# Patient Record
Sex: FEMALE | Race: WHITE | NOT HISPANIC OR LATINO | Employment: OTHER | URBAN - METROPOLITAN AREA
[De-identification: names, ages, dates, MRNs, and addresses within clinical notes are randomized per-mention and may not be internally consistent; named-entity substitution may affect disease eponyms.]

---

## 2017-05-04 LAB
CHOLEST SERPL-MCNC: 198 MG/DL (ref 0–199)
CREAT SERPL-MCNC: 1.09 MG/DL (ref 0.55–1.02)
GFR SERPL CREATININE-BSD FRML MDRD: 54 ML/MIN/1.73M2
GLUCOSE SERPL-MCNC: 93 MG/DL (ref 70–100)
HDLC SERPL-MCNC: 40 MG/DL
LDLC SERPL CALC-MCNC: 140 MG/DL (ref 0–129)
NONHDLC SERPL-MCNC: 158 MG/DL
POTASSIUM SERPL-SCNC: 4.3 MMOL/L (ref 3.5–5.1)
TRIGL SERPL-MCNC: 89 MG/DL (ref 0–149)
TSH SERPL-ACNC: 0.97 UIU/ML (ref 0.3–4.5)

## 2017-05-08 ENCOUNTER — TRANSFERRED RECORDS (OUTPATIENT)
Dept: HEALTH INFORMATION MANAGEMENT | Facility: CLINIC | Age: 62
End: 2017-05-08

## 2017-05-08 LAB
HPV ABSTRACT: NORMAL
PAP-ABSTRACT: NORMAL

## 2018-02-08 LAB
CREAT SERPL-MCNC: 1.06 MG/DL (ref 0.55–1.02)
GFR SERPL CREATININE-BSD FRML MDRD: 55 ML/MIN/1.73M2

## 2018-05-18 ENCOUNTER — TRANSFERRED RECORDS (OUTPATIENT)
Dept: HEALTH INFORMATION MANAGEMENT | Facility: CLINIC | Age: 63
End: 2018-05-18

## 2018-06-18 ENCOUNTER — TRANSFERRED RECORDS (OUTPATIENT)
Dept: MULTI SPECIALTY CLINIC | Facility: CLINIC | Age: 63
End: 2018-06-18

## 2019-07-19 ENCOUNTER — OFFICE VISIT (OUTPATIENT)
Dept: FAMILY MEDICINE | Facility: CLINIC | Age: 64
End: 2019-07-19
Payer: COMMERCIAL

## 2019-07-19 VITALS
DIASTOLIC BLOOD PRESSURE: 62 MMHG | HEART RATE: 64 BPM | WEIGHT: 257 LBS | SYSTOLIC BLOOD PRESSURE: 102 MMHG | TEMPERATURE: 97.9 F | BODY MASS INDEX: 38.95 KG/M2 | HEIGHT: 68 IN

## 2019-07-19 DIAGNOSIS — E03.4 HYPOTHYROIDISM DUE TO ACQUIRED ATROPHY OF THYROID: Primary | ICD-10-CM

## 2019-07-19 DIAGNOSIS — Z12.39 SCREENING FOR BREAST CANCER: ICD-10-CM

## 2019-07-19 DIAGNOSIS — R94.31 ABNORMAL ELECTROCARDIOGRAM: ICD-10-CM

## 2019-07-19 DIAGNOSIS — Z78.0 ASYMPTOMATIC POSTMENOPAUSAL STATUS: ICD-10-CM

## 2019-07-19 DIAGNOSIS — Z82.49 FAMILY HISTORY OF ISCHEMIC HEART DISEASE: ICD-10-CM

## 2019-07-19 DIAGNOSIS — F33.42 RECURRENT MAJOR DEPRESSIVE DISORDER, IN FULL REMISSION (H): ICD-10-CM

## 2019-07-19 PROCEDURE — 84443 ASSAY THYROID STIM HORMONE: CPT | Performed by: FAMILY MEDICINE

## 2019-07-19 PROCEDURE — 36415 COLL VENOUS BLD VENIPUNCTURE: CPT | Performed by: FAMILY MEDICINE

## 2019-07-19 PROCEDURE — 99203 OFFICE O/P NEW LOW 30 MIN: CPT | Performed by: FAMILY MEDICINE

## 2019-07-19 PROCEDURE — 93000 ELECTROCARDIOGRAM COMPLETE: CPT | Performed by: FAMILY MEDICINE

## 2019-07-19 RX ORDER — LEVOTHYROXINE SODIUM 100 UG/1
100 TABLET ORAL EVERY MORNING
COMMUNITY
End: 2019-07-19

## 2019-07-19 RX ORDER — LEVOTHYROXINE SODIUM 100 UG/1
100 TABLET ORAL EVERY MORNING
Qty: 90 TABLET | Refills: 3 | Status: SHIPPED | OUTPATIENT
Start: 2019-07-19 | End: 2020-07-23

## 2019-07-19 ASSESSMENT — MIFFLIN-ST. JEOR: SCORE: 1755.27

## 2019-07-19 NOTE — PROGRESS NOTES
"Subjective     Hanh Villalba is a 63 year old female who presents to clinic today for the following health issues:    Is a retired  ([employer] [place]).  Is partnered with her long term partner Abigail.  Enjoying care home.  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    HPI   New Patient/Transfer of Care  Has a history of hypothyroidism and depression.  Feels that her depression is completely resolved with Prozac and desires to continue on medication.    Mother had an MI at age 70 - and  of heart disease at age 80.   70 year old sister just had an MI 4 weeks ago - was asymptomatic prior.        Reviewed and updated as needed this visit by Provider  Allergies  Meds  Problems  Surg Hx         Review of Systems   ROS COMP: Constitutional, HEENT, cardiovascular, pulmonary, gi and gu systems are negative, except as otherwise noted.      Objective    /62 (Cuff Size: Adult Large)   Pulse 64   Temp 97.9  F (36.6  C) (Oral)   Ht 1.721 m (5' 7.75\")   Wt 116.6 kg (257 lb)   BMI 39.37 kg/m    Body mass index is 39.37 kg/m .  Physical Exam   GENERAL: healthy, alert and no distress  RESP: lungs clear to auscultation - no rales, rhonchi or wheezes  BREAST: normal without masses, tenderness or nipple discharge and no palpable axillary masses or adenopathy  CV: regular rate and rhythm, normal S1 S2, no S3 or S4, no murmur, click or rub, no peripheral edema and peripheral pulses strong  PSYCH: mentation appears normal, affect normal/bright    Diagnostic Test Results:  No results found for this or any previous visit (from the past 24 hour(s)).    EKG - normal, bradycardic.    Assessment & Plan     (E03.4) Hypothyroidism due to acquired atrophy of thyroid  (primary encounter diagnosis)  Comment: asymptomatic   Plan: levothyroxine (SYNTHROID/LEVOTHROID) 100 MCG         tablet, TSH with free T4 reflex        adjust therapy based on labs      (F33.42) Recurrent major depressive disorder, in full " "remission (H)  Comment: in remission  Plan: FLUoxetine (PROZAC) 20 MG capsule        Continue current medication      (Z78.0) Asymptomatic postmenopausal status  Comment: has never had DEXA  Plan: DX Hip/Pelvis/Spine        adjust therapy based on results    (Z12.31) Screening for breast cancer  Comment:   Plan: mammogram ordered    (Z82.49) Family history of ischemic heart disease  Comment:   Plan: EKG 12-lead complete w/read - Clinics,         Echocardiogram Exercise Stress        Advised stress test given family history         BMI:   Estimated body mass index is 39.37 kg/m  as calculated from the following:    Height as of this encounter: 1.721 m (5' 7.75\").    Weight as of this encounter: 116.6 kg (257 lb).   Weight management plan: Discussed healthy diet and exercise guidelines        Discussed that we completed many tasks for a preventive visit today.  Could consider returning this year to complete CPE or we could simply meet again in a year for Annual Wellness exam.      No follow-ups on file.    Angeli Messina MD  [facility]      "

## 2019-07-19 NOTE — PATIENT INSTRUCTIONS
You will be contacted to schedule your stress test.    My care team will contact you with lab results.

## 2019-07-19 NOTE — LETTER
[facility]  [address] [address]  [phone]                                                                                                July 22, 2019    Hanh Villalba  [address] [place] [address]        Dear Ms. Villalba,    Your recent lab results were within normal limits. A copy of those results are included with this letter.      Sincerely,      Mari Messina MD/    Results for orders placed or performed in visit on 07/19/19   TSH with free T4 reflex   Result Value Ref Range    TSH 1.24 0.40 - 4.00 mU/L

## 2019-07-20 LAB — TSH SERPL DL<=0.005 MIU/L-ACNC: 1.24 MU/L (ref 0.4–4)

## 2019-08-05 ENCOUNTER — ANCILLARY PROCEDURE (OUTPATIENT)
Dept: BONE DENSITY | Facility: CLINIC | Age: 64
End: 2019-08-05
Attending: FAMILY MEDICINE
Payer: COMMERCIAL

## 2019-08-05 ENCOUNTER — ANCILLARY PROCEDURE (OUTPATIENT)
Dept: MAMMOGRAPHY | Facility: CLINIC | Age: 64
End: 2019-08-05
Attending: FAMILY MEDICINE
Payer: COMMERCIAL

## 2019-08-05 DIAGNOSIS — Z12.39 SCREENING FOR BREAST CANCER: ICD-10-CM

## 2019-08-05 DIAGNOSIS — Z78.0 ASYMPTOMATIC POSTMENOPAUSAL STATUS: ICD-10-CM

## 2019-08-05 PROCEDURE — 77067 SCR MAMMO BI INCL CAD: CPT | Mod: TC

## 2019-08-05 PROCEDURE — 77080 DXA BONE DENSITY AXIAL: CPT | Performed by: INTERNAL MEDICINE

## 2019-08-06 ENCOUNTER — ANCILLARY PROCEDURE (OUTPATIENT)
Dept: CARDIOLOGY | Facility: CLINIC | Age: 64
End: 2019-08-06
Attending: FAMILY MEDICINE
Payer: COMMERCIAL

## 2019-08-06 DIAGNOSIS — R94.31 ABNORMAL ELECTROCARDIOGRAM: ICD-10-CM

## 2019-08-06 DIAGNOSIS — Z82.49 FAMILY HISTORY OF ISCHEMIC HEART DISEASE: ICD-10-CM

## 2019-08-06 PROCEDURE — 93321 DOPPLER ECHO F-UP/LMTD STD: CPT | Mod: 26 | Performed by: INTERNAL MEDICINE

## 2019-08-06 PROCEDURE — 93016 CV STRESS TEST SUPVJ ONLY: CPT | Performed by: INTERNAL MEDICINE

## 2019-08-06 PROCEDURE — 93350 STRESS TTE ONLY: CPT | Mod: TC | Performed by: INTERNAL MEDICINE

## 2019-08-06 PROCEDURE — 93321 DOPPLER ECHO F-UP/LMTD STD: CPT | Mod: TC | Performed by: INTERNAL MEDICINE

## 2019-08-06 PROCEDURE — 93350 STRESS TTE ONLY: CPT | Mod: 26 | Performed by: INTERNAL MEDICINE

## 2019-08-06 PROCEDURE — 93325 DOPPLER ECHO COLOR FLOW MAPG: CPT | Mod: 26 | Performed by: INTERNAL MEDICINE

## 2019-08-06 PROCEDURE — 93352 ADMIN ECG CONTRAST AGENT: CPT | Performed by: INTERNAL MEDICINE

## 2019-08-06 PROCEDURE — 93325 DOPPLER ECHO COLOR FLOW MAPG: CPT | Mod: TC | Performed by: INTERNAL MEDICINE

## 2019-08-06 PROCEDURE — 93018 CV STRESS TEST I&R ONLY: CPT | Performed by: INTERNAL MEDICINE

## 2019-08-06 PROCEDURE — 93017 CV STRESS TEST TRACING ONLY: CPT | Performed by: INTERNAL MEDICINE

## 2019-08-06 PROCEDURE — 40000264 ZZHC STATISTIC IV PUSH SINGLE INITIAL SUBSTANCE: Performed by: INTERNAL MEDICINE

## 2019-08-06 RX ADMIN — Medication 5 ML: at 09:15

## 2019-08-13 ENCOUNTER — MYC MEDICAL ADVICE (OUTPATIENT)
Dept: FAMILY MEDICINE | Facility: CLINIC | Age: 64
End: 2019-08-13

## 2019-08-14 ENCOUNTER — OFFICE VISIT (OUTPATIENT)
Dept: FAMILY MEDICINE | Facility: CLINIC | Age: 64
End: 2019-08-14
Payer: COMMERCIAL

## 2019-08-14 VITALS
SYSTOLIC BLOOD PRESSURE: 108 MMHG | WEIGHT: 255 LBS | BODY MASS INDEX: 38.65 KG/M2 | HEIGHT: 68 IN | HEART RATE: 68 BPM | TEMPERATURE: 98.5 F | DIASTOLIC BLOOD PRESSURE: 60 MMHG

## 2019-08-14 DIAGNOSIS — N95.0 POST-MENOPAUSAL BLEEDING: Primary | ICD-10-CM

## 2019-08-14 DIAGNOSIS — E03.4 HYPOTHYROIDISM DUE TO ACQUIRED ATROPHY OF THYROID: ICD-10-CM

## 2019-08-14 PROCEDURE — 99214 OFFICE O/P EST MOD 30 MIN: CPT | Performed by: FAMILY MEDICINE

## 2019-08-14 ASSESSMENT — MIFFLIN-ST. JEOR: SCORE: 1746.2

## 2019-08-14 NOTE — PROGRESS NOTES
"Subjective     Hanh Villalba is a 63 year old female who presents to clinic today for the following health issues:    HPI   Vaginal Bleeding (Dysmenorrhea)  Onset: august 13    Description:   Duration of bleeding episodes: started august 13th with spotting  Frequency between periods:  Post menopausal  Cramping: mild lower abdominal cramping    Accompanying Signs & Symptoms:  Weakness: no   Lightheadedness: no   Hot flashes: not for many years  Nosebleeds/Easy bruising: no   Vaginal Discharge: no   No history of abnormal discharge    Menarche-at age 11.   menopause around [age], since then no vaginal bleeding  No pregnancy, no sexual activity  No gyn issues, no prolonged   History of oophorectomy left side-history of cyst  7/19/19=tsh normal      Woke up in the morning with wiping noticed brown/red discharge, she was sure it was coming from vaginal area, from vaginal area, today stopped  No trauma or sexual activity  Mother with history of AML  gramdmother -uterine or cervical cancer    Pap done in 2017 was neg with neg hpv        Patient Active Problem List   Diagnosis     Hypothyroidism due to acquired atrophy of thyroid     Recurrent major depressive disorder, in full remission (H)     Past Surgical History:   Procedure Laterality Date     SALPINGO OOPHORECTOMY,R/L/SHANDA Left     Salpingo Oophorectomy,       Social History     Tobacco Use     Smoking status: Former Smoker     Smokeless tobacco: Never Used   Substance Use Topics     Alcohol use: Yes     No family history on file.        Reviewed and updated as needed this visit by Provider         Review of Systems   ROS COMP: Constitutional, HEENT, cardiovascular, pulmonary, GI, , musculoskeletal, neuro, skin, endocrine and psych systems are negative, except as otherwise noted.      Objective    /60   Pulse 68   Temp 98.5  F (36.9  C) (Oral)   Ht 1.721 m (5' 7.75\")   Wt 115.7 kg (255 lb)   BMI 39.06 kg/m    Body mass index is 39.06 kg/m .  Physical " "Exam   GENERAL: healthy, alert and no distress  EYES: Eyes grossly normal to inspection, PERRL and conjunctivae and sclerae normal  RESP: lungs clear to auscultation - no rales, rhonchi or wheezes  BREAST: normal without masses, tenderness or nipple discharge and no palpable axillary masses or adenopathy  CV: regular rate and rhythm, normal S1 S2, no S3 or S4, no murmur, click or rub, no peripheral edema and peripheral pulses strong  ABDOMEN: soft, nontender, no hepatosplenomegaly, no masses and bowel sounds normal  MS: no gross musculoskeletal defects noted, no edema  SKIN: no suspicious lesions or rashes  NEURO: Normal strength and tone, mentation intact and speech normal  PSYCH: mentation appears normal, affect normal/bright    Diagnostic Test Results:  Labs reviewed in Epic        Assessment & Plan       ICD-10-CM    1. Post-menopausal bleeding N95.0 US Pelvic Complete w Transvaginal   2. Hypothyroidism due to acquired atrophy of thyroid E03.4    new to this provider  Patient is  who went through menapause about 15-20 years ago with resolved postmenopausal bleeding.  History of hypothyroidism-taking meds, recent labs was normal.    Ultrasound as advised, reviewed differential diagnosis being cancer  Follow up with gyn for endometrial biopsy    BMI:   Estimated body mass index is 39.06 kg/m  as calculated from the following:    Height as of this encounter: 1.721 m (5' 7.75\").    Weight as of this encounter: 115.7 kg (255 lb).   Weight management plan: Discussed healthy diet and exercise guidelines        Work on weight loss  Regular exercise    No follow-ups on file.    Lamonte Castillo DO  [facility]    "

## 2019-08-14 NOTE — PATIENT INSTRUCTIONS
Ultrasound as advised  Follow up with gyn  Lamonte Castillo D.O.    Patient Education     [facility]   Discharged by : Sierra Proctor MA    Paper scripts provided to patient : no     If you have any questions regarding your visit please contact your care team:     Team Gold                Clinic Hours Telephone Number     Dr. Mari Brooksfer Femi, CNP 7am-7pm  Monday - Thursday   7am-5pm  Fridays  [phone]   (Appointment scheduling available 24/7)     RN Line  [phone] option 2     Urgent Care - [place] - 11am-9pm Monday-Friday Saturday-Sunday- 9am-5pm     [place] -   5pm-9pm Monday-Friday Saturday-Sunday- 9am-5pm    [phone] - [place]    [phone] - [place]     For a Price Quote for your services, please call our FileHold Document Management software Price Line at [phone].     What options do I have for visits at the clinic other than the traditional office visit?     To expand how we care for you, many of our providers are utilizing electronic visits (e-visits) and telephone visits, when medically appropriate, for interactions with their patients rather than a visit in the clinic. We also offer nurse visits for many medical concerns. Just like any other service, we will bill your insurance company for this type of visit based on time spent on the phone with your provider. Not all insurance companies cover these visits. Please check with your medical insurance if this type of visit is covered. You will be responsible for any charges that are not paid by your insurance.   E-visits via Webupo: generally incur a $45.00 fee.     Telephone visits:  Time spent on the phone: *charged based on time that is spent on the phone in increments of 10 minutes. Estimated cost:   5-10 mins $30.00   11-20 mins. $59.00   21-30 mins. $85.00     Use [facility]upo (secure email communication and access to your chart) to send your primary care provider a  message or make an appointment. Ask someone on your Team how to sign up for [facility].     As always, Thank you for trusting us with your health care needs!    [facility]:    Scheduling Appointments  [place]  Call: [phone]    Betty Zavala, [facility]  Call: [phone]    [facility]  Call: [phone]    For Gastroenterology referrals   [facility]   Clinics and Surgery [place], 4th Floor   [address] [place] [address]   Appointments: [phone]    WHERE TO GO FOR CARE?  Clinic    Make an appointment if you:       Are sick (cold, cough, flu, sore throat, earache or in pain).       Have a small injury (sprain, small cut, burn or broken bone).       Need a physical exam, Pap smear, vaccine or prescription refill.       Have questions about your health or medicines.    To reach us:      Call [phone] ([phone]). Open 24 hours every day. (For counseling services, call [phone].)    Log into [employer] at [email or web address]. (Visit [email or web address] to create an account.) Hospital emergency room    An emergency is a serious or life- threatening problem that must be treated right away.    Call 590 or get to the hospital if you have:      Very bad or sudden:            - Chest pain or pressure         - Bleeding         - Head or belly pain         - Dizziness or trouble seeing, walking or                          Speaking      Problems breathing      Blood in your vomit or you are coughing up blood      A major injury (knocked out, loss of a finger or limb, rape, broken bone protruding from skin)    A mental health crisis. (Or call the Mental Health Crisis line at 1-216.630.3772 or Suicide Prevention Hotline at 1-386.817.2242.)    Open 24 hours every day. You don't need an appointment.     Urgent care    Visit urgent care for sickness or small injuries when the clinic is closed. You  don't need an appointment. To check hours or find an urgent care near you, visit [email or web address]. Online care    Get online care from [facility] for more than 70 common problems, like colds, allergies and infections. Open 24 hours every day at:   [email or web address]   Need help deciding?    For advice about where to be seen, you may call your clinic and ask to speak with a nurse. We're here for you 24 hours every day.         If you are deaf or hard of hearing, please let us know. We provide many free services including sign language interpreters, oral interpreters, TTYs, telephone amplifiers, note takers and written materials.

## 2019-08-14 NOTE — TELEPHONE ENCOUNTER
See [employer] message reply to patient.  Will await response.    RN followed protocol found in Telephone Triage Protocols for Nurses (fifth edition) by Kailyn Coates.    Eddi Sharma RN

## 2019-08-14 NOTE — TELEPHONE ENCOUNTER
Patient states she will call the appointment line (see Mychart).    Will close encounter.    Chandler Zhou RN

## 2019-08-15 ENCOUNTER — ANCILLARY PROCEDURE (OUTPATIENT)
Dept: ULTRASOUND IMAGING | Facility: CLINIC | Age: 64
End: 2019-08-15
Attending: FAMILY MEDICINE
Payer: COMMERCIAL

## 2019-08-15 DIAGNOSIS — N95.0 POST-MENOPAUSAL BLEEDING: ICD-10-CM

## 2019-08-15 PROCEDURE — 76856 US EXAM PELVIC COMPLETE: CPT

## 2019-08-15 PROCEDURE — 76830 TRANSVAGINAL US NON-OB: CPT

## 2019-08-27 ENCOUNTER — OFFICE VISIT (OUTPATIENT)
Dept: OBGYN | Facility: CLINIC | Age: 64
End: 2019-08-27
Payer: COMMERCIAL

## 2019-08-27 VITALS
WEIGHT: 258.6 LBS | OXYGEN SATURATION: 98 % | DIASTOLIC BLOOD PRESSURE: 71 MMHG | HEART RATE: 55 BPM | BODY MASS INDEX: 39.61 KG/M2 | SYSTOLIC BLOOD PRESSURE: 115 MMHG

## 2019-08-27 DIAGNOSIS — N95.0 POSTMENOPAUSAL BLEEDING: Primary | ICD-10-CM

## 2019-08-27 PROCEDURE — 99203 OFFICE O/P NEW LOW 30 MIN: CPT | Performed by: OBSTETRICS & GYNECOLOGY

## 2019-08-27 RX ORDER — CLOBETASOL PROPIONATE 0.5 MG/G
OINTMENT TOPICAL
COMMUNITY
Start: 2018-06-18 | End: 2020-07-17

## 2019-08-27 NOTE — PROGRESS NOTES
Hanh is a 63 year old  referred here by Dr. Castillo with complaint of postmenopausal bleeding uterine bleeding.  She went through menopause around age 43.    She developed bleeding on .   She noted brown and red stained discharge and blood.  She states the bleeding isn't too heavy but she has intermittent bleeding every day.  She has used liners and tampons.   She has had associated cramping.    She has not had any dizziness, shortness of breath, fatigue, or chest pain.  She has had no prior episodes of bleeding.     Her grandmother had a uterine or cervical cancer.     Component      Latest Ref Rng & Units 2019   TSH      0.40 - 4.00 mU/L 1.24     She had the following ultrasound and we reviewed the report and the films.      ULTRASOUND PELVIC COMPLETE WITH TRANSVAGINAL IMAGING  8/15/2019 9:00 AM   HISTORY: Post-menopausal bleeding.  FINDINGS:  Transvaginal images were performed to better evaluate the patient's uterus, ovaries and endometrial stripe.  The uterus is elongated in appearance measuring 10.4 x 5.3 x 5.9 cm. No fibroids are evident. Endometrial stripe measures 34 mm and is abnormally thickened for patient's age. Echogenic focus in the region of the lower uterine segment could represent an endometrial polyp measuring 1 cm in diameter. The right ovary is normal measuring 1.4 x  1.3 x 2.4 cm. The left ovary is not visualized and likely obscured by bowel gas.  No adnexal masses are present. No free pelvic fluid is present.                                                   IMPRESSION: Marked thickening of the endometrial stripe with echogenic focus in the lower uterine segment of the endometrial cavity, likely a polyp. Endometrial hyperplasia or neoplasia remains in the differential.      Past Medical History:   Diagnosis Date     Overweight        Past Surgical History:   Procedure Laterality Date     SALPINGO OOPHORECTOMY,R/L/SHANDA Left 1980s    Salpingo Oophorectomy,       OB History     Para Term  AB Living   0 0 0 0 0 0   SAB TAB Ectopic Multiple Live Births   0 0 0 0 0       Gynecological History         No LMP recorded. Patient is postmenopausal.  Menopause age 43     NoSTD/no PID/no IUD      See above HPI         No Known Allergies      Current Outpatient Medications   Medication Sig Dispense Refill     Apple Cider Vinegar 600 MG CAPS        CALCIUM PO Take 20 mg by mouth       CHELATED MAGNESIUM PO Take 600 mg by mouth       cholecalciferol (VITAMIN D3) 5000 units TABS tablet Take by mouth daily       clobetasol (TEMOVATE) 0.05 % external ointment Apply sparingly then once or twice a week as needed       Cyanocobalamin 1500 MCG TBDP        FLUoxetine (PROZAC) 20 MG capsule Take 1 capsule (20 mg) by mouth daily 90 capsule 3     Glucosamine-Chondroitin (GLUCOSAMINE CHONDR COMPLEX PO) Take 750 mg by mouth       Lactobacillus (ACIDOPHILUS PO)        levothyroxine (SYNTHROID/LEVOTHROID) 100 MCG tablet Take 1 tablet (100 mcg) by mouth every morning 90 tablet 3     Omega 3-6-9 Fatty Acids (OMEGA-3-6-9 PO)        Turmeric 450 MG CAPS        Zinc Picolinate 25 MG TABS          Social History     Socioeconomic History     Marital status: Single     Spouse name: Not on file     Number of children: Not on file     Years of education: Not on file     Highest education level: Not on file   Occupational History     Occupation: retired     Comment:    Social Needs     Financial resource strain: Not on file     Food insecurity:     Worry: Not on file     Inability: Not on file     Transportation needs:     Medical: Not on file     Non-medical: Not on file   Tobacco Use     Smoking status: Former Smoker     Smokeless tobacco: Former User     Quit date: 1987   Substance and Sexual Activity     Alcohol use: Yes     Drug use: Never     Sexual activity: Not Currently   Lifestyle     Physical activity:     Days per week: Not on file     Minutes per session: Not on  file     Stress: Not on file   Relationships     Social connections:     Talks on phone: Not on file     Gets together: Not on file     Attends Christian service: Not on file     Active member of club or organization: Not on file     Attends meetings of clubs or organizations: Not on file     Relationship status: Not on file     Intimate partner violence:     Fear of current or ex partner: Not on file     Emotionally abused: Not on file     Physically abused: Not on file     Forced sexual activity: Not on file   Other Topics Concern     Not on file   Social History Narrative     Not on file       Family History   Problem Relation Age of Onset     Acute myelogenous leukemia Mother      Parkinsonism Father      Obesity Sister      Heart Disease Sister      Uterine Cancer Maternal Grandmother 40     No Known Problems Sister          Review of Systems:  10 point ROS of systems including Constitutional, Eyes, Respiratory, Cardiovascular, Gastroenterology, Genitourinary, Integumentary, Muscularskeletal, Psychiatric were all negative except for pertinent positives noted in my HPI and in the PMH.          EXAM:  /71 (BP Location: Right arm, Cuff Size: Adult Large)   Pulse 55   Wt 117.3 kg (258 lb 9.6 oz)   SpO2 98%   BMI 39.61 kg/m    Body mass index is 39.61 kg/m .  General Appearance:  healthy, alert, active, no distress  Skin:  Normal skin turgor  Neuro:  Alert, cranial nerves grossly intact  HEENT: NCAT  Neck:  No masses or lesions carotids are +2/4. No bruits heard  Lungs:  Good respiratory effort   Abdomen: Soft, nontender.  Normal bowel sounds.  No masses  Pelvic exam:  Not performed   Extremities:  No clubbing, cyanosis or edema.        ASSESSMENT:  Postmenopausal bleeding  Endometrial thickening on ultrasound       PLAN:  We discussed the bleeding profiles as people age, approach and are in menopause.  Even though menstrual changes and irregularities are common and expected, they may also indicate or  precipitate endometrial abnormalities.   The patient and I discussed the options for evaluation.  The EMB, SIS and the D&C were reviewed with her.  The EMB will not remove a polyp, but will give a tissue sample.  The SIS will further evaluate the lining, but no tissue sample, and should she have a suspected polyp, it would not be removed.  The D&C is more expensive but is currently the gold standard.  With the thickening of the endometrium so thick and also with the possibility of polyp, I suggest the D&C with hysteroscopy is the best choice of the options noted above.    The D&C and Hysteroscopy is discussed with her and the goals and limitations.  The MyoSure is used to help remove tissue under visual guidance.  The instruments are discussed.  We reviewed the associated risks and benefits and goals and limitations.  The risks include, but are not limited to, death, brain damage, paralysis of any or all limbs, loss of an organ, function of an organ, disfiguring scars, bleeding, infection, damage to the uterus, tubes, ovaries, bowel, bladder, cervix and vagina.  In addition, there is a possibility of other procedures that might be deemed necessary at the time of the surgery, depending upon findings and/or complications.  This may also include laparoscopy, laparotomy, and rarely colostomy (which often times can be reversible in the future).  Risks with blood include but are not limited to HIV/AIDS, hepatitis and transfusion reactions, with transfusion reactions being the greatest risk.  Questions seem to be answered.   TT 30 min  CT and review of records and scans, greater than 80% as noted above in the Plan.   Navdeep Barajas MD

## 2019-08-29 ENCOUNTER — TELEPHONE (OUTPATIENT)
Dept: OBGYN | Facility: CLINIC | Age: 64
End: 2019-08-29

## 2019-08-29 NOTE — TELEPHONE ENCOUNTER
Surgery Pre-Certification    Medical Record Number: [id number]  Hanh Villalba  YOB: 1955   Phone: [phone] (home)   Primary Provider: Angeli [place]/  /  Reason for Admit:  NICK    Surgeon: KANDY Barajas MD  Surgical Procedure: Myosure D&C  ICD-9 Coded: N95.0  Date of Surgery: 9/12/19 12:00  Consent signed? No    Date signed:   Hospital: [facility]  Outpatient    Requestor:  Marah Gregg CMA     Location:  [facility] [phone]    Sent to pre cert

## 2019-08-29 NOTE — TELEPHONE ENCOUNTER
Reason for call:  Schedule surgery  Patient called regarding (reason for call): appointment  Additional comments: Patient states she is waiting for Marah to call her back to schedule surgery. Please call.     Phone number to reach patient:  Home number on file [phone] (home)    Best Time:  any    Can we leave a detailed message on this number?  NO

## 2019-08-29 NOTE — TELEPHONE ENCOUNTER
I called patient and gave her surgery information and scheduled her for post op. Appt.  She will schedule pre op physical with family doctor.  JOVANA Almaguer 8/29/2019

## 2019-08-29 NOTE — TELEPHONE ENCOUNTER
Authorizing Provider Encounter Provider   Navdeep Barajas MD Bassett, Cecil Emerson, MD   Associated Diagnoses     Postmenopausal bleeding  - Primary       Order Questions     Question Answer Comment   Procedure name(s) - multi select hysteroscopy and dilation and curettage with Myosure    Is this a multi surgeon case? No    Laterality N/A    Explant? No    Reason for procedure postmenopausal bleeding, endometrial thickening on ultrasound    Urgency of Surgery Patient Choice/Next Available    Location of Case: [facility]    Surgeon Procedure Time (incision to closure) in minutes (per procedure as applicable) 30    Note:  Surgical Case Time Needed (in minutes)   Patient Class (for admit prior to surgery, specify number of days in comments): Same day (surgery center outpatient)    Anesthesia Local + MAC    H&P To Be Completed By: PCP     needed? No    Is a PAC Consult order needed for the case? No    Note:  Preoperative Assessment Center   Post-Op Appointment 2 weeks    Vendor Needed? Yes

## 2019-08-30 NOTE — TELEPHONE ENCOUNTER
Patient has [employer] Medicare insurance, when checking on [employer] it comes up with no auth or pre-d needed on this plan for cpt code: 48454, 19126

## 2019-09-11 ENCOUNTER — ANESTHESIA EVENT (OUTPATIENT)
Dept: SURGERY | Facility: AMBULATORY SURGERY CENTER | Age: 64
End: 2019-09-11

## 2019-09-11 NOTE — ANESTHESIA PREPROCEDURE EVALUATION
Anesthesia Pre-Procedure Evaluation    Patient: Hanh Villalba   MRN:     [id number] Gender:   female   Age:    63 year old :      1955        Preoperative Diagnosis: POST MENOPAUSAL BLEEDING, ENDOMETRIAL THICKENING ON ULTRASOUND   Procedure(s):  HYSTEROSCOPY WITH MORCELLATOR (MYOSURE)  DILATION AND CURETTAGE, UTERUS     Past Medical History:   Diagnosis Date     Overweight       Past Surgical History:   Procedure Laterality Date     SALPINGO OOPHORECTOMY,R/L/SHANDA Left 1980s    Salpingo Oophorectomy,          Anesthesia Evaluation     .             ROS/MED HX    ENT/Pulmonary:  - neg pulmonary ROS     Neurologic:  - neg neurologic ROS     Cardiovascular:  - neg cardiovascular ROS       METS/Exercise Tolerance:  >4 METS   Hematologic:  - neg hematologic  ROS       Musculoskeletal:  - neg musculoskeletal ROS       GI/Hepatic:  - neg GI/hepatic ROS       Renal/Genitourinary:  - ROS Renal section negative       Endo:     (+) thyroid problem hypothyroidism, .      Psychiatric:     (+) psychiatric history depression      Infectious Disease:         Malignancy:      - no malignancy   Other: Comment: Post-menopausal bleeding                        PHYSICAL EXAM:   Mental Status/Neuro: A/A/O   Airway: Facies: Feasible  Mallampati: I  Mouth/Opening: Full  TM distance: > 6 cm  Neck ROM: Full   Respiratory: Auscultation: CTAB     Resp. Rate: Normal     Resp. Effort: Normal      CV: Rhythm: Regular  Rate: Age appropriate  Heart: Normal Sounds  Edema: None   Comments:      Dental: Normal Dentition                LABS:  CBC: No results found for: WBC, HGB, HCT, PLT  BMP:   Lab Results   Component Value Date    CR 1.06 (H) 2018     COAGS: No results found for: PTT, INR, FIBR  POC: No results found for: BGM, HCG, HCGS  OTHER:   Lab Results   Component Value Date    TSH 1.24 2019        Preop Vitals    BP Readings from Last 3 Encounters:   19 115/71   19 108/60   19 102/62    Pulse Readings  "from Last 3 Encounters:   08/27/19 55   08/14/19 68   07/19/19 64      Resp Readings from Last 3 Encounters:   No data found for Resp    SpO2 Readings from Last 3 Encounters:   08/27/19 98%      Temp Readings from Last 1 Encounters:   08/14/19 36.9  C (98.5  F) (Oral)    Ht Readings from Last 1 Encounters:   08/14/19 1.721 m (5' 7.75\")      Wt Readings from Last 1 Encounters:   08/27/19 117.3 kg (258 lb 9.6 oz)    Estimated body mass index is 39.61 kg/m  as calculated from the following:    Height as of 8/14/19: 1.721 m (5' 7.75\").    Weight as of 8/27/19: 117.3 kg (258 lb 9.6 oz).     LDA:        Assessment:   ASA SCORE: 2    H&P: History and physical reviewed and following examination; no interval change.    NPO Status: NPO Appropriate     Plan:   Anes. Type:  MAC   Pre-Medication: None   Induction:  IV (Standard)   Airway: Native Airway   Access/Monitoring: PIV   Maintenance: Propofol Sedation     Postop Plan:   Postop Pain: None  Postop Sedation/Airway: Not planned     PONV Management: Adult Risk Factors: Female   Prevention: Ondansetron, Dexamethasone, Propofol     CONSENT: Direct conversation   Plan and risks discussed with: Patient   Blood Products: Consented (ALL Blood Products)                   Frederick Andrade MD  "

## 2019-09-12 ENCOUNTER — ANESTHESIA (OUTPATIENT)
Dept: SURGERY | Facility: AMBULATORY SURGERY CENTER | Age: 64
End: 2019-09-12
Payer: COMMERCIAL

## 2019-09-12 ENCOUNTER — HOSPITAL ENCOUNTER (OUTPATIENT)
Facility: AMBULATORY SURGERY CENTER | Age: 64
Discharge: HOME | End: 2019-09-12
Attending: OBSTETRICS & GYNECOLOGY | Admitting: OBSTETRICS & GYNECOLOGY
Payer: COMMERCIAL

## 2019-09-12 VITALS
OXYGEN SATURATION: 96 % | DIASTOLIC BLOOD PRESSURE: 48 MMHG | SYSTOLIC BLOOD PRESSURE: 127 MMHG | RESPIRATION RATE: 16 BRPM | TEMPERATURE: 96.2 F

## 2019-09-12 DIAGNOSIS — G89.18 POST-OP PAIN: Primary | ICD-10-CM

## 2019-09-12 DIAGNOSIS — C54.1 ENDOMETRIAL ADENOCARCINOMA (H): Primary | ICD-10-CM

## 2019-09-12 PROCEDURE — 88342 IMHCHEM/IMCYTCHM 1ST ANTB: CPT | Performed by: OBSTETRICS & GYNECOLOGY

## 2019-09-12 PROCEDURE — 58558 HYSTEROSCOPY BIOPSY: CPT | Performed by: OBSTETRICS & GYNECOLOGY

## 2019-09-12 PROCEDURE — 58558 HYSTEROSCOPY BIOPSY: CPT

## 2019-09-12 PROCEDURE — 00000159 ZZHCL STATISTIC H-SEND OUTS PREP: Performed by: OBSTETRICS & GYNECOLOGY

## 2019-09-12 PROCEDURE — 88305 TISSUE EXAM BY PATHOLOGIST: CPT | Performed by: OBSTETRICS & GYNECOLOGY

## 2019-09-12 PROCEDURE — 81288 MLH1 GENE: CPT | Performed by: OBSTETRICS & GYNECOLOGY

## 2019-09-12 PROCEDURE — 88341 IMHCHEM/IMCYTCHM EA ADD ANTB: CPT | Performed by: OBSTETRICS & GYNECOLOGY

## 2019-09-12 PROCEDURE — G8918 PT W/O PREOP ORDER IV AB PRO: HCPCS

## 2019-09-12 PROCEDURE — G8907 PT DOC NO EVENTS ON DISCHARG: HCPCS

## 2019-09-12 RX ORDER — ONDANSETRON 2 MG/ML
INJECTION INTRAMUSCULAR; INTRAVENOUS PRN
Status: DISCONTINUED | OUTPATIENT
Start: 2019-09-12 | End: 2019-09-12

## 2019-09-12 RX ORDER — FENTANYL CITRATE 50 UG/ML
INJECTION, SOLUTION INTRAMUSCULAR; INTRAVENOUS PRN
Status: DISCONTINUED | OUTPATIENT
Start: 2019-09-12 | End: 2019-09-12

## 2019-09-12 RX ORDER — PROPOFOL 10 MG/ML
INJECTION, EMULSION INTRAVENOUS CONTINUOUS PRN
Status: DISCONTINUED | OUTPATIENT
Start: 2019-09-12 | End: 2019-09-12

## 2019-09-12 RX ORDER — KETOROLAC TROMETHAMINE 30 MG/ML
15 INJECTION, SOLUTION INTRAMUSCULAR; INTRAVENOUS ONCE
Status: COMPLETED | OUTPATIENT
Start: 2019-09-12 | End: 2019-09-12

## 2019-09-12 RX ORDER — IBUPROFEN 600 MG/1
600 TABLET, FILM COATED ORAL
Status: CANCELLED | OUTPATIENT
Start: 2019-09-12

## 2019-09-12 RX ORDER — HYDRALAZINE HYDROCHLORIDE 20 MG/ML
2.5-5 INJECTION INTRAMUSCULAR; INTRAVENOUS EVERY 10 MIN PRN
Status: DISCONTINUED | OUTPATIENT
Start: 2019-09-12 | End: 2019-09-13 | Stop reason: HOSPADM

## 2019-09-12 RX ORDER — HYDROMORPHONE HYDROCHLORIDE 1 MG/ML
.3-.5 INJECTION, SOLUTION INTRAMUSCULAR; INTRAVENOUS; SUBCUTANEOUS EVERY 10 MIN PRN
Status: DISCONTINUED | OUTPATIENT
Start: 2019-09-12 | End: 2019-09-13 | Stop reason: HOSPADM

## 2019-09-12 RX ORDER — FENTANYL CITRATE 50 UG/ML
25-50 INJECTION, SOLUTION INTRAMUSCULAR; INTRAVENOUS
Status: DISCONTINUED | OUTPATIENT
Start: 2019-09-12 | End: 2019-09-13 | Stop reason: HOSPADM

## 2019-09-12 RX ORDER — GABAPENTIN 300 MG/1
300 CAPSULE ORAL ONCE
Status: COMPLETED | OUTPATIENT
Start: 2019-09-12 | End: 2019-09-12

## 2019-09-12 RX ORDER — ALBUTEROL SULFATE 0.83 MG/ML
2.5 SOLUTION RESPIRATORY (INHALATION) EVERY 4 HOURS PRN
Status: DISCONTINUED | OUTPATIENT
Start: 2019-09-12 | End: 2019-09-13 | Stop reason: HOSPADM

## 2019-09-12 RX ORDER — ONDANSETRON 2 MG/ML
4 INJECTION INTRAMUSCULAR; INTRAVENOUS EVERY 30 MIN PRN
Status: DISCONTINUED | OUTPATIENT
Start: 2019-09-12 | End: 2019-09-13 | Stop reason: HOSPADM

## 2019-09-12 RX ORDER — ACETAMINOPHEN 325 MG/1
650 TABLET ORAL EVERY 4 HOURS PRN
Qty: 50 TABLET | Refills: 0 | Status: SHIPPED | OUTPATIENT
Start: 2019-09-12 | End: 2019-10-22

## 2019-09-12 RX ORDER — LIDOCAINE 40 MG/G
CREAM TOPICAL
Status: DISCONTINUED | OUTPATIENT
Start: 2019-09-12 | End: 2019-09-13 | Stop reason: HOSPADM

## 2019-09-12 RX ORDER — SODIUM CHLORIDE, SODIUM LACTATE, POTASSIUM CHLORIDE, CALCIUM CHLORIDE 600; 310; 30; 20 MG/100ML; MG/100ML; MG/100ML; MG/100ML
INJECTION, SOLUTION INTRAVENOUS CONTINUOUS
Status: DISCONTINUED | OUTPATIENT
Start: 2019-09-12 | End: 2019-09-13 | Stop reason: HOSPADM

## 2019-09-12 RX ORDER — OXYCODONE AND ACETAMINOPHEN 5; 325 MG/1; MG/1
1 TABLET ORAL
Status: CANCELLED | OUTPATIENT
Start: 2019-09-12

## 2019-09-12 RX ORDER — DEXAMETHASONE SODIUM PHOSPHATE 4 MG/ML
4 INJECTION, SOLUTION INTRA-ARTICULAR; INTRALESIONAL; INTRAMUSCULAR; INTRAVENOUS; SOFT TISSUE EVERY 10 MIN PRN
Status: DISCONTINUED | OUTPATIENT
Start: 2019-09-12 | End: 2019-09-13 | Stop reason: HOSPADM

## 2019-09-12 RX ORDER — ACETAMINOPHEN 325 MG/1
975 TABLET ORAL ONCE
Status: COMPLETED | OUTPATIENT
Start: 2019-09-12 | End: 2019-09-12

## 2019-09-12 RX ORDER — ACETAMINOPHEN 325 MG/1
975 TABLET ORAL ONCE
Status: DISCONTINUED | OUTPATIENT
Start: 2019-09-12 | End: 2019-09-13 | Stop reason: HOSPADM

## 2019-09-12 RX ORDER — OXYCODONE HYDROCHLORIDE 5 MG/1
10 TABLET ORAL EVERY 4 HOURS PRN
Status: DISCONTINUED | OUTPATIENT
Start: 2019-09-12 | End: 2019-09-13 | Stop reason: HOSPADM

## 2019-09-12 RX ORDER — ONDANSETRON 4 MG/1
4 TABLET, ORALLY DISINTEGRATING ORAL EVERY 30 MIN PRN
Status: DISCONTINUED | OUTPATIENT
Start: 2019-09-12 | End: 2019-09-13 | Stop reason: HOSPADM

## 2019-09-12 RX ORDER — PHYSOSTIGMINE SALICYLATE 1 MG/ML
1.2 INJECTION INTRAVENOUS
Status: DISCONTINUED | OUTPATIENT
Start: 2019-09-12 | End: 2019-09-13 | Stop reason: HOSPADM

## 2019-09-12 RX ORDER — KETOROLAC TROMETHAMINE 30 MG/ML
INJECTION, SOLUTION INTRAMUSCULAR; INTRAVENOUS PRN
Status: DISCONTINUED | OUTPATIENT
Start: 2019-09-12 | End: 2019-09-12

## 2019-09-12 RX ORDER — NALOXONE HYDROCHLORIDE 0.4 MG/ML
.1-.4 INJECTION, SOLUTION INTRAMUSCULAR; INTRAVENOUS; SUBCUTANEOUS
Status: DISCONTINUED | OUTPATIENT
Start: 2019-09-12 | End: 2019-09-13 | Stop reason: HOSPADM

## 2019-09-12 RX ORDER — IBUPROFEN 600 MG/1
600 TABLET, FILM COATED ORAL EVERY 6 HOURS PRN
Qty: 30 TABLET | Refills: 0 | Status: SHIPPED | OUTPATIENT
Start: 2019-09-12 | End: 2019-10-22

## 2019-09-12 RX ORDER — METOPROLOL TARTRATE 1 MG/ML
1-2 INJECTION, SOLUTION INTRAVENOUS EVERY 5 MIN PRN
Status: DISCONTINUED | OUTPATIENT
Start: 2019-09-12 | End: 2019-09-13 | Stop reason: HOSPADM

## 2019-09-12 RX ORDER — PROPOFOL 10 MG/ML
INJECTION, EMULSION INTRAVENOUS PRN
Status: DISCONTINUED | OUTPATIENT
Start: 2019-09-12 | End: 2019-09-12

## 2019-09-12 RX ORDER — MEPERIDINE HYDROCHLORIDE 25 MG/ML
12.5 INJECTION INTRAMUSCULAR; INTRAVENOUS; SUBCUTANEOUS
Status: DISCONTINUED | OUTPATIENT
Start: 2019-09-12 | End: 2019-09-13 | Stop reason: HOSPADM

## 2019-09-12 RX ORDER — LIDOCAINE HYDROCHLORIDE 20 MG/ML
INJECTION, SOLUTION INFILTRATION; PERINEURAL PRN
Status: DISCONTINUED | OUTPATIENT
Start: 2019-09-12 | End: 2019-09-12

## 2019-09-12 RX ADMIN — SODIUM CHLORIDE, SODIUM LACTATE, POTASSIUM CHLORIDE, CALCIUM CHLORIDE: 600; 310; 30; 20 INJECTION, SOLUTION INTRAVENOUS at 12:29

## 2019-09-12 RX ADMIN — ACETAMINOPHEN 975 MG: 325 TABLET ORAL at 11:29

## 2019-09-12 RX ADMIN — KETOROLAC TROMETHAMINE 15 MG: 30 INJECTION, SOLUTION INTRAMUSCULAR; INTRAVENOUS at 11:38

## 2019-09-12 RX ADMIN — GABAPENTIN 300 MG: 300 CAPSULE ORAL at 11:30

## 2019-09-12 RX ADMIN — FENTANYL CITRATE 25 MCG: 50 INJECTION, SOLUTION INTRAMUSCULAR; INTRAVENOUS at 12:30

## 2019-09-12 RX ADMIN — LIDOCAINE HYDROCHLORIDE 60 MG: 20 INJECTION, SOLUTION INFILTRATION; PERINEURAL at 12:30

## 2019-09-12 RX ADMIN — PROPOFOL 50 MG: 10 INJECTION, EMULSION INTRAVENOUS at 12:30

## 2019-09-12 RX ADMIN — SODIUM CHLORIDE, SODIUM LACTATE, POTASSIUM CHLORIDE, CALCIUM CHLORIDE: 600; 310; 30; 20 INJECTION, SOLUTION INTRAVENOUS at 11:38

## 2019-09-12 RX ADMIN — FENTANYL CITRATE 25 MCG: 50 INJECTION, SOLUTION INTRAMUSCULAR; INTRAVENOUS at 12:46

## 2019-09-12 RX ADMIN — PROPOFOL 150 MCG/KG/MIN: 10 INJECTION, EMULSION INTRAVENOUS at 12:30

## 2019-09-12 RX ADMIN — ONDANSETRON 4 MG: 2 INJECTION INTRAMUSCULAR; INTRAVENOUS at 13:16

## 2019-09-12 RX ADMIN — KETOROLAC TROMETHAMINE 30 MG: 30 INJECTION, SOLUTION INTRAMUSCULAR; INTRAVENOUS at 13:16

## 2019-09-12 RX ADMIN — OXYCODONE HYDROCHLORIDE 5 MG: 5 TABLET ORAL at 13:33

## 2019-09-12 NOTE — ANESTHESIA CARE TRANSFER NOTE
Patient: Hanh Villalba    Procedure(s):  HYSTEROSCOPY WITH MORCELLATOR (MYOSURE)  DILATION AND CURETTAGE, UTERUS    Diagnosis: POST MENOPAUSAL BLEEDING, ENDOMETRIAL THICKENING ON ULTRASOUND  Diagnosis Additional Information: No value filed.    Anesthesia Type:   MAC     Note:  Airway :Nasal Cannula  Patient transferred to:Phase II  Comments: Awake, comfortable, sats 99%< Report to RN.Handoff Report: Identifed the Patient, Identified the Reponsible Provider, Reviewed the pertinent medical history, Discussed the surgical course, Reviewed Intra-OP anesthesia mangement and issues during anesthesia, Set expectations for post-procedure period and Allowed opportunity for questions and acknowledgement of understanding      Vitals: (Last set prior to Anesthesia Care Transfer)    CRNA VITALS  9/12/2019 1246 - 9/12/2019 1320      9/12/2019             Pulse:  51    SpO2:  98 %                Electronically Signed By: ROSEMARY Jernigan CRNA  September 12, 2019  1:20 PM

## 2019-09-12 NOTE — DISCHARGE INSTRUCTIONS
[facility]  Same-Day Surgery   Adult Discharge Orders & Instructions   For 24 hours after surgery  1. Get plenty of rest.  A responsible adult must stay with you for at least 24 hours after you leave the hospital.   2. Do not drive or use heavy equipment.  If you have weakness or tingling, don't drive or use heavy equipment until this feeling goes away.  3. Do not drink alcohol.  4. Avoid strenuous or risky activities.  Ask for help when climbing stairs.   5. You may feel lightheaded.  IF so, sit for a few minutes before standing.  Have someone help you get up.   6. If you have nausea (feel sick to your stomach): Drink only clear liquids such as apple juice, ginger ale, broth or 7-Up.  Rest may also help.  Be sure to drink enough fluids.  Move to a regular diet as you feel able.  7. You may have a slight fever. Call the doctor if your fever is over 100 F (37.7 C) (taken under the tongue) or lasts longer than 24 hours.  8. You may have a dry mouth, a sore throat, muscle aches or trouble sleeping.  These should go away after 24 hours.  9. Do not make important or legal decisions.   Call your doctor for any of the followin.  Signs of infection (fever, growing tenderness at the surgery site, a large amount of drainage or bleeding, severe pain, foul-smelling drainage, redness, swelling).    2. It has been over 8 to 10 hours since surgery and you are still not able to urinate (pass water).    3.  Headache for over 24 hours.      To contact a doctor, call _____[phone]____________________      Tylenol was given at 11:30 AM    No Ibuprofen before 5: 45PM

## 2019-09-12 NOTE — PROCEDURES
"OPERATIVE REPORT:    Date of Procedure:  9/12/2019    Preoperative Diagnosis:  Postmenopausal bleeding  Endometrial thickening on ultrasound     Postoperative Diagnosis:  Postmenopausal bleeding  Endometrial thickening on ultrasound     Procedures:  Hysteroscopy and dilation and curetttage with Myosure    Surgeon:  Navdeep Barajas MD    Circulator: Sushma Scott RN  Scrub Person: Inez Shaver RN    Anesthesia:  Combined MAC with Local    Specimens:    Endometrial sampling     Complications:   None apparent     Findings/Conclusions:   uterus sounded to 7 cm  Very irregular lining of the uterus     Estimated Blood Loss:  10 ml    Condition on discharge from OR:  Satisfactory      Procedure in Detail:  Proper consents were obtained.  The patient and I reviewed the risks, benefits, goals and limitations.  Her questions seemed to be answered.  After consenting to the procedure, the patient was taken to the OR where she was placed into the supine position.  She was then placed under general anesthesia after which she was placed into the dorsal lithotomy position.  She was then prepped and draped into the usual sterile fashion.  I then performed the exam under anesthesia.  The speculum was placed and the cervix was grasped with the allis tenaculum.  The cervix was then dilated to a 6 Dari.  The hysteroscope was inserted in through the cervix.  The findings showed very irregular lining with atypical vessels seen.  Clear pictures were difficult due to some of the blood in the uterus.  It appeared that she might have a smooth polyp like structure as well. The Myosure device then inserted and the tissue was removed. The uterus was deflated and re-inflated with the fluid and the Myosure was again used to removed additional tissue.  The Myosure was then used to \"brush\" over the endometrium for sampling.  After multiple passes the endometrial curettage was then sent to pathology.   The uterus was deflated and  " the vaginal instruments were removed and hemostasis was noted.  The patient was then taken out of the dorsal lithotomy position, awakened, and taken to the recovery room in stable condition.    No apparent complications.  Counts were correct.

## 2019-09-12 NOTE — ANESTHESIA POSTPROCEDURE EVALUATION
Anesthesia POST Procedure Evaluation    Patient: Hanh Villalba   MRN:     [id number] Gender:   female   Age:    63 year old :      1955        Preoperative Diagnosis: POST MENOPAUSAL BLEEDING, ENDOMETRIAL THICKENING ON ULTRASOUND   Procedure(s):  HYSTEROSCOPY WITH MORCELLATOR (MYOSURE)  DILATION AND CURETTAGE, UTERUS   Postop Comments: No value filed.       Anesthesia Type:  Not documented  MAC    Reportable Event: NO     PAIN: Uncomplicated   Sign Out status: Comfortable, Well controlled pain     PONV: No PONV   Sign Out status:  No Nausea or Vomiting     Neuro/Psych: Uneventful perioperative course   Sign Out Status: Preoperative baseline; Age appropriate mentation     Airway/Resp.: Uneventful perioperative course   Sign Out Status: Non labored breathing, age appropriate RR; Resp. Status within EXPECTED Parameters     CV: Uneventful perioperative course   Sign Out status: Appropriate BP and perfusion indices; Appropriate HR/Rhythm     Disposition:   Sign Out in:  PACU  Disposition:  Phase II; Home  Recovery Course: Uneventful  Follow-Up: Not required     Comments/Narrative:  Patient doing well post-operatively.  No significant issues.  Hemodynamically stable, pain well controlled, nausea well controlled.  Stable for discharge from the PACU             Last Anesthesia Record Vitals:  CRNA VITALS  2019 1246 - 2019 1346      2019             Pulse:  51    SpO2:  98 %          Last PACU Vitals:  Vitals Value Taken Time   BP     Temp     Pulse     Resp     SpO2     Temp src     NIBP 113/56 2019  1:13 PM   Pulse 51 2019  1:15 PM   SpO2 98 % 2019  1:15 PM   Resp     Temp     Ht Rate     Temp 2           Electronically Signed By: Frederick Andrade MD, 2019, 2:31 PM

## 2019-09-12 NOTE — H&P
I have seen the patient today, and I have reviewed the H&P on record.  At this time, there are no updates indicated.  She desires to continue with the planned procedure.  Navdeep Barajas MD

## 2019-09-17 LAB — COPATH REPORT: NORMAL

## 2019-09-18 DIAGNOSIS — C55 ENDOMETRIOID ADENOCARCINOMA OF UTERUS (H): Primary | ICD-10-CM

## 2019-09-19 NOTE — TELEPHONE ENCOUNTER
ONCOLOGY INTAKE: Records Information      APPT INFORMATION: [id number] Dr. Perez Reddy  Referring provider:  Dr. 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 Internal Referral    Has patient been seen for any external appt for this diagnosis? No    If yes, where? NA    Has patient had any imaging or procedures outside of [facility] for this condition? No      If Yes, where? NA    ADDITIONAL INFORMATION:  None

## 2019-09-20 ENCOUNTER — OFFICE VISIT (OUTPATIENT)
Dept: URGENT CARE | Facility: URGENT CARE | Age: 64
End: 2019-09-20
Payer: COMMERCIAL

## 2019-09-20 VITALS
WEIGHT: 260 LBS | TEMPERATURE: 98.5 F | SYSTOLIC BLOOD PRESSURE: 132 MMHG | HEIGHT: 69 IN | BODY MASS INDEX: 38.51 KG/M2 | DIASTOLIC BLOOD PRESSURE: 70 MMHG | RESPIRATION RATE: 12 BRPM | HEART RATE: 70 BPM

## 2019-09-20 DIAGNOSIS — R30.0 DYSURIA: Primary | ICD-10-CM

## 2019-09-20 LAB
ALBUMIN UR-MCNC: 100 MG/DL
APPEARANCE UR: ABNORMAL
BACTERIA #/AREA URNS HPF: ABNORMAL /HPF
BILIRUB UR QL STRIP: NEGATIVE
COLOR UR AUTO: YELLOW
GLUCOSE UR STRIP-MCNC: NEGATIVE MG/DL
HGB UR QL STRIP: ABNORMAL
KETONES UR STRIP-MCNC: NEGATIVE MG/DL
LEUKOCYTE ESTERASE UR QL STRIP: ABNORMAL
NITRATE UR QL: POSITIVE
PH UR STRIP: 6 PH (ref 5–7)
RBC #/AREA URNS AUTO: ABNORMAL /HPF
SOURCE: ABNORMAL
SP GR UR STRIP: 1.02 (ref 1–1.03)
SPECIMEN SOURCE: NORMAL
UROBILINOGEN UR STRIP-ACNC: 0.2 EU/DL (ref 0.2–1)
WBC #/AREA URNS AUTO: >100 /HPF
WBC CLUMPS #/AREA URNS HPF: PRESENT /HPF
WET PREP SPEC: NORMAL

## 2019-09-20 PROCEDURE — 87088 URINE BACTERIA CULTURE: CPT | Performed by: FAMILY MEDICINE

## 2019-09-20 PROCEDURE — 87186 SC STD MICRODIL/AGAR DIL: CPT | Performed by: FAMILY MEDICINE

## 2019-09-20 PROCEDURE — 87086 URINE CULTURE/COLONY COUNT: CPT | Performed by: FAMILY MEDICINE

## 2019-09-20 PROCEDURE — 99213 OFFICE O/P EST LOW 20 MIN: CPT | Performed by: FAMILY MEDICINE

## 2019-09-20 PROCEDURE — 81001 URINALYSIS AUTO W/SCOPE: CPT | Performed by: FAMILY MEDICINE

## 2019-09-20 PROCEDURE — 87210 SMEAR WET MOUNT SALINE/INK: CPT | Performed by: FAMILY MEDICINE

## 2019-09-20 RX ORDER — SULFAMETHOXAZOLE/TRIMETHOPRIM 800-160 MG
1 TABLET ORAL 2 TIMES DAILY
Qty: 10 TABLET | Refills: 0 | Status: SHIPPED | OUTPATIENT
Start: 2019-09-20 | End: 2019-09-20

## 2019-09-20 RX ORDER — CEPHALEXIN 500 MG/1
500 CAPSULE ORAL 2 TIMES DAILY
Qty: 14 CAPSULE | Refills: 0 | Status: SHIPPED | OUTPATIENT
Start: 2019-09-20 | End: 2020-02-25

## 2019-09-20 ASSESSMENT — ENCOUNTER SYMPTOMS
BACK PAIN: 1
JOINT SWELLING: 0
STIFFNESS: 1
DIFFICULTY URINATING: 0
MUSCLE CRAMPS: 0
ARTHRALGIAS: 0
FLANK PAIN: 0
DYSURIA: 1
NECK PAIN: 0
MUSCLE WEAKNESS: 0
HEMATURIA: 0
HOT FLASHES: 0
MYALGIAS: 1
DECREASED LIBIDO: 0

## 2019-09-20 ASSESSMENT — MIFFLIN-ST. JEOR: SCORE: 1780.79

## 2019-09-20 NOTE — TELEPHONE ENCOUNTER
RECORDS STATUS - ALL OTHER DIAGNOSIS      RECORDS RECEIVED FROM: Internal Referral   DATE RECEIVED: Complete   NOTES STATUS DETAILS   OFFICE NOTE from referring provider     OFFICE NOTE from medical oncologist     DISCHARGE SUMMARY from hospital     DISCHARGE REPORT from the ER     OPERATIVE REPORT     MEDICATION LIST     CLINICAL TRIAL TREATMENTS TO DATE     LABS     PATHOLOGY REPORTS     ANYTHING RELATED TO DIAGNOSIS     GENONOMIC TESTING     TYPE:     IMAGING (NEED IMAGES & REPORT)     CT SCANS     MRI     MAMMO     ULTRASOUND     PET

## 2019-09-21 NOTE — PROGRESS NOTES
SUBJECTIVE:   Hanh Villalba is a 63 year old female presenting with a chief complaint of   Chief Complaint   Patient presents with     Urgent Care     Vaginal Problem     Had d and c last Thursday, having some pinkish discharge since Tuesday. having painful urination and pain in vaginal area, some frequency with urination. unsure if catheterizied during procedure.        She is an established patient of [facility].    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  Patient is post D&C Hysteroscopy 9/12/2019.  Patient has an upcoming consult scheduled 9/26/2019, as noted in Epic.    UTI symptoms started 2 day(s) ago.   Symptoms include:  dysuria and urgency.     Symptoms are mild, noted to be worsening.  Fever, Chills, Nausea/Vomiting?:  No  Abdominal Pain?:  Yes, 2/10 suprapubic versus vaginal pain noted.  Pain worsens with attempted urination.  Flank Pain?:  No  Hematuria?:  No  Vaginal Discharge/Symptoms?:  Bleeding has almost stopped the past 2 days, post recent procedure 9/12/2019.  No LMP recorded. Patient is postmenopausal..  STD Concerns?:  No    History of Recurrent UTI's?:  No  History of Kidney Stones?:  No  On Blood Thinners (e.g. Coumadin)?:  No  Last Creatinine:    Recent Labs   Lab Test 02/08/18   CR 1.06*     Review of Systems  No chest pain or shortness of breath.  Patient states that she has good family support.    Patient Active Problem List   Diagnosis     Hypothyroidism due to acquired atrophy of thyroid     Recurrent major depressive disorder, in full remission (H)       Past Medical History:   Diagnosis Date     Overweight        No Known Allergies    Family History   Problem Relation Age of Onset     Acute myelogenous leukemia Mother      Parkinsonism Father      Obesity Sister      Heart Disease Sister      Uterine Cancer Maternal Grandmother 40     No Known Problems Sister        Current Outpatient Medications  "  Medication Sig Dispense Refill     Apple Cider Vinegar 600 MG CAPS        CALCIUM PO Take 20 mg by mouth       CHELATED MAGNESIUM PO Take 600 mg by mouth       cholecalciferol (VITAMIN D3) 5000 units TABS tablet Take by mouth daily       clobetasol (TEMOVATE) 0.05 % external ointment Apply sparingly then once or twice a week as needed       Cyanocobalamin 1500 MCG TBDP        FLUoxetine (PROZAC) 20 MG capsule Take 1 capsule (20 mg) by mouth daily 90 capsule 3     Lactobacillus (ACIDOPHILUS PO)        levothyroxine (SYNTHROID/LEVOTHROID) 100 MCG tablet Take 1 tablet (100 mcg) by mouth every morning 90 tablet 3     Omega 3-6-9 Fatty Acids (OMEGA-3-6-9 PO)        Turmeric 450 MG CAPS        Zinc Picolinate 25 MG TABS        acetaminophen (TYLENOL) 325 MG tablet Take 2 tablets (650 mg) by mouth every 4 hours as needed for mild pain (Patient not taking: Reported on 9/20/2019) 50 tablet 0     Glucosamine-Chondroitin (GLUCOSAMINE CHONDR COMPLEX PO) Take 750 mg by mouth       ibuprofen (ADVIL/MOTRIN) 600 MG tablet Take 1 tablet (600 mg) by mouth every 6 hours as needed for moderate pain (Patient not taking: Reported on 9/20/2019) 30 tablet 0       Social History     Tobacco Use     Smoking status: Former Smoker     Smokeless tobacco: Former User     Quit date: 11/11/1987   Substance Use Topics     Alcohol use: Yes       OBJECTIVE  /70   Pulse 70   Temp 98.5  F (36.9  C) (Oral)   Resp 12   Ht 1.74 m (5' 8.5\")   Wt 117.9 kg (260 lb)   BMI 38.96 kg/m      Physical Exam    GENERAL APPEARANCE:  Awake, alert, and in no acute distress.  PSYCHIATRIC:  Pleasant affect.  HEENT:  Sclera anicteric.  No conjunctivitis.  Mucous membranes moist.  Neck:  Spontaneous full range of motion.  HEART:  Regular rate and rhythm.  No murmurs, rubs, or gallops.  LUNGS:  No respiratory distress.  No wheezes, rales, or rhonchi   ABDOMEN:   Soft and not distended, without organomegaly.  No tenderness to palpation.  BACK: No CVA " tenderness.  :  Declined by patient.  SKIN:  No rash.    Labs:  Results for orders placed or performed in visit on 09/20/19 (from the past 24 hour(s))   Wet prep   Result Value Ref Range    Specimen Description Vagina     Wet Prep Moderate  WBC'S seen       Wet Prep No clue cells seen     Wet Prep No yeast seen     Wet Prep No Trichomonas seen    *UA reflex to Microscopic and Culture ([place] and [facility] (except [place] and [place])   Result Value Ref Range    Color Urine Yellow     Appearance Urine Cloudy     Glucose Urine Negative NEG^Negative mg/dL    Bilirubin Urine Negative NEG^Negative    Ketones Urine Negative NEG^Negative mg/dL    Specific Gravity Urine 1.025 1.003 - 1.035    Blood Urine Large (A) NEG^Negative    pH Urine 6.0 5.0 - 7.0 pH    Protein Albumin Urine 100 (A) NEG^Negative mg/dL    Urobilinogen Urine 0.2 0.2 - 1.0 EU/dL    Nitrite Urine Positive (A) NEG^Negative    Leukocyte Esterase Urine Large (A) NEG^Negative    Source Midstream Urine    Urine Microscopic   Result Value Ref Range    WBC Urine >100 (A) OTO5^0 - 5 /HPF    RBC Urine 25-50 (A) OTO2^O - 2 /HPF    WBC Clumps Present (A) NEG^Negative /HPF    Bacteria Urine Few (A) NEG^Negative /HPF       ASSESSMENT:    1. Dysuria, likely UTI post recent procedure.  Differential was considered.    - Wet prep  - *UA reflex to Microscopic and Culture ([place] and [facility] (except [place] and [place])  - Urine Microscopic  - Urine Culture Aerobic Bacterial  - cephALEXin (KEFLEX) 500 MG capsule; Take 1 capsule (500 mg) by mouth 2 times daily for 7 days  Dispense: 14 capsule; Refill: 0     PLAN:    Patient declines further exam, labs, or evaluation today.    Patient will follow up 9/26/2019, as previously scheduled.    Patient Instructions     Fluids, only as discussed.      Antibiotic treatment (Keflex), pending the results of the Urine Culture.    Follow up in 24 hours if not improving.      Follow up in the ER immediately if:   fever, severe/worsening abdominal pain, severe/worsening back pain, or other emergent symptoms, as discussed.      Note: Note the initially prescribed Bactrim DS was canceled, which patient was advised not to fill.  Provider and nursing staff were unable to speak directly with the patient's pharmacy, but nursing staff left a message with the patient's pharmacy regarding the canceled prescription.  Nursing staff verified that patient picked up the correct, Keflex medication this evening.    Discussed risks and benefits of treatment strategies, as noted in the Assessment and Plan sections.    The patient was discharged ambulatory and in stable condition post discussion of follow up.     The above dictation was composed using Dragon software.    Sierra Francisco MD

## 2019-09-21 NOTE — PATIENT INSTRUCTIONS
Fluids, only as discussed.      Antibiotic treatment (Keflex), pending the results of the Urine Culture.    Follow up in 24 hours if not improving.      Follow up in the ER immediately if:  fever, severe/worsening abdominal pain, severe/worsening back pain, or other emergent symptoms, as discussed.

## 2019-09-23 LAB
BACTERIA SPEC CULT: ABNORMAL
BACTERIA SPEC CULT: ABNORMAL
SPECIMEN SOURCE: ABNORMAL

## 2019-09-26 ENCOUNTER — ONCOLOGY VISIT (OUTPATIENT)
Dept: ONCOLOGY | Facility: CLINIC | Age: 64
End: 2019-09-26
Attending: OBSTETRICS & GYNECOLOGY
Payer: COMMERCIAL

## 2019-09-26 ENCOUNTER — DOCUMENTATION ONLY (OUTPATIENT)
Dept: ONCOLOGY | Facility: CLINIC | Age: 64
End: 2019-09-26

## 2019-09-26 ENCOUNTER — PRE VISIT (OUTPATIENT)
Dept: ONCOLOGY | Facility: CLINIC | Age: 64
End: 2019-09-26

## 2019-09-26 VITALS
TEMPERATURE: 98.6 F | HEIGHT: 68 IN | HEART RATE: 64 BPM | BODY MASS INDEX: 39.56 KG/M2 | RESPIRATION RATE: 18 BRPM | WEIGHT: 261 LBS | DIASTOLIC BLOOD PRESSURE: 66 MMHG | SYSTOLIC BLOOD PRESSURE: 124 MMHG | OXYGEN SATURATION: 97 %

## 2019-09-26 DIAGNOSIS — C55 ENDOMETRIOID ADENOCARCINOMA OF UTERUS (H): ICD-10-CM

## 2019-09-26 LAB
ALBUMIN SERPL-MCNC: 4 G/DL (ref 3.4–5)
ALP SERPL-CCNC: 102 U/L (ref 40–150)
ALT SERPL W P-5'-P-CCNC: 30 U/L (ref 0–50)
ANION GAP SERPL CALCULATED.3IONS-SCNC: 5 MMOL/L (ref 3–14)
AST SERPL W P-5'-P-CCNC: 15 U/L (ref 0–45)
BASOPHILS # BLD AUTO: 0 10E9/L (ref 0–0.2)
BASOPHILS NFR BLD AUTO: 0.5 %
BILIRUB SERPL-MCNC: 0.7 MG/DL (ref 0.2–1.3)
BUN SERPL-MCNC: 18 MG/DL (ref 7–30)
CALCIUM SERPL-MCNC: 10.4 MG/DL (ref 8.5–10.1)
CHLORIDE SERPL-SCNC: 106 MMOL/L (ref 94–109)
CO2 SERPL-SCNC: 28 MMOL/L (ref 20–32)
COPATH REPORT: NORMAL
CREAT SERPL-MCNC: 1.04 MG/DL (ref 0.52–1.04)
DIFFERENTIAL METHOD BLD: NORMAL
EOSINOPHIL # BLD AUTO: 0.3 10E9/L (ref 0–0.7)
EOSINOPHIL NFR BLD AUTO: 3.4 %
ERYTHROCYTE [DISTWIDTH] IN BLOOD BY AUTOMATED COUNT: 13.2 % (ref 10–15)
GFR SERPL CREATININE-BSD FRML MDRD: 56 ML/MIN/{1.73_M2}
GLUCOSE SERPL-MCNC: 90 MG/DL (ref 70–99)
HCT VFR BLD AUTO: 39 % (ref 35–47)
HGB BLD-MCNC: 12.7 G/DL (ref 11.7–15.7)
IMM GRANULOCYTES # BLD: 0 10E9/L (ref 0–0.4)
IMM GRANULOCYTES NFR BLD: 0.4 %
LYMPHOCYTES # BLD AUTO: 2.2 10E9/L (ref 0.8–5.3)
LYMPHOCYTES NFR BLD AUTO: 27.3 %
MCH RBC QN AUTO: 31.9 PG (ref 26.5–33)
MCHC RBC AUTO-ENTMCNC: 32.6 G/DL (ref 31.5–36.5)
MCV RBC AUTO: 98 FL (ref 78–100)
MONOCYTES # BLD AUTO: 0.6 10E9/L (ref 0–1.3)
MONOCYTES NFR BLD AUTO: 7 %
NEUTROPHILS # BLD AUTO: 4.9 10E9/L (ref 1.6–8.3)
NEUTROPHILS NFR BLD AUTO: 61.4 %
NRBC # BLD AUTO: 0 10*3/UL
NRBC BLD AUTO-RTO: 0 /100
PLATELET # BLD AUTO: 241 10E9/L (ref 150–450)
POTASSIUM SERPL-SCNC: 4.4 MMOL/L (ref 3.4–5.3)
PROT SERPL-MCNC: 7.1 G/DL (ref 6.8–8.8)
RBC # BLD AUTO: 3.98 10E12/L (ref 3.8–5.2)
SODIUM SERPL-SCNC: 139 MMOL/L (ref 133–144)
WBC # BLD AUTO: 8 10E9/L (ref 4–11)

## 2019-09-26 PROCEDURE — 85025 COMPLETE CBC W/AUTO DIFF WBC: CPT | Performed by: OBSTETRICS & GYNECOLOGY

## 2019-09-26 PROCEDURE — 80053 COMPREHEN METABOLIC PANEL: CPT | Performed by: OBSTETRICS & GYNECOLOGY

## 2019-09-26 PROCEDURE — 99205 OFFICE O/P NEW HI 60 MIN: CPT | Mod: ZP | Performed by: OBSTETRICS & GYNECOLOGY

## 2019-09-26 PROCEDURE — 36415 COLL VENOUS BLD VENIPUNCTURE: CPT | Performed by: OBSTETRICS & GYNECOLOGY

## 2019-09-26 RX ORDER — CEFAZOLIN SODIUM 1 G/50ML
1 INJECTION, SOLUTION INTRAVENOUS SEE ADMIN INSTRUCTIONS
Status: CANCELLED | OUTPATIENT
Start: 2019-09-26

## 2019-09-26 RX ORDER — HEPARIN SODIUM 5000 [USP'U]/.5ML
5000 INJECTION, SOLUTION INTRAVENOUS; SUBCUTANEOUS
Status: CANCELLED | OUTPATIENT
Start: 2019-09-26

## 2019-09-26 RX ORDER — CEFAZOLIN SODIUM 2 G/50ML
2 SOLUTION INTRAVENOUS
Status: CANCELLED | OUTPATIENT
Start: 2019-09-26

## 2019-09-26 SDOH — HEALTH STABILITY: MENTAL HEALTH: HOW OFTEN DO YOU HAVE A DRINK CONTAINING ALCOHOL?: 2-3 TIMES A WEEK

## 2019-09-26 ASSESSMENT — PAIN SCALES - GENERAL: PAINLEVEL: NO PAIN (0)

## 2019-09-26 ASSESSMENT — MIFFLIN-ST. JEOR: SCORE: 1772.9

## 2019-09-26 NOTE — PROGRESS NOTES
Gynecologic Oncology Clinic - New Patient    Referring provider:    Navdeep Barajas MD  [address] [place] [address]    Patient: Hanh Villalba  : 1955    Date of Visit: Sep 26, 2019     Chief Complaint: endometrial cancer    History of Present Illness:  Hanh Villalba is a 63 year old post-menopausal female who presents for recommendations regarding treatment/management of endometrial cancer. She presents with her partner, Abigail.     She reports an episode of post-menopausal bleeding in August, which led to an ultrasound which showed a thickened endometrium and echogenic lesion. She subsequently underwent a D&C with the pathology returning showing a Grade 1 endometrial cancer. She continues to have bleeding. She had consistent bleeding for about a week. Since that time, light discharge but no heavy bleeding. Occasional cramping/pain. One episode of diarrhea. She recently had a UTI for which she is on antibiotics. Symptoms have resolved. No other changes to bowel/bladder habits.     Review of Systems:  Answers for HPI/ROS submitted by the patient on 2019; reviewed at visit 19   General Symptoms: No  Skin Symptoms: No  HENT Symptoms: No  EYE SYMPTOMS: No  HEART SYMPTOMS: No  LUNG SYMPTOMS: No  INTESTINAL SYMPTOMS: No  URINARY SYMPTOMS: Yes - recent UTI  GYNECOLOGIC SYMPTOMS: Yes, see HPI  BREAST SYMPTOMS: No  SKELETAL SYMPTOMS: Yes-back pain, muscle aches, joint pain  BLOOD SYMPTOMS: No  NERVOUS SYSTEM SYMPTOMS: No  MENTAL HEALTH SYMPTOMS: No    Past Medical History  Past Medical History:   Diagnosis Date     Depression      Hypothyroidism      Overweight        Past Surgical History  Past Surgical History:   Procedure Laterality Date     DILATION AND CURETTAGE N/A 2019    Procedure: DILATION AND CURETTAGE, UTERUS;  Surgeon: Navdeep Barajas MD;  Location:  OR     DILATION AND CURETTAGE, HYSTEROSCOPY DIAGNOSTIC, COMBINED  2019    postmenopausal  bleeding, endometrial thickening      OPERATIVE HYSTEROSCOPY WITH MORCELLATOR N/A 2019    Procedure: HYSTEROSCOPY WITH MORCELLATOR (MYOSURE);  Surgeon: Navdeep Barajas MD;  Location: [facility] OR     SALPINGO OOPHORECTOMY,R/L/SHANDA Left 1980s    Left ovarian with endometriois     US BREAST BIOPSY LT Left         OB/Gynecologic History:    Last Pap Smear: 2017 negative/HPV negative. History of abnormal: No    Social History  Social History     Tobacco Use     Smoking status: Former Smoker     Packs/day: 0.00     Smokeless tobacco: Former User     Quit date: 1987   Substance Use Topics     Alcohol use: Yes     Frequency: 2-3 times a week     Drug use: Never   She presents with her partner, Abigail. She is an avid traveler. She was supposed to be traveling to [place], but unfortunately had to cancel that because of this diagnosis. She has a trip planned to [facility] in November which includes scuba diving.     Family History  Family History   Problem Relation Age of Onset     Acute myelogenous leukemia Mother      Parkinsonism Father      Obesity Sister      Heart Disease Sister      Uterine Cancer Maternal Grandmother 40        Uterine versus cervical     No Known Problems Sister      Leukemia Brother      Leukemia Nephew    No family history of colon cancer or breast cancer.    Current Outpatient Medications   Medication Sig Dispense Refill     acetaminophen (TYLENOL) 325 MG tablet Take 2 tablets (650 mg) by mouth every 4 hours as needed for mild pain 50 tablet 0     Apple Cider Vinegar 600 MG CAPS        CALCIUM PO Take 20 mg by mouth       cephALEXin (KEFLEX) 500 MG capsule Take 1 capsule (500 mg) by mouth 2 times daily for 7 days 14 capsule 0     CHELATED MAGNESIUM PO Take 600 mg by mouth       cholecalciferol (VITAMIN D3) 5000 units TABS tablet Take by mouth daily       clobetasol (TEMOVATE) 0.05 % external ointment Apply sparingly then once or twice a week as needed       Cyanocobalamin 1500 MCG TBDP  "       FLUoxetine (PROZAC) 20 MG capsule Take 1 capsule (20 mg) by mouth daily 90 capsule 3     ibuprofen (ADVIL/MOTRIN) 600 MG tablet Take 1 tablet (600 mg) by mouth every 6 hours as needed for moderate pain 30 tablet 0     Lactobacillus (ACIDOPHILUS PO)        levothyroxine (SYNTHROID/LEVOTHROID) 100 MCG tablet Take 1 tablet (100 mcg) by mouth every morning 90 tablet 3     Omega 3-6-9 Fatty Acids (OMEGA-3-6-9 PO)        Zinc Picolinate 25 MG TABS        Turmeric 450 MG CAPS           Allergies  No Known Allergies    Preventive Care:  Mammogram: within last year, normal  Colonoscopy: colonoscopy within last 3 years, colonic polyps     Physical Exam:   /66   Pulse 64   Temp 98.6  F (37  C) (Oral)   Resp 18   Ht 1.72 m (5' 7.72\")   Wt 118.4 kg (261 lb)   SpO2 97%   BMI 40.02 kg/m    General appearance: Alert and oriented, no acute distress, well groomed  ENT: no palpable neck masses or thyromegaly appreciated  CV: bradycardic rate, regular rhythm, no murmurs appreciated   Resp: Lungs clear to auscultation bilaterally. Normal respiratory effort.  GI: Abdomen soft, non-tender, non-distended. No palpable masses  Skin: no skin changes or lesions  Psych: alert and oriented, appropriate affect  Lymph: No palpable lymphadenopathy in the neck, supraclavicular region, groin  Genitourinary:  External: anatomically normal appearing labia majora, minora, and clitoral bowen. No lesions noted  Vagina: pink mucosa without lesions  Cervix: normal in appearance without lesions  Bimanual exam: somewhat limited by patient discomfort. Feels slightly enlarged, mobile.    Labs:   Results for ALBERTO MONTALVO (MRN [id number]) as of 9/26/2019 17:17   Ref. Range 9/26/2019 12:21   Sodium Latest Ref Range: 133 - 144 mmol/L 139   Potassium Latest Ref Range: 3.4 - 5.3 mmol/L 4.4   Chloride Latest Ref Range: 94 - 109 mmol/L 106   Carbon Dioxide Latest Ref Range: 20 - 32 mmol/L 28   Urea Nitrogen Latest Ref Range: 7 - 30 mg/dL 18 "   Creatinine Latest Ref Range: 0.52 - 1.04 mg/dL 1.04   GFR Estimate Latest Ref Range: >60 mL/min/1.73_m2 56 (L)   GFR Estimate If Black Latest Ref Range: >60 mL/min/1.73_m2 65   Calcium Latest Ref Range: 8.5 - 10.1 mg/dL 10.4 (H)   Anion Gap Latest Ref Range: 3 - 14 mmol/L 5   Albumin Latest Ref Range: 3.4 - 5.0 g/dL 4.0   Protein Total Latest Ref Range: 6.8 - 8.8 g/dL 7.1   Bilirubin Total Latest Ref Range: 0.2 - 1.3 mg/dL 0.7   Alkaline Phosphatase Latest Ref Range: 40 - 150 U/L 102   ALT Latest Ref Range: 0 - 50 U/L 30   AST Latest Ref Range: 0 - 45 U/L 15   Glucose Latest Ref Range: 70 - 99 mg/dL 90   WBC Latest Ref Range: 4.0 - 11.0 10e9/L 8.0   Hemoglobin Latest Ref Range: 11.7 - 15.7 g/dL 12.7   Hematocrit Latest Ref Range: 35.0 - 47.0 % 39.0   Platelet Count Latest Ref Range: 150 - 450 10e9/L 241   RBC Count Latest Ref Range: 3.8 - 5.2 10e12/L 3.98   MCV Latest Ref Range: 78 - 100 fl 98   MCH Latest Ref Range: 26.5 - 33.0 pg 31.9   MCHC Latest Ref Range: 31.5 - 36.5 g/dL 32.6   RDW Latest Ref Range: 10.0 - 15.0 % 13.2   Diff Method Unknown Automated Method   % Neutrophils Latest Units: % 61.4   % Lymphocytes Latest Units: % 27.3   % Monocytes Latest Units: % 7.0   % Eosinophils Latest Units: % 3.4   % Basophils Latest Units: % 0.5   % Immature Granulocytes Latest Units: % 0.4       Imaging:   ULTRASOUND PELVIC COMPLETE WITH TRANSVAGINAL IMAGING  8/15/2019 9:00  AM      HISTORY: Post-menopausal bleeding.     FINDINGS:  Transvaginal images were performed to better evaluate the  patient's uterus, ovaries and endometrial stripe.     The uterus is elongated in appearance measuring 10.4 x 5.3 x 5.9 cm.  No fibroids are evident. Endometrial stripe measures 34 mm and is  abnormally thickened for patient's age. Echogenic focus in the region  of the lower uterine segment could represent an endometrial polyp  measuring 1 cm in diameter. The right ovary is normal measuring 1.4 x  1.3 x 2.4 cm. The left ovary is not  visualized and likely obscured by  bowel gas.  No adnexal masses are present. No free pelvic fluid is  present.                                                                      IMPRESSION: Marked thickening of the endometrial stripe with echogenic  focus in the lower uterine segment of the endometrial cavity, likely a  polyp. Endometrial hyperplasia or neoplasia remains in the  differential.     MICKIE GAMBOA MD    Assessment:  Hanh Villalba is a 63 year old here as a new patient for diagnosis of Grade 1 endometrial cancer.     Plan:   1. Grade 1 endometrioid adenocarcinoma: We reviewed the diagnosis of uterine cancer. Reviewed grade and that hers appears to be low grade, in general these tend to have a good prognosis; however, they can spread outside of the uterus. We discussed stage and that this is determined surgically. We discussed options for treatment to include surgery or radiation therapy. Hormones are an option but are not standard of care. We did discuss the option of using hormonal therapy to allow her to postpone surgery until after her trip to [facility]. However, she would prefer to proceed with surgery after a thorough discussion. We discussed risks/benefits of each alternative. However, based on history and exam, I feel she is an appropriate surgical risk. We discussed hysterectomy, BSO, sentinel lymph node removal. Her pathology shows loss of MSH1 and PMS 2 with hypermethylation of MLH1 promotor as the likely cause.     We reviewed sentinel lymph node procedure and ~15% risk of needing full lymph node dissection. I reviewed surgery including the use of vaginal manipulator and vaginal specimen removal. I discussed the risk of vaginal cuff dehiscence (separation) or wound dehiscence with need for further surgery. Risks including infection, bleeding, damage to surrounding structures or organs including bladder, ureters, bowel, blood vessels, or nerves. Risk of need for additional procedures or  blood transfusion if complications. Blood transfusions carry additional risks of transfusion reactions, damaged to lung/kidneys, fevers, death. Risk of blood clot/pulmonary embolism, which can be fatal. Lymph node resection brings risks of numbness of mons, upper thigh, vulva and lymphedema, which we think are reduced with sentinel lymph node dissection.    She would like to proceed with surgery. Consent was signed.  2. Pre-op work-up: Had recent EKG which showed sinus bradycardia. No medical issues that increase cardiopulmonary risks aside from her BMI 40. We will obtain CBC, BMP. She will return for surgery    Thank you for this consult and for including us in the care of your patient.     Perez Reddy MD    Gynecologic Oncology

## 2019-09-26 NOTE — LETTER
2019       RE: Hanh Villalba   [address]  [place] [address]     Dear Colleague,    Thank you for referring your patient, Hanh Villalba, to the [facility]. Please see a copy of my visit note below.    Gynecologic Oncology Clinic - New Patient    Referring provider:    Navdeep Barajas MD  [address] [place] [address]    Patient: Hanh Villalba  : 1955    Date of Visit: Sep 26, 2019     Chief Complaint: endometrial cancer    History of Present Illness:  Hanh Villalba is a 63 year old post-menopausal female who presents for recommendations regarding treatment/management of endometrial cancer. She presents with her partner, Abigail.     She reports an episode of post-menopausal bleeding in August, which led to an ultrasound which showed a thickened endometrium and echogenic lesion. She subsequently underwent a D&C with the pathology returning showing a Grade 1 endometrial cancer. She continues to have bleeding. She had consistent bleeding for about a week. Since that time, light discharge but no heavy bleeding. Occasional cramping/pain. One episode of diarrhea. She recently had a UTI for which she is on antibiotics. Symptoms have resolved. No other changes to bowel/bladder habits.     Review of Systems:  Answers for HPI/ROS submitted by the patient on 2019; reviewed at visit 19   General Symptoms: No  Skin Symptoms: No  HENT Symptoms: No  EYE SYMPTOMS: No  HEART SYMPTOMS: No  LUNG SYMPTOMS: No  INTESTINAL SYMPTOMS: No  URINARY SYMPTOMS: Yes - recent UTI  GYNECOLOGIC SYMPTOMS: Yes, see HPI  BREAST SYMPTOMS: No  SKELETAL SYMPTOMS: Yes-back pain, muscle aches, joint pain  BLOOD SYMPTOMS: No  NERVOUS SYSTEM SYMPTOMS: No  MENTAL HEALTH SYMPTOMS: No    Past Medical History  Past Medical History:   Diagnosis Date     Depression      Hypothyroidism      Overweight        Past Surgical History  Past Surgical History:   Procedure Laterality  Date     DILATION AND CURETTAGE N/A 2019    Procedure: DILATION AND CURETTAGE, UTERUS;  Surgeon: Navdeep Barajas MD;  Location: [facility] OR     DILATION AND CURETTAGE, HYSTEROSCOPY DIAGNOSTIC, COMBINED  2019    postmenopausal bleeding, endometrial thickening      OPERATIVE HYSTEROSCOPY WITH MORCELLATOR N/A 2019    Procedure: HYSTEROSCOPY WITH MORCELLATOR (MYOSURE);  Surgeon: Navdeep Barajas MD;  Location: [facility] OR     SALPINGO OOPHORECTOMY,R/L/SHANDA Left 1980s    Left ovarian with endometriois     US BREAST BIOPSY LT Left         OB/Gynecologic History:    Last Pap Smear: 2017 negative/HPV negative. History of abnormal: No    Social History  Social History     Tobacco Use     Smoking status: Former Smoker     Packs/day: 0.00     Smokeless tobacco: Former User     Quit date: 1987   Substance Use Topics     Alcohol use: Yes     Frequency: 2-3 times a week     Drug use: Never   She presents with her partner, Abigail. She is an avid traveler. She was supposed to be traveling to [place], but unfortunately had to cancel that because of this diagnosis. She has a trip planned to [facility] in November which includes scuba diving.     Family History  Family History   Problem Relation Age of Onset     Acute myelogenous leukemia Mother      Parkinsonism Father      Obesity Sister      Heart Disease Sister      Uterine Cancer Maternal Grandmother 40        Uterine versus cervical     No Known Problems Sister      Leukemia Brother      Leukemia Nephew    No family history of colon cancer or breast cancer.    Current Outpatient Medications   Medication Sig Dispense Refill     acetaminophen (TYLENOL) 325 MG tablet Take 2 tablets (650 mg) by mouth every 4 hours as needed for mild pain 50 tablet 0     Apple Cider Vinegar 600 MG CAPS        CALCIUM PO Take 20 mg by mouth       cephALEXin (KEFLEX) 500 MG capsule Take 1 capsule (500 mg) by mouth 2 times daily for 7 days 14 capsule 0     CHELATED MAGNESIUM PO  "Take 600 mg by mouth       cholecalciferol (VITAMIN D3) 5000 units TABS tablet Take by mouth daily       clobetasol (TEMOVATE) 0.05 % external ointment Apply sparingly then once or twice a week as needed       Cyanocobalamin 1500 MCG TBDP        FLUoxetine (PROZAC) 20 MG capsule Take 1 capsule (20 mg) by mouth daily 90 capsule 3     ibuprofen (ADVIL/MOTRIN) 600 MG tablet Take 1 tablet (600 mg) by mouth every 6 hours as needed for moderate pain 30 tablet 0     Lactobacillus (ACIDOPHILUS PO)        levothyroxine (SYNTHROID/LEVOTHROID) 100 MCG tablet Take 1 tablet (100 mcg) by mouth every morning 90 tablet 3     Omega 3-6-9 Fatty Acids (OMEGA-3-6-9 PO)        Zinc Picolinate 25 MG TABS        Turmeric 450 MG CAPS           Allergies  No Known Allergies    Preventive Care:  Mammogram: within last year, normal  Colonoscopy: colonoscopy within last 3 years, colonic polyps     Physical Exam:   /66   Pulse 64   Temp 98.6  F (37  C) (Oral)   Resp 18   Ht 1.72 m (5' 7.72\")   Wt 118.4 kg (261 lb)   SpO2 97%   BMI 40.02 kg/m     General appearance: Alert and oriented, no acute distress, well groomed  ENT: no palpable neck masses or thyromegaly appreciated  CV: bradycardic rate, regular rhythm, no murmurs appreciated   Resp: Lungs clear to auscultation bilaterally. Normal respiratory effort.  GI: Abdomen soft, non-tender, non-distended. No palpable masses  Skin: no skin changes or lesions  Psych: alert and oriented, appropriate affect  Lymph: No palpable lymphadenopathy in the neck, supraclavicular region, groin  Genitourinary:  External: anatomically normal appearing labia majora, minora, and clitoral bowen. No lesions noted  Vagina: pink mucosa without lesions  Cervix: normal in appearance without lesions  Bimanual exam: somewhat limited by patient discomfort. Feels slightly enlarged, mobile.    Labs:   Results for SELVIN ALBERTO LYDIA (MRN [id number]) as of 9/26/2019 17:17   Ref. Range 9/26/2019 12:21   Sodium Latest " Ref Range: 133 - 144 mmol/L 139   Potassium Latest Ref Range: 3.4 - 5.3 mmol/L 4.4   Chloride Latest Ref Range: 94 - 109 mmol/L 106   Carbon Dioxide Latest Ref Range: 20 - 32 mmol/L 28   Urea Nitrogen Latest Ref Range: 7 - 30 mg/dL 18   Creatinine Latest Ref Range: 0.52 - 1.04 mg/dL 1.04   GFR Estimate Latest Ref Range: >60 mL/min/1.73_m2 56 (L)   GFR Estimate If Black Latest Ref Range: >60 mL/min/1.73_m2 65   Calcium Latest Ref Range: 8.5 - 10.1 mg/dL 10.4 (H)   Anion Gap Latest Ref Range: 3 - 14 mmol/L 5   Albumin Latest Ref Range: 3.4 - 5.0 g/dL 4.0   Protein Total Latest Ref Range: 6.8 - 8.8 g/dL 7.1   Bilirubin Total Latest Ref Range: 0.2 - 1.3 mg/dL 0.7   Alkaline Phosphatase Latest Ref Range: 40 - 150 U/L 102   ALT Latest Ref Range: 0 - 50 U/L 30   AST Latest Ref Range: 0 - 45 U/L 15   Glucose Latest Ref Range: 70 - 99 mg/dL 90   WBC Latest Ref Range: 4.0 - 11.0 10e9/L 8.0   Hemoglobin Latest Ref Range: 11.7 - 15.7 g/dL 12.7   Hematocrit Latest Ref Range: 35.0 - 47.0 % 39.0   Platelet Count Latest Ref Range: 150 - 450 10e9/L 241   RBC Count Latest Ref Range: 3.8 - 5.2 10e12/L 3.98   MCV Latest Ref Range: 78 - 100 fl 98   MCH Latest Ref Range: 26.5 - 33.0 pg 31.9   MCHC Latest Ref Range: 31.5 - 36.5 g/dL 32.6   RDW Latest Ref Range: 10.0 - 15.0 % 13.2   Diff Method Unknown Automated Method   % Neutrophils Latest Units: % 61.4   % Lymphocytes Latest Units: % 27.3   % Monocytes Latest Units: % 7.0   % Eosinophils Latest Units: % 3.4   % Basophils Latest Units: % 0.5   % Immature Granulocytes Latest Units: % 0.4       Imaging:   ULTRASOUND PELVIC COMPLETE WITH TRANSVAGINAL IMAGING  8/15/2019 9:00  AM      HISTORY: Post-menopausal bleeding.     FINDINGS:  Transvaginal images were performed to better evaluate the  patient's uterus, ovaries and endometrial stripe.     The uterus is elongated in appearance measuring 10.4 x 5.3 x 5.9 cm.  No fibroids are evident. Endometrial stripe measures 34 mm and is  abnormally  thickened for patient's age. Echogenic focus in the region  of the lower uterine segment could represent an endometrial polyp  measuring 1 cm in diameter. The right ovary is normal measuring 1.4 x  1.3 x 2.4 cm. The left ovary is not visualized and likely obscured by  bowel gas.  No adnexal masses are present. No free pelvic fluid is  present.                                                                      IMPRESSION: Marked thickening of the endometrial stripe with echogenic  focus in the lower uterine segment of the endometrial cavity, likely a  polyp. Endometrial hyperplasia or neoplasia remains in the  differential.     MICKIE GAMBOA MD    Assessment:  Hanh Villalba is a 63 year old here as a new patient for diagnosis of Grade 1 endometrial cancer.     Plan:   1. Grade 1 endometrioid adenocarcinoma: We reviewed the diagnosis of uterine cancer. Reviewed grade and that hers appears to be low grade, in general these tend to have a good prognosis; however, they can spread outside of the uterus. We discussed stage and that this is determined surgically. We discussed options for treatment to include surgery or radiation therapy. Hormones are an option but are not standard of care. We did discuss the option of using hormonal therapy to allow her to postpone surgery until after her trip to [facility]. However, she would prefer to proceed with surgery after a thorough discussion. We discussed risks/benefits of each alternative. However, based on history and exam, I feel she is an appropriate surgical risk. We discussed hysterectomy, BSO, sentinel lymph node removal. Her pathology shows loss of MSH1 and PMS 2 with hypermethylation of MLH1 promotor as the likely cause.     We reviewed sentinel lymph node procedure and ~15% risk of needing full lymph node dissection. I reviewed surgery including the use of vaginal manipulator and vaginal specimen removal. I discussed the risk of vaginal cuff dehiscence (separation) or  wound dehiscence with need for further surgery. Risks including infection, bleeding, damage to surrounding structures or organs including bladder, ureters, bowel, blood vessels, or nerves. Risk of need for additional procedures or blood transfusion if complications. Blood transfusions carry additional risks of transfusion reactions, damaged to lung/kidneys, fevers, death. Risk of blood clot/pulmonary embolism, which can be fatal. Lymph node resection brings risks of numbness of mons, upper thigh, vulva and lymphedema, which we think are reduced with sentinel lymph node dissection.    She would like to proceed with surgery. Consent was signed.  2. Pre-op work-up: Had recent EKG which showed sinus bradycardia. No medical issues that increase cardiopulmonary risks aside from her BMI 40. We will obtain CBC, BMP. She will return for surgery    Thank you for this consult and for including us in the care of your patient.     Perez Reddy MD    Gynecologic Oncology     Again, thank you for allowing me to participate in the care of your patient.      Sincerely,    Perez Reddy MD

## 2019-09-26 NOTE — PROGRESS NOTES
I scheduled surgery for this patient with Dr. KINGSLEY Harper on 10/2 at [place]. Scheduled per availability. On wait list for 10/4.    The RN has provided them with education and a packet regarding their procedure.     They are aware that they will receive a call  ~2 days prior to the scheduled procedure and will be given an exact arrival/start time.

## 2019-09-27 ENCOUNTER — PREP FOR PROCEDURE (OUTPATIENT)
Dept: ONCOLOGY | Facility: CLINIC | Age: 64
End: 2019-09-27

## 2019-09-27 NOTE — NURSING NOTE
Pre Op Nurse Teaching Template    Relevant Diagnosis: endometrial cancer    Teaching Topic:  davinci assisted removal of uterus bilateral fallopian tubes and ovaries sentinel lymph node dissection    Person(s) involved in teaching :  Patient  & spouse  Motivation Level:  Asks Questions:    Yes      Eager to Learn:     Yes     Cooperative:          Yes    Receptive (willing. Able to accept information):    Yes      Patient and those who are listed above demonstrates understanding of the following:   Reason for the appointment, diagnosis and treatment plan:   Yes   Knowledge of proper use of medications and conditions for which they are ordered (with special attention to potential side effects or drug interactions): Yes   Which situations necessitate calling provider and whom to contact: Yes         Nutritional needs and diet plan:  Yes      Pain management techniques:     Yes, Pain Scale   Diet:   Yes, [facility] Diet Instructions    Teaching Concerns addressed: Yes    Infection Prevention:  Patient and those who are listed above demonstrate understanding of the following:  Pre-Op CHG Bathing Instructions: Yes  Surgical procedure site care taught:   Yes   Signs and symptoms of infection taught: Yes       Instructional Materials Used/Given:  The [facility] Before You Surgery Booklet  Showering or Bathing before Surgery Instructions & CHG Product  Hysterectomy Guidelines  Pain Assessment Tool   Home Care after Major Abdominal or Vaginal Surgery  Map  Accommodations Brochure  Phone numbers for [facility] and [facility]  Copy of Surgical Consent    Done Today:  Lab work,   Preop Visit not needed   Tests Ordered:  Post Op Visit Scheduled:10/22  Comments:    Surgery date/time:10/4    Consent to file in medical records  .

## 2019-09-29 ENCOUNTER — HEALTH MAINTENANCE LETTER (OUTPATIENT)
Age: 64
End: 2019-09-29

## 2019-10-03 ENCOUNTER — ANESTHESIA EVENT (OUTPATIENT)
Dept: SURGERY | Facility: CLINIC | Age: 64
End: 2019-10-03
Payer: COMMERCIAL

## 2019-10-03 NOTE — ANESTHESIA PREPROCEDURE EVALUATION
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]   Procedure(s):  Robot-assisted total laparoscopic hysterectomy, removal of tubes and ovaries, sentinel lymph node removal     Past Medical History:   Diagnosis Date     Depression      Hypothyroidism      Overweight       Past Surgical History:   Procedure Laterality Date     DILATION AND CURETTAGE N/A 2019    Procedure: DILATION AND CURETTAGE, UTERUS;  Surgeon: Navdeep Barajas MD;  Location: [facility] OR     DILATION AND CURETTAGE, HYSTEROSCOPY DIAGNOSTIC, COMBINED  2019    postmenopausal bleeding, endometrial thickening      OPERATIVE HYSTEROSCOPY WITH MORCELLATOR N/A 2019    Procedure: HYSTEROSCOPY WITH MORCELLATOR (MYOSURE);  Surgeon: Navdeep Barajas MD;  Location: [facility] OR     SALPINGO OOPHORECTOMY,R/L/SHANDA Left 1980s    Left ovarian with endometriois     US BREAST BIOPSY LT Left           Anesthesia Evaluation     .             ROS/MED HX    ENT/Pulmonary:  - neg pulmonary ROS     Neurologic:  - neg neurologic ROS     Cardiovascular:  - neg cardiovascular ROS   (+) ----. : . . . :. . Previous cardiac testing Echodate:2019results:Non-diagnostic stress echocardiogram. Unable to reach target heart rate.  No regional wall motion abnormalities at rest and peak exercise.  Normal LV size and function at rest. The LVEF is 55-60% and LVEF does not increase with exercise.  Normal blood pressure response to exercise.  No ECG evidence of ischemia at rest and peak exercise. Test terminated for leg pain.  No subjective signs of ischemia at rest and peak exercise. No reported chest discomfort.  Poor functional capacity.  No significant valvular disease noted on routine screening color flow Doppler and pulsed Doppler examination. The ascending aortic size appears normal.date:  "results:ECG reviewed date:7/19/2019 results:Last EKG Sinus Bradycardia, rate 51. date: results:          METS/Exercise Tolerance:  >4 METS   Hematologic:  - neg hematologic  ROS       Musculoskeletal:  - neg musculoskeletal ROS       GI/Hepatic:  - neg GI/hepatic ROS       Renal/Genitourinary:  - ROS Renal section negative       Endo:     (+) thyroid problem hypothyroidism, Obesity, .      Psychiatric:     (+) psychiatric history depression      Infectious Disease:         Malignancy:   (+) Malignancy History of Other  Other CA Endometrial Cancer Grade 1 status post         Other: Comment: Post-menopausal bleeding                    JZG FV AN PHYSICAL EXAM    LABS:  CBC:   Lab Results   Component Value Date    WBC 8.0 09/26/2019    HGB 12.7 09/26/2019    HCT 39.0 09/26/2019     09/26/2019     BMP:   Lab Results   Component Value Date     09/26/2019    POTASSIUM 4.4 09/26/2019    POTASSIUM 4.3 05/04/2017    CHLORIDE 106 09/26/2019    CO2 28 09/26/2019    BUN 18 09/26/2019    CR 1.04 09/26/2019    CR 1.06 (H) 02/08/2018    GLC 90 09/26/2019    GLC 93 05/04/2017     COAGS: No results found for: PTT, INR, FIBR  POC: No results found for: BGM, HCG, HCGS  OTHER:   Lab Results   Component Value Date    SHANDRA 10.4 (H) 09/26/2019    ALBUMIN 4.0 09/26/2019    PROTTOTAL 7.1 09/26/2019    ALT 30 09/26/2019    AST 15 09/26/2019    ALKPHOS 102 09/26/2019    BILITOTAL 0.7 09/26/2019    TSH 1.24 07/19/2019        Preop Vitals    BP Readings from Last 3 Encounters:   09/26/19 124/66   09/20/19 132/70   09/12/19 127/48    Pulse Readings from Last 3 Encounters:   09/26/19 64   09/20/19 70   08/27/19 55      Resp Readings from Last 3 Encounters:   09/26/19 18   09/20/19 12   09/12/19 16    SpO2 Readings from Last 3 Encounters:   09/26/19 97%   09/12/19 96%   08/27/19 98%      Temp Readings from Last 1 Encounters:   09/26/19 37  C (98.6  F) (Oral)    Ht Readings from Last 1 Encounters:   09/26/19 1.72 m (5' 7.72\")      Wt " "Readings from Last 1 Encounters:   09/26/19 118.4 kg (261 lb)    Estimated body mass index is 40.02 kg/m  as calculated from the following:    Height as of 9/26/19: 1.72 m (5' 7.72\").    Weight as of 9/26/19: 118.4 kg (261 lb).     LDA:        Assessment:   ASA SCORE: 2    H&P: History and physical reviewed and following examination; no interval change.         Plan:   Anes. Type:  General   Pre-Medication: Acetaminophen; Gabapentin   Induction:  IV (Standard)   Airway: ETT; Oral   Access/Monitoring: PIV; 2nd PIV   Maintenance: Balanced     Postop Plan:   Postop Pain: Opioids  Postop Sedation/Airway: Not planned     PONV Management:   Adult Risk Factors: Female, Postop Opioids   Prevention: Ondansetron, Dexamethasone                   Davin Mendoza MD  "

## 2019-10-04 ENCOUNTER — SURGERY (OUTPATIENT)
Age: 64
End: 2019-10-04
Payer: COMMERCIAL

## 2019-10-04 ENCOUNTER — ANESTHESIA (OUTPATIENT)
Dept: SURGERY | Facility: CLINIC | Age: 64
End: 2019-10-04
Payer: COMMERCIAL

## 2019-10-04 ENCOUNTER — HOSPITAL ENCOUNTER (OUTPATIENT)
Facility: CLINIC | Age: 64
Discharge: HOME | End: 2019-10-04
Attending: OBSTETRICS & GYNECOLOGY | Admitting: OBSTETRICS & GYNECOLOGY
Payer: COMMERCIAL

## 2019-10-04 VITALS
DIASTOLIC BLOOD PRESSURE: 71 MMHG | RESPIRATION RATE: 16 BRPM | HEART RATE: 59 BPM | OXYGEN SATURATION: 98 % | SYSTOLIC BLOOD PRESSURE: 124 MMHG | TEMPERATURE: 97.8 F

## 2019-10-04 DIAGNOSIS — C55 ENDOMETRIOID ADENOCARCINOMA OF UTERUS (H): ICD-10-CM

## 2019-10-04 DIAGNOSIS — Z90.710 S/P LAPAROSCOPIC HYSTERECTOMY: Primary | ICD-10-CM

## 2019-10-04 LAB
ABO + RH BLD: NORMAL
ABO + RH BLD: NORMAL
BLD GP AB SCN SERPL QL: NORMAL
BLOOD BANK CMNT PATIENT-IMP: NORMAL
GLUCOSE BLDC GLUCOMTR-MCNC: 97 MG/DL (ref 70–99)
SPECIMEN EXP DATE BLD: NORMAL

## 2019-10-04 PROCEDURE — 37000008 ZZH ANESTHESIA TECHNICAL FEE, 1ST 30 MIN: Performed by: OBSTETRICS & GYNECOLOGY

## 2019-10-04 PROCEDURE — 25000566 ZZH SEVOFLURANE, EA 15 MIN: Performed by: OBSTETRICS & GYNECOLOGY

## 2019-10-04 PROCEDURE — 40000170 ZZH STATISTIC PRE-PROCEDURE ASSESSMENT II: Performed by: OBSTETRICS & GYNECOLOGY

## 2019-10-04 PROCEDURE — 88305 TISSUE EXAM BY PATHOLOGIST: CPT | Performed by: OBSTETRICS & GYNECOLOGY

## 2019-10-04 PROCEDURE — 88112 CYTOPATH CELL ENHANCE TECH: CPT | Performed by: OBSTETRICS & GYNECOLOGY

## 2019-10-04 PROCEDURE — 25000125 ZZHC RX 250: Performed by: OBSTETRICS & GYNECOLOGY

## 2019-10-04 PROCEDURE — 27210794 ZZH OR GENERAL SUPPLY STERILE: Performed by: OBSTETRICS & GYNECOLOGY

## 2019-10-04 PROCEDURE — 36000088 ZZH SURGERY LEVEL 8 EA 15 ADDTL MIN - UMMC: Performed by: OBSTETRICS & GYNECOLOGY

## 2019-10-04 PROCEDURE — 58571 TLH W/T/O 250 G OR LESS: CPT | Mod: GC | Performed by: OBSTETRICS & GYNECOLOGY

## 2019-10-04 PROCEDURE — 25000132 ZZH RX MED GY IP 250 OP 250 PS 637: Performed by: STUDENT IN AN ORGANIZED HEALTH CARE EDUCATION/TRAINING PROGRAM

## 2019-10-04 PROCEDURE — 88309 TISSUE EXAM BY PATHOLOGIST: CPT | Performed by: OBSTETRICS & GYNECOLOGY

## 2019-10-04 PROCEDURE — 38571 LAPAROSCOPY LYMPHADENECTOMY: CPT | Mod: GC | Performed by: OBSTETRICS & GYNECOLOGY

## 2019-10-04 PROCEDURE — 86901 BLOOD TYPING SEROLOGIC RH(D): CPT | Performed by: OBSTETRICS & GYNECOLOGY

## 2019-10-04 PROCEDURE — 25800030 ZZH RX IP 258 OP 636: Performed by: STUDENT IN AN ORGANIZED HEALTH CARE EDUCATION/TRAINING PROGRAM

## 2019-10-04 PROCEDURE — 82962 GLUCOSE BLOOD TEST: CPT

## 2019-10-04 PROCEDURE — 36415 COLL VENOUS BLD VENIPUNCTURE: CPT | Performed by: OBSTETRICS & GYNECOLOGY

## 2019-10-04 PROCEDURE — 25800030 ZZH RX IP 258 OP 636: Performed by: ANESTHESIOLOGY

## 2019-10-04 PROCEDURE — 25000128 H RX IP 250 OP 636: Performed by: STUDENT IN AN ORGANIZED HEALTH CARE EDUCATION/TRAINING PROGRAM

## 2019-10-04 PROCEDURE — 71000027 ZZH RECOVERY PHASE 2 EACH 15 MINS: Performed by: OBSTETRICS & GYNECOLOGY

## 2019-10-04 PROCEDURE — 88331 PATH CONSLTJ SURG 1 BLK 1SPC: CPT | Performed by: OBSTETRICS & GYNECOLOGY

## 2019-10-04 PROCEDURE — 71000015 ZZH RECOVERY PHASE 1 LEVEL 2 EA ADDTL HR: Performed by: OBSTETRICS & GYNECOLOGY

## 2019-10-04 PROCEDURE — 25000131 ZZH RX MED GY IP 250 OP 636 PS 637: Performed by: STUDENT IN AN ORGANIZED HEALTH CARE EDUCATION/TRAINING PROGRAM

## 2019-10-04 PROCEDURE — 00000155 ZZHCL STATISTIC H-CELL BLOCK W/STAIN: Performed by: OBSTETRICS & GYNECOLOGY

## 2019-10-04 PROCEDURE — 25000125 ZZHC RX 250: Performed by: ANESTHESIOLOGY

## 2019-10-04 PROCEDURE — 25000125 ZZHC RX 250: Performed by: STUDENT IN AN ORGANIZED HEALTH CARE EDUCATION/TRAINING PROGRAM

## 2019-10-04 PROCEDURE — 25000128 H RX IP 250 OP 636: Performed by: ANESTHESIOLOGY

## 2019-10-04 PROCEDURE — 37000009 ZZH ANESTHESIA TECHNICAL FEE, EACH ADDTL 15 MIN: Performed by: OBSTETRICS & GYNECOLOGY

## 2019-10-04 PROCEDURE — 25000128 H RX IP 250 OP 636: Performed by: OBSTETRICS & GYNECOLOGY

## 2019-10-04 PROCEDURE — 71000014 ZZH RECOVERY PHASE 1 LEVEL 2 FIRST HR: Performed by: OBSTETRICS & GYNECOLOGY

## 2019-10-04 PROCEDURE — 88307 TISSUE EXAM BY PATHOLOGIST: CPT | Performed by: OBSTETRICS & GYNECOLOGY

## 2019-10-04 PROCEDURE — 36000086 ZZH SURGERY LEVEL 8 1ST 30 MIN UMMC: Performed by: OBSTETRICS & GYNECOLOGY

## 2019-10-04 PROCEDURE — 86900 BLOOD TYPING SEROLOGIC ABO: CPT | Performed by: OBSTETRICS & GYNECOLOGY

## 2019-10-04 PROCEDURE — 86850 RBC ANTIBODY SCREEN: CPT | Performed by: OBSTETRICS & GYNECOLOGY

## 2019-10-04 RX ORDER — ACETAMINOPHEN 325 MG/1
650 TABLET ORAL EVERY 6 HOURS PRN
Qty: 24 TABLET | Refills: 0 | Status: SHIPPED | OUTPATIENT
Start: 2019-10-04 | End: 2019-10-22

## 2019-10-04 RX ORDER — SODIUM CHLORIDE, SODIUM LACTATE, POTASSIUM CHLORIDE, CALCIUM CHLORIDE 600; 310; 30; 20 MG/100ML; MG/100ML; MG/100ML; MG/100ML
INJECTION, SOLUTION INTRAVENOUS CONTINUOUS PRN
Status: DISCONTINUED | OUTPATIENT
Start: 2019-10-04 | End: 2019-10-04

## 2019-10-04 RX ORDER — FLUMAZENIL 0.1 MG/ML
0.2 INJECTION, SOLUTION INTRAVENOUS
Status: DISCONTINUED | OUTPATIENT
Start: 2019-10-04 | End: 2019-10-04 | Stop reason: HOSPADM

## 2019-10-04 RX ORDER — ONDANSETRON 2 MG/ML
INJECTION INTRAMUSCULAR; INTRAVENOUS PRN
Status: DISCONTINUED | OUTPATIENT
Start: 2019-10-04 | End: 2019-10-04

## 2019-10-04 RX ORDER — LIDOCAINE HYDROCHLORIDE 20 MG/ML
INJECTION, SOLUTION INFILTRATION; PERINEURAL PRN
Status: DISCONTINUED | OUTPATIENT
Start: 2019-10-04 | End: 2019-10-04

## 2019-10-04 RX ORDER — HYDROMORPHONE HYDROCHLORIDE 1 MG/ML
.3-.5 INJECTION, SOLUTION INTRAMUSCULAR; INTRAVENOUS; SUBCUTANEOUS
Status: DISCONTINUED | OUTPATIENT
Start: 2019-10-04 | End: 2019-10-04 | Stop reason: HOSPADM

## 2019-10-04 RX ORDER — LIDOCAINE 40 MG/G
CREAM TOPICAL
Status: DISCONTINUED | OUTPATIENT
Start: 2019-10-04 | End: 2019-10-04 | Stop reason: HOSPADM

## 2019-10-04 RX ORDER — FENTANYL CITRATE 50 UG/ML
25-50 INJECTION, SOLUTION INTRAMUSCULAR; INTRAVENOUS
Status: DISCONTINUED | OUTPATIENT
Start: 2019-10-04 | End: 2019-10-04 | Stop reason: HOSPADM

## 2019-10-04 RX ORDER — ONDANSETRON 4 MG/1
4 TABLET, ORALLY DISINTEGRATING ORAL EVERY 30 MIN PRN
Status: DISCONTINUED | OUTPATIENT
Start: 2019-10-04 | End: 2019-10-09 | Stop reason: HOSPADM

## 2019-10-04 RX ORDER — SODIUM CHLORIDE, SODIUM LACTATE, POTASSIUM CHLORIDE, CALCIUM CHLORIDE 600; 310; 30; 20 MG/100ML; MG/100ML; MG/100ML; MG/100ML
INJECTION, SOLUTION INTRAVENOUS CONTINUOUS
Status: DISCONTINUED | OUTPATIENT
Start: 2019-10-04 | End: 2019-10-09 | Stop reason: HOSPADM

## 2019-10-04 RX ORDER — GABAPENTIN 300 MG/1
300 CAPSULE ORAL ONCE
Status: COMPLETED | OUTPATIENT
Start: 2019-10-04 | End: 2019-10-04

## 2019-10-04 RX ORDER — HEPARIN SODIUM 5000 [USP'U]/.5ML
5000 INJECTION, SOLUTION INTRAVENOUS; SUBCUTANEOUS
Status: COMPLETED | OUTPATIENT
Start: 2019-10-04 | End: 2019-10-04

## 2019-10-04 RX ORDER — ONDANSETRON 4 MG/1
4 TABLET, ORALLY DISINTEGRATING ORAL
Status: COMPLETED | OUTPATIENT
Start: 2019-10-04 | End: 2019-10-04

## 2019-10-04 RX ORDER — AMOXICILLIN 250 MG
1-2 CAPSULE ORAL 2 TIMES DAILY
Qty: 30 TABLET | Refills: 0 | Status: SHIPPED | OUTPATIENT
Start: 2019-10-04 | End: 2019-10-22

## 2019-10-04 RX ORDER — PROPOFOL 10 MG/ML
INJECTION, EMULSION INTRAVENOUS PRN
Status: DISCONTINUED | OUTPATIENT
Start: 2019-10-04 | End: 2019-10-04

## 2019-10-04 RX ORDER — ACETAMINOPHEN 325 MG/1
975 TABLET ORAL ONCE
Status: COMPLETED | OUTPATIENT
Start: 2019-10-04 | End: 2019-10-04

## 2019-10-04 RX ORDER — ONDANSETRON 2 MG/ML
4 INJECTION INTRAMUSCULAR; INTRAVENOUS EVERY 30 MIN PRN
Status: DISCONTINUED | OUTPATIENT
Start: 2019-10-04 | End: 2019-10-09 | Stop reason: HOSPADM

## 2019-10-04 RX ORDER — FENTANYL CITRATE 50 UG/ML
INJECTION, SOLUTION INTRAMUSCULAR; INTRAVENOUS PRN
Status: DISCONTINUED | OUTPATIENT
Start: 2019-10-04 | End: 2019-10-04

## 2019-10-04 RX ORDER — NALOXONE HYDROCHLORIDE 0.4 MG/ML
.1-.4 INJECTION, SOLUTION INTRAMUSCULAR; INTRAVENOUS; SUBCUTANEOUS
Status: DISCONTINUED | OUTPATIENT
Start: 2019-10-04 | End: 2019-10-05 | Stop reason: HOSPADM

## 2019-10-04 RX ORDER — EPHEDRINE SULFATE 50 MG/ML
INJECTION, SOLUTION INTRAMUSCULAR; INTRAVENOUS; SUBCUTANEOUS PRN
Status: DISCONTINUED | OUTPATIENT
Start: 2019-10-04 | End: 2019-10-04

## 2019-10-04 RX ORDER — OXYCODONE HYDROCHLORIDE 5 MG/1
5 TABLET ORAL EVERY 4 HOURS PRN
Qty: 6 TABLET | Refills: 0 | Status: SHIPPED | OUTPATIENT
Start: 2019-10-04 | End: 2019-10-22

## 2019-10-04 RX ORDER — MEPERIDINE HYDROCHLORIDE 25 MG/ML
12.5 INJECTION INTRAMUSCULAR; INTRAVENOUS; SUBCUTANEOUS
Status: DISCONTINUED | OUTPATIENT
Start: 2019-10-04 | End: 2019-10-09 | Stop reason: HOSPADM

## 2019-10-04 RX ORDER — CEFAZOLIN SODIUM 1 G/3ML
1 INJECTION, POWDER, FOR SOLUTION INTRAMUSCULAR; INTRAVENOUS SEE ADMIN INSTRUCTIONS
Status: DISCONTINUED | OUTPATIENT
Start: 2019-10-04 | End: 2019-10-04 | Stop reason: HOSPADM

## 2019-10-04 RX ORDER — CEFAZOLIN SODIUM 2 G/100ML
2 INJECTION, SOLUTION INTRAVENOUS
Status: COMPLETED | OUTPATIENT
Start: 2019-10-04 | End: 2019-10-04

## 2019-10-04 RX ORDER — ACETAMINOPHEN 325 MG/1
650 TABLET ORAL
Status: DISCONTINUED | OUTPATIENT
Start: 2019-10-04 | End: 2019-10-09 | Stop reason: HOSPADM

## 2019-10-04 RX ORDER — DEXAMETHASONE SODIUM PHOSPHATE 4 MG/ML
INJECTION, SOLUTION INTRA-ARTICULAR; INTRALESIONAL; INTRAMUSCULAR; INTRAVENOUS; SOFT TISSUE PRN
Status: DISCONTINUED | OUTPATIENT
Start: 2019-10-04 | End: 2019-10-04

## 2019-10-04 RX ORDER — BUPIVACAINE HYDROCHLORIDE 2.5 MG/ML
INJECTION, SOLUTION EPIDURAL; INFILTRATION; INTRACAUDAL PRN
Status: DISCONTINUED | OUTPATIENT
Start: 2019-10-04 | End: 2019-10-04

## 2019-10-04 RX ORDER — OXYCODONE HYDROCHLORIDE 10 MG/1
10 TABLET ORAL
Status: COMPLETED | OUTPATIENT
Start: 2019-10-04 | End: 2019-10-04

## 2019-10-04 RX ORDER — NALOXONE HYDROCHLORIDE 0.4 MG/ML
.1-.4 INJECTION, SOLUTION INTRAMUSCULAR; INTRAVENOUS; SUBCUTANEOUS
Status: DISCONTINUED | OUTPATIENT
Start: 2019-10-04 | End: 2019-10-04 | Stop reason: HOSPADM

## 2019-10-04 RX ORDER — INDOCYANINE GREEN AND WATER 25 MG
KIT INJECTION PRN
Status: DISCONTINUED | OUTPATIENT
Start: 2019-10-04 | End: 2019-10-04 | Stop reason: HOSPADM

## 2019-10-04 RX ORDER — GLYCOPYRROLATE 0.2 MG/ML
INJECTION, SOLUTION INTRAMUSCULAR; INTRAVENOUS PRN
Status: DISCONTINUED | OUTPATIENT
Start: 2019-10-04 | End: 2019-10-04

## 2019-10-04 RX ADMIN — MIDAZOLAM 2 MG: 1 INJECTION INTRAMUSCULAR; INTRAVENOUS at 07:40

## 2019-10-04 RX ADMIN — GLYCOPYRROLATE 0.4 MG: 0.2 INJECTION, SOLUTION INTRAMUSCULAR; INTRAVENOUS at 08:22

## 2019-10-04 RX ADMIN — DEXAMETHASONE SODIUM PHOSPHATE 2 MG: 4 INJECTION, SOLUTION INTRA-ARTICULAR; INTRALESIONAL; INTRAMUSCULAR; INTRAVENOUS; SOFT TISSUE at 07:58

## 2019-10-04 RX ADMIN — FENTANYL CITRATE 100 MCG: 50 INJECTION, SOLUTION INTRAMUSCULAR; INTRAVENOUS at 07:40

## 2019-10-04 RX ADMIN — DEXAMETHASONE SODIUM PHOSPHATE 4 MG: 4 INJECTION, SOLUTION INTRA-ARTICULAR; INTRALESIONAL; INTRAMUSCULAR; INTRAVENOUS; SOFT TISSUE at 08:42

## 2019-10-04 RX ADMIN — GABAPENTIN 300 MG: 300 CAPSULE ORAL at 06:37

## 2019-10-04 RX ADMIN — ROCURONIUM BROMIDE 20 MG: 10 INJECTION INTRAVENOUS at 09:46

## 2019-10-04 RX ADMIN — ROCURONIUM BROMIDE 30 MG: 10 INJECTION INTRAVENOUS at 08:36

## 2019-10-04 RX ADMIN — FENTANYL CITRATE 25 MCG: 50 INJECTION INTRAMUSCULAR; INTRAVENOUS at 12:00

## 2019-10-04 RX ADMIN — ACETAMINOPHEN 975 MG: 325 TABLET, FILM COATED ORAL at 06:37

## 2019-10-04 RX ADMIN — OXYCODONE HYDROCHLORIDE 10 MG: 5 TABLET ORAL at 13:57

## 2019-10-04 RX ADMIN — HYDROMORPHONE HYDROCHLORIDE 0.3 MG: 1 INJECTION, SOLUTION INTRAMUSCULAR; INTRAVENOUS; SUBCUTANEOUS at 12:49

## 2019-10-04 RX ADMIN — FENTANYL CITRATE 50 MCG: 50 INJECTION, SOLUTION INTRAMUSCULAR; INTRAVENOUS at 08:35

## 2019-10-04 RX ADMIN — ROCURONIUM BROMIDE 20 MG: 10 INJECTION INTRAVENOUS at 10:32

## 2019-10-04 RX ADMIN — DEXMEDETOMIDINE HYDROCHLORIDE 40 MCG: 100 INJECTION, SOLUTION INTRAVENOUS at 07:58

## 2019-10-04 RX ADMIN — CEFAZOLIN SODIUM 2 G: 2 INJECTION, SOLUTION INTRAVENOUS at 07:58

## 2019-10-04 RX ADMIN — ROCURONIUM BROMIDE 60 MG: 10 INJECTION INTRAVENOUS at 07:40

## 2019-10-04 RX ADMIN — HEPARIN SODIUM 5000 UNITS: 5000 INJECTION, SOLUTION INTRAVENOUS; SUBCUTANEOUS at 07:05

## 2019-10-04 RX ADMIN — CEFAZOLIN 1 G: 1 INJECTION, POWDER, FOR SOLUTION INTRAMUSCULAR; INTRAVENOUS at 09:59

## 2019-10-04 RX ADMIN — ONDANSETRON 4 MG: 4 TABLET, ORALLY DISINTEGRATING ORAL at 14:28

## 2019-10-04 RX ADMIN — Medication 5 MG: at 08:17

## 2019-10-04 RX ADMIN — INDOCYANINE GREEN 2.5 MG: KIT INTRAVENOUS at 10:53

## 2019-10-04 RX ADMIN — BUPIVACAINE HYDROCHLORIDE 60 ML: 2.5 INJECTION, SOLUTION EPIDURAL; INFILTRATION; INTRACAUDAL; PERINEURAL at 07:58

## 2019-10-04 RX ADMIN — ACETAMINOPHEN 650 MG: 325 TABLET, FILM COATED ORAL at 13:56

## 2019-10-04 RX ADMIN — SODIUM CHLORIDE, POTASSIUM CHLORIDE, SODIUM LACTATE AND CALCIUM CHLORIDE: 600; 310; 30; 20 INJECTION, SOLUTION INTRAVENOUS at 08:05

## 2019-10-04 RX ADMIN — ONDANSETRON 4 MG: 2 INJECTION INTRAMUSCULAR; INTRAVENOUS at 11:21

## 2019-10-04 RX ADMIN — ROCURONIUM BROMIDE 10 MG: 10 INJECTION INTRAVENOUS at 10:55

## 2019-10-04 RX ADMIN — SUGAMMADEX 200 MG: 100 INJECTION, SOLUTION INTRAVENOUS at 11:15

## 2019-10-04 RX ADMIN — ROCURONIUM BROMIDE 10 MG: 10 INJECTION INTRAVENOUS at 09:28

## 2019-10-04 RX ADMIN — LIDOCAINE HYDROCHLORIDE 100 MG: 20 INJECTION, SOLUTION INFILTRATION; PERINEURAL at 07:40

## 2019-10-04 RX ADMIN — SODIUM CHLORIDE, POTASSIUM CHLORIDE, SODIUM LACTATE AND CALCIUM CHLORIDE: 600; 310; 30; 20 INJECTION, SOLUTION INTRAVENOUS at 07:33

## 2019-10-04 RX ADMIN — PROPOFOL 200 MG: 10 INJECTION, EMULSION INTRAVENOUS at 07:40

## 2019-10-04 RX ADMIN — HYDROMORPHONE HYDROCHLORIDE 0.5 MG: 1 INJECTION, SOLUTION INTRAMUSCULAR; INTRAVENOUS; SUBCUTANEOUS at 08:41

## 2019-10-04 NOTE — ANESTHESIA PROCEDURE NOTES
Arterial Line Procedure Note  Staff:     Anesthesiologist:  Anu Minor MD    Resident/CRNA:  Lydia Everett MD    Arterial line performed by resident/CRNA in presence of a teaching physician    Location: In OR After Induction  Procedure Start/Stop Times:     patient identified, IV checked, site marked, risks and benefits discussed, informed consent, monitors and equipment checked, pre-op evaluation and at physician/surgeon's request      Correct Patient: Yes      Correct Position: Yes      Correct Site: Yes      Correct Procedure: Yes      Correct Laterality:  Yes    Site Marked:  Yes  Line Placement:     Procedure:  Arterial Line    Insertion Site:  Radial    Insertion laterality:  Left    Skin Prep: Chloraprep      Patient Prep: patient draped, mask, sterile gloves, hat and hand hygiene      Local skin infiltration:  None    Ultrasound Guided?: Yes      Artery evaluated via ultrasound confirming patency.   Using realtime imaging, the artery was punctured and the needle was observed entering the artery.      A permanent image is entered into patient's chart.      Catheter size:  20 gauge, Quick cath    Cath secured with: other (comment)      Dressing:  Tegaderm    Arterial waveform: Yes      IBP within 10% of NIBP: Yes

## 2019-10-04 NOTE — BRIEF OP NOTE
[facility], [place]    Brief Operative Note    Pre-operative diagnosis: Grade 1 endometrial cancer  Post-operative diagnosis Grade 1 endometrial cancer  Procedure: Procedure(s):  Robot-assisted total laparoscopic hysterectomy, Bilateral salpingectomy and Right Oophorectomy, Lysis of adhesion, Cervical Indocyanine Green injection, Bilateral sentinel lymph node dissection, Repair of vaginal laceration.  Surgeon: Surgeon(s) and Role:     * Perez Reddy MD - Primary     * Ekaterina Marks MD - Fellow - Assisting      * Lei Branham MD, PGY-4 - Assisting  Anesthesia: Combined General with TAP block   Estimated blood loss: 50 mL  Drains:  None  Specimens:   ID Type Source Tests Collected by Time Destination   1 : pelvic washings Washings Pelvis CYTOLOGY NON GYN Perez Reddy MD 10/4/2019  9:13 AM    A : small bowel mesentery nodule Tissue Other SURGICAL PATHOLOGY EXAM Perez Reddy MD 10/4/2019  8:50 AM    B : right obturator sentinel lymph node Tissue Lymph Node, [place] SURGICAL PATHOLOGY EXAM Perez Reddy MD 10/4/2019  9:23 AM    C : left internal iliac sentinel lymph nodes Tissue Lymph Node, [place] SURGICAL PATHOLOGY EXAM Perez Reddy MD 10/4/2019 10:02 AM    D : uterus, cervix, bilateral fallopian tubes, right ovary Tissue Uterus, Cervix and Bilateral Fallopian Tubes SURGICAL PATHOLOGY EXAM Perez Reddy MD 10/4/2019 10:41 AM      Findings: On EUA, narrow introitus with intact hymen. Small nulliparous cervix, mobile and without lesions or masses. On laparoscopic entry, no injury noted. Normal appearing liver, diaphragm, greater curvature of stomach. Diffuse 2-5 mm areas of dark brown, mucinous deposits on serosa of bowel and posterior cul de sac peritoneum consistent with endometriosis. Frozen sent of these lesions which returned as benign. Omental adhesions to upper anterior wall near liver, bowel adhesions to right pelvic wall, sigmoid adhesions to left pelvic  wall, left uterine adnexa, and uterine fundus. Small bowel adhesions along left pelvic side wall and posterior cul de sac. Surgically absent left ovary. Right and left pelvic sentinel lymph nodes identified and sent to pathology. Surgical pedicles, pelvic lymph node dissection beds and vaginal cuff hemostatic at end of case. Small vaginal lacerations at perineum and 4 o'clock of vagina repaired with 2-0 Vicryl with good hemostasis.  Complications: None.    Lei Branham MD  OB/GYN Resident, PGY-4  10/4/2019

## 2019-10-04 NOTE — ANESTHESIA PROCEDURE NOTES
Peripheral Nerve Block Procedure Note    Staff:     Anesthesiologist:  Chan Parada MD    Resident/CRNA:  Jim Bhagat MD    Block performed by resident/CRNA in the presence of a teaching physician    Location: OR AFTER induction  Procedure Start/Stop TImes:     patient identified, IV checked, site marked, risks and benefits discussed, informed consent, monitors and equipment checked, pre-op evaluation, at physician/surgeon's request and post-op pain management      Correct Patient: Yes      Correct Position: Yes      Correct Site: Yes      Correct Procedure: Yes      Correct Laterality:  Yes    Site Marked:  Yes  Procedure details:     Procedure:  TAP    Laterality:  Bilateral    Position:  Supine    Sterile Prep: chloraprep, mask and sterile gloves      Needle gauge:  21    Needle length (mm):  110    Ultrasound: Yes      Ultrasound used to identify targeted nerve, plexus, or vascular structure and placed a needle adjacent to it      Permanent Image entered into patiient's record      Abnormal pain on injection: No      Blood Aspirated: No      Paresthesias:  No    Bleeding at site: No      Bolus via:  Needle    Infusion Method:  Single Shot    Blood aspirated via catheter: No      Complications:  None  Assessment/Narrative:     Injection made incrementally with aspirations every (mL):  5

## 2019-10-04 NOTE — OP NOTE
[facility]  Full Operative Note    Date of Service:  10/4/2019    Surgeon: Perez Reddy MD  Assistants:   Ekaterina Marks, Gyn/Onc Fellow  Lei Branham MD, PGY-4      Preop Dx: FIGO Grade 1 endometrioid adenocarcinoma of the uterus  Postop Dx: Same, endometriosis  Procedure:   Robot-assisted total laparoscopic hysterectomy, Bilateral salpingectomy and Right Oophorectomy, Lysis of adhesion, Cervical Indocyanine Green injection, Bilateral sentinel lymph node dissection, Repair of vaginal laceration.    Anesthesia: General anesthesia  IVF: 1300 mL crystalloid  EBL: 50 mL  UOP: 400 mL   Drains: Malloy during case  Specimens:   ID Type Source Tests Collected by Time Destination   1 : pelvic washings Washings Pelvis CYTOLOGY NON GYN Perez Reddy MD 10/4/2019  9:13 AM    A : small bowel mesentery nodule Tissue Other SURGICAL PATHOLOGY EXAM Perez Reddy MD 10/4/2019  8:50 AM    B : right obturator sentinel lymph node Tissue Lymph Node, [place] SURGICAL PATHOLOGY EXAM Perez Reddy MD 10/4/2019  9:23 AM    C : left internal iliac sentinel lymph nodes Tissue Lymph Node, [place] SURGICAL PATHOLOGY EXAM Perez Reddy MD 10/4/2019 10:02 AM    D : uterus, cervix, bilateral fallopian tubes, right ovary Tissue Uterus, Cervix and Bilateral Fallopian Tubes SURGICAL PATHOLOGY EXAM Perez Reddy MD 10/4/2019 10:41 AM       Complications: None apparent    Indications: Ms. Hanh Villalba is a 63 year old who had post-menopausal bleeding with ultrasound showing thickened endometrium. She underwent a D&C showing Grade 1 endometrial cancer. The risks, benefits, and alternatives to the procedure were discussed and she agreed to proceed.    Findings:   On EUA, narrow introitus with intact hymen. Small nulliparous cervix, mobile and without lesions or masses. On laparoscopic entry, no injury noted. Normal appearing liver, diaphragm, greater curvature of stomach. Diffuse 2-5 mm areas of dark brown  deposits on serosa and mesentery of the large bowel and a small bowel loop that was adherent within the pelvis consistent with endometriosis. Frozen sent of these lesions which returned as benign. Omental adhesions to upper anterior wall near liver, bowel adhesions to right pelvic wall, sigmoid adhesions to left pelvic wall, left uterine adnexa, and uterine fundus. Small bowel adhesions along left pelvic side wall and posterior cul de sac. Surgically absent left ovary. Right and left pelvic sentinel lymph nodes identified and sent to pathology. Right in the obturator space and left along the internal iliac. Surgical pedicles, pelvic lymph node dissection beds and vaginal cuff hemostatic at end of case. Small vaginal lacerations at perineum and 4 o'clock of vagina repaired with 2-0 Vicryl with good hemostasis.    Complications:   None.    Description of Procedure:  Appropriate consent had been previously obtained. The patient was brought to the operating room where anesthesia as above was induced. Surgical time out was completed to confirm patient and procedure.    The patient was placed in dorsal lithotomy position in the Brennan stirru with her arms tucked at the sides. All bony prominences were padded.  She was prepped and draped in usual sterile fashion. A allen catheter was placed in the bladder. The cervix was visualized and 1mL of 1.25mg/mL was injected into 3 and 9 o'clock on the cervix both deep and superficial. A Ancestry uterine manipulator was placed in the uterus with a cervical cup fitted to the cervix. Gloves were changed.     Attention was turned to the abdomen where and a supraumbilial skin incision was made with a scalpel. A Veress needle was inserted into the abdomen and intra-abdominal placement confirmed by saline drop test and initial pressure of 3mmHg. The abdomen was insufflated to an operating pressure of 15mmHg with carbon dioxide. The Veress needle was removed and an 8mm blunt robotic trocar  was placed. The camera was inserted through the umbilical port and a complete survey of the abdomen was completed with findings as noted above. Under direct visualization, four additional 8-mm ports were inserted, two in each lower quadrant with each port placed approximately 9cm lateral to the prior port. The far right port was an 8mm AirSeal port. A survey was as above. Pelvic washings were collected. The patient was placed into Trendelenburg and the robot was then docked about difficulty and instruments placed.     The adhesions between the bowel and the pelvic sidewalls were taken down sharply using the robot including the adhesions of the small bowel loop which was freed from the pelvis. The retroperitoneum was entered parallel to the gonadal vessels. The ureter was identified. The para-vesical space was opened. Using FireFly the sentinel lymph node was identified on the right. It was removed with cautery. The additional adhesions on the left were taken down, adhesiolysis took nearly an hour. The same procedure was then carried out on the left to identify the ureter and then the sentinel lymph node, which was removed using cautery. Both were sent to pathology separately.  The ureters were again identified and the gonadal vessels were skeletonized, cauterized and transected followed by division of the broad ligament bilaterally. The round ligaments were then cauterized and divided, followed by division of the vesicouterine peritoneum, allowing the bladder to be reflected over the lower uterine segment, cervix and proximal vagina. The uterine arteries were skeletonized, cauterized and transected, followed by cautery and transection of the cardinal and uterosacral ligaments. Once below the level of the cervix, an anterior colpotomy was created and carried circumferentially around the base of the cervix using cautery. The specimen was retrieved through the vaginal opening and sent to pathology.     The vaginal cuff  was then closed in a running fashion using 0 V-lock. The abdomen and pelvis were irrigated and hemostasis confirmed. All instruments were removed and the robot was undocked. The pneumoperitoneum was allowed to escape, and the instruments were removed.    The skin of all port sites was closed using subcuticular 4-0 Monocryl followed by the application of skin glue. All counts were correct x 2 prior to closure. The vagina was inspected and the above lacerations were closed with 2-0 Vicryl to aid in hemostasis. The allen was removed. The patient was awakened and taken to recovery in stable condition.     Perez Reddy MD    Gynecologic Oncology

## 2019-10-04 NOTE — OR NURSING
Pt arrived to PACU.  PACU Rn assumed care of pt @ 1140.  Pt arrived able to state name and deny pain or nausea at present time.  Lungs coarse bi lat; tolerating face mask.  5 lap sites - approximated and intact - ji.  --  Additional assessment as per flowsheet.

## 2019-10-04 NOTE — ANESTHESIA POSTPROCEDURE EVALUATION
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   Postop Comments: No value filed.       Anesthesia Type:  Not documented  General    Reportable Event: NO     PAIN: Uncomplicated   Sign Out status: Comfortable, Well controlled pain     PONV: No PONV   Sign Out status:  No Nausea or Vomiting     Neuro/Psych: Uneventful perioperative course   Sign Out Status: Preoperative baseline; Age appropriate mentation     Airway/Resp.: Uneventful perioperative course   Sign Out Status: Non labored breathing, age appropriate RR; Resp. Status within EXPECTED Parameters     CV: Uneventful perioperative course   Sign Out status: Appropriate BP and perfusion indices; Appropriate HR/Rhythm     Disposition:   Sign Out in:  PACU  Disposition:  Phase II; Home  Recovery Course: Uneventful  Follow-Up: Not required           Last Anesthesia Record Vitals:  CRNA VITALS  10/4/2019 1105 - 10/4/2019 1205      10/4/2019             Resp Rate (observed):  (!) 5          Last PACU Vitals:  Vitals Value Taken Time   /62 10/4/2019 12:25 PM   Temp 36.4  C (97.5  F) 10/4/2019 12:10 PM   Pulse 55 10/4/2019 12:20 PM   Resp 12 10/4/2019 12:25 PM   SpO2 100 % 10/4/2019 12:31 PM   Temp src     NIBP     Pulse     SpO2     Resp     Temp     Ht Rate     Temp 2     Vitals shown include unvalidated device data.      Electronically Signed By: Anu Minor MD, 2019, 12:32 PM

## 2019-10-04 NOTE — DISCHARGE INSTRUCTIONS
[facility], [place]    Take it easy when you get home.  Remember, same day surgery DOES NOT MEAN SAME DAY RECOVERY! Healing is a gradual process.   You will need some time to recover- you may be more tired than you realize at first.  Rest and relax for at least the first 24 hours at home.  You'll feel better and heal faster if you take good care of yourself.   Same-Day Surgery   Adult Discharge Orders & Instructions     For 24 hours after surgery    1. Get plenty of rest.  A responsible adult must stay with you for at least 24 hours after you leave the hospital.   2. Do not drive or use heavy equipment.  If you have weakness or tingling, don't drive or use heavy equipment until this feeling goes away.  3. Do not drink alcohol.  4. Avoid strenuous or risky activities.  Ask for help when climbing stairs.   5. You may feel lightheaded.  IF so, sit for a few minutes before standing.  Have someone help you get up.   6. If you have nausea (feel sick to your stomach): Drink only clear liquids such as apple juice, ginger ale, broth or 7-Up.  Rest may also help.  Be sure to drink enough fluids.  Move to a regular diet as you feel able.  7. You may have a slight fever. Call the doctor if your fever is over 100 F (37.7 C) (taken under the tongue) or lasts longer than 24 hours.  8. You may have a dry mouth, a sore throat, muscle aches or trouble sleeping.  These should go away after 24 hours.  9. Do not make important or legal decisions.   Call your doctor for any of the followin.  Signs of infection (fever, growing tenderness at the surgery site, a large amount of drainage or bleeding, severe pain, foul-smelling drainage, redness, swelling).    2. It has been over 8 to 10 hours since surgery and you are still not able to urinate (pass water).    3.  Headache for over 24 hours.    4.  Numbness, tingling or weakness the day after surgery (if you had spinal anesthesia).  Use this number to  contact the clinic during regular business hours only please.  To contact a doctor, call _____[phone]  _____ or:       Use this number if the clinic is closed; this is a hospital answering service [phone] and ask for the resident on call for   ____GYN resident on call _____ (answered 24 hours a day)      Emergency Department:    [facility]: [phone]       (TTY for hearing impaired: [phone])           Tips for taking pain medications  To get the best pain relief possible , remember these points:      Take pain medications as directed, before pain becomes severe      Pain medication can upset your stomach: taking it with food may help      Constipation is a common side effect of pain medication. Drink plenty of  Fluids      Eat foods high in fiber. Take a stool softener  if recommended by your doctor or  Pharmacist.        Do not drink alcohol, drive or operate machinery while taking pain medications.      Ask about other ways to control pain, such as with heat, ice or relaxation.

## 2019-10-04 NOTE — PROGRESS NOTES
Post-Op Check      Hanh Villalba is a 63 year old POD#0 s/p RA-TLH, BSO, SLND    S: Pt doing well with pain well controlled with oral medications. Denies any nausea, shortness of breath and chest pain. Ambulating without dizziness. Eating crackers and drinking water and soda without issue. Voiding without issues    O:    /71   Pulse 59   Temp 97.8  F (36.6  C) (Oral)   Resp 16   SpO2 98%     I/O last 3 completed shifts:  In: 1400 [P.O.:100; I.V.:1300]  Out: 450 [Urine:400; Blood:50]    General:  A&Ox3, NAD  CV:  RRR, no m/r/g  Pulm:  CTAB, good inspiratory effort  Abd: soft, appropriately tender to palpation, nondistended.  No rebound or guarding  Incision: c/d/i    A/P:  63 year old POD#0 s/p RA-TLH, BSO, SLND for grade 1 endometrial cancer. Doing well in immediate postoperative period. Meeting goals for discharge.  - Reviewed intraoperative findings  - Discuss return precautions  - Will follow-up with Dr. Reddy on 10/22 for postoperative visit    Toyin Amin MD  OBGYN Resident, PGY1  Gyn Onc Pager: [phone]

## 2019-10-04 NOTE — ANESTHESIA CARE TRANSFER NOTE
Patient: Hanh Villalba    Procedure(s):  Robot-assisted total laparoscopic hysterectomy, Bilateral salpingectomy and Right Oophorectomy, Lysis of adhesion, Cervical Indocyanine Green injection, Bilateral sentinel lymph node dissection, Repair of vaginal laceration.    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    Anesthesia Type:   General     Note:  Airway :Face Mask  Patient transferred to:PACU  Comments: VSS. Breathing spontaneously at a regular rate with adequate tidal volumes and maintaining O2 sats on 6L facemask. Denies nausea or pain. No apparent complications from anesthesia.     Davin Mendoza MD  CA-1      Handoff Report: Identifed the Patient, Identified the Reponsible Provider, Reviewed the pertinent medical history, Discussed the surgical course, Reviewed Intra-OP anesthesia mangement and issues during anesthesia, Set expectations for post-procedure period and Allowed opportunity for questions and acknowledgement of understanding      Vitals: (Last set prior to Anesthesia Care Transfer)    CRNA VITALS  10/4/2019 1105 - 10/4/2019 1148      10/4/2019             Resp Rate (observed):  (!) 5                Electronically Signed By: Davin Mendoza MD  October 4, 2019  11:48 AM

## 2019-10-04 NOTE — OR NURSING
"Pt meeting phase one completion criteria @1255.   Alert and oriented times 4.  Rating pain in abdomen as \"dull\".   Lungs cont equal and clear bi lat.  Tolerating wean to room air.  Lap sites to abdomen remain approximated and intact.  Abdomen soft to palpation.  Clean claudia pad in place.             SBAR Hand off report to January Schmid Rn  .           "

## 2019-10-04 NOTE — ANESTHESIA PROCEDURE NOTES
Central Line Procedure Note  Staff:     Anesthesiologist:  Anu Minor MD    Resident/CRNA:  Lydia Everett MD    Central line placed by Resident/CRNA in the presence of a teaching physician    Location: In OR after induction  Procedure Start/Stop Times:     patient identified, IV checked, site marked, risks and benefits discussed, informed consent, monitors and equipment checked, pre-op evaluation and at physician/surgeon's request      Correct Patient: Yes      Correct Position: Yes      Correct Site: Yes      Correct Procedure: Yes      Correct Laterality:  Yes    Site Marked:  Yes  Line Placement:     Procedure:  Central Line    Insertion laterality:  Right    Insertion site:  Internal Jugular    Position:  Trendelenburg      Maximal Sterile Barriers: All elements of maximal sterile barrier technique followed      (Maximal sterile barriers include:   Sterile gown, Sterile Gloves, Mask, Cap, Whole body draped, hand hygiene and acceptable skin prep).Skin Prep: Chloraprep         Injection Technique:  Ultrasound guided    Sterile Ultrasound Technique:  Sterile probe cover and Sterile gel    Vein evaluated via U/S for patency/adequacy of catheter insertion and is adequate.  Using realtime U/S imaging the vein was punctured, and needle was observed entering vein on U/S      Permanent Image entered into patient's record      Local skin infiltration:  None    Catheter size:  7 Fr, 3 lumen, 20 cm    Cath secured with: suture      Dressing:  Tegaderm and Biopatch    Complications:  None obvious    Blood aspirated all lumens: Yes      All Lumens Flushed: Yes

## 2019-10-07 LAB — COPATH REPORT: NORMAL

## 2019-10-09 LAB — COPATH REPORT: NORMAL

## 2019-10-17 ENCOUNTER — MYC MEDICAL ADVICE (OUTPATIENT)
Dept: FAMILY MEDICINE | Facility: CLINIC | Age: 64
End: 2019-10-17

## 2019-10-17 NOTE — TELEPHONE ENCOUNTER
See [employer] message reply to patient.  Will await response. Okay to close if read with no reply.    Eddi Sharma RN

## 2019-10-18 ASSESSMENT — ENCOUNTER SYMPTOMS: DYSURIA: 1

## 2019-10-22 ENCOUNTER — OFFICE VISIT (OUTPATIENT)
Dept: ONCOLOGY | Facility: CLINIC | Age: 64
End: 2019-10-22
Attending: OBSTETRICS & GYNECOLOGY
Payer: COMMERCIAL

## 2019-10-22 VITALS
TEMPERATURE: 98.5 F | WEIGHT: 261.9 LBS | OXYGEN SATURATION: 99 % | HEART RATE: 70 BPM | RESPIRATION RATE: 18 BRPM | BODY MASS INDEX: 40.16 KG/M2 | SYSTOLIC BLOOD PRESSURE: 109 MMHG | DIASTOLIC BLOOD PRESSURE: 72 MMHG

## 2019-10-22 DIAGNOSIS — C55 ENDOMETRIOID ADENOCARCINOMA OF UTERUS (H): Primary | ICD-10-CM

## 2019-10-22 PROCEDURE — G0463 HOSPITAL OUTPT CLINIC VISIT: HCPCS | Mod: ZF

## 2019-10-22 PROCEDURE — 99213 OFFICE O/P EST LOW 20 MIN: CPT | Mod: 24 | Performed by: OBSTETRICS & GYNECOLOGY

## 2019-10-22 ASSESSMENT — PAIN SCALES - GENERAL: PAINLEVEL: NO PAIN (0)

## 2019-10-22 NOTE — PATIENT INSTRUCTIONS
Referral to radiation oncology  Follow up appointment 3 months after radiation is complete   PT, RN, DR MOORE

## 2019-10-22 NOTE — PROGRESS NOTES
Gynecologic Oncology Clinic - Established Patient Visit    Visit date: Oct 22, 2019     CC: treatment recommendations    Interval history: Hanh Villalba is a 63 year old who underwent a RATLH-BSO, sLND on 10/4/19 for endometrial cancer.     She is currently feeling very well. She had a couple of episode of vaginal spotting immediately following surgery, nothing recently. She is otherwise feeling well. She has no incisional concerns. She has a [place] to [place] planned for early November.         Relevant history:  9/12/2019 D&C with pathology showing Grade 1 endometrial cancer, loss of MLH1 and PMS2, hypermethylation of MLH1 promotor positive  10/4/2019: Robotic-assisted Total laparoscopic hysterectomy, bilateral salpingo-oophorectomy, sentinel lymph node dissection, vaginal laceration repair: Stage 1B FIGO Grade 2, DOI 17/21mm (81%), LVSI+, cytology negative, tumor 3.4cm; MSI-H (MLH1 promoter melthylation)    Review of Systems:  Constitutional: no fevers, chills  Cv: no chest pain, palpitations,   Resp: no cough, shortness of breath  GI: no constipation, diarrhea, abdominal pain  : no vaginal bleeding or discharge; pain     Past Medical History:  Past Medical History:   Diagnosis Date     Depression      Hypothyroidism      Overweight        Past Surgical History:  Past Surgical History:   Procedure Laterality Date     DAVINCI HYSTERECTOMY TOTAL, BILATERAL SALPINGO-OOPHORECTOMY, NODE DISSECTION, COMBINED N/A 10/4/2019    Procedure: Robot-assisted total laparoscopic hysterectomy, Bilateral salpingectomy and Right Oophorectomy, Lysis of adhesion, Cervical Indocyanine Green injection, Bilateral sentinel lymph node dissection, Repair of vaginal laceration.;  Surgeon: Perez Reddy MD;  Location: [facility] OR     DILATION AND CURETTAGE N/A 9/12/2019    Procedure: DILATION AND CURETTAGE, UTERUS;  Surgeon: Navdeep Barajas MD;  Location:  OR     OPERATIVE HYSTEROSCOPY WITH MORCELLATOR N/A 9/12/2019     Procedure: HYSTEROSCOPY WITH MORCELLATOR (MYOSURE);  Surgeon: Navdeep Barajas MD;  Location: [facility] OR     SALPINGO OOPHORECTOMY,R/L/SHANDA Left 1980s    Left ovarian with endometriois     US BREAST BIOPSY LT Left         Physical Exam:  /72   Pulse 70   Temp 98.5  F (36.9  C) (Oral)   Resp 18   Wt 118.8 kg (261 lb 14.4 oz)   SpO2 99%   BMI 40.16 kg/m     General appearance: no acute distress, well groomed, sitting comfortably   GI: abdomen soft, non-tender, minimal distension in the lower abdomen. Incisions well healed  :  External: vulva/labia normal in appearance without lesions  Bimanual exam: cuff is palpably intact with sutures in place.    Labs/Pathology:  FINAL DIAGNOSIS:   A. NODULE, SMALL BOWEL MESENTERIC, BIOPSY:   - Fibrous adhesions with inflammation and hemorrhage   - Negative for malignancy     B. SENTINEL LYMPH NODE, RIGHT OBTURATOR, EXCISION:   - One reactive lymph node, negative for malignancy (0/1)     C. SENTINEL LYMPH NODE, LEFT EXTERNAL ILIAC, EXCISION:   - One reactive lymph node, negative for malignancy (0/1)     D. UTERUS, CERVIX, BILATERAL FALLOPIAN TUBES AND RIGHT OVARY, HYSTERECTOMY    WITH BILATERAL SALPINGECTOMY AND   RIGHT OOPHORECTOMY:   - Endometrial endometrioid adenocarcinoma (FIGO grade 2)   - Chronic cervicitis   - Right ovary with surface fibrous adhesions and old hemorrhage   - Fallopian tubes with no significant histologic abnormality     Assessment:  Hanh is a 63 year old s/p surgery with a final diagnosis of Stage 1B FIGO Grade 2 endometrioid adenocarcinoma, DOI 17/21mm (81%), LVSI+, cytology negative, tumor 3.4cm.     Plan:  - Although her cancer is an early stage, she unfortunately does have numerous risk factors for recurrence both locoregional and distant given the grade 2 lesion, deep invasion, and positive LVSI. Given her high intermediate risk tumor, I would recommend she meet with Radiation Oncology to discuss vaginal brachytherapy. I have placed  the referral. We can work to schedule her prior to her trip in November; however, any therapy could wait until after she returns from [place] as this will give her cuff time to heal and hopefully would still remain within the usual window for therapy.  - Based upon her stage, I do not think any systemic therapy is warranted. We will plan to enter surveillance.   -For intermediate risk endometrial cancer recommendations are for every 3 month follow-up for the first year, then every 6 month follow-up until 5 years after diagnosis with imaging completed if any concern for recurrence.    Perez Reddy MD     Gynecologic Oncology

## 2019-10-22 NOTE — LETTER
10/22/2019       RE: Hanh Villalba  [address]  [facility] [address]     Dear Colleague,    Thank you for referring your patient, Hanh Villalba, to the [facility]. Please see a copy of my visit note below.    Gynecologic Oncology Clinic - Established Patient Visit    Visit date: Oct 22, 2019     CC: treatment recommendations    Interval history: Hanh Villalba is a 63 year old who underwent a RATLH-BSO, sLND on 10/4/19 for endometrial cancer.     She is currently feeling very well. She had a couple of episode of vaginal spotting immediately following surgery, nothing recently. She is otherwise feeling well. She has no incisional concerns. She has a trip to [place] planned for early November.         Relevant history:  9/12/2019 D&C with pathology showing Grade 1 endometrial cancer, loss of MLH1 and PMS2, hypermethylation of MLH1 promotor positive  10/4/2019: Robotic-assisted Total laparoscopic hysterectomy, bilateral salpingo-oophorectomy, sentinel lymph node dissection, vaginal laceration repair: Stage 1B FIGO Grade 2, DOI 17/21mm (81%), LVSI+, cytology negative, tumor 3.4cm; MSI-H (MLH1 promoter melthylation)    Review of Systems:  Constitutional: no fevers, chills  Cv: no chest pain, palpitations,   Resp: no cough, shortness of breath  GI: no constipation, diarrhea, abdominal pain  : no vaginal bleeding or discharge; pain     Past Medical History:  Past Medical History:   Diagnosis Date     Depression      Hypothyroidism      Overweight        Past Surgical History:  Past Surgical History:   Procedure Laterality Date     DAVINCI HYSTERECTOMY TOTAL, BILATERAL SALPINGO-OOPHORECTOMY, NODE DISSECTION, COMBINED N/A 10/4/2019    Procedure: Robot-assisted total laparoscopic hysterectomy, Bilateral salpingectomy and Right Oophorectomy, Lysis of adhesion, Cervical Indocyanine Green injection, Bilateral sentinel lymph node dissection, Repair of vaginal laceration.;  Surgeon:  Perez Reddy MD;  Location: [facility] OR     DILATION AND CURETTAGE N/A 9/12/2019    Procedure: DILATION AND CURETTAGE, UTERUS;  Surgeon: Navdeep Barajas MD;  Location: [facility] OR     OPERATIVE HYSTEROSCOPY WITH MORCELLATOR N/A 9/12/2019    Procedure: HYSTEROSCOPY WITH MORCELLATOR (MYOSURE);  Surgeon: Navdeep Barajas MD;  Location: [facility] OR     SALPINGO OOPHORECTOMY,R/L/SHANDA Left 1980s    Left ovarian with endometriois     US BREAST BIOPSY LT Left         Physical Exam:  /72   Pulse 70   Temp 98.5  F (36.9  C) (Oral)   Resp 18   Wt 118.8 kg (261 lb 14.4 oz)   SpO2 99%   BMI 40.16 kg/m      General appearance: no acute distress, well groomed, sitting comfortably   GI: abdomen soft, non-tender, minimal distension in the lower abdomen. Incisions well healed  :  External: vulva/labia normal in appearance without lesions  Bimanual exam: cuff is palpably intact with sutures in place.    Labs/Pathology:  FINAL DIAGNOSIS:   A. NODULE, SMALL BOWEL MESENTERIC, BIOPSY:   - Fibrous adhesions with inflammation and hemorrhage   - Negative for malignancy     B. SENTINEL LYMPH NODE, RIGHT OBTURATOR, EXCISION:   - One reactive lymph node, negative for malignancy (0/1)     C. SENTINEL LYMPH NODE, LEFT EXTERNAL ILIAC, EXCISION:   - One reactive lymph node, negative for malignancy (0/1)     D. UTERUS, CERVIX, BILATERAL FALLOPIAN TUBES AND RIGHT OVARY, HYSTERECTOMY    WITH BILATERAL SALPINGECTOMY AND   RIGHT OOPHORECTOMY:   - Endometrial endometrioid adenocarcinoma (FIGO grade 2)   - Chronic cervicitis   - Right ovary with surface fibrous adhesions and old hemorrhage   - Fallopian tubes with no significant histologic abnormality     Assessment:  Hanh is a 63 year old s/p surgery with a final diagnosis of Stage 1B FIGO Grade 2 endometrioid adenocarcinoma, DOI 17/21mm (81%), LVSI+, cytology negative, tumor 3.4cm.     Plan:  - Although her cancer is an early stage, she unfortunately does have numerous risk factors for  recurrence both locoregional and distant given the grade 2 lesion, deep invasion, and positive LVSI. Given her high intermediate risk tumor, I would recommend she meet with Radiation Oncology to discuss vaginal brachytherapy. I have placed the referral. We can work to schedule her prior to her trip in November; however, any therapy could wait until after she returns from [place] as this will give her cuff time to heal and hopefully would still remain within the usual window for therapy.  - Based upon her stage, I do not think any systemic therapy is warranted. We will plan to enter surveillance.   -For intermediate risk endometrial cancer recommendations are for every 3 month follow-up for the first year, then every 6 month follow-up until 5 years after diagnosis with imaging completed if any concern for recurrence.    Perez Reddy MD     Gynecologic Oncology

## 2019-10-22 NOTE — NURSING NOTE
"Oncology Rooming Note    October 22, 2019 9:41 AM   Hanh Villalba is a 63 year old female who presents for:    Chief Complaint   Patient presents with     Oncology Clinic Visit     Post op; hysterectomy     Initial Vitals: /72   Pulse 70   Temp 98.5  F (36.9  C) (Oral)   Resp 18   Wt 118.8 kg (261 lb 14.4 oz)   SpO2 99%   BMI 40.16 kg/m   Estimated body mass index is 40.16 kg/m  as calculated from the following:    Height as of 9/26/19: 1.72 m (5' 7.72\").    Weight as of this encounter: 118.8 kg (261 lb 14.4 oz). Body surface area is 2.38 meters squared.  No Pain (0) Comment: Data Unavailable   No LMP recorded. Patient is postmenopausal.  Allergies reviewed: Yes  Medications reviewed: Yes    Medications: Medication refills not needed today.  Pharmacy name entered into [employer]:    [id number] - [place], [place] - [address] [facility] - [place], [place] - [address]    Clinical concerns: Patient states there are no new concerns to discuss with provider.  Dr Reddy was not notified.         Rupali Bernal CMA              "

## 2019-10-24 NOTE — PROGRESS NOTES
Department of Radiation Oncology     [place] [address]  [address] [place]  [address]  Office:  [phone]  Fax:  [phone]   Radiation Oncology Clinic  [address] [place] [address]  Phone:  [phone]  Fax:  [phone]     RE: Hanh Villalba : 1955   MRN: [id number] RUPERT: Oct 28, 2019       RADIATION ONCOLOGY CONSULTATION       PROBLEM: high intermediate risk endometrial cancer     HISTORY OF PRESENT ILLNESS: Ms. Villalba is a 64-year-old woman with FIGO stage IB, [id number], grade 2 endometrial adenocarcinoma status post TLH-BSO with sentinel lymph node dissection who presents for consideration of adjuvant radiotherapy.    Regarding her oncologic history, she presented to her primary care provider on 2019 with 1 day of vaginal spotting.  Transvaginal ultrasound on 8/15/2019 showed a 10.4 x 5.3 x 5.9 cm uterus with a 34 mm endometrial stripe with an echogenic focus in the lower uterine segment consistent with a possible endometrial polyp. Endometrial biopsy on 19 ([id number], [facility]) was consistent with endometrioid adenocarcinoma of the endometrium, FIGO grade 1 with loss of MLH1 and PMS2 expression.     She was referred to Dr. Reddy in GYN oncology on 2019 with normal external genitalia, vagina, and cervix with a slightly enlarged uterus on bimanual exam.  Dr. Reddy recommended surgical resection.  On 10/4/2019 she underwent TLH-BSO with bilateral lymph node sampling.  The left ovary was noted to be surgically absent.  Small vaginal lacerations were repaired prior to closure and the postoperative course was uncomplicated.  Pathology ([facility], [id number]) showed grade 2 endometrial adenocarcinoma with 17/21 mm (81%) myometrial invasion with LVSI.  There was no lower uterine segment involvement or cervical stromal involvement and 0/1 sentinel lymph nodes on the right and 0/1 on the left were positive for carcinoma.  In follow-up with Dr. Reddy, she was  recommended for discussion of adjuvant radiotherapy due to her multiple risk factors for recurrence.     Currently, she reports that she is doing very well.  She has some increased urinary frequency and slight pain with bowel movements since her surgery, but this has been improving.  Her energy is mostly back to normal.  She denies unusual vaginal discharge or other pelvic pain.    PMH:   Past Medical History:   Diagnosis Date     Depression      Hypothyroidism      Overweight        PSH:  Past Surgical History:   Procedure Laterality Date     DAVINCI HYSTERECTOMY TOTAL, BILATERAL SALPINGO-OOPHORECTOMY, NODE DISSECTION, COMBINED N/A 10/4/2019    Procedure: Robot-assisted total laparoscopic hysterectomy, Bilateral salpingectomy and Right Oophorectomy, Lysis of adhesion, Cervical Indocyanine Green injection, Bilateral sentinel lymph node dissection, Repair of vaginal laceration.;  Surgeon: Perez Reddy MD;  Location: [facility] OR     DILATION AND CURETTAGE N/A 9/12/2019    Procedure: DILATION AND CURETTAGE, UTERUS;  Surgeon: Navdeep Barajas MD;  Location: [facility] OR     OPERATIVE HYSTEROSCOPY WITH MORCELLATOR N/A 9/12/2019    Procedure: HYSTEROSCOPY WITH MORCELLATOR (MYOSURE);  Surgeon: Navdeep Barajas MD;  Location: [facility] OR     SALPINGO OOPHORECTOMY,R/L/SHANDA Left 1980s    Left ovarian with endometriois     US BREAST BIOPSY LT Left        MEDICATIONS:  Current Outpatient Medications   Medication Sig Dispense Refill     Apple Cider Vinegar 600 MG CAPS Take 1 capsule by mouth daily        CALCIUM PO Take 20 mg by mouth daily        CHELATED MAGNESIUM PO Take 600 mg by mouth daily        cholecalciferol (VITAMIN D3) 5000 units TABS tablet Take by mouth daily       clobetasol (TEMOVATE) 0.05 % external ointment Apply sparingly then once or twice a week as needed       Cyanocobalamin 1500 MCG TBDP Take 1 tablet by mouth daily        FLUoxetine (PROZAC) 20 MG capsule Take 1 capsule (20 mg) by mouth daily 90 capsule 3      Lactobacillus (ACIDOPHILUS PO) Take 1 tablet by mouth daily        levothyroxine (SYNTHROID/LEVOTHROID) 100 MCG tablet Take 1 tablet (100 mcg) by mouth every morning 90 tablet 3     Omega 3-6-9 Fatty Acids (OMEGA-3-6-9 PO) Take 1 capsule by mouth daily        Zinc Picolinate 25 MG TABS Take 1 tablet by mouth daily          ALLERGY:  No Known Allergies    FAMILY HISTORY:  Family History   Problem Relation Age of Onset     Acute myelogenous leukemia Mother      Parkinsonism Father      Obesity Sister      Heart Disease Sister      Uterine Cancer Maternal Grandmother 40        Uterine versus cervical     No Known Problems Sister      Leukemia Brother      Leukemia Nephew        SOCIAL HISTORY  Social History     Tobacco Use     Smoking status: Former Smoker     Packs/day: 0.00     Last attempt to quit: 1987     Years since quittin.9     Smokeless tobacco: Former User     Quit date: 1987   Substance Use Topics     Alcohol use: Yes     Frequency: 2-3 times a week     ECOG PERFORMANCE STATUS: 0    HISTORY OF RADIATION: None    CHEMOTHERAPY HISTORY:  None    IMPLANTED CARDIAC DEVICE: None    PREGNANCY RISK: Post hysterectomy    REVIEW OF SYMPTOMS:  A full 14-point review of systems was performed as per nursing note.  Pertinent negatives and positives reviewed.    PHYSICAL EXAMINATION:    /75   Wt 118.4 kg (261 lb)   BMI 40.02 kg/m  , Pain 0/10  Gen: Alert, in NAD  Eyes: EOMI, sclera anicteric  HENT      Head: NC/AT     Ears: No external auricular lesions     Nose/sinus: No rhinorrhea or epistaxis     Oral Cavity/Oropharynx: MMM, no thrush noted  Pulm: Breathing comfortably on room air, no audible wheezes or ronchi  CV: Well-perfused, no cyanosis  Abdominal: Soft, nondistended  Skin: Normal color and turgor  Neurologic/MSK: motor grossly intact, normal gait  Psych: Appropriate mood and affect    ASSESSMENT AND PLAN: Bry Villalba is a 64-year-old woman with FIGO stage IB, [id number], grade 2  endometrial adenocarcinoma status post TLH-BSO with sentinel lymph node dissection with 81% myometrial invasion, positive LVSI, negative sentinel nodes, and no cervical stromal or lower uterine segment invasion who presents for consideration of adjuvant radiotherapy.    We had a long discussion with the patient regarding her pathology and the treatment paradigm for endometrial cancer.  We explained the indications for adjuvant radiotherapy, particularly in her case given her age greater than 50 years with grade 2 disease, outer third myometrial invasion and lymphovascular space invasion.  We discussed both external beam radiotherapy to the pelvis and vaginal brachytherapy with recommendation for vaginal brachytherapy due to decreased toxicities, increased convenience, and no effect on local recurrence or overall survival.  Her questions were answered to her stated satisfaction, and she consented for treatment.    The patient is planning a scuba diving trip to the [facility] in November and wants to start radiotherapy in the first week of December when she returns.  This is reasonable, so we will schedule her accordingly.    Patient to be treated with 5 fractions of adjuvant brachytherapy using a vaginal cylinder, planning treatment beginning around 12/2/2019    Thank you for allowing us to participate in this patient's care.  Please feel free to call with any questions or concerns.       The patient was seen and examined with Dr. Cesar, who agrees with the above assessment and plan.    Idris Castañeda MD PGY-3  Radiation Oncology, [facility], [place]  [phone] clinic  Pager [phone]    Ms. Villalba was seen and examined by me. Note above by Dr. Castañeda was reviewed and edited by me and reflects our mutual findings and plan of care.    Janny Cesar MD  Department of Radiation Oncology  [facility]

## 2019-10-28 ENCOUNTER — OFFICE VISIT (OUTPATIENT)
Dept: RADIATION ONCOLOGY | Facility: CLINIC | Age: 64
End: 2019-10-28
Attending: OBSTETRICS & GYNECOLOGY
Payer: COMMERCIAL

## 2019-10-28 VITALS — BODY MASS INDEX: 40.02 KG/M2 | SYSTOLIC BLOOD PRESSURE: 128 MMHG | DIASTOLIC BLOOD PRESSURE: 75 MMHG | WEIGHT: 261 LBS

## 2019-10-28 DIAGNOSIS — C54.1 MALIGNANT NEOPLASM OF ENDOMETRIUM (H): Primary | ICD-10-CM

## 2019-10-28 PROCEDURE — G0463 HOSPITAL OUTPT CLINIC VISIT: HCPCS | Performed by: RADIOLOGY

## 2019-10-28 ASSESSMENT — ENCOUNTER SYMPTOMS
FALLS: 0
DIARRHEA: 0
DYSURIA: 0
SENSORY CHANGE: 0
TINGLING: 0
HEMATURIA: 0
SPEECH CHANGE: 0
DEPRESSION: 0
EYE DISCHARGE: 0
VOMITING: 0
SEIZURES: 0
SPUTUM PRODUCTION: 0
ORTHOPNEA: 0
BACK PAIN: 0
FREQUENCY: 0
PALPITATIONS: 0
HEARTBURN: 0
CHILLS: 0
ABDOMINAL PAIN: 0
EYE PAIN: 0
CLAUDICATION: 0
POLYDIPSIA: 0
TREMORS: 0
DOUBLE VISION: 0
SINUS PAIN: 0
NECK PAIN: 0
EYE REDNESS: 0
BLOOD IN STOOL: 0
SHORTNESS OF BREATH: 0
PHOTOPHOBIA: 0
BLURRED VISION: 0
STRIDOR: 0
FOCAL WEAKNESS: 0
HEADACHES: 0
WHEEZING: 0
SORE THROAT: 0
MYALGIAS: 0
FLANK PAIN: 0
HEMOPTYSIS: 0
WEIGHT LOSS: 0
NERVOUS/ANXIOUS: 0
PND: 0
DIZZINESS: 0
INSOMNIA: 0
FEVER: 0
NAUSEA: 0
CONSTIPATION: 0
BRUISES/BLEEDS EASILY: 0
MEMORY LOSS: 0
HALLUCINATIONS: 0
DIAPHORESIS: 0
WEAKNESS: 0
LOSS OF CONSCIOUSNESS: 0
COUGH: 0

## 2019-10-28 ASSESSMENT — LIFESTYLE VARIABLES: SUBSTANCE_ABUSE: 0

## 2019-10-28 NOTE — PROGRESS NOTES
HPI  INITIAL PATIENT ASSESSMENT    Diagnosis: Cancer    Prior radiation therapy: None    Prior chemotherapy: None    Prior hormonal therapy:No    Pain Eval:  Denies    Psychosocial  Living arrangements: Lives with partner  Fall Risk: independent   referral needs: Not needed    Advanced Directive: No  Implantable Cardiac Device? No        Nurse face-to-face time: Level 5:  over 15 min face to face time    Review of Systems   Constitutional: Negative for chills, diaphoresis, fever, malaise/fatigue and weight loss.   HENT: Negative for congestion, ear discharge, ear pain, hearing loss, nosebleeds, sinus pain, sore throat and tinnitus.    Eyes: Negative for blurred vision, double vision, photophobia, pain, discharge and redness.   Respiratory: Negative for cough, hemoptysis, sputum production, shortness of breath, wheezing and stridor.    Cardiovascular: Negative for chest pain, palpitations, orthopnea, claudication, leg swelling and PND.   Gastrointestinal: Negative for abdominal pain, blood in stool, constipation, diarrhea, heartburn, melena, nausea and vomiting.   Genitourinary: Negative for dysuria, flank pain, frequency, hematuria and urgency.   Musculoskeletal: Negative for back pain, falls, joint pain, myalgias and neck pain.   Skin: Negative for itching and rash.   Neurological: Negative for dizziness, tingling, tremors, sensory change, speech change, focal weakness, seizures, loss of consciousness, weakness and headaches.   Endo/Heme/Allergies: Negative for environmental allergies and polydipsia. Does not bruise/bleed easily.   Psychiatric/Behavioral: Negative for depression, hallucinations, memory loss, substance abuse and suicidal ideas. The patient is not nervous/anxious and does not have insomnia.

## 2019-10-28 NOTE — LETTER
10/28/2019       RE: Hanh Villalba   [place] [facility] [id number]     Dear Colleague,    Thank you for referring your patient, Hanh Villalba, to the RADIATION ONCOLOGY CLINIC. Please see a copy of my visit note below.    Department of Radiation Oncology     [place] [address]  [address] [place]  [address]  Office:  [phone]  Fax:  [phone]   Radiation Oncology Clinic  [address] [place] [address]  Phone:  [phone]  Fax:  [phone]     RE: Hanh Villalba : 1955   MRN: [id number] RUPERT: Oct 28, 2019       RADIATION ONCOLOGY CONSULTATION       PROBLEM: high intermediate risk endometrial cancer     HISTORY OF PRESENT ILLNESS: Ms. Villalba is a 64-year-old woman with FIGO stage IB, [id number], grade 2 endometrial adenocarcinoma status post TLH-BSO with sentinel lymph node dissection who presents for consideration of adjuvant radiotherapy.    Regarding her oncologic history, she presented to her primary care provider on 2019 with 1 day of vaginal spotting.  Transvaginal ultrasound on 8/15/2019 showed a 10.4 x 5.3 x 5.9 cm uterus with a 34 mm endometrial stripe with an echogenic focus in the lower uterine segment consistent with a possible endometrial polyp. Endometrial biopsy on 19 ([id number], [facility]) was consistent with endometrioid adenocarcinoma of the endometrium, FIGO grade 1 with loss of MLH1 and PMS2 expression.     She was referred to Dr. Reddy in GYN oncology on 2019 with normal external genitalia, vagina, and cervix with a slightly enlarged uterus on bimanual exam.  Dr. Reddy recommended surgical resection.  On 10/4/2019 she underwent TLH-BSO with bilateral lymph node sampling.  The left ovary was noted to be surgically absent.  Small vaginal lacerations were repaired prior to closure and the postoperative course was uncomplicated.  Pathology ([facility], [id number]) showed grade 2 endometrial adenocarcinoma with 17/21 mm (81%)  myometrial invasion with LVSI.  There was no lower uterine segment involvement or cervical stromal involvement and 0/1 sentinel lymph nodes on the right and 0/1 on the left were positive for carcinoma.  In follow-up with Dr. Reddy, she was recommended for discussion of adjuvant radiotherapy due to her multiple risk factors for recurrence.     Currently, she reports that she is doing very well.  She has some increased urinary frequency and slight pain with bowel movements since her surgery, but this has been improving.  Her energy is mostly back to normal.  She denies unusual vaginal discharge or other pelvic pain.    PMH:   Past Medical History:   Diagnosis Date     Depression      Hypothyroidism      Overweight        PSH:  Past Surgical History:   Procedure Laterality Date     DAVINCI HYSTERECTOMY TOTAL, BILATERAL SALPINGO-OOPHORECTOMY, NODE DISSECTION, COMBINED N/A 10/4/2019    Procedure: Robot-assisted total laparoscopic hysterectomy, Bilateral salpingectomy and Right Oophorectomy, Lysis of adhesion, Cervical Indocyanine Green injection, Bilateral sentinel lymph node dissection, Repair of vaginal laceration.;  Surgeon: Perez Reddy MD;  Location: [facility] OR     DILATION AND CURETTAGE N/A 9/12/2019    Procedure: DILATION AND CURETTAGE, UTERUS;  Surgeon: Navdeep Barajas MD;  Location: [facility] OR     OPERATIVE HYSTEROSCOPY WITH MORCELLATOR N/A 9/12/2019    Procedure: HYSTEROSCOPY WITH MORCELLATOR (MYOSURE);  Surgeon: Navdeep Barajas MD;  Location: [facility] OR     SALPINGO OOPHORECTOMY,R/L/SHANDA Left 1980s    Left ovarian with endometriois     US BREAST BIOPSY LT Left        MEDICATIONS:  Current Outpatient Medications   Medication Sig Dispense Refill     Apple Cider Vinegar 600 MG CAPS Take 1 capsule by mouth daily        CALCIUM PO Take 20 mg by mouth daily        CHELATED MAGNESIUM PO Take 600 mg by mouth daily        cholecalciferol (VITAMIN D3) 5000 units TABS tablet Take by mouth daily       clobetasol  (TEMOVATE) 0.05 % external ointment Apply sparingly then once or twice a week as needed       Cyanocobalamin 1500 MCG TBDP Take 1 tablet by mouth daily        FLUoxetine (PROZAC) 20 MG capsule Take 1 capsule (20 mg) by mouth daily 90 capsule 3     Lactobacillus (ACIDOPHILUS PO) Take 1 tablet by mouth daily        levothyroxine (SYNTHROID/LEVOTHROID) 100 MCG tablet Take 1 tablet (100 mcg) by mouth every morning 90 tablet 3     Omega 3-6-9 Fatty Acids (OMEGA-3-6-9 PO) Take 1 capsule by mouth daily        Zinc Picolinate 25 MG TABS Take 1 tablet by mouth daily          ALLERGY:  No Known Allergies    FAMILY HISTORY:  Family History   Problem Relation Age of Onset     Acute myelogenous leukemia Mother      Parkinsonism Father      Obesity Sister      Heart Disease Sister      Uterine Cancer Maternal Grandmother 40        Uterine versus cervical     No Known Problems Sister      Leukemia Brother      Leukemia Nephew        SOCIAL HISTORY  Social History     Tobacco Use     Smoking status: Former Smoker     Packs/day: 0.00     Last attempt to quit: 1987     Years since quittin.9     Smokeless tobacco: Former User     Quit date: 1987   Substance Use Topics     Alcohol use: Yes     Frequency: 2-3 times a week     ECOG PERFORMANCE STATUS: 0    HISTORY OF RADIATION: None    CHEMOTHERAPY HISTORY:  None    IMPLANTED CARDIAC DEVICE: None    PREGNANCY RISK: Post hysterectomy    REVIEW OF SYMPTOMS:  A full 14-point review of systems was performed as per nursing note.  Pertinent negatives and positives reviewed.    PHYSICAL EXAMINATION:    /75   Wt 118.4 kg (261 lb)   BMI 40.02 kg/m   , Pain 0/10  Gen: Alert, in NAD  Eyes: EOMI, sclera anicteric  HENT      Head: NC/AT     Ears: No external auricular lesions     Nose/sinus: No rhinorrhea or epistaxis     Oral Cavity/Oropharynx: MMM, no thrush noted  Pulm: Breathing comfortably on room air, no audible wheezes or ronchi  CV: Well-perfused, no  cyanosis  Abdominal: Soft, nondistended  Skin: Normal color and turgor  Neurologic/MSK: motor grossly intact, normal gait  Psych: Appropriate mood and affect    ASSESSMENT AND PLAN: Bry Villalba is a 64-year-old woman with FIGO stage IB, [id number], grade 2 endometrial adenocarcinoma status post TLH-BSO with sentinel lymph node dissection with 81% myometrial invasion, positive LVSI, negative sentinel nodes, and no cervical stromal or lower uterine segment invasion who presents for consideration of adjuvant radiotherapy.    We had a long discussion with the patient regarding her pathology and the treatment paradigm for endometrial cancer.  We explained the indications for adjuvant radiotherapy, particularly in her case given her age greater than 50 years with grade 2 disease, outer third myometrial invasion and lymphovascular space invasion.  We discussed both external beam radiotherapy to the pelvis and vaginal brachytherapy with recommendation for vaginal brachytherapy due to decreased toxicities, increased convenience, and no effect on local recurrence or overall survival.  Her questions were answered to her stated satisfaction, and she consented for treatment.    The patient is planning a scuba diving trip to the [facility] in November and wants to start radiotherapy in the first week of December when she returns.  This is reasonable, so we will schedule her accordingly.    Patient to be treated with 5 fractions of adjuvant brachytherapy using a vaginal cylinder, planning treatment beginning around 12/2/2019    Thank you for allowing us to participate in this patient's care.  Please feel free to call with any questions or concerns.       The patient was seen and examined with Dr. Cesar, who agrees with the above assessment and plan.    Idris Castañeda MD PGY-3  Radiation Oncology, [facility]  [phone] clinic  Pager [phone]    Ms. Villalba was seen and examined by me. Note above by Dr. Castañeda  was reviewed and edited by me and reflects our mutual findings and plan of care.    Janny Cesar MD  Department of Radiation Oncology  [facility]                     HPI  INITIAL PATIENT ASSESSMENT    Diagnosis: Cancer    Prior radiation therapy: None    Prior chemotherapy: None    Prior hormonal therapy:No    Pain Eval:  Denies    Psychosocial  Living arrangements: Lives with partner  Fall Risk: independent   referral needs: Not needed    Advanced Directive: No  Implantable Cardiac Device? No        Nurse face-to-face time: Level 5:  over 15 min face to face time    Review of Systems   Constitutional: Negative for chills, diaphoresis, fever, malaise/fatigue and weight loss.   HENT: Negative for congestion, ear discharge, ear pain, hearing loss, nosebleeds, sinus pain, sore throat and tinnitus.    Eyes: Negative for blurred vision, double vision, photophobia, pain, discharge and redness.   Respiratory: Negative for cough, hemoptysis, sputum production, shortness of breath, wheezing and stridor.    Cardiovascular: Negative for chest pain, palpitations, orthopnea, claudication, leg swelling and PND.   Gastrointestinal: Negative for abdominal pain, blood in stool, constipation, diarrhea, heartburn, melena, nausea and vomiting.   Genitourinary: Negative for dysuria, flank pain, frequency, hematuria and urgency.   Musculoskeletal: Negative for back pain, falls, joint pain, myalgias and neck pain.   Skin: Negative for itching and rash.   Neurological: Negative for dizziness, tingling, tremors, sensory change, speech change, focal weakness, seizures, loss of consciousness, weakness and headaches.   Endo/Heme/Allergies: Negative for environmental allergies and polydipsia. Does not bruise/bleed easily.   Psychiatric/Behavioral: Negative for depression, hallucinations, memory loss, substance abuse and suicidal ideas. The patient is not nervous/anxious and does not have  insomnia.          Again, thank you for allowing me to participate in the care of your patient.      Sincerely,    Janny Cesar MD

## 2019-10-31 ENCOUNTER — OFFICE VISIT (OUTPATIENT)
Dept: FAMILY MEDICINE | Facility: CLINIC | Age: 64
End: 2019-10-31
Payer: COMMERCIAL

## 2019-10-31 VITALS — HEART RATE: 64 BPM | SYSTOLIC BLOOD PRESSURE: 129 MMHG | TEMPERATURE: 98.3 F | DIASTOLIC BLOOD PRESSURE: 71 MMHG

## 2019-10-31 DIAGNOSIS — Z71.84 TRAVEL ADVICE ENCOUNTER: Primary | ICD-10-CM

## 2019-10-31 PROCEDURE — 90662 IIV NO PRSV INCREASED AG IM: CPT | Performed by: NURSE PRACTITIONER

## 2019-10-31 PROCEDURE — 99402 PREV MED CNSL INDIV APPRX 30: CPT | Mod: 25 | Performed by: NURSE PRACTITIONER

## 2019-10-31 PROCEDURE — 90715 TDAP VACCINE 7 YRS/> IM: CPT | Performed by: NURSE PRACTITIONER

## 2019-10-31 PROCEDURE — 90471 IMMUNIZATION ADMIN: CPT | Performed by: NURSE PRACTITIONER

## 2019-10-31 PROCEDURE — G0008 ADMIN INFLUENZA VIRUS VAC: HCPCS | Mod: 59 | Performed by: NURSE PRACTITIONER

## 2019-10-31 RX ORDER — AZITHROMYCIN 500 MG/1
500 TABLET, FILM COATED ORAL DAILY
Qty: 3 TABLET | Refills: 0 | Status: SHIPPED | OUTPATIENT
Start: 2019-10-31 | End: 2020-02-25

## 2019-10-31 NOTE — PATIENT INSTRUCTIONS
Today October 31, 2019 you received the    Flu Vaccine    Tetanus (Tdap) Vaccine  .    These appointments can be made as a NURSE ONLY visit.    **It is very important for the vaccinations to be given on the scheduled day(s), this helps ensure you receive the full effectiveness of the vaccine.**    Please call [facility] with any questions [phone]    Thank you for visiting [facility]

## 2019-10-31 NOTE — PROGRESS NOTES
Nurse Note      Itinerary:  [place]       Departure Date: 11/08/2019      Return Date: 11/23/2019      Length of Trip 2 weeks      Reason for Travel: Tourism           Urban or rural: both      Accommodations: Hotel        IMMUNIZATION HISTORY  Have you received any immunizations within the past 4 weeks?  No  Have you ever fainted from having your blood drawn or from an injection?  No  Have you ever had a fever reaction to vaccination?  No  Have you ever had any bad reaction or side effect from any vaccination?  No  Have you ever had hepatitis A or B vaccine?  Yes  Do you live (or work closely) with anyone who has AIDS, an AIDS-like condition, any other immune disorder or who is on chemotherapy for cancer?  No  Do you have a family history of immunodeficiency?  No  Have you received any injection of immune globulin or any blood products during the past 12 months?  No    Patient roomed by All.ELIJAH Benitezole FREEMAN Deejay is a 64 year old female seen today with partner for counsultation for international travel to the stated countries.   Patient will be departing in  9 day(s) and  traveling with partner.      Patient itinerary :  will be in the [facility] ( 3 days ) [facility] and [facility] ( 2 days )  > [place]  > [facility] region of [facility] which presents risk for Dengue Fever, Chikungungya and food borne illnesses. exposure.      Patient's activities will include sightseeing, beach activities (salt water) and scuba diving.    Patient's country [place] birth is [place]    Special medical concerns: Anxiety/depression history  Pre-travel questionnaire was completed by patient and reviewed by provider.     Vitals: /71 (BP Location: Right arm, Patient Position: Sitting, Cuff Size: Adult Large)   Pulse 64   Temp 98.3  F (36.8  C) (Oral)   LMP  (Exact Date)   Breastfeeding? No   BMI= There is no height or weight on file to calculate BMI.    EXAM:  General:  Well-nourished,  well-developed in no acute distress.  Appears to be stated age, interacts appropriately and expresses understanding of information given to patient.    Current Outpatient Medications   Medication Sig Dispense Refill     azithromycin (ZITHROMAX) 500 MG tablet Take 1 tablet (500 mg) by mouth daily for 3 doses Take 1 tablet a day for up to 3 days for severe diarrhea 3 tablet 0     CALCIUM PO Take 20 mg by mouth daily        FLUoxetine (PROZAC) 20 MG capsule Take 1 capsule (20 mg) by mouth daily 90 capsule 3     levothyroxine (SYNTHROID/LEVOTHROID) 100 MCG tablet Take 1 tablet (100 mcg) by mouth every morning 90 tablet 3     Apple Cider Vinegar 600 MG CAPS Take 1 capsule by mouth daily        CHELATED MAGNESIUM PO Take 600 mg by mouth daily        cholecalciferol (VITAMIN D3) 5000 units TABS tablet Take by mouth daily       clobetasol (TEMOVATE) 0.05 % external ointment Apply sparingly then once or twice a week as needed       Cyanocobalamin 1500 MCG TBDP Take 1 tablet by mouth daily        Lactobacillus (ACIDOPHILUS PO) Take 1 tablet by mouth daily        Omega 3-6-9 Fatty Acids (OMEGA-3-6-9 PO) Take 1 capsule by mouth daily        Zinc Picolinate 25 MG TABS Take 1 tablet by mouth daily        Patient Active Problem List   Diagnosis     Hypothyroidism due to acquired atrophy of thyroid     Recurrent major depressive disorder, in full remission (H)     Endometrioid adenocarcinoma of uterus (H)     Not on File      Immunizations discussed include:   Hepatitis A:  Up to date  Hepatitis B: Up to date  Influenza: Ordered/given today, risks, benefits and side effects reviewed  Typhoid: Up to date  Rabies: Declined  reviewed managment of a animal bite or scratch (washing wound, seek medical care within 24 hours for post exposure prophylaxis )  Yellow Fever: Not indicated  Japanese Encephalitis: Not indicated - risk of disease is minimal urban and off season  Meningococcus: Not indicated  Tetanus/Diphtheria: Ordered/given  today, risks, benefits and side effects reviewed  Measles/Mumps/Rubella: Immune by disease history per patient report  Cholera: Not needed  Polio: Up to date  Pneumococcal: Under age of 65  Varicella: Immune by disease history per patient report  Zostavax:  Up to date  Shingrix: deferred  HPV:  Not indicated  TB:  Low risk     Altitude Exposure on this trip: no  Past tolerance to Altitude: na    ASSESSMENT/PLAN:    ICD-10-CM    1. Travel advice encounter Z71.84 azithromycin (ZITHROMAX) 500 MG tablet     I have reviewed general recommendations for safe travel   including: food/water precautions, insect precautions,roadway safety. Educational materials and [employer] report provided.    Malaraia prophylaxis recommended: none  Symptomatic treatment for traveler's diarrhea: azithromycin  Altitude illness prevention and treatment: na      Evacuation insurance advised and resources were provided to patient.    Total visit time 30 minutes  with over 50% of time spent counseling patient as detailed above.    Nadya Casillas CNP

## 2019-11-03 ENCOUNTER — MYC MEDICAL ADVICE (OUTPATIENT)
Dept: FAMILY MEDICINE | Facility: CLINIC | Age: 64
End: 2019-11-03

## 2019-11-04 NOTE — TELEPHONE ENCOUNTER
MyChart sent and will leave encounter open until read in case of response, then may close.    Erin Patel RN

## 2019-11-04 NOTE — TELEPHONE ENCOUNTER
[employer] has been read without response, so will close encounter.    Erin Patel RN

## 2019-11-05 ENCOUNTER — OFFICE VISIT (OUTPATIENT)
Dept: URGENT CARE | Facility: URGENT CARE | Age: 64
End: 2019-11-05
Payer: COMMERCIAL

## 2019-11-05 VITALS
BODY MASS INDEX: 40.02 KG/M2 | HEART RATE: 69 BPM | OXYGEN SATURATION: 99 % | TEMPERATURE: 98 F | WEIGHT: 261 LBS | DIASTOLIC BLOOD PRESSURE: 70 MMHG | SYSTOLIC BLOOD PRESSURE: 129 MMHG

## 2019-11-05 DIAGNOSIS — E66.01 MORBID OBESITY (H): ICD-10-CM

## 2019-11-05 DIAGNOSIS — R30.0 DYSURIA: Primary | ICD-10-CM

## 2019-11-05 DIAGNOSIS — N39.0 URINARY TRACT INFECTION WITHOUT HEMATURIA, SITE UNSPECIFIED: ICD-10-CM

## 2019-11-05 LAB
ALBUMIN UR-MCNC: NEGATIVE MG/DL
APPEARANCE UR: CLEAR
BILIRUB UR QL STRIP: NEGATIVE
COLOR UR AUTO: YELLOW
GLUCOSE UR STRIP-MCNC: NEGATIVE MG/DL
HGB UR QL STRIP: ABNORMAL
KETONES UR STRIP-MCNC: NEGATIVE MG/DL
LEUKOCYTE ESTERASE UR QL STRIP: ABNORMAL
NITRATE UR QL: NEGATIVE
PH UR STRIP: 5.5 PH (ref 5–7)
RBC #/AREA URNS AUTO: ABNORMAL /HPF
SOURCE: ABNORMAL
SP GR UR STRIP: >1.03 (ref 1–1.03)
UROBILINOGEN UR STRIP-ACNC: 0.2 EU/DL (ref 0.2–1)
WBC #/AREA URNS AUTO: ABNORMAL /HPF

## 2019-11-05 PROCEDURE — 81001 URINALYSIS AUTO W/SCOPE: CPT | Performed by: PHYSICIAN ASSISTANT

## 2019-11-05 PROCEDURE — 99213 OFFICE O/P EST LOW 20 MIN: CPT | Performed by: PREVENTIVE MEDICINE

## 2019-11-05 RX ORDER — CEPHALEXIN 500 MG/1
500 CAPSULE ORAL 2 TIMES DAILY
Qty: 14 CAPSULE | Refills: 0 | Status: SHIPPED | OUTPATIENT
Start: 2019-11-05 | End: 2020-02-25

## 2019-11-06 NOTE — PROGRESS NOTES
SUBJECTIVE:   Hanh Villalba is a 64 year old female who  presents today for a possible UTI. Symptoms of dysuria, urgency, frequency and burning have been going on for 6day(s).  Hematuria no.  gradual onsetand moderate.  There is no history of fever, chills, nausea or vomiting.  No history of vaginal or penile discharge. This patient does have a history of urinary tract infections. Patient denies long duration, rigors, flank pain, temperature > 101 degrees F., Vomiting, significant nausea or diarrhea, taking Coumadin and GFR less than 30 within the last year or vaginal discharge, vaginal odor and vaginal itching     Past Medical History:   Diagnosis Date     Depression      Hypothyroidism      Overweight      Current Outpatient Medications   Medication Sig Dispense Refill     Apple Cider Vinegar 600 MG CAPS Take 1 capsule by mouth daily        CALCIUM PO Take 20 mg by mouth daily        CHELATED MAGNESIUM PO Take 600 mg by mouth daily        cholecalciferol (VITAMIN D3) 5000 units TABS tablet Take by mouth daily       clobetasol (TEMOVATE) 0.05 % external ointment Apply sparingly then once or twice a week as needed       Cyanocobalamin 1500 MCG TBDP Take 1 tablet by mouth daily        FLUoxetine (PROZAC) 20 MG capsule Take 1 capsule (20 mg) by mouth daily 90 capsule 3     Lactobacillus (ACIDOPHILUS PO) Take 1 tablet by mouth daily        levothyroxine (SYNTHROID/LEVOTHROID) 100 MCG tablet Take 1 tablet (100 mcg) by mouth every morning 90 tablet 3     Omega 3-6-9 Fatty Acids (OMEGA-3-6-9 PO) Take 1 capsule by mouth daily        Zinc Picolinate 25 MG TABS Take 1 tablet by mouth daily        Social History     Tobacco Use     Smoking status: Former Smoker     Packs/day: 0.00     Last attempt to quit: 1987     Years since quittin.0     Smokeless tobacco: Never Used   Substance Use Topics     Alcohol use: Yes     Frequency: 2-3 times a week     36 hours of keflex on  and 11/3 - improved  symptoms    ROS:   CONSTITUTIONAL:NEGATIVE for fever, chills, change in weight  INTEGUMENTARY/SKIN: NEGATIVE for worrisome rashes, moles or lesions  EYES: NEGATIVE for vision changes or irritation  ENT/MOUTH: NEGATIVE for ear, mouth and throat problems  RESP:NEGATIVE for significant cough or SOB  CV: NEGATIVE for chest pain, palpitations or peripheral edema  GI: NEGATIVE for nausea, abdominal pain, heartburn, or change in bowel habits  MUSCULOSKELETAL: NEGATIVE for significant arthralgias or myalgia  NEURO: NEGATIVE for weakness, dizziness or paresthesias  ENDOCRINE: NEGATIVE for temperature intolerance, skin/hair changes  HEME/ALLERGY/IMMUNE: NEGATIVE for bleeding problems  PSYCHIATRIC: NEGATIVE for changes in mood or affect    OBJECTIVE:  /70   Pulse 69   Temp 98  F (36.7  C) (Oral)   Wt 118.4 kg (261 lb)   SpO2 99%   BMI 40.02 kg/m    GENERAL APPEARANCE: healthy, alert and no distress  RESP: lungs clear to auscultation - no rales, rhonchi or wheezes  CV: regular rates and rhythm, normal S1 S2, no murmur noted  ABDOMEN:  soft, nontender, no HSM or masses and bowel sounds normal  BACK: No CVA tenderness  SKIN: no suspicious lesions or rashes    UA 5-10 wbc    ASSESSMENT:   Lower, uncomplicated urinary tract infection.  Keflex 500 mg two times per day for 7 days  U cx pending      PLAN:  As per ordered above  Drink plenty of fluids.  Prevention and treatment of UTI's discussed.Signs and symptoms of pyelonephritis mentioned.  Follow up with primary care physician if not improving

## 2019-11-06 NOTE — PATIENT INSTRUCTIONS
Lower, uncomplicated urinary tract infection.  Keflex 500 mg two times per day for 7 days  U cx pending      PLAN:  As per ordered above  Drink plenty of fluids.  Prevention and treatment of UTI's discussed.Signs and symptoms of pyelonephritis mentioned.  Follow up with primary care physician if not improving

## 2019-12-02 ENCOUNTER — ALLIED HEALTH/NURSE VISIT (OUTPATIENT)
Dept: RADIATION ONCOLOGY | Facility: CLINIC | Age: 64
End: 2019-12-02
Attending: RADIOLOGY
Payer: COMMERCIAL

## 2019-12-02 VITALS — SYSTOLIC BLOOD PRESSURE: 128 MMHG | DIASTOLIC BLOOD PRESSURE: 68 MMHG | BODY MASS INDEX: 39.71 KG/M2 | WEIGHT: 259 LBS

## 2019-12-02 DIAGNOSIS — C54.1 MALIGNANT NEOPLASM OF ENDOMETRIUM (H): Primary | ICD-10-CM

## 2019-12-02 PROCEDURE — 27110014 ZZH IMPLANT RADIATION BRIEF: Performed by: RADIOLOGY

## 2019-12-02 PROCEDURE — 77295 3-D RADIOTHERAPY PLAN: CPT | Performed by: RADIOLOGY

## 2019-12-02 PROCEDURE — 77770 HDR RDNCL NTRSTL/ICAV BRCHTX: CPT | Performed by: RADIOLOGY

## 2019-12-02 PROCEDURE — C1717 BRACHYTX, NON-STR,HDR IR-192: HCPCS | Performed by: RADIOLOGY

## 2019-12-02 PROCEDURE — G0463 HOSPITAL OUTPT CLINIC VISIT: HCPCS | Mod: 25 | Performed by: RADIOLOGY

## 2019-12-02 PROCEDURE — 77470 SPECIAL RADIATION TREATMENT: CPT | Performed by: RADIOLOGY

## 2019-12-02 PROCEDURE — 77332 RADIATION TREATMENT AID(S): CPT | Performed by: RADIOLOGY

## 2019-12-02 PROCEDURE — 57156 INS VAG BRACHYTX DEVICE: CPT | Performed by: RADIOLOGY

## 2019-12-02 NOTE — PROGRESS NOTES
Hanh Villalba is here for scheduled HDR brachytherapy    HDR Single Channel Cylinder  1    Pre-procedure-  Time out done by Cynthia Goode RN and Dr Cesar.   Patient identified, arm band applied, signed consent available   Medications taken by patient prior to procedure None  Pain assessment: 0/140    Post-procedure-  Pain assessment: 0/10   Device removed without difficulty, Education for possible side effects and management, Discharge teaching reviewed, questions answered, Follow-up scheduled and Discharged to home

## 2019-12-02 NOTE — PROGRESS NOTES
A radiation therapy treatment planning simulation was performed.  HDR brachytherapy vaginal cylinder 1 of 5.  Please see the [facility] record for documentation.    Janny Cesar MD  Radiation Oncology

## 2019-12-02 NOTE — PROGRESS NOTES
RADIATION ONCOLOGY FOLLOW-UP VISIT    Ms. Villalba returns for initiation of vaginal cuff high-dose-rate brachytherapy.  Since her initial visit on October 28, 2019, she has done well.  She denies any pelvic pain, vaginal discharge, vaginal bleeding.  She has returned from her scuba diving trip to the [facility].    PHYSICAL EXAM  Vitals: /68, weight 117.48 kg, pain 0/10  General: In no acute distress, alert and oriented x3  Abdomen: Soft, nondistended, nontender, port sites well-healed  Genitourinary: Normal external genitalia, vaginal cuff well-healed without granulation tissue, bimanual exam confirms  Extremities: No edema     ASSESSMENT:  Ms. Villalba is a 64-year-old woman with FIGO stage IB, [id number], grade 2 endometrial adenocarcinoma status post TLH-BSO with sentinel lymph node dissection who presents for adjuvant radiotherapy to the vaginal cuff utilizing high-dose-rate brachytherapy.    PLAN:  1.  First traction of vaginal brachytherapy today.    Janny Cesar MD  Department of Radiation Oncology  [facility]

## 2019-12-04 ENCOUNTER — ALLIED HEALTH/NURSE VISIT (OUTPATIENT)
Dept: RADIATION ONCOLOGY | Facility: CLINIC | Age: 64
End: 2019-12-04
Attending: RADIOLOGY
Payer: COMMERCIAL

## 2019-12-04 VITALS — SYSTOLIC BLOOD PRESSURE: 151 MMHG | OXYGEN SATURATION: 93 % | DIASTOLIC BLOOD PRESSURE: 69 MMHG | HEART RATE: 73 BPM

## 2019-12-04 DIAGNOSIS — C54.1 MALIGNANT NEOPLASM OF ENDOMETRIUM (H): Primary | ICD-10-CM

## 2019-12-04 PROCEDURE — 77332 RADIATION TREATMENT AID(S): CPT | Performed by: RADIOLOGY

## 2019-12-04 PROCEDURE — 27110014 ZZH IMPLANT RADIATION BRIEF: Performed by: RADIOLOGY

## 2019-12-04 PROCEDURE — 77770 HDR RDNCL NTRSTL/ICAV BRCHTX: CPT | Performed by: RADIOLOGY

## 2019-12-04 PROCEDURE — C1717 BRACHYTX, NON-STR,HDR IR-192: HCPCS | Performed by: RADIOLOGY

## 2019-12-04 PROCEDURE — 77280 THER RAD SIMULAJ FIELD SMPL: CPT | Performed by: RADIOLOGY

## 2019-12-04 PROCEDURE — 57156 INS VAG BRACHYTX DEVICE: CPT | Performed by: RADIOLOGY

## 2019-12-04 NOTE — PROGRESS NOTES
A radiation therapy treatment planning simulation was performed.  HDR brachytherapy vaginal cylinder 2 of 5.  Please see the [facility] record for documentation.    Janny Cesar MD  Radiation Oncology

## 2019-12-04 NOTE — NURSING NOTE
Hanh Villalba is here for scheduled HDR brachytherapy    HDR Single Channel Cylinder  2    Pre-procedure-  Patient identified, arm band applied, signed consent available   Medications taken by patient prior to procedure, none  Pain assessment: 0/10  BP (!) 151/69   Pulse 73   SpO2 93%     Post-procedure-  Pain assessment: 0/10   Device removed without difficulty, Education for possible side effects and management, Discharge teaching reviewed, questions answered and Discharged to home.    Falls Risk Screening Questions - HDR    1. Have you fallen in the past one week?  No.  2. Have you felt unsteady when walking or standing in the past one week?  No.

## 2019-12-06 ENCOUNTER — ALLIED HEALTH/NURSE VISIT (OUTPATIENT)
Dept: RADIATION ONCOLOGY | Facility: CLINIC | Age: 64
End: 2019-12-06
Attending: RADIOLOGY
Payer: COMMERCIAL

## 2019-12-06 VITALS — DIASTOLIC BLOOD PRESSURE: 64 MMHG | WEIGHT: 258 LBS | BODY MASS INDEX: 39.56 KG/M2 | SYSTOLIC BLOOD PRESSURE: 148 MMHG

## 2019-12-06 DIAGNOSIS — C54.1 MALIGNANT NEOPLASM OF ENDOMETRIUM (H): Primary | ICD-10-CM

## 2019-12-06 PROCEDURE — 77280 THER RAD SIMULAJ FIELD SMPL: CPT | Performed by: RADIOLOGY

## 2019-12-06 PROCEDURE — C1717 BRACHYTX, NON-STR,HDR IR-192: HCPCS | Performed by: RADIOLOGY

## 2019-12-06 PROCEDURE — 77770 HDR RDNCL NTRSTL/ICAV BRCHTX: CPT | Performed by: RADIOLOGY

## 2019-12-06 PROCEDURE — 57156 INS VAG BRACHYTX DEVICE: CPT | Performed by: RADIOLOGY

## 2019-12-06 PROCEDURE — 27110014 ZZH IMPLANT RADIATION BRIEF: Performed by: RADIOLOGY

## 2019-12-06 PROCEDURE — 77332 RADIATION TREATMENT AID(S): CPT | Performed by: RADIOLOGY

## 2019-12-06 NOTE — PROGRESS NOTES
Hanh FREEMAN Martymo is here for scheduled HDR brachytherapy    HDR Single Channel Cylinder  3    Pre-procedure-  Time out done by Cynthia Goode RN and Dr Cesar.   Patient identified, arm band applied, signed consent available   Medications taken by patient prior to procedure None  Pain assessment: 0/10  BP (!) 148/64   Wt 117 kg (258 lb)   BMI 39.56 kg/m      Post-procedure-  Pain assessment: 0/10   Device removed without difficulty, Education for possible side effects and management, Discharge teaching reviewed, questions answered and Discharged to home

## 2019-12-09 ENCOUNTER — ALLIED HEALTH/NURSE VISIT (OUTPATIENT)
Dept: RADIATION ONCOLOGY | Facility: CLINIC | Age: 64
End: 2019-12-09
Attending: RADIOLOGY
Payer: COMMERCIAL

## 2019-12-09 VITALS — SYSTOLIC BLOOD PRESSURE: 134 MMHG | HEART RATE: 67 BPM | OXYGEN SATURATION: 94 % | DIASTOLIC BLOOD PRESSURE: 70 MMHG

## 2019-12-09 DIAGNOSIS — C54.1 MALIGNANT NEOPLASM OF ENDOMETRIUM (H): Primary | ICD-10-CM

## 2019-12-09 PROCEDURE — 77280 THER RAD SIMULAJ FIELD SMPL: CPT | Performed by: RADIOLOGY

## 2019-12-09 PROCEDURE — 77770 HDR RDNCL NTRSTL/ICAV BRCHTX: CPT | Performed by: RADIOLOGY

## 2019-12-09 PROCEDURE — 57156 INS VAG BRACHYTX DEVICE: CPT | Performed by: RADIOLOGY

## 2019-12-09 PROCEDURE — 77332 RADIATION TREATMENT AID(S): CPT | Performed by: RADIOLOGY

## 2019-12-09 PROCEDURE — 27110014 ZZH IMPLANT RADIATION BRIEF: Performed by: RADIOLOGY

## 2019-12-09 PROCEDURE — C1717 BRACHYTX, NON-STR,HDR IR-192: HCPCS | Performed by: RADIOLOGY

## 2019-12-09 NOTE — PROGRESS NOTES
A radiation therapy treatment planning simulation was performed.  HDR brachytherapy vaginal cylinder 4 of 5.  Please see the [facility] record for documentation.    Janny Cesar MD  Radiation Oncology

## 2019-12-09 NOTE — NURSING NOTE
Falls Risk Screening Questions - Weekly OTV    1. Have you fallen in the past one week?  No.  2. Have you felt unsteady when walking or standing in the past one week?  No.

## 2019-12-09 NOTE — PROGRESS NOTES
A radiation therapy treatment planning simulation was performed.  HDR brachytherapy vaginal cylinder 3 of 5.  Please see the [facility] record for documentation.    Janny Cesar MD  Radiation Oncology

## 2019-12-13 ENCOUNTER — ALLIED HEALTH/NURSE VISIT (OUTPATIENT)
Dept: RADIATION ONCOLOGY | Facility: CLINIC | Age: 64
End: 2019-12-13
Attending: RADIOLOGY
Payer: COMMERCIAL

## 2019-12-13 VITALS — HEART RATE: 80 BPM | SYSTOLIC BLOOD PRESSURE: 126 MMHG | DIASTOLIC BLOOD PRESSURE: 75 MMHG | OXYGEN SATURATION: 97 %

## 2019-12-13 DIAGNOSIS — C54.1 MALIGNANT NEOPLASM OF ENDOMETRIUM (H): Primary | ICD-10-CM

## 2019-12-13 PROCEDURE — 57156 INS VAG BRACHYTX DEVICE: CPT | Performed by: RADIOLOGY

## 2019-12-13 PROCEDURE — 77336 RADIATION PHYSICS CONSULT: CPT | Mod: XU | Performed by: RADIOLOGY

## 2019-12-13 PROCEDURE — 77770 HDR RDNCL NTRSTL/ICAV BRCHTX: CPT | Performed by: RADIOLOGY

## 2019-12-13 PROCEDURE — 77332 RADIATION TREATMENT AID(S): CPT | Performed by: RADIOLOGY

## 2019-12-13 PROCEDURE — C1717 BRACHYTX, NON-STR,HDR IR-192: HCPCS | Performed by: RADIOLOGY

## 2019-12-13 PROCEDURE — 27110014 ZZH IMPLANT RADIATION BRIEF: Performed by: RADIOLOGY

## 2019-12-13 PROCEDURE — 77280 THER RAD SIMULAJ FIELD SMPL: CPT | Performed by: RADIOLOGY

## 2019-12-13 NOTE — NURSING NOTE
Hanh Villalba is here for scheduled HDR brachytherapy    HDR Tandem and Ring   5    Pre-procedure-  Patient identified, arm band applied, signed consent available   Medications taken by patient prior to procedure, none  Pain assessment: 0/10  /75   Pulse 80   SpO2 97%     Post-procedure-  Pain assessment: 0/10   Device removed without difficulty, Education for possible side effects and management, Discharge teaching reviewed, questions answered, Vaginal dilator use reviewed, Follow-up scheduled and Discharged to home    Falls Risk Screening Questions - Weekly OTV    1. Have you fallen in the past one week?  No.  2. Have you felt unsteady when walking or standing in the past one week?  No.

## 2019-12-17 NOTE — PROGRESS NOTES
A radiation therapy treatment planning simulation was performed.  HDR brachytherapy vaginal cylinder 5 of 5.  Please see the [facility] record for documentation.    Janny Cesar MD  Radiation Oncology

## 2019-12-26 ENCOUNTER — ONCOLOGY VISIT (OUTPATIENT)
Dept: RADIATION ONCOLOGY | Facility: CLINIC | Age: 64
End: 2019-12-26

## 2019-12-27 NOTE — PROCEDURES
RADIOTHERAPY TREATMENT SUMMARY  DATE OF REPORT: 2019    PATIENT: DARIO VILLALBA  MEDICAL RECORD NO: [id number]  : 1955    DIAGNOSIS: Endometrial Adenocarcinoma, grade 2, FIGO stage IB, ([id number]) C54.1 Malignant neoplasm of endometrium  INTENT OF RADIOTHERAPY: Cure  CONCURRENT SYSTEMIC THERAPY: None                  TREATMENT SUMMARY:  Details of the treatments summarized below are found in records kept in the Department of Radiation Oncology at [facility].  Radiation Oncology - Course: 1:   Treatment Site Dose Modality From To Days Fx.  Vaginal cuff  3,000 cGy  2019  11  5    Dose per Fraction: 600 cGy       Total Dose: 3,000 cGy         COMMENTS:                      Dario Villalba is a 64-year-old post-menopausal woman who presented with vaginal spotting and was found to have a 34 mm endometrial stripe on transvaginal ultrasound. Endometrial biopsy was consistent with endometrioid adenocarcinoma, FIGO grade 1, with loss of MLH1 and PMS2 expression. She underwent total laparoscopic hysterectomy and bilateral salpingo oophorectomy with sentinel lymph node dissection with Dr. KINGSLEY Harper of Gynecology Oncology on 19. Pathology showed grade 2 endometrial adenocarcinoma  with 17/21 mm (81%) myometrial invasion, positive LVSI, and no cervical stromal or lower uterine segment invasion. Pearblossom nodes (0/2, 1L, 1R) were negative. Her disease was staged as FIGO stage IB, [id number]. Given her age, deep myometrial invasion and positive LVSI, she was recommended for adjuvant radiotherapy with intention of improved local disease control, and proceeded to undergo intracavitary brachytherapy using Ir-192 delivered via vaginal cylinder. A total dose of 3,000 cGy was delivered in 5 once-every-other-day fractions to the vaginal cuff. Ms. Villalba overall tolerated treatment well without acute side effects from radiotherapy.    ED visits/hospitalizations: None  Missed  treatments: None  Acute Toxicity Profile by CTC v5.0: None    PAIN MANAGEMENT: Patient did not experience pain during her course of treatment.                           FOLLOW UP PLAN:                         1. Follow up in 2 months in clinic with Dr. Cesar  2. Follow up with Dr. KINGSLEY Harper as scheduled.    Resident Physician: Juan Miguel Branham M.D.   Staff Physician: Janny Cesar M.D.  Physicist: Nicolás Christopher, PhD    CC:   Perez Harper MD

## 2020-01-31 ENCOUNTER — TELEPHONE (OUTPATIENT)
Dept: FAMILY MEDICINE | Facility: CLINIC | Age: 65
End: 2020-01-31

## 2020-01-31 NOTE — TELEPHONE ENCOUNTER
Panel Management Review      Patient has the following on her problem list: None      Composite cancer screening  Chart review shows that this patient is due/due soon for the following None  Summary:    Patient is due/failing the following:   PHYSICAL    Action needed:   Patient needs office visit for Physical.    Type of outreach:    Sent [employer] message.    Questions for provider review:    None                                                                                                                                    Dale Silver CMA on 1/31/2020 at 3:01 PM       .

## 2020-02-25 ENCOUNTER — ONCOLOGY VISIT (OUTPATIENT)
Dept: ONCOLOGY | Facility: CLINIC | Age: 65
End: 2020-02-25
Attending: OBSTETRICS & GYNECOLOGY
Payer: COMMERCIAL

## 2020-02-25 VITALS
TEMPERATURE: 98.1 F | SYSTOLIC BLOOD PRESSURE: 123 MMHG | OXYGEN SATURATION: 96 % | WEIGHT: 270 LBS | BODY MASS INDEX: 41.4 KG/M2 | HEART RATE: 72 BPM | RESPIRATION RATE: 16 BRPM | DIASTOLIC BLOOD PRESSURE: 71 MMHG

## 2020-02-25 DIAGNOSIS — C55 ENDOMETRIOID ADENOCARCINOMA OF UTERUS (H): Primary | ICD-10-CM

## 2020-02-25 PROCEDURE — 99213 OFFICE O/P EST LOW 20 MIN: CPT | Mod: ZP | Performed by: OBSTETRICS & GYNECOLOGY

## 2020-02-25 PROCEDURE — G0463 HOSPITAL OUTPT CLINIC VISIT: HCPCS | Mod: ZF

## 2020-02-25 ASSESSMENT — PAIN SCALES - GENERAL: PAINLEVEL: NO PAIN (0)

## 2020-02-25 NOTE — LETTER
2/25/2020       RE: Hanh Villalba  [address]  [place] [address]     Dear Colleague,    Thank you for referring your patient, Hanh Villalba, to the [facility]. Please see a copy of my visit note below.    Gynecologic Oncology Clinic - Established Patient Visit    Visit date: Feb 25, 2020     CC: endometrial cancer surveillance    Interval history: Hanh Villalba is a 64 year old with history of intermediate risk endometrial cancer here for follow-up. She is doing very well since her last visit. She had a 2 week trip to [facility] where she completed 19 dives. This went well. She is tolerating general diet. No change in bladder or bowels although she does note some urinary symptoms since brachytherapy, which continue to improve. No vaginal discharge or bleeding.       Relevant history:  9/12/2019 D&C with pathology showing Grade 1 endometrial cancer, loss of MLH1 and PMS2, hypermethylation of MLH1 promotor positive  10/4/2019: Robotic-assisted Total laparoscopic hysterectomy, bilateral salpingo-oophorectomy, sentinel lymph node dissection, vaginal laceration repair: Stage 1B FIGO Grade 2, DOI 17/21mm (81%), LVSI+, cytology negative, tumor 3.4cm; MSI-H (MLH1 promoter melthylation)  12/2019: Vaginal brachytherapy; 30Gy in 5 fx     Review of Systems:  Constitutional: no fevers, chills  Cv: no chest pain, palpitations,   Resp: +URI with cough, no shortness of breath  GI: no constipation, diarrhea, abdominal pain  : no vaginal bleeding or discharge    Past Surgical History:  Past Surgical History:   Procedure Laterality Date     DAVINCI HYSTERECTOMY TOTAL, BILATERAL SALPINGO-OOPHORECTOMY, NODE DISSECTION, COMBINED N/A 10/4/2019    Procedure: Robot-assisted total laparoscopic hysterectomy, Bilateral salpingectomy and Right Oophorectomy, Lysis of adhesion, Cervical Indocyanine Green injection, Bilateral sentinel lymph node dissection, Repair of vaginal laceration.;  Surgeon:  Perez Reddy MD;  Location: [facility] OR     DILATION AND CURETTAGE N/A 9/12/2019    Procedure: DILATION AND CURETTAGE, UTERUS;  Surgeon: Navdeep Barajas MD;  Location: [facility] OR     OPERATIVE HYSTEROSCOPY WITH MORCELLATOR N/A 9/12/2019    Procedure: HYSTEROSCOPY WITH MORCELLATOR (MYOSURE);  Surgeon: Navdeep Barajas MD;  Location: [facility] OR     SALPINGO OOPHORECTOMY,R/L/SHANDA Left 1980s    Left ovarian with endometriois     US BREAST BIOPSY LT Left         Physical Exam:  /71   Pulse 72   Temp 98.1  F (36.7  C) (Oral)   Resp 16   Wt 122.5 kg (270 lb)   LMP  (LMP Unknown)   SpO2 96%   Breastfeeding No   BMI 41.40 kg/m      General appearance: no acute distress, well groomed, sitting comfortably   CV: regular rate,  rhythm, no murmurs appreciated  Resp: clear to auscultation bilaterally, normal respiratory effort  GI: abdomen soft, non-tender, non-distended, no appreciable masses  Skin: incisions well healed  :  External: vulva/labia normal in appearance without lesions  Vagina: mucosa is pink, no lesions appreciated, vaginal cuff intact  Cervix: surgically absent  Uterus/adnexa: surgically absent uterus, no pelvic masses appreciated    Labs/Pathology:  n/a    Imaging review:  n/a    Assessment:  Hanh is a 64 year old with history of intermediate risk endometrial cancer here for 3 month surveillance visit after completion of vaginal brachytherapy as adjuvant therapy. No evidence of recurrence.    Plan:  - Return to clinic in 3 months for follow-up, sooner as needed.    Perez Reddy MD     Gynecologic Oncology

## 2020-02-25 NOTE — PROGRESS NOTES
Gynecologic Oncology Clinic - Established Patient Visit    Visit date: Feb 25, 2020     CC: ***    Interval history: Hanh Villalba is a 64 year old ***      Relevant history:  ***    Review of Systems:  Constitutional: ***no fevers, chills  Cv: ***no chest pain, palpitations,   Resp: ***no cough, shortness of breath  GI: ***no constipation, diarrhea, abdominal pain  : ***no vaginal bleeding or discharge    Past Medical History:  Past Medical History:   Diagnosis Date     Depression      Hypothyroidism      Overweight        Past Surgical History:  Past Surgical History:   Procedure Laterality Date     DAVINCI HYSTERECTOMY TOTAL, BILATERAL SALPINGO-OOPHORECTOMY, NODE DISSECTION, COMBINED N/A 10/4/2019    Procedure: Robot-assisted total laparoscopic hysterectomy, Bilateral salpingectomy and Right Oophorectomy, Lysis of adhesion, Cervical Indocyanine Green injection, Bilateral sentinel lymph node dissection, Repair of vaginal laceration.;  Surgeon: Perez Reddy MD;  Location: [facility]     DILATION AND CURETTAGE N/A 9/12/2019    Procedure: DILATION AND CURETTAGE, UTERUS;  Surgeon: Navdeep Barajas MD;  Location: [facility]     OPERATIVE HYSTEROSCOPY WITH MORCELLATOR N/A 9/12/2019    Procedure: HYSTEROSCOPY WITH MORCELLATOR (MYOSURE);  Surgeon: Navdeep Barajas MD;  Location: [facility]     SALPINGO OOPHORECTOMY,R/L/SHANDA Left 1980s    Left ovarian with endometriois     US BREAST BIOPSY LT Left         Physical Exam:  /71   Pulse 72   Temp 98.1  F (36.7  C) (Oral)   Resp 16   Wt 122.5 kg (270 lb)   LMP  (LMP Unknown)   SpO2 96%   Breastfeeding No   BMI 41.40 kg/m     General appearance: ***no acute distress, well groomed, sitting comfortably   CV: *** rate, *** rhythm, no murmurs appreciated  Resp: ***clear to auscultation bilaterally, normal respiratory effort  GI: ***abdomen soft, non-tender, non-distended, no appreciable masses  :  External: vulva/labia normal in appearance without  lesions  Vagina: mucosa is pink, no lesions appreciated, ***vaginal cuff intact  Cervix: ***surgically absent  Uterus/adnexa: ***surgically absent uterus, no pelvic masses appreciated    Labs/Pathology:  ***    Imaging review:  ***    Assessment:  Hanh is a 64 year old ***    Plan:  ***    Perez Reddy MD     Gynecologic Oncology   Answers for HPI/ROS submitted by the patient on 2/24/2020   General Symptoms: No  Skin Symptoms: No  HENT Symptoms: No  EYE SYMPTOMS: No  HEART SYMPTOMS: No  LUNG SYMPTOMS: No  INTESTINAL SYMPTOMS: No  URINARY SYMPTOMS: No  GYNECOLOGIC SYMPTOMS: No  BREAST SYMPTOMS: No  SKELETAL SYMPTOMS: No  BLOOD SYMPTOMS: No  NERVOUS SYSTEM SYMPTOMS: No  MENTAL HEALTH SYMPTOMS: No

## 2020-02-25 NOTE — NURSING NOTE
"Oncology Rooming Note    February 25, 2020 7:24 AM   Hanh Villalba is a 64 year old female who presents for:    Chief Complaint   Patient presents with     Oncology Clinic Visit     Return Endometrial Ca     Initial Vitals: /71   Pulse 72   Temp 98.1  F (36.7  C) (Oral)   Resp 16   Wt 122.5 kg (270 lb)   LMP  (LMP Unknown)   SpO2 96%   Breastfeeding No   BMI 41.40 kg/m   Estimated body mass index is 41.4 kg/m  as calculated from the following:    Height as of 9/26/19: 1.72 m (5' 7.72\").    Weight as of this encounter: 122.5 kg (270 lb). Body surface area is 2.42 meters squared.  No Pain (0) Comment: Data Unavailable   No LMP recorded (lmp unknown). Patient has had a hysterectomy.  Allergies reviewed: Yes  Medications reviewed: Yes    Medications: Medication refills not needed today.  Pharmacy name entered into [employer]: [id number] [address]    Clinical concerns: No new concerns.         Chelo Shields CMA              "

## 2020-02-26 NOTE — PROGRESS NOTES
Gynecologic Oncology Clinic - Established Patient Visit    Visit date: Feb 25, 2020     CC: endometrial cancer surveillance    Interval history: Hanh Villalba is a 64 year old with history of intermediate risk endometrial cancer here for follow-up. She is doing very well since her last visit. She had a 2 week trip to [facility] where she completed 19 dives. This went well. She is tolerating general diet. No change in bladder or bowels although she does note some urinary symptoms since brachytherapy, which continue to improve. No vaginal discharge or bleeding.       Relevant history:  9/12/2019 D&C with pathology showing Grade 1 endometrial cancer, loss of MLH1 and PMS2, hypermethylation of MLH1 promotor positive  10/4/2019: Robotic-assisted Total laparoscopic hysterectomy, bilateral salpingo-oophorectomy, sentinel lymph node dissection, vaginal laceration repair: Stage 1B FIGO Grade 2, DOI 17/21mm (81%), LVSI+, cytology negative, tumor 3.4cm; MSI-H (MLH1 promoter melthylation)  12/2019: Vaginal brachytherapy; 30Gy in 5 fx     Review of Systems:  Constitutional: no fevers, chills  Cv: no chest pain, palpitations,   Resp: +URI with cough, no shortness of breath  GI: no constipation, diarrhea, abdominal pain  : no vaginal bleeding or discharge    Past Surgical History:  Past Surgical History:   Procedure Laterality Date     DAVINCI HYSTERECTOMY TOTAL, BILATERAL SALPINGO-OOPHORECTOMY, NODE DISSECTION, COMBINED N/A 10/4/2019    Procedure: Robot-assisted total laparoscopic hysterectomy, Bilateral salpingectomy and Right Oophorectomy, Lysis of adhesion, Cervical Indocyanine Green injection, Bilateral sentinel lymph node dissection, Repair of vaginal laceration.;  Surgeon: Perez Reddy MD;  Location: [facility] OR     DILATION AND CURETTAGE N/A 9/12/2019    Procedure: DILATION AND CURETTAGE, UTERUS;  Surgeon: Navdeep Barajas MD;  Location:  OR     OPERATIVE HYSTEROSCOPY WITH MORCELLATOR N/A 9/12/2019    Procedure:  HYSTEROSCOPY WITH MORCELLATOR (MYOSURE);  Surgeon: Navdeep Barajas MD;  Location: [facility] OR     SALPINGO OOPHORECTOMY,R/L/SHANDA Left 1980s    Left ovarian with endometriois     US BREAST BIOPSY LT Left         Physical Exam:  /71   Pulse 72   Temp 98.1  F (36.7  C) (Oral)   Resp 16   Wt 122.5 kg (270 lb)   LMP  (LMP Unknown)   SpO2 96%   Breastfeeding No   BMI 41.40 kg/m     General appearance: no acute distress, well groomed, sitting comfortably   CV: regular rate,  rhythm, no murmurs appreciated  Resp: clear to auscultation bilaterally, normal respiratory effort  GI: abdomen soft, non-tender, non-distended, no appreciable masses  Skin: incisions well healed  :  External: vulva/labia normal in appearance without lesions  Vagina: mucosa is pink, no lesions appreciated, vaginal cuff intact  Cervix: surgically absent  Uterus/adnexa: surgically absent uterus, no pelvic masses appreciated    Labs/Pathology:  n/a    Imaging review:  n/a    Assessment:  Hanh is a 64 year old with history of intermediate risk endometrial cancer here for 3 month surveillance visit after completion of vaginal brachytherapy as adjuvant therapy. No evidence of recurrence.    Plan:  - Return to clinic in 3 months for follow-up, sooner as needed.    Perez Reddy MD     Gynecologic Oncology

## 2020-03-02 ENCOUNTER — OFFICE VISIT (OUTPATIENT)
Dept: RADIATION ONCOLOGY | Facility: CLINIC | Age: 65
End: 2020-03-02
Attending: RADIOLOGY
Payer: COMMERCIAL

## 2020-03-02 VITALS — WEIGHT: 272 LBS | BODY MASS INDEX: 41.7 KG/M2 | DIASTOLIC BLOOD PRESSURE: 73 MMHG | SYSTOLIC BLOOD PRESSURE: 145 MMHG

## 2020-03-02 DIAGNOSIS — C54.1 MALIGNANT NEOPLASM OF ENDOMETRIUM (H): Primary | ICD-10-CM

## 2020-03-02 PROCEDURE — G0463 HOSPITAL OUTPT CLINIC VISIT: HCPCS | Performed by: RADIOLOGY

## 2020-03-02 NOTE — LETTER
3/2/2020       RE: Hanh Villalba   [address]  [place] [address]     Dear Colleague,    Thank you for referring your patient, Hanh Villalba, to the RADIATION ONCOLOGY CLINIC. Please see a copy of my visit note below.        RADIATION ONCOLOGY FOLLOW-UP VISIT  DATE OF VISIT: 2020    Hanh Villalba  MRN: [id number]   : 1955     DISEASE TREATED:  Endometrial Adenocarcinoma, grade 2, FIGO stage IB, ([id number])     RADIATION THERAPY DELIVERED: Vaginal HDR Brachytherapy, 3,000 cGy completed 19    SYSTEMIC THERAPY: None    INTERVAL SINCE COMPLETION OF RADIATION THERAPY: Approximately 9 weeks    SUBJECTIVE:   Hanh Villalba is a 64 year old woman who presented to her primary care provider on 2019 with 1 day of vaginal spotting.  Transvaginal ultrasound on 8/15/2019 showed a 10.4 x 5.3 x 5.9 cm uterus with a 34 mm endometrial stripe with an echogenic focus in the lower uterine segment consistent with a possible endometrial polyp. Endometrial biopsy on 19 ([id number], [facility]) was consistent with endometrioid adenocarcinoma of the endometrium, FIGO grade 1 with loss of MLH1 and PMS2 expression. On 10/4/2019 she underwent TLH-BSO with bilateral lymph node sampling.  The left ovary was noted to be surgically absent.  Small vaginal lacerations were repaired prior to closure and the postoperative course was uncomplicated.  Pathology ([facility], [id number]) showed grade 2 endometrial adenocarcinoma with 17/21 mm (81%) myometrial invasion with LVSI.  There was no lower uterine segment involvement or cervical stromal involvement and 0/1 sentinel lymph nodes on the right and 0/1 on the left were positive for carcinoma.   Her disease was staged as FIGO stage IB, [id number]. Given her age, deep myometrial invasion and positive LVSI, she was recommended for adjuvant radiotherapy with intention of improved local disease control, and proceeded to undergo intracavitary brachytherapy  using Ir-192 delivered via vaginal cylinder. Ms. Villalba overall tolerated treatment well without acute side effects from radiotherapy. She recently saw Dr. Putnam on 20 and pelvic examination showed no mucosal abnormalities at the vaginal cuff and no masses palpated on bimanual examination.  Overall, she feels well.  She is tolerating a normal diet.  She denies any diarrhea or vaginal discharge.  She does note some increased urinary frequency and slight pain with bowel movements since her surgery and brachytherapy, but this has been improving.  Her energy is mostly back to normal.  She is using her vaginal dilator as instructed. She describes stress urinary incontinence which kicked up after brachytherapy but is gradually improving with Kegel exercises.  She recently returned from a scuba diving trip and is planning a trip to [facility] later this spring.    PHYSICAL EXAM:  General: In no acute distress, alert, oriented  Vitals: /73, weight: 123.38 kg, pain 0/10   The remainder of physical exam was not performed today as she recently had pelvic exam by Dr. Reddy    LABS AND IMAGING: No new studies    IMPRESSION:   Ms. Villalba is a 64 year old female with intermediate risk endometrial carcinoma who is status post vaginal cuff high-dose-rate brachytherapy completed approximately 9 weeks ago.  Overall, she tolerated radiation well.  She has recovered from the acute effects of treatment aside from some residual urinary frequency which is gradually improving.    PLAN:   1.  Follow-up with radiation oncology as needed  2.  Continue use of vaginal dilator for at least 9 to 12 months as instructed  3.  Continue follow-up with Gynecologic Oncology as instructed  4.  She was instructed to call with any questions.    Janny Cesar MD  Department of Radiation Oncology  [facility]                  FOLLOW-UP VISIT    Patient Name: Hanh Villalba      : 1955     Age: 64 year old         ______________________________________________________________________________     Chief Complaint   Patient presents with     Cancer     Pt is here for a follow up:Endometrial Cancer: Brachytherapy 3000 cGy completed 19     BP (!) 145/73   Wt 123.4 kg (272 lb)   LMP  (LMP Unknown)   BMI 41.70 kg/m          Pain  Denies    Labs  Other Labs: No    Imaging  None        Diarrhea: 0- None    Constipation: 0- None    Nausea: 0- None    Vomitin- No vomiting    Genitourinary system: 0- Normal    Dysuria: 0- None    Vaginal dilator use: Yes 3 times a week    Other Appointments:     MD Name:  Appointment Date:    MD Name: Appointment Date:   MD Name: Appointment Date:   Other Appointment Notes:     Residual Radiation side effect: No concerns     Additional Instructions:     Nurse face-to-face time: Level 3:  10 min face to face time          Again, thank you for allowing me to participate in the care of your patient.      Sincerely,    Janny Cesar MD

## 2020-03-02 NOTE — PROGRESS NOTES
FOLLOW-UP VISIT    Patient Name: Hanh Villalba      : 1955     Age: 64 year old        ______________________________________________________________________________     Chief Complaint   Patient presents with     Cancer     Pt is here for a follow up:Endometrial Cancer: Brachytherapy 3000 cGy completed 19     BP (!) 145/73   Wt 123.4 kg (272 lb)   LMP  (LMP Unknown)   BMI 41.70 kg/m         Pain  Denies    Labs  Other Labs: No    Imaging  None        Diarrhea: 0- None    Constipation: 0- None    Nausea: 0- None    Vomitin- No vomiting    Genitourinary system: 0- Normal    Dysuria: 0- None    Vaginal dilator use: Yes 3 times a week    Other Appointments:     MD Name:  Appointment Date:    MD Name: Appointment Date:   MD Name: Appointment Date:   Other Appointment Notes:     Residual Radiation side effect: No concerns     Additional Instructions:     Nurse face-to-face time: Level 3:  10 min face to face time

## 2020-03-15 ENCOUNTER — HEALTH MAINTENANCE LETTER (OUTPATIENT)
Age: 65
End: 2020-03-15

## 2020-07-14 ASSESSMENT — ENCOUNTER SYMPTOMS
DIARRHEA: 0
CONSTIPATION: 0
DYSURIA: 0
FEVER: 0
PARESTHESIAS: 0
HEMATURIA: 0
BREAST MASS: 0
EYE PAIN: 0
NERVOUS/ANXIOUS: 0
WEAKNESS: 0
FREQUENCY: 1
HEADACHES: 0
JOINT SWELLING: 1
ARTHRALGIAS: 1
DIZZINESS: 0
PALPITATIONS: 0
SHORTNESS OF BREATH: 0
NAUSEA: 0
MYALGIAS: 1
HEMATOCHEZIA: 0
HEARTBURN: 0
COUGH: 1
SORE THROAT: 0
CHILLS: 1
ABDOMINAL PAIN: 0

## 2020-07-14 ASSESSMENT — ACTIVITIES OF DAILY LIVING (ADL): CURRENT_FUNCTION: NO ASSISTANCE NEEDED

## 2020-07-17 ENCOUNTER — OFFICE VISIT (OUTPATIENT)
Dept: FAMILY MEDICINE | Facility: CLINIC | Age: 65
End: 2020-07-17
Payer: COMMERCIAL

## 2020-07-17 VITALS
WEIGHT: 264 LBS | HEIGHT: 68 IN | BODY MASS INDEX: 40.01 KG/M2 | TEMPERATURE: 98.3 F | HEART RATE: 63 BPM | SYSTOLIC BLOOD PRESSURE: 111 MMHG | OXYGEN SATURATION: 99 % | DIASTOLIC BLOOD PRESSURE: 72 MMHG

## 2020-07-17 DIAGNOSIS — E66.01 MORBID OBESITY (H): ICD-10-CM

## 2020-07-17 DIAGNOSIS — N39.46 MIXED STRESS AND URGE URINARY INCONTINENCE: ICD-10-CM

## 2020-07-17 DIAGNOSIS — Z11.59 NEED FOR HEPATITIS C SCREENING TEST: ICD-10-CM

## 2020-07-17 DIAGNOSIS — N18.30 CKD (CHRONIC KIDNEY DISEASE) STAGE 3, GFR 30-59 ML/MIN (H): ICD-10-CM

## 2020-07-17 DIAGNOSIS — Z00.00 ENCOUNTER FOR MEDICARE ANNUAL WELLNESS EXAM: ICD-10-CM

## 2020-07-17 DIAGNOSIS — F33.42 RECURRENT MAJOR DEPRESSIVE DISORDER, IN FULL REMISSION (H): ICD-10-CM

## 2020-07-17 DIAGNOSIS — C55 ENDOMETRIOID ADENOCARCINOMA OF UTERUS (H): ICD-10-CM

## 2020-07-17 DIAGNOSIS — Z00.00 ROUTINE GENERAL MEDICAL EXAMINATION AT A HEALTH CARE FACILITY: Primary | ICD-10-CM

## 2020-07-17 DIAGNOSIS — Z13.1 SCREENING FOR DIABETES MELLITUS: ICD-10-CM

## 2020-07-17 DIAGNOSIS — Z13.6 CARDIOVASCULAR SCREENING; LDL GOAL LESS THAN 130: ICD-10-CM

## 2020-07-17 DIAGNOSIS — E03.4 HYPOTHYROIDISM DUE TO ACQUIRED ATROPHY OF THYROID: ICD-10-CM

## 2020-07-17 DIAGNOSIS — Z23 NEED FOR PNEUMOCOCCAL VACCINE: ICD-10-CM

## 2020-07-17 DIAGNOSIS — L90.0 LICHEN SCLEROSUS: ICD-10-CM

## 2020-07-17 LAB
ANION GAP SERPL CALCULATED.3IONS-SCNC: 4 MMOL/L (ref 3–14)
BUN SERPL-MCNC: 22 MG/DL (ref 7–30)
CALCIUM SERPL-MCNC: 10.1 MG/DL (ref 8.5–10.1)
CHLORIDE SERPL-SCNC: 111 MMOL/L (ref 94–109)
CHOLEST SERPL-MCNC: 223 MG/DL
CO2 SERPL-SCNC: 28 MMOL/L (ref 20–32)
CREAT SERPL-MCNC: 1.1 MG/DL (ref 0.52–1.04)
GFR SERPL CREATININE-BSD FRML MDRD: 52 ML/MIN/{1.73_M2}
GLUCOSE SERPL-MCNC: 93 MG/DL (ref 70–99)
HDLC SERPL-MCNC: 43 MG/DL
LDLC SERPL CALC-MCNC: 161 MG/DL
NONHDLC SERPL-MCNC: 180 MG/DL
POTASSIUM SERPL-SCNC: 4.3 MMOL/L (ref 3.4–5.3)
SODIUM SERPL-SCNC: 143 MMOL/L (ref 133–144)
TRIGL SERPL-MCNC: 93 MG/DL
TSH SERPL DL<=0.005 MIU/L-ACNC: 0.84 MU/L (ref 0.4–4)

## 2020-07-17 PROCEDURE — 80048 BASIC METABOLIC PNL TOTAL CA: CPT | Performed by: FAMILY MEDICINE

## 2020-07-17 PROCEDURE — 99213 OFFICE O/P EST LOW 20 MIN: CPT | Mod: 25 | Performed by: FAMILY MEDICINE

## 2020-07-17 PROCEDURE — 36415 COLL VENOUS BLD VENIPUNCTURE: CPT | Performed by: FAMILY MEDICINE

## 2020-07-17 PROCEDURE — G0438 PPPS, INITIAL VISIT: HCPCS | Performed by: FAMILY MEDICINE

## 2020-07-17 PROCEDURE — 86803 HEPATITIS C AB TEST: CPT | Performed by: FAMILY MEDICINE

## 2020-07-17 PROCEDURE — 84443 ASSAY THYROID STIM HORMONE: CPT | Performed by: FAMILY MEDICINE

## 2020-07-17 PROCEDURE — 90670 PCV13 VACCINE IM: CPT | Performed by: FAMILY MEDICINE

## 2020-07-17 PROCEDURE — G0009 ADMIN PNEUMOCOCCAL VACCINE: HCPCS | Performed by: FAMILY MEDICINE

## 2020-07-17 PROCEDURE — 80061 LIPID PANEL: CPT | Performed by: FAMILY MEDICINE

## 2020-07-17 RX ORDER — CLOBETASOL PROPIONATE 0.5 MG/G
OINTMENT TOPICAL
Qty: 45 G | Refills: 0 | Status: SHIPPED | OUTPATIENT
Start: 2020-07-17 | End: 2023-09-26

## 2020-07-17 ASSESSMENT — PATIENT HEALTH QUESTIONNAIRE - PHQ9: SUM OF ALL RESPONSES TO PHQ QUESTIONS 1-9: 0

## 2020-07-17 ASSESSMENT — ENCOUNTER SYMPTOMS
CONSTIPATION: 0
PALPITATIONS: 0
DIARRHEA: 0
NERVOUS/ANXIOUS: 0
EYE PAIN: 0
DIZZINESS: 0
SHORTNESS OF BREATH: 0
CHILLS: 1
SORE THROAT: 0
HEMATOCHEZIA: 0
MYALGIAS: 1
HEARTBURN: 0
DYSURIA: 0
BREAST MASS: 0
PARESTHESIAS: 0
HEMATURIA: 0
FREQUENCY: 1
ABDOMINAL PAIN: 0
ARTHRALGIAS: 1
NAUSEA: 0
HEADACHES: 0
FEVER: 0
WEAKNESS: 0

## 2020-07-17 ASSESSMENT — ACTIVITIES OF DAILY LIVING (ADL): CURRENT_FUNCTION: NO ASSISTANCE NEEDED

## 2020-07-17 ASSESSMENT — MIFFLIN-ST. JEOR: SCORE: 1778.38

## 2020-07-17 NOTE — PROGRESS NOTES
"SUBJECTIVE:   Hanh Villalba is a 64 year old female who presents for Preventive Visit.  Are you in the first 12 months of your Medicare coverage?  No    Healthy Habits:     In general, how would you rate your overall health?  Excellent    Frequency of exercise:  2-3 days/week    Duration of exercise:  15-30 minutes    Do you usually eat at least 4 servings of fruit and vegetables a day, include whole grains    & fiber and avoid regularly eating high fat or \"junk\" foods?  Yes    Taking medications regularly:  Yes    Barriers to taking medications:  None    Medication side effects:  Not applicable    Ability to successfully perform activities of daily living:  No assistance needed    Home Safety:  No safety concerns identified    Hearing Impairment:  No hearing concerns    In the past 6 months, have you been bothered by leaking of urine? Yes    In general, how would you rate your overall mental or emotional health?  Excellent      PHQ-2 Total Score: 0    Health care directive - will sign today    clobetazol - using for lichen sclerosis, diagnosed by her previous MD in  in mid-2010's.  Well controlled. Uses sparingly.    Using pelvic dilators  Has been advised to use for at least a year.  Pelvic area is feeling normal.    Thyroid- feels much colder in the winter.  And her partner can be very warm in the same room.  Dry skin is more frequent in the winter.  Despite normal thyroid levels.    No joint concerns    Do you feel safe in your environment? Yes    Have you ever done Advance Care Planning? (For example, a Health Directive, POLST, or a discussion with a medical provider or your loved ones about your wishes): No, but pt has it with her to complete today      Fall risk  Fallen 2 or more times in the past year?: No  Any fall with injury in the past year?: No    Cognitive Screening   1) Repeat 3 items (Leader, Season, Table)    2) Clock draw: NORMAL  3) 3 item recall: Recalls 3 objects  Results: 3 items " recalled: COGNITIVE IMPAIRMENT LESS LIKELY    Mini-CogTM Copyright SHELLEY [employer]. Licensed by the author for use in [facility]; reprinted with permission (sara@[facility]). All rights reserved.      Do you have sleep apnea, excessive snoring or daytime drowsiness?: no    Reviewed and updated as needed this visit by clinical staff  Tobacco  Allergies  Meds  Problems  Med Hx  Surg Hx  Fam Hx  Soc Hx          Reviewed and updated as needed this visit by Provider  Allergies  Meds  Problems        Social History     Tobacco Use     Smoking status: Former Smoker     Packs/day: 1.00     Years: 15.00     Pack years: 15.00     Types: Cigarettes     Start date: 1972     Last attempt to quit: 1987     Years since quittin.7     Smokeless tobacco: Never Used   Substance Use Topics     Alcohol use: Yes     Frequency: 2-3 times a week     Comment: a beer here and there - never more than 2 -  2 times/week         Alcohol Use 2020   Prescreen: >3 drinks/day or >7 drinks/week? No         Current providers sharing in care for this patient include:   Patient Care Team:  Angeli Nolan MD as PCP - General (Family Practice)  Angeli Nolan MD as Assigned PCP    The following health maintenance items are reviewed in Epic and correct as of today:  Health Maintenance   Topic Date Due     HEPATITIS C SCREENING  1955     ADVANCE CARE PLANNING  1955     DEPRESSION ACTION PLAN  1955     PHQ-9  1955     ZOSTER IMMUNIZATION (2 of 3) 10/13/2014     LIPID  2018     PNEUMOCOCCAL IMMUNIZATION 65+ LOW/MEDIUM RISK (1 of 2 - PCV13) 10/23/2018     TSH W/FREE T4 REFLEX  2020     INFLUENZA VACCINE (1) 2020     FALL RISK ASSESSMENT  2021     COLORECTAL CANCER SCREENING  2021     MEDICARE ANNUAL WELLNESS VISIT  2021     MAMMO SCREENING  2021     DTAP/TDAP/TD IMMUNIZATION (3 - Td) 10/31/2029     DEXA  Completed     IPV IMMUNIZATION   "Aged Out     MENINGITIS IMMUNIZATION  Aged Out     HEPATITIS B IMMUNIZATION  Aged Out     Lab work is in process      Review of Systems   Constitutional: Positive for chills. Negative for fever.   HENT: Negative for congestion, ear pain, hearing loss and sore throat.    Eyes: Negative for pain and visual disturbance.   Respiratory: Negative for shortness of breath.    Cardiovascular: Negative for chest pain, palpitations and peripheral edema.   Gastrointestinal: Negative for abdominal pain, constipation, diarrhea, heartburn, hematochezia and nausea.   Breasts:  Negative for tenderness, breast mass and discharge.   Genitourinary: Positive for frequency and urgency. Negative for dysuria, genital sores, hematuria, pelvic pain, vaginal bleeding and vaginal discharge.   Musculoskeletal: Positive for arthralgias and myalgias.   Skin: Negative for rash.   Neurological: Negative for dizziness, weakness, headaches and paresthesias.   Psychiatric/Behavioral: Negative for mood changes. The patient is not nervous/anxious.        OBJECTIVE:   /72 (BP Location: Right arm, Patient Position: Chair, Cuff Size: Adult Large)   Pulse 63   Temp 98.3  F (36.8  C) (Oral)   Ht 1.715 m (5' 7.52\")   Wt 119.7 kg (264 lb)   LMP  (LMP Unknown)   SpO2 99%   BMI 40.71 kg/m   Estimated body mass index is 40.71 kg/m  as calculated from the following:    Height as of this encounter: 1.715 m (5' 7.52\").    Weight as of this encounter: 119.7 kg (264 lb).  Physical Exam  GENERAL APPEARANCE: healthy, alert and no distress  EYES: Eyes grossly normal to inspection, PERRL and conjunctivae and sclerae normal  HENT: ear canals and TM's normal, nose and mouth without ulcers or lesions, oropharynx clear and oral mucous membranes moist  NECK: no adenopathy, no asymmetry, masses, or scars and thyroid normal to palpation  RESP: lungs clear to auscultation - no rales, rhonchi or wheezes  BREAST: normal without masses, tenderness or nipple discharge " and no palpable axillary masses or adenopathy  CV: regular rate and rhythm, normal S1 S2, no S3 or S4, no murmur, click or rub, no peripheral edema and peripheral pulses strong  ABDOMEN: soft, nontender, no hepatosplenomegaly, no masses and bowel sounds normal   (female): normal female external genitalia, normal urethral meatus and Normal postsurgical bimanual exam without masses or tenderness  MS: no musculoskeletal defects are noted and gait is age appropriate without ataxia  SKIN: no suspicious lesions or rashes  NEURO: Normal strength and tone, sensory exam grossly normal, mentation intact and speech normal  PSYCH: mentation appears normal and affect normal/bright    Diagnostic Test Results:  No results found for this or any previous visit (from the past 24 hour(s)).    ASSESSMENT / PLAN:   (Z00.00) Routine general medical examination at a health care facility  (primary encounter diagnosis)  Comment: stable health  Plan:  Other health care maintenance up to date per chart or patient report.  Discussed zoster vaccine    (N18.3) CKD (chronic kidney disease) stage 3, GFR 30-59 ml/min (H)  Comment:   Plan: adjust therapy based on labs      (F33.42) Recurrent major depressive disorder, in full remission (H)  Comment: stable  Plan: FLUoxetine (PROZAC) 20 MG capsule        Continue current medication      (E66.01) morbid obesity (H)  Comment:   Plan: discussed diet and exercise    (E03.4) Hypothyroidism due to acquired atrophy of thyroid  Comment: some cold intolerance and dry skin, although this has been despite normal TSH levels  Plan: adjust therapy based on labs. And discussed that these could also be independent symptoms.    (C55) Endometrioid adenocarcinoma of uterus (H)  Comment: followed by Gyn Onc  Plan: normal pelvic exam completed today    (Z00.00) Encounter for Medicare annual wellness exam  Comment:   Plan: as above    (Z13.1) Screening for diabetes mellitus  Comment:   Plan: adjust therapy based on  "labs      (Z13.6) CARDIOVASCULAR SCREENING; LDL GOAL LESS THAN 130  Comment:   Plan: adjust therapy based on labs      (Z23) Need for pneumococcal vaccine  Comment:   Plan: PNEUMOCOCCAL CONJ VACCINE 13 VALENT IM            (Z11.59) Need for hepatitis C screening test  Comment:   Plan: Hepatitis C Screen Reflex to HCV RNA Quant and         Genotype        Patient agrees to testing.    (N39.46) Mixed stress and urge urinary incontinence  Comment: worse after surery  Plan: SRAVAN PT, HAND, AND CHIROPRACTIC REFERRAL            (L90.0) Lichen sclerosus  Comment: previously diagnosed  Plan: clobetasol (TEMOVATE) 0.05 % external ointment        Normal exam today.   Refill provided for flares and ongoing sparing use.      COUNSELING:  Reviewed preventive health counseling, as reflected in patient instructions  Special attention given to:       Regular exercise       Healthy diet/nutrition       Bladder control    Estimated body mass index is 40.71 kg/m  as calculated from the following:    Height as of this encounter: 1.715 m (5' 7.52\").    Weight as of this encounter: 119.7 kg (264 lb).    Weight management plan: Discussed healthy diet and exercise guidelines     reports that she quit smoking about 32 years ago. Her smoking use included cigarettes. She started smoking about 48 years ago. She has a 15.00 pack-year smoking history. She has never used smokeless tobacco.      Appropriate preventive services were discussed with this patient, including applicable screening as appropriate for cardiovascular disease, diabetes, osteopenia/osteoporosis, and glaucoma.  As appropriate for age/gender, discussed screening for colorectal cancer, prostate cancer, breast cancer, and cervical cancer. Checklist reviewing preventive services available has been given to the patient.    Reviewed patients plan of care and provided an AVS. The Intermediate Care Plan ( asthma action plan, low back pain action plan, and migraine action plan) for " Hanh meets the Care Plan requirement. This Care Plan has been established and reviewed with the Patient.    Counseling Resources:  ATP IV Guidelines  Pooled Cohorts Equation Calculator  Breast Cancer Risk Calculator  FRAX Risk Assessment  ICSI Preventive Guidelines  Dietary Guidelines for Americans, 2010  USDA's MyPlate  ASA Prophylaxis  Lung CA Screening    Angeli Messina MD  [facility]    Identified Health Risks:    Information on urinary incontinence and treatment options given to patient.

## 2020-07-17 NOTE — LETTER
My Depression Action Plan  Name: Hanh Villalba   Date of Birth 1955  Date: 7/17/2020    My doctor: Angeli Nolan   My clinic: [address] [id number] [phone]          GREEN    ZONE   Good Control    What it looks like:     Things are going generally well. You have normal ups and downs. You may even feel depressed from time to time, but bad moods usually last less than a day.   What you need to do:  1. Continue to care for yourself (see self care plan)  2. Check your depression survival kit and update it as needed  3. Follow your physician s recommendations including any medication.  4. Do not stop taking medication unless you consult with your physician first.           YELLOW         ZONE Getting Worse    What it looks like:     Depression is starting to interfere with your life.     It may be hard to get out of bed; you may be starting to isolate yourself from others.    Symptoms of depression are starting to last most all day and this has happened for several days.     You may have suicidal thoughts but they are not constant.   What you need to do:     1. Call your care team. Your response to treatment will improve if you keep your care team informed of your progress. Yellow periods are signs an adjustment may need to be made.     2. Continue your self-care.  Just get dressed and ready for the day.  Don't give yourself time to talk yourself out of it.    3. Talk to someone in your support network.    4. Open up your Depression Self-Care Plan/Wellness Kit.           RED    ZONE Medical Alert - Get Help    What it looks like:     Depression is seriously interfering with your life.     You may experience these or other symptoms: You can t get out of bed most days, can t work or engage in other necessary activities, you have trouble taking care of basic hygiene, or basic responsibilities, thoughts of  suicide or death that will not go away, self-injurious behavior.     What you need to do:  1. Call your care team and request a same-day appointment. If they are not available (weekends or after hours) call your local crisis line, emergency room or 911.            Depression Self-Care Plan / Wellness Kit    Self-Care for Depression  Here s the deal. Your body and mind are really not as separate as most people think.  What you do and think affects how you feel and how you feel influences what you do and think. This means if you do things that people who feel good do, it will help you feel better.  Sometimes this is all it takes.  There is also a place for medication and therapy depending on how severe your depression is, so be sure to consult with your medical provider and/ or Behavioral Health Consultant if your symptoms are worsening or not improving.     In order to better manage my stress, I will:    Exercise  Get some form of exercise, every day. This will help reduce pain and release endorphins, the  feel good  chemicals in your brain. This is almost as good as taking antidepressants!  This is not the same as joining a gym and then never going! (they count on that by the way ) It can be as simple as just going for a walk or doing some gardening, anything that will get you moving.      Hygiene   Maintain good hygiene (get out of bed in the morning, make your bed, brush your teeth, take a shower, and get dressed like you were going to work, even if you are unemployed).  If your clothes don't fit try to get ones that do.    Diet  Strive to eat foods that are good for me, drink plenty of water, and avoid excessive sugar, caffeine, alcohol, and other mood-altering substances.  Some foods that are helpful in depression are: complex carbohydrates, B vitamins, flaxseed, fish or fish oil, fresh fruits and vegetables.    Psychotherapy  Agree to participate in Individual Therapy (if recommended).    Medication  If  prescribed medications, I agree to take them.  Missing doses can result in serious side effects.  I understand that drinking alcohol, or other illicit drug use, may cause potential side effects.  I will not stop my medication abruptly without first discussing it with my provider.    Staying Connected With Others  Stay in touch with my friends, family members, and my primary care provider/team.    Use your imagination  Be creative.  We all have a creative side; it doesn t matter if it s oil painting, sand castles, or mud pies! This will also kick up the endorphins.    Witness Beauty  (AKA stop and smell the roses) Take a look outside, even in mid-winter. Notice colors, textures. Watch the squirrels and birds.     Service to others  Be of service to others.  There is always someone else in need.  By helping others we can  get out of ourselves  and remember the really important things.  This also provides opportunities for practicing all the other parts of the program.    Humor  Laugh and be silly!  Adjust your TV habits for less news and crime-drama and more comedy.    Control your stress  Try breathing deep, massage therapy, biofeedback, and meditation. Find time to relax each day.     Crisis Text Line  http://www.crisistextline.org    The Crisis Text Line serves anyone, in any type of crisis, providing access to free, 24/7 support and information via the medium people already use and trust:    Here's how it works:  1.  Text 648-350 from anywhere in the [place], anytime, about any type of crisis.  2.  A live, trained Crisis Counselor receives the text and responds quickly.  3.  The volunteer Crisis Counselor will help you move from a 'hot moment to a cool moment'.    My support system    Clinic Contact:  Phone number:    Contact 1:  Phone number:    Contact 2:  Phone number:    Anabaptist/:  Phone number:    Therapist:  Phone number:    Local crisis center:    Phone number:    Other community support:   Phone number:

## 2020-07-17 NOTE — NURSING NOTE
Prior to immunization administration, verified patients identity using patient s name and date of birth. Please see Immunization Activity for additional information.     Screening Questionnaire for Adult Immunization    Are you sick today?   No   Do you have allergies to medications, food, a vaccine component or latex?   No   Have you ever had a serious reaction after receiving a vaccination?   No   Do you have a long-term health problem with heart, lung, kidney, or metabolic disease (e.g., diabetes), asthma, a blood disorder, no spleen, complement component deficiency, a cochlear implant, or a spinal fluid leak?  Are you on long-term aspirin therapy?   No   Do you have cancer, leukemia, HIV/AIDS, or any other immune system problem?   No   Do you have a parent, brother, or sister with an immune system problem?   No   In the past 3 months, have you taken medications that affect  your immune system, such as prednisone, other steroids, or anticancer drugs; drugs for the treatment of rheumatoid arthritis, Crohn s disease, or psoriasis; or have you had radiation treatments?   No   Have you had a seizure, or a brain or other nervous system problem?   No   During the past year, have you received a transfusion of blood or blood    products, or been given immune (gamma) globulin or antiviral drug?   No   For women: Are you pregnant or is there a chance you could become       pregnant during the next month?   No   Have you received any vaccinations in the past 4 weeks?   No     Immunization questionnaire answers were all negative.        Per orders of Dr. Nolan, injection of prevnar 13 given by Tierra Gaines MA. Patient instructed to remain in clinic for 15 minutes afterwards, and to report any adverse reaction to me immediately.  Vaccine information supplied for pcv 13.  Verified patient's name and date of birth prior to injection.       Screening performed by Tierra Gaines MA on 7/17/2020 at 8:54 AM.

## 2020-07-17 NOTE — PATIENT INSTRUCTIONS
Patient Education   Personalized Prevention Plan  You are due for the preventive services outlined below.  Your care team is available to assist you in scheduling these services.  If you have already completed any of these items, please share that information with your care team to update in your medical record.  Health Maintenance Due   Topic Date Due                         Zoster (Shingles) Vaccine (2 of 3) 10/13/2014                         Urinary Incontinence, Female (Adult)  Urinary incontinence means loss of control of the bladder. This problem affects many women, especially as they get older. If you have incontinence, you may be embarrassed to ask for help. But know that this problem can be treated.  Types of Incontinence  There are different types of incontinence. Two of the main types are described here. You can have more than one type.    Stress incontinence. With this type, urine leaks when pressure (stress) is put on the bladder. This may happen when you cough, sneeze, or laugh. Stress incontinence most often occurs because the pelvic floor muscles that support the bladder and urethra are weak. This can happen after pregnancy and vaginal childbirth or a hysterectomy. It can also be due to excess body weight or hormone changes.    Urge incontinence (also called overactive bladder). With this type, a sudden urge to urinate is felt often. This may happen even though there may not be much urine in the bladder. The need to urinate often during the night is common. Urge incontinence most often occurs because of bladder spasms. This may be due to bladder irritation or infection. Damage to bladder nerves or pelvic muscles, constipation, and certain medicines can also lead to urge incontinence.  Treatment of urinary incontinence depends on the cause. Further evaluation is needed to find the type you have. This will likely include an exam and certain tests. Based on the results, you and your healthcare  provider can then plan treatment. Until a diagnosis is made, the home care tips below can help relieve symptoms.  Home care    Do pelvic floor muscle exercises, if they are prescribed. The pelvic floor muscles help support the bladder and urethra. Many women find that their symptoms improve when doing special exercises that strengthen these muscles. To do the exercises contract the muscles you would use to stop your stream of urine, but do this when you re not urinating. Hold for 10 seconds, then relax. Repeat 10 to 20 times in a row, at least 3 times a day. Your provider may give you other instructions for how to do the exercises and how often.    Keep a bladder diary. This helps track how often and how much you urinate over a set period of time. Bring this diary with you to your next visit with the provider. The information can help your provider learn more about your bladder problem.    Lose weight, if advised to by your provider. Excess weight puts pressure on the bladder. Your provider can help you create a weight-loss plan that s right for you. This may include exercising more and making certain diet changes.    Don't consume foods and drinks that may irritate the bladder. These can include alcohol and caffeinated drinks.    Quit smoking. Smoking and other tobacco use can lead to chronic cough that strains the pelvic floor muscles. Smoking may also damage the bladder and urethra. Talk with your provider about treatments or methods you can use to quit smoking.    If drinking large amounts of fluid causes you to have symptoms, you may be advised to limit your fluid intake. You may also be advised to drink most of your fluids during the day and to limit fluids at night.    If you re worried about urine leakage or accidents, you may wear absorbent pads to catch urine. Change the pads often. This helps reduce discomfort. It may also reduce the risk of skin or bladder infections.  Follow-up care  Follow up with your  healthcare provider, or as directed. It may take some to find the right treatment for your problem. Your treatment plan may include special therapies or medicines. Certain procedures or surgery may also be options. Be sure to discuss any questions you have with your provider.  When to seek medical advice  Call the healthcare provider right away if any of these occur:    Fever of 100.4 F (38 C) or higher, or as directed by your provider    Bladder pain or fullness    Abdominal swelling    Nausea or vomiting    Back pain    Weakness, dizziness or fainting  Date Last Reviewed: 10/1/2017    6304-0100 [employer]. [address] [place], [place] [address]. All rights reserved. This information is not intended as a substitute for professional medical care. Always follow your healthcare professional's instructions.            Consider  the new shingles vaccine, Shingrix.   Shingrix is given in a 2 shot series, between 2 and 6 months apart. It is recommended for adults age 50 and older. Initial studies have indicated that this new vaccine is 85-90% effective at preventing shingles, and is preferred over the old Zostavax vaccine.   It may be best to get the vaccine/shot at a pharmacy because many insurances cover this particular vaccine/shot through the pharmacy benefits (not the clinic benefits).      [facility] will call you to schedule physical therapy evaluation.  If you do not hear from them within 2-3 days you can reach them at [phone].  Your records from physical therapy visits will be available to me electronically.  Please let me know if your symptoms do not improve as expected with physical therapy.

## 2020-07-20 LAB — HCV AB SERPL QL IA: NONREACTIVE

## 2020-07-23 ENCOUNTER — DOCUMENTATION ONLY (OUTPATIENT)
Dept: OTHER | Facility: CLINIC | Age: 65
End: 2020-07-23

## 2020-08-20 ENCOUNTER — THERAPY VISIT (OUTPATIENT)
Dept: PHYSICAL THERAPY | Facility: CLINIC | Age: 65
End: 2020-08-20
Attending: FAMILY MEDICINE
Payer: COMMERCIAL

## 2020-08-20 DIAGNOSIS — M99.05 SOMATIC DYSFUNCTION OF PELVIS REGION: ICD-10-CM

## 2020-08-20 DIAGNOSIS — N39.46 MIXED STRESS AND URGE URINARY INCONTINENCE: ICD-10-CM

## 2020-08-20 DIAGNOSIS — N39.46 MIXED INCONTINENCE URGE AND STRESS (MALE)(FEMALE): Primary | ICD-10-CM

## 2020-08-20 PROCEDURE — 97112 NEUROMUSCULAR REEDUCATION: CPT | Mod: GP | Performed by: PHYSICAL THERAPIST

## 2020-08-20 PROCEDURE — 97161 PT EVAL LOW COMPLEX 20 MIN: CPT | Mod: GP | Performed by: PHYSICAL THERAPIST

## 2020-08-20 PROCEDURE — 97140 MANUAL THERAPY 1/> REGIONS: CPT | Mod: GP | Performed by: PHYSICAL THERAPIST

## 2020-08-20 NOTE — PROGRESS NOTES
[facility] Initial Evaluation  Subjective:  The history is provided by the patient. No  was used.   Patient Health History  Hanh Villalba being seen for PT for Bladder Control.     Problem began: 10/10/2023.   Problem occurred: Aging, exacerbated by hysterectomy.   Pain is reported as 0/10 on pain scale.  General health as reported by patient is excellent.  Pertinent medical history includes: cancer, depression, overweight, smoking and thyroid problems.     Medical allergies: none.   Surgeries include:  Cancer surgery and other. Other surgery history details: ovary removed in the 80's.    Current medications:  Anti-depressants and hormone replacement therapy.    Current occupation is retired.   Primary job tasks include:  Driving, lifting/carrying, pushing/pulling and repetitive tasks.   Other job/home tasks details: [employer].                Therapist Generated HPI Evaluation  Problem details: GIANCARLO  She had hysterectomy (robotically) in October 2018 hysterectomy and now has more leakage   BILL with cough sneeze shout  Leaks on way to bathroom- a few TBSPs so is changing underwear often  Not wearing pads  Urgency 2-3 x day*  Voids 3 x in am and 5 the rest of day   Nocturia 1 x   Drinking water, caffeine 1 c in am   No H/o UTI's  No FI   goal: prevent more leakage and hold urine on way to bathroom  H/O Chronic LBP B hip pain   Oophorectomy 1980's  .         Type of problem:  Incontinence.    This is a chronic condition.  Condition occurred with:  Other reason.                                           Objective:  Standing Alignment:        Lumbar:  Anterior pelvic tilt            Gait:    Gait Type:  Normal                                            Pelvic Dysfunction Evaluation:        Flexibility:  NA      Abdominal Wall:  normal (bikini scar tight)        Pelvic Clock Exam:  normal                External Assessment:  External assessment pelvic: no observable  pfm release.              Internal Assessment:  Internal assessment pelvic: pfm overactive, no release except for L OI.              Additional History:    Number of Pregnancies:                          General     ROS    Assessment/Plan:    Patient is a 64 year old female with pelvic complaints.    Patient has the following significant findings with corresponding treatment plan.                Diagnosis 1:  GIANCARLO  Decreased ROM/flexibility - manual therapy, therapeutic exercise, therapeutic activity and home program  Impaired muscle performance - neuro re-education and home program  Decreased function - therapeutic activities and home program  Impaired posture - neuro re-education, therapeutic activities and home program    .    Previous and current functional limitations:  (See Goal Flow Sheet for this information)    Short term and Long term goals: (See Goal Flow Sheet for this information)     Communication ability:  Patient appears to be able to clearly communicate and understand verbal and written communication and follow directions correctly.  Treatment Explanation - The following has been discussed with the patient:   RX ordered/plan of care  Anticipated outcomes  Possible risks and side effects  This patient would benefit from PT intervention to resume normal activities.   Rehab potential is good.    Frequency:  1 X week, once daily  Duration:  for 6 weeks  Discharge Plan:  Achieve all LTG.  Independent in home treatment program.  Reach maximal therapeutic benefit.    Please refer to the daily flowsheet for treatment today, total treatment time and time spent performing 1:1 timed codes.

## 2020-09-11 ENCOUNTER — THERAPY VISIT (OUTPATIENT)
Dept: PHYSICAL THERAPY | Facility: CLINIC | Age: 65
End: 2020-09-11
Payer: COMMERCIAL

## 2020-09-11 DIAGNOSIS — M99.05 SOMATIC DYSFUNCTION OF PELVIS REGION: ICD-10-CM

## 2020-09-11 DIAGNOSIS — N39.46 MIXED INCONTINENCE URGE AND STRESS (MALE)(FEMALE): Primary | ICD-10-CM

## 2020-09-11 PROCEDURE — 97140 MANUAL THERAPY 1/> REGIONS: CPT | Mod: GP | Performed by: PHYSICAL THERAPIST

## 2020-09-11 PROCEDURE — 97112 NEUROMUSCULAR REEDUCATION: CPT | Mod: GP | Performed by: PHYSICAL THERAPIST

## 2020-09-16 ENCOUNTER — THERAPY VISIT (OUTPATIENT)
Dept: PHYSICAL THERAPY | Facility: CLINIC | Age: 65
End: 2020-09-16
Payer: COMMERCIAL

## 2020-09-16 DIAGNOSIS — M99.05 SOMATIC DYSFUNCTION OF PELVIS REGION: ICD-10-CM

## 2020-09-16 DIAGNOSIS — N39.46 MIXED INCONTINENCE URGE AND STRESS (MALE)(FEMALE): Primary | ICD-10-CM

## 2020-09-16 PROCEDURE — 97112 NEUROMUSCULAR REEDUCATION: CPT | Mod: GP | Performed by: PHYSICAL THERAPIST

## 2020-09-16 PROCEDURE — 97110 THERAPEUTIC EXERCISES: CPT | Mod: GP | Performed by: PHYSICAL THERAPIST

## 2020-09-16 PROCEDURE — 97140 MANUAL THERAPY 1/> REGIONS: CPT | Mod: GP | Performed by: PHYSICAL THERAPIST

## 2020-09-16 NOTE — Clinical Note
Please abstract the following data from this visit with this patient into the appropriate field in Epic:Tests that can be patient reported without a hard copy:Other Tests found in the patient's chart through Chart Review/Care Everywhere:Colonoscopy done by this group [facility] on this date: most recentMammogram done by this [facility] on this date: most recentPap smear done by this [facility] on this date: most recentNote to Abstraction: If this section is blank, no results were found via Chart Review/Care Everywhere. Spoke to the patient. He is willing to schedule but due to caregivers schedule he would like to start this Monday as it is her day off. We can then discuss other dates, but she will have to request off. Valeria Tucker will be his care giver.  Will discuss testing available with .

## 2020-09-23 ENCOUNTER — THERAPY VISIT (OUTPATIENT)
Dept: PHYSICAL THERAPY | Facility: CLINIC | Age: 65
End: 2020-09-23
Payer: COMMERCIAL

## 2020-09-23 DIAGNOSIS — M99.05 SOMATIC DYSFUNCTION OF PELVIS REGION: ICD-10-CM

## 2020-09-23 DIAGNOSIS — N39.46 MIXED INCONTINENCE URGE AND STRESS (MALE)(FEMALE): Primary | ICD-10-CM

## 2020-09-23 PROCEDURE — 97110 THERAPEUTIC EXERCISES: CPT | Mod: GP | Performed by: PHYSICAL THERAPIST

## 2020-09-23 PROCEDURE — 97140 MANUAL THERAPY 1/> REGIONS: CPT | Mod: GP | Performed by: PHYSICAL THERAPIST

## 2020-09-23 PROCEDURE — 97530 THERAPEUTIC ACTIVITIES: CPT | Mod: GP | Performed by: PHYSICAL THERAPIST

## 2020-09-30 ENCOUNTER — THERAPY VISIT (OUTPATIENT)
Dept: PHYSICAL THERAPY | Facility: CLINIC | Age: 65
End: 2020-09-30
Payer: COMMERCIAL

## 2020-09-30 DIAGNOSIS — N39.46 MIXED INCONTINENCE URGE AND STRESS (MALE)(FEMALE): Primary | ICD-10-CM

## 2020-09-30 DIAGNOSIS — M99.05 SOMATIC DYSFUNCTION OF PELVIS REGION: ICD-10-CM

## 2020-09-30 PROCEDURE — 97530 THERAPEUTIC ACTIVITIES: CPT | Mod: GP | Performed by: PHYSICAL THERAPIST

## 2020-09-30 PROCEDURE — 97110 THERAPEUTIC EXERCISES: CPT | Mod: GP | Performed by: PHYSICAL THERAPIST

## 2020-09-30 NOTE — PROGRESS NOTES
DISCHARGE REPORT    Progress reporting period is from [id number] to [id number]..       SUBJECTIVE  Subjective changes noted by patient:  Subjective: Pt. had 1 episode of leakage with squatting and coughing (not a gusher and she didnt have to change underwear). She is pleased with her progress.      .     Initial Pain level: 0/10.   Changes in function:  Yes (See Goal flowsheet attached for changes in current functional level)  Adverse reaction to treatment or activity: None    OBJECTIVE  Objective: PERF scale-4-/10/10/9 and she is able to perform plie with pfm.       ASSESSMENT/PLAN  Updated problem list and treatment plan:   Diagnosis 1:  GIANCARLO  Impaired muscle performance - home program  Decreased function - home program  STG/LTGs have been met or progress has been made towards goals:  Yes (See Goal flow sheet completed today.)  Assessment of Progress: The patient's condition is improving.  Self Management Plans:  Patient is independent in a home treatment program.  Patient is independent in self management of symptoms.    Hanh continues to require the following intervention to meet STG and LTG's:  PT intervention is no longer required to meet STG/LTG.    Recommendations:  This patient is ready to be discharged from therapy and continue their home treatment program.

## 2020-10-06 NOTE — PROGRESS NOTES
Follow Up Notes on Referred Patient    Date: 10/12/2020       Dr. Perez Reddy MD  [address] [place] [address]       RE: Hanh Villalba  : 1955  RUPERT: 10/12/2020    Dear Dr. 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  She completed brachytherapy 2019.  She is here today for a surveillance visit.     Oncology history:   2019 D&C with pathology showing Grade 1 endometrial cancer, loss of MLH1 and PMS2, hypermethylation of MLH1 promotor positive  10/4/2019: Robotic-assisted Total laparoscopic hysterectomy, bilateral salpingo-oophorectomy, sentinel lymph node dissection, vaginal laceration repair: Stage 1B FIGO Grade 2, DOI 17/21mm (81%), LVSI+, cytology negative, tumor 3.4cm; MSI-H (MLH1 promoter melthylation)  2019: Vaginal brachytherapy; 30Gy in 5 fx         Today she comes to clinic and states she had a pelvic exam with her PCP in July at her annual exam. She is no longer using a dilator and is not sexually active. Her mammogram is due and her colonoscopy is due next year. She denies any vaginal bleeding, no changes in her bowel or bladder habits, no nausea/emesis, no lower extremity edema, and no difficulties eating or sleeping. She denies any abdominal discomfort/bloating, no fevers or chills, and no chest pain or shortness of breath. She was being seen by PT for incontinence and has been discharged and is dong her home exercises.           Review of Systems     Constitutional:  Negative for fever, chills, weight loss, weight gain, fatigue, decreased appetite, night sweats, recent stressors, height gain, height loss, post-operative complications, incisional pain, hallucinations, increased energy, hyperactivity and confused.   HENT:  Negative for ear pain, hearing loss, tinnitus, nosebleeds, trouble swallowing, hoarse voice, mouth sores, sore throat, ear discharge, tooth  pain, gum tenderness, taste disturbance, smell disturbance, hearing aid, bleeding gums, dry mouth, sinus pain, sinus congestion and neck mass.    Eyes:  Negative for double vision, pain, redness, eye pain, decreased vision, eye watering, eye bulging, eye dryness, flashing lights, spots, floaters, strabismus, tunnel vision, jaundice and eye irritation.   Respiratory:   Negative for cough, hemoptysis, sputum production, shortness of breath, wheezing, sleep disturbances due to breathing, snores loudly, respiratory pain, dyspnea on exertion, cough disturbing sleep and postural dyspnea.    Cardiovascular:  Negative for chest pain, dyspnea on exertion, palpitations, orthopnea, claudication, leg swelling, fingers/toes turn blue, hypertension, hypotension, syncope, history of heart murmur, chest pain on exertion, chest pain at rest, pacemaker, few scattered varicosities, leg pain, sleep disturbances due to breathing, tachycardia, light-headedness, exercise intolerance and edema.   Gastrointestinal:  Negative for heartburn, nausea, vomiting, abdominal pain, diarrhea, constipation, blood in stool, melena, rectal pain, bloating, hemorrhoids, bowel incontinence, jaundice, rectal bleeding, coffee ground emesis and change in stool.   Genitourinary:  Negative for bladder incontinence, dysuria, urgency, hematuria, flank pain, vaginal discharge, difficulty urinating, genital sores, dyspareunia, decreased libido, nocturia, voiding less frequently, arousal difficulty, abnormal vaginal bleeding, excessive menstruation, menstrual changes, hot flashes, vaginal dryness and postmenopausal bleeding.   Musculoskeletal:  Negative for myalgias, back pain, joint swelling, arthralgias, stiffness, muscle cramps, neck pain, bone pain, muscle weakness and fracture.   Skin:  Negative for nail changes, itching, poor wound healing, rash, hair changes, skin changes, acne, warts, poor wound healing, scarring, flaky skin, Raynaud's phenomenon,  sensitivity to sunlight and skin thickening.   Neurological:  Negative for dizziness, tingling, tremors, speech change, seizures, loss of consciousness, weakness, light-headedness, numbness, headaches, disturbances in coordination, extremity numbness, memory loss, difficulty walking and paralysis.   Endo/Heme:  Negative for anemia, swollen glands and bruises/bleeds easily.   Psychiatric/Behavioral:  Negative for depression, hallucinations, memory loss, decreased concentration, mood swings and panic attacks.    Breast:  Negative for breast discharge, breast mass, breast pain and nipple retraction.   Endocrine:  Negative for altered temperature regulation, polyphagia, polydipsia, unwanted hair growth and change in facial hair.          Past Medical History:    Past Medical History:   Diagnosis Date     Cancer (H) August 2019    Uterus removed     Depression      Depressive disorder 1999    taking prosac     Hypothyroidism      Overweight          Past Surgical History:    Past Surgical History:   Procedure Laterality Date     ABDOMEN SURGERY  Oct 2019    Robotic Hysterectomy     BIOPSY  early 2000's    left breast - non event     COLONOSCOPY  within last 6 years ?    OK     DAVINCI HYSTERECTOMY TOTAL, BILATERAL SALPINGO-OOPHORECTOMY, NODE DISSECTION, COMBINED N/A 10/4/2019    Procedure: Robot-assisted total laparoscopic hysterectomy, Bilateral salpingectomy and Right Oophorectomy, Lysis of adhesion, Cervical Indocyanine Green injection, Bilateral sentinel lymph node dissection, Repair of vaginal laceration.;  Surgeon: Perez Reddy MD;  Location: [facility] OR     DILATION AND CURETTAGE N/A 9/12/2019    Procedure: DILATION AND CURETTAGE, UTERUS;  Surgeon: Navdeep Barajas MD;  Location:  OR     OPERATIVE HYSTEROSCOPY WITH MORCELLATOR N/A 9/12/2019    Procedure: HYSTEROSCOPY WITH MORCELLATOR (MYOSURE);  Surgeon: Navdeep Barajas MD;  Location: [facility] OR     SALPINGO OOPHORECTOMY,R/L/SHANDA Left 1980s    Left ovarian  with endometriois     US BREAST BIOPSY LT Left          Health Maintenance Due   Topic Date Due     ZOSTER IMMUNIZATION (2 of 3) 10/13/2014     INFLUENZA VACCINE (1) 2020       Current Medications:     Current Outpatient Medications   Medication Sig Dispense Refill     Apple Cider Vinegar 600 MG CAPS Take 1 capsule by mouth daily        CALCIUM PO Take 20 mg by mouth daily        cholecalciferol (VITAMIN D3) 5000 units TABS tablet Take by mouth daily       clobetasol (TEMOVATE) 0.05 % external ointment Apply sparingly then once or twice a week as needed 45 g 0     Cyanocobalamin 1500 MCG TBDP Take 1 tablet by mouth daily        FLUoxetine (PROZAC) 20 MG capsule Take 1 capsule (20 mg) by mouth daily 90 capsule 3     Lactobacillus (ACIDOPHILUS PO) Take 1 tablet by mouth daily        levothyroxine (SYNTHROID/LEVOTHROID) 100 MCG tablet Take 1 tablet (100 mcg) by mouth every morning 90 tablet 1     Omega 3-6-9 Fatty Acids (OMEGA-3-6-9 PO) Take 1 capsule by mouth daily        Zinc Picolinate 25 MG TABS Take 1 tablet by mouth daily            Allergies:      No Known Allergies     Social History:     Social History     Tobacco Use     Smoking status: Former Smoker     Packs/day: 1.00     Years: 15.00     Pack years: 15.00     Types: Cigarettes     Start date: 1972     Quit date: 1987     Years since quittin.9     Smokeless tobacco: Never Used   Substance Use Topics     Alcohol use: Yes     Frequency: 2-3 times a week     Comment: a beer here and there - never more than 2 -  2 times/week       History   Drug Use Unknown         Family History:     The patient's family history is notable for:    Family History   Problem Relation Age of Onset     Acute myelogenous leukemia Mother      Coronary Artery Disease Mother         bypass surgery      Osteoporosis Mother      Obesity Mother      Parkinsonism Father      Prostate Cancer Father         Diagnosed late in life in his 80's     Obesity Sister       "Heart Disease Sister      Coronary Artery Disease Sister         heart attacK in June of 2019     Uterine Cancer Maternal Grandmother 40        Uterine versus cervical     No Known Problems Sister      Leukemia Brother      Leukemia Nephew      Coronary Artery Disease Brother         Carotid Artery Surgery     Obesity Brother      Substance Abuse Brother         alcohol         Physical Exam:     /71 (BP Location: Right arm, Patient Position: Sitting, Cuff Size: Adult Large)   Pulse 75   Temp 98.2  F (36.8  C) (Oral)   Resp 16   Ht 1.715 m (5' 7.52\")   Wt 124 kg (273 lb 4.8 oz)   LMP  (LMP Unknown)   SpO2 98%   BMI 42.15 kg/m    Body mass index is 42.15 kg/m .    General Appearance: healthy and alert, no distress     HEENT: no thyromegaly, no palpable nodules or masses        Cardiovascular: regular rate and rhythm, no gallops, rubs or murmurs     Respiratory: lungs clear, no rales, rhonchi or wheezes, normal diaphragmatic excursion    Musculoskeletal: extremities non tender and without edema    Skin: no lesions or rashes     Neurological: normal gait, no gross defects     Psychiatric: appropriate mood and affect                               Hematological: normal cervical, supraclavicular and inguinal lymph nodes     Gastrointestinal:       abdomen soft, non-tender, non-distended, no organomegaly or masses    Genitourinary: External genitalia and urethral meatus appears normal.  Vagina is smooth without nodularity or masses.  Cervix surgically absent. Bimanual exam reveal no masses, nodularity or fullness.  Recto-vaginal exam confirms these findings.      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  She completed brachytherapy 12/2019.  She is here today for a surveillance visit.    20 minutes were spent with this patient, over 50% of that time was spent in symptom management, treatment planning and in " counseling and coordination of care.      Plan:     1.)        Patient to RTC in 3 months for her next surveillance visit; will plan on a virtual visit. Reviewed recommendations from [facility] not to perform surveillance pap smears in women diagnosed with endometrial cancer as this does not improve detection of local recurrence. Reviewed signs and symptoms for when she should contact the clinic or seek additional care. Patient to contact the clinic with any questions or concerns in the interim. Discussed using her dilator twice a month x 5 min. Answered all of her questions to the best of my ability.      2.) Genetic risk factors were assessed and she had loss of MLH1 and PMS2 MMR proteins. Her MLH promoter methylation was positive (typically associated with sporadic microsatellite unstable tumors of the colon/rectum or endometrium).    3.) Labs and/or tests ordered include:  None.      4.) Health maintenance issues addressed today include annual health maintenance and non-gynecologic issues with PCP.    ROSEMARY Lance, WHNP-BC, ANP-BC  Women's Health Nurse Practitioner  Adult Nurse Pracitioner  Division of Gynecologic Oncology          CC  Patient Care Team:  Angeli Nolan MD as PCP - General (Family Practice)  Angeli Nolan MD as Assigned PCP  O'SHEA, ROLDAN

## 2020-10-12 ENCOUNTER — ONCOLOGY VISIT (OUTPATIENT)
Dept: ONCOLOGY | Facility: CLINIC | Age: 65
End: 2020-10-12
Attending: NURSE PRACTITIONER
Payer: COMMERCIAL

## 2020-10-12 VITALS
OXYGEN SATURATION: 98 % | BODY MASS INDEX: 41.42 KG/M2 | RESPIRATION RATE: 16 BRPM | SYSTOLIC BLOOD PRESSURE: 112 MMHG | HEART RATE: 75 BPM | HEIGHT: 68 IN | TEMPERATURE: 98.2 F | DIASTOLIC BLOOD PRESSURE: 71 MMHG | WEIGHT: 273.3 LBS

## 2020-10-12 DIAGNOSIS — C55 ENDOMETRIOID ADENOCARCINOMA OF UTERUS (H): Primary | ICD-10-CM

## 2020-10-12 PROCEDURE — G0463 HOSPITAL OUTPT CLINIC VISIT: HCPCS

## 2020-10-12 PROCEDURE — 99213 OFFICE O/P EST LOW 20 MIN: CPT | Performed by: NURSE PRACTITIONER

## 2020-10-12 ASSESSMENT — ENCOUNTER SYMPTOMS
SYNCOPE: 0
RESPIRATORY PAIN: 0
WEIGHT LOSS: 0
HEADACHES: 0
EYE REDNESS: 0
DECREASED APPETITE: 0
INSOMNIA: 0
NECK PAIN: 0
BREAST PAIN: 0
POOR WOUND HEALING: 0
JAUNDICE: 0
SKIN CHANGES: 0
DIZZINESS: 0
TASTE DISTURBANCE: 0
COUGH DISTURBING SLEEP: 0
FLANK PAIN: 0
COUGH: 0
SINUS PAIN: 0
HALLUCINATIONS: 0
HYPERTENSION: 0
NAIL CHANGES: 0
HEMOPTYSIS: 0
RECTAL PAIN: 0
VOMITING: 0
DIARRHEA: 0
FATIGUE: 0
MEMORY LOSS: 0
NUMBNESS: 0
POLYPHAGIA: 0
ARTHRALGIAS: 0
BLOATING: 0
NIGHT SWEATS: 0
DIFFICULTY URINATING: 0
SWOLLEN GLANDS: 0
HOARSE VOICE: 0
WEIGHT GAIN: 0
NAUSEA: 0
TACHYCARDIA: 0
MUSCLE CRAMPS: 0
LOSS OF CONSCIOUSNESS: 0
LEG SWELLING: 0
DECREASED LIBIDO: 0
CHILLS: 0
SNORES LOUDLY: 0
MYALGIAS: 0
BLOOD IN STOOL: 0
DYSPNEA ON EXERTION: 0
EXTREMITY NUMBNESS: 0
ORTHOPNEA: 0
WEAKNESS: 0
STIFFNESS: 0
EYE IRRITATION: 0
SMELL DISTURBANCE: 0
BACK PAIN: 0
POSTURAL DYSPNEA: 0
NECK MASS: 0
DISTURBANCES IN COORDINATION: 0
DYSURIA: 0
BRUISES/BLEEDS EASILY: 0
SINUS CONGESTION: 0
SEIZURES: 0
SHORTNESS OF BREATH: 0
TINGLING: 0
EYE PAIN: 0
NERVOUS/ANXIOUS: 0
LIGHT-HEADEDNESS: 0
HEARTBURN: 0
PARALYSIS: 0
BREAST MASS: 0
LEG PAIN: 0
INCREASED ENERGY: 0
POLYDIPSIA: 0
CONSTIPATION: 0
RECTAL BLEEDING: 0
CLAUDICATION: 0
HOT FLASHES: 0
FEVER: 0
BOWEL INCONTINENCE: 0
HYPOTENSION: 0
WHEEZING: 0
SORE THROAT: 0
TREMORS: 0
ALTERED TEMPERATURE REGULATION: 0
HEMATURIA: 0
SPUTUM PRODUCTION: 0
EXERCISE INTOLERANCE: 0
TROUBLE SWALLOWING: 0
EYE WATERING: 0
PALPITATIONS: 0
ABDOMINAL PAIN: 0
DECREASED CONCENTRATION: 0
DEPRESSION: 0
PANIC: 0
SPEECH CHANGE: 0
SLEEP DISTURBANCES DUE TO BREATHING: 0
MUSCLE WEAKNESS: 0
JOINT SWELLING: 0
DOUBLE VISION: 0

## 2020-10-12 ASSESSMENT — PAIN SCALES - GENERAL: PAINLEVEL: MILD PAIN (2)

## 2020-10-12 ASSESSMENT — MIFFLIN-ST. JEOR: SCORE: 1820.56

## 2020-10-12 NOTE — LETTER
10/12/2020         RE: Hanh Villalba   [address]  [place] [address]        Dear Colleague,    Thank you for referring your patient, Hanh Villalba, to the [facility]. Please see a copy of my visit note below.                Follow Up Notes on Referred Patient    Date: 10/12/2020       Dr. Perez Reddy MD  [address] [place] [id number]       RE: Hanh Villalba  : 1955  RUPERT: 10/12/2020    Dear Dr. 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  She completed brachytherapy 2019.  She is here today for a surveillance visit.     Oncology history:   2019 D&C with pathology showing Grade 1 endometrial cancer, loss of MLH1 and PMS2, hypermethylation of MLH1 promotor positive  10/4/2019: Robotic-assisted Total laparoscopic hysterectomy, bilateral salpingo-oophorectomy, sentinel lymph node dissection, vaginal laceration repair: Stage 1B FIGO Grade 2, DOI 17/21mm (81%), LVSI+, cytology negative, tumor 3.4cm; MSI-H (MLH1 promoter melthylation)  2019: Vaginal brachytherapy; 30Gy in 5 fx         Today she comes to clinic and states she had a pelvic exam with her PCP in July at her annual exam. She is no longer using a dilator and is not sexually active. Her mammogram is due and her colonoscopy is due next year. She denies any vaginal bleeding, no changes in her bowel or bladder habits, no nausea/emesis, no lower extremity edema, and no difficulties eating or sleeping. She denies any abdominal discomfort/bloating, no fevers or chills, and no chest pain or shortness of breath. She was being seen by PT for incontinence and has been discharged and is dong her home exercises.           Review of Systems     Constitutional:  Negative for fever, chills, weight loss, weight gain, fatigue, decreased appetite, night sweats, recent stressors, height gain, height  loss, post-operative complications, incisional pain, hallucinations, increased energy, hyperactivity and confused.   HENT:  Negative for ear pain, hearing loss, tinnitus, nosebleeds, trouble swallowing, hoarse voice, mouth sores, sore throat, ear discharge, tooth pain, gum tenderness, taste disturbance, smell disturbance, hearing aid, bleeding gums, dry mouth, sinus pain, sinus congestion and neck mass.    Eyes:  Negative for double vision, pain, redness, eye pain, decreased vision, eye watering, eye bulging, eye dryness, flashing lights, spots, floaters, strabismus, tunnel vision, jaundice and eye irritation.   Respiratory:   Negative for cough, hemoptysis, sputum production, shortness of breath, wheezing, sleep disturbances due to breathing, snores loudly, respiratory pain, dyspnea on exertion, cough disturbing sleep and postural dyspnea.    Cardiovascular:  Negative for chest pain, dyspnea on exertion, palpitations, orthopnea, claudication, leg swelling, fingers/toes turn blue, hypertension, hypotension, syncope, history of heart murmur, chest pain on exertion, chest pain at rest, pacemaker, few scattered varicosities, leg pain, sleep disturbances due to breathing, tachycardia, light-headedness, exercise intolerance and edema.   Gastrointestinal:  Negative for heartburn, nausea, vomiting, abdominal pain, diarrhea, constipation, blood in stool, melena, rectal pain, bloating, hemorrhoids, bowel incontinence, jaundice, rectal bleeding, coffee ground emesis and change in stool.   Genitourinary:  Negative for bladder incontinence, dysuria, urgency, hematuria, flank pain, vaginal discharge, difficulty urinating, genital sores, dyspareunia, decreased libido, nocturia, voiding less frequently, arousal difficulty, abnormal vaginal bleeding, excessive menstruation, menstrual changes, hot flashes, vaginal dryness and postmenopausal bleeding.   Musculoskeletal:  Negative for myalgias, back pain, joint swelling, arthralgias,  stiffness, muscle cramps, neck pain, bone pain, muscle weakness and fracture.   Skin:  Negative for nail changes, itching, poor wound healing, rash, hair changes, skin changes, acne, warts, poor wound healing, scarring, flaky skin, Raynaud's phenomenon, sensitivity to sunlight and skin thickening.   Neurological:  Negative for dizziness, tingling, tremors, speech change, seizures, loss of consciousness, weakness, light-headedness, numbness, headaches, disturbances in coordination, extremity numbness, memory loss, difficulty walking and paralysis.   Endo/Heme:  Negative for anemia, swollen glands and bruises/bleeds easily.   Psychiatric/Behavioral:  Negative for depression, hallucinations, memory loss, decreased concentration, mood swings and panic attacks.    Breast:  Negative for breast discharge, breast mass, breast pain and nipple retraction.   Endocrine:  Negative for altered temperature regulation, polyphagia, polydipsia, unwanted hair growth and change in facial hair.          Past Medical History:    Past Medical History:   Diagnosis Date     Cancer (H) August 2019    Uterus removed     Depression      Depressive disorder 1999    taking prosac     Hypothyroidism      Overweight          Past Surgical History:    Past Surgical History:   Procedure Laterality Date     ABDOMEN SURGERY  Oct 2019    Robotic Hysterectomy     BIOPSY  early 2000's    left breast - non event     COLONOSCOPY  within last 6 years ?    OK     DAVINCI HYSTERECTOMY TOTAL, BILATERAL SALPINGO-OOPHORECTOMY, NODE DISSECTION, COMBINED N/A 10/4/2019    Procedure: Robot-assisted total laparoscopic hysterectomy, Bilateral salpingectomy and Right Oophorectomy, Lysis of adhesion, Cervical Indocyanine Green injection, Bilateral sentinel lymph node dissection, Repair of vaginal laceration.;  Surgeon: Perez Reddy MD;  Location: [facility]     DILATION AND CURETTAGE N/A 9/12/2019    Procedure: DILATION AND CURETTAGE, UTERUS;  Surgeon: Navdeep Barajas  MD Moncho;  Location: [facility] OR     OPERATIVE HYSTEROSCOPY WITH MORCELLATOR N/A 2019    Procedure: HYSTEROSCOPY WITH MORCELLATOR (MYOSURE);  Surgeon: Navdeep Barajas MD;  Location: [facility] OR     SALPINGO OOPHORECTOMY,R/L/SHANDA Left 1980s    Left ovarian with endometriois     US BREAST BIOPSY LT Left          Health Maintenance Due   Topic Date Due     ZOSTER IMMUNIZATION (2 of 3) 10/13/2014     INFLUENZA VACCINE (1) 2020       Current Medications:     Current Outpatient Medications   Medication Sig Dispense Refill     Apple Cider Vinegar 600 MG CAPS Take 1 capsule by mouth daily        CALCIUM PO Take 20 mg by mouth daily        cholecalciferol (VITAMIN D3) 5000 units TABS tablet Take by mouth daily       clobetasol (TEMOVATE) 0.05 % external ointment Apply sparingly then once or twice a week as needed 45 g 0     Cyanocobalamin 1500 MCG TBDP Take 1 tablet by mouth daily        FLUoxetine (PROZAC) 20 MG capsule Take 1 capsule (20 mg) by mouth daily 90 capsule 3     Lactobacillus (ACIDOPHILUS PO) Take 1 tablet by mouth daily        levothyroxine (SYNTHROID/LEVOTHROID) 100 MCG tablet Take 1 tablet (100 mcg) by mouth every morning 90 tablet 1     Omega 3-6-9 Fatty Acids (OMEGA-3-6-9 PO) Take 1 capsule by mouth daily        Zinc Picolinate 25 MG TABS Take 1 tablet by mouth daily            Allergies:      No Known Allergies     Social History:     Social History     Tobacco Use     Smoking status: Former Smoker     Packs/day: 1.00     Years: 15.00     Pack years: 15.00     Types: Cigarettes     Start date: 1972     Quit date: 1987     Years since quittin.9     Smokeless tobacco: Never Used   Substance Use Topics     Alcohol use: Yes     Frequency: 2-3 times a week     Comment: a beer here and there - never more than 2 -  2 times/week       History   Drug Use Unknown         Family History:     The patient's family history is notable for:    Family History   Problem Relation Age of Onset      "Acute myelogenous leukemia Mother      Coronary Artery Disease Mother         bypass surgery 1989     Osteoporosis Mother      Obesity Mother      Parkinsonism Father      Prostate Cancer Father         Diagnosed late in life in his 80's     Obesity Sister      Heart Disease Sister      Coronary Artery Disease Sister         heart attacK in June of 2019     Uterine Cancer Maternal Grandmother 40        Uterine versus cervical     No Known Problems Sister      Leukemia Brother      Leukemia Nephew      Coronary Artery Disease Brother         Carotid Artery Surgery     Obesity Brother      Substance Abuse Brother         alcohol         Physical Exam:     /71 (BP Location: Right arm, Patient Position: Sitting, Cuff Size: Adult Large)   Pulse 75   Temp 98.2  F (36.8  C) (Oral)   Resp 16   Ht 1.715 m (5' 7.52\")   Wt 124 kg (273 lb 4.8 oz)   LMP  (LMP Unknown)   SpO2 98%   BMI 42.15 kg/m    Body mass index is 42.15 kg/m .    General Appearance: healthy and alert, no distress     HEENT: no thyromegaly, no palpable nodules or masses        Cardiovascular: regular rate and rhythm, no gallops, rubs or murmurs     Respiratory: lungs clear, no rales, rhonchi or wheezes, normal diaphragmatic excursion    Musculoskeletal: extremities non tender and without edema    Skin: no lesions or rashes     Neurological: normal gait, no gross defects     Psychiatric: appropriate mood and affect                               Hematological: normal cervical, supraclavicular and inguinal lymph nodes     Gastrointestinal:       abdomen soft, non-tender, non-distended, no organomegaly or masses    Genitourinary: External genitalia and urethral meatus appears normal.  Vagina is smooth without nodularity or masses.  Cervix surgically absent. Bimanual exam reveal no masses, nodularity or fullness.  Recto-vaginal exam confirms these findings.      Assessment:    Hanh Villalba is a 64 year old woman with a diagnosis of stage IB " grade 2 endometrial endometrioid adenocarcinoma.  She completed brachytherapy 12/2019.  She is here today for a surveillance visit.    20 minutes were spent with this patient, over 50% of that time was spent in symptom management, treatment planning and in counseling and coordination of care.      Plan:     1.)        Patient to RTC in 3 months for her next surveillance visit; will plan on a virtual visit. Reviewed recommendations from [facility] not to perform surveillance pap smears in women diagnosed with endometrial cancer as this does not improve detection of local recurrence. Reviewed signs and symptoms for when she should contact the clinic or seek additional care. Patient to contact the clinic with any questions or concerns in the interim. Discussed using her dilator twice a month x 5 min. Answered all of her questions to the best of my ability.      2.) Genetic risk factors were assessed and she had loss of MLH1 and PMS2 MMR proteins. Her MLH promoter methylation was positive (typically associated with sporadic microsatellite unstable tumors of the colon/rectum or endometrium).    3.) Labs and/or tests ordered include:  None.      4.) Health maintenance issues addressed today include annual health maintenance and non-gynecologic issues with PCP.    ROSEMARY Lance, WHNP-BC, ANP-BC  Women's Health Nurse Practitioner  Adult Nurse Pracitioner  Division of Gynecologic Oncology          CC  Patient Care Team:  Angeli Nolan MD as PCP - General (Family Practice)  Angeli Nolan MD as Assigned PCP  ROLDAN BROWN

## 2020-10-15 ENCOUNTER — DOCUMENTATION ONLY (OUTPATIENT)
Dept: OTHER | Facility: CLINIC | Age: 65
End: 2020-10-15

## 2021-01-14 ENCOUNTER — HEALTH MAINTENANCE LETTER (OUTPATIENT)
Age: 66
End: 2021-01-14

## 2021-01-14 DIAGNOSIS — E03.4 HYPOTHYROIDISM DUE TO ACQUIRED ATROPHY OF THYROID: ICD-10-CM

## 2021-01-20 RX ORDER — LEVOTHYROXINE SODIUM 100 UG/1
100 TABLET ORAL EVERY MORNING
Qty: 90 TABLET | Refills: 0 | Status: SHIPPED | OUTPATIENT
Start: 2021-01-20 | End: 2021-04-30

## 2021-01-20 NOTE — TELEPHONE ENCOUNTER
Prescription approved per [facility]ill [facility].    Sharonda Carson, RN, BSN, PHN  [facility]

## 2021-02-16 ENCOUNTER — VIRTUAL VISIT (OUTPATIENT)
Dept: ONCOLOGY | Facility: CLINIC | Age: 66
End: 2021-02-16
Attending: NURSE PRACTITIONER
Payer: COMMERCIAL

## 2021-02-16 DIAGNOSIS — C55 ENDOMETRIOID ADENOCARCINOMA OF UTERUS (H): Primary | ICD-10-CM

## 2021-02-16 PROCEDURE — 99213 OFFICE O/P EST LOW 20 MIN: CPT | Mod: 95 | Performed by: NURSE PRACTITIONER

## 2021-02-16 NOTE — LETTER
"    2021         RE: Hanh Villalba   [address]   [place] [address]        Dear Colleague,    Thank you for referring your patient, Hanh Villalba, to the [facility]. Please see a copy of my visit note below.    Bry is a 65 year old who is being evaluated via a billable video visit.      How would you like to obtain your AVS? MyChart  If the video visit is dropped, the invitation should be resent by: Send to e-mail at: [email or web address]  Will anyone else be joining your video visit? No       I have reviewed and updated the patient's allergies and medication list.    Concerns: none  Refills: none     Vitals - Patient Reported  Weight (Patient Reported): 117.9 kg (260 lb)  Height (Patient Reported): 172.7 cm (5' 8\")  BMI (Based on Pt Reported Ht/Wt): 39.53  Pain Score: No Pain (1)  Pain Loc: Low Back    Lily Saldivar CMA          Video-Visit Details    Type of service:  Video Visit    Video start time: 858    Video End Time:915    Originating Location (pt. 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  She completed brachytherapy 2019.  She is here today for a surveillance visit. In light of the recent COVID19 pandemic, and in accordance with our group and national guidelines this patients care has been reviewed and it is felt that presenting for her visit would be more likely to increase rather than decrease her relative risks.  Therefore this visit was conducted by video.     Oncology history:   2019 D&C with pathology showing Grade 1 endometrial cancer, loss of MLH1 and PMS2, hypermethylation of MLH1 " promotor positive  10/4/2019: Robotic-assisted Total laparoscopic hysterectomy, bilateral salpingo-oophorectomy, sentinel lymph node dissection, vaginal laceration repair: Stage 1B FIGO Grade 2, DOI 17/21mm (81%), LVSI+, cytology negative, tumor 3.4cm; MSI-H (MLH1 promoter melthylation)  12/2019: Vaginal brachytherapy; 30Gy in 5 fx         Today she reports she is doing well. She denies any vaginal bleeding, no changes in her bowel or bladder (she will have some incontinence at times and wears a pad periodically) habits, no nausea/emesis, no lower extremity edema, and no difficulties eating or sleeping. She denies any abdominal discomfort/bloating, no fevers or chills, and no chest pain or shortness of breath. She is using her dilator about 3 times per month for 1-2 minutes while in the shower. Her annual exam will be in July, her colonoscopy is due in June, and her mammogram in August. She is using the clobetasol about every 8-10 days. She did have some hot flashes when she went into menopause but no night sweats; she denies any symptoms now. She and her partner, Abigail, have not yet signed up for the Covid vaccine.         Review of Systems:    Systemic           no weight changes; no fever; no chills; no night sweats; no appetite changes  Skin           no rashes, or lesions  Eye           no irritation; no changes in vision  Shila-Laryngeal           no dysphagia; no hoarseness   Pulmonary    no cough; no shortness of breath  Cardiovascular    no chest pain; no palpitations  Gastrointestinal    no diarrhea; no constipation; no abdominal pain; no changes in bowel habits; no blood in stool  Genitourinary   no urinary frequency; no urinary urgency; no dysuria; no pain; no abnormal vaginal discharge; no abnormal vaginal bleeding  Breast    no breast discharge; no breast changes; no breast pain  Musculoskeletal    no myalgias; no arthralgias; no back pain  Psychiatric           + depressed mood; no anxiety     Hematologic              no tender lymph nodes; no noticeable swellings or lumps   Endocrine    no hot flashes; no heat/cold intolerance; + thyroid        Neurological   no tremor; no numbness and tingling; no headaches; no difficulty sleeping      Past Medical History:    Past Medical History:   Diagnosis Date     Cancer (H) August 2019    Uterus removed     Depression      Depressive disorder 1999    taking prosac     Hypothyroidism      Overweight          Past Surgical History:    Past Surgical History:   Procedure Laterality Date     ABDOMEN SURGERY  Oct 2019    Robotic Hysterectomy     BIOPSY  early 2000's    left breast - non event     COLONOSCOPY  within last 6 years ?    OK     DAVINCI HYSTERECTOMY TOTAL, BILATERAL SALPINGO-OOPHORECTOMY, NODE DISSECTION, COMBINED N/A 10/4/2019    Procedure: Robot-assisted total laparoscopic hysterectomy, Bilateral salpingectomy and Right Oophorectomy, Lysis of adhesion, Cervical Indocyanine Green injection, Bilateral sentinel lymph node dissection, Repair of vaginal laceration.;  Surgeon: Perez Reddy MD;  Location: [facility] OR     DILATION AND CURETTAGE N/A 9/12/2019    Procedure: DILATION AND CURETTAGE, UTERUS;  Surgeon: Navdeep Barajas MD;  Location: [facility] OR     OPERATIVE HYSTEROSCOPY WITH MORCELLATOR N/A 9/12/2019    Procedure: HYSTEROSCOPY WITH MORCELLATOR (MYOSURE);  Surgeon: Navdeep Barajas MD;  Location: [facility] OR     SALPINGO OOPHORECTOMY,R/L/SHANDA Left 1980s    Left ovarian with endometriois     US BREAST BIOPSY LT Left          Health Maintenance Due   Topic Date Due     ZOSTER IMMUNIZATION (2 of 3) 10/13/2014     INFLUENZA VACCINE (1) 09/01/2020     PHQ-9  01/17/2021       Current Medications:     Current Outpatient Medications   Medication Sig Dispense Refill     Apple Cider Vinegar 600 MG CAPS Take 1 capsule by mouth daily        CALCIUM PO Take 20 mg by mouth daily        cholecalciferol (VITAMIN D3) 5000 units TABS tablet Take by mouth daily        clobetasol (TEMOVATE) 0.05 % external ointment Apply sparingly then once or twice a week as needed 45 g 0     Cyanocobalamin 1500 MCG TBDP Take 1 tablet by mouth daily        FLUoxetine (PROZAC) 20 MG capsule Take 1 capsule (20 mg) by mouth daily 90 capsule 3     Lactobacillus (ACIDOPHILUS PO) Take 1 tablet by mouth daily        levothyroxine (SYNTHROID/LEVOTHROID) 100 MCG tablet Take 1 tablet (100 mcg) by mouth every morning 90 tablet 0     Omega 3-6-9 Fatty Acids (OMEGA-3-6-9 PO) Take 1 capsule by mouth daily        Zinc Picolinate 25 MG TABS Take 1 tablet by mouth daily            Allergies:      No Known Allergies     Social History:     Social History     Tobacco Use     Smoking status: Former Smoker     Packs/day: 1.00     Years: 15.00     Pack years: 15.00     Types: Cigarettes     Start date: 1972     Quit date: 1987     Years since quittin.2     Smokeless tobacco: Never Used   Substance Use Topics     Alcohol use: Yes     Frequency: 2-3 times a week     Comment: a beer here and there - never more than 2 -  2 times/week       History   Drug Use Unknown         Family History:     The patient's family history is notable for:    Family History   Problem Relation Age of Onset     Acute myelogenous leukemia Mother      Coronary Artery Disease Mother         bypass surgery      Osteoporosis Mother      Obesity Mother      Parkinsonism Father      Prostate Cancer Father         Diagnosed late in life in his 80's     Obesity Sister      Heart Disease Sister      Coronary Artery Disease Sister         heart attacK in 2019     Uterine Cancer Maternal Grandmother 40        Uterine versus cervical     No Known Problems Sister      Leukemia Brother      Leukemia Nephew      Coronary Artery Disease Brother         Carotid Artery Surgery     Obesity Brother      Substance Abuse Brother         alcohol         Physical Exam:     LMP  (LMP Unknown)   There is no height or weight on file to  calculate BMI.    General Appearance: healthy and alert, no distress    Eyes:  Eyes grossly normal to inspection.  No discharge or erythema, or obvious scleral/conjunctival abnormalities.    Respiratory: No audible wheeze, cough, or visible cyanosis.  No visible retractions or increased work of breathing.     Musculoskeletal: extremities non tender and without edema    Skin: no lesions or rashes on visible skin    Neurological: normal gait, no gross defects     Psychiatric: appropriate mood and affect. Mentation appears normal, affect normal/bright, judgement and insight intact, normal speech and appearance well-groomed                            The rest of a comprehensive physical examination is deferred due to PHE (public health emergency) video visit restrictions.        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  She completed brachytherapy 12/2019.  She is here today for a surveillance visit. In light of the recent COVID19 pandemic, and in accordance with our group and national guidelines this patients care has been reviewed and it is felt that presenting for her visit would be more likely to increase rather than decrease her relative risks.  Therefore this visit was conducted by video.     24 minutes spent on the date of the encounter doing chart review, history and exam, documentation and further activities as noted above      Plan:     1.)        Patient to RTC in 3 months for her next surveillance visit.  Reviewed recommendations from [facility] not to perform surveillance pap smears in women diagnosed with endometrial cancer as this does not improve detection of local recurrence. Reviewed signs and symptoms for when she should contact the clinic or seek additional care. Patient to contact the clinic with any questions or concerns in the interim.  Answered all of her questions to the best of my ability. Continue to use her  dilator. Discussed vulvar hygiene.      2.) Genetic risk factors were assessed and she had a loss of MLH1 and PMS2; hypermethylation of MLH1 promotor positive. MLH1 promoter methylation is typically associated with sporadic microsatellite unstable tumors of the colon/rectum or endometrium.    3.) Labs and/or tests ordered include: none.      4.) Health maintenance issues addressed today include annual health maintenance and non-gynecologic issues with PCP.    ROSEMARY Lance, WHNP-BC, ANP-BC  Women's Health Nurse Practitioner  Adult Nurse Practitioner  Division of Gynecologic Oncology          CC  Patient Care Team:  Angeli Nolan MD as PCP - General (Family Practice)  Perez Reddy MD as Assigned Cancer Care Provider

## 2021-02-16 NOTE — PROGRESS NOTES
"Bry is a 65 year old who is being evaluated via a billable video visit.      How would you like to obtain your AVS? MyChart  If the video visit is dropped, the invitation should be resent by: Send to e-mail at: [email or web address]  Will anyone else be joining your video visit? No       I have reviewed and updated the patient's allergies and medication list.    Concerns: none  Refills: none     Vitals - Patient Reported  Weight (Patient Reported): 117.9 kg (260 lb)  Height (Patient Reported): 172.7 cm (5' 8\")  BMI (Based on Pt Reported Ht/Wt): 39.53  Pain Score: No Pain (1)  Pain Loc: Low Back    Lily Saldivar CMA          Video-Visit Details    Type of service:  Video Visit    Video start time: 858    Video End Time:915    Originating Location (pt. 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  She completed brachytherapy 2019.  She is here today for a surveillance visit. In light of the recent COVID19 pandemic, and in accordance with our group and national guidelines this patients care has been reviewed and it is felt that presenting for her visit would be more likely to increase rather than decrease her relative risks.  Therefore this visit was conducted by video.     Oncology history:   2019 D&C with pathology showing Grade 1 endometrial cancer, loss of MLH1 and PMS2, hypermethylation of MLH1 promotor positive  10/4/2019: Robotic-assisted Total laparoscopic hysterectomy, bilateral salpingo-oophorectomy, sentinel lymph node dissection, vaginal laceration repair: Stage 1B FIGO Grade 2, DOI 17/21mm (81%), LVSI+, cytology negative, tumor 3.4cm; MSI-H " (MLH1 promoter melthylation)  12/2019: Vaginal brachytherapy; 30Gy in 5 fx         Today she reports she is doing well. She denies any vaginal bleeding, no changes in her bowel or bladder (she will have some incontinence at times and wears a pad periodically) habits, no nausea/emesis, no lower extremity edema, and no difficulties eating or sleeping. She denies any abdominal discomfort/bloating, no fevers or chills, and no chest pain or shortness of breath. She is using her dilator about 3 times per month for 1-2 minutes while in the shower. Her annual exam will be in July, her colonoscopy is due in June, and her mammogram in August. She is using the clobetasol about every 8-10 days. She did have some hot flashes when she went into menopause but no night sweats; she denies any symptoms now. She and her partner, Abigail, have not yet signed up for the Covid vaccine.         Review of Systems:    Systemic           no weight changes; no fever; no chills; no night sweats; no appetite changes  Skin           no rashes, or lesions  Eye           no irritation; no changes in vision  Shila-Laryngeal           no dysphagia; no hoarseness   Pulmonary    no cough; no shortness of breath  Cardiovascular    no chest pain; no palpitations  Gastrointestinal    no diarrhea; no constipation; no abdominal pain; no changes in bowel habits; no blood in stool  Genitourinary   no urinary frequency; no urinary urgency; no dysuria; no pain; no abnormal vaginal discharge; no abnormal vaginal bleeding  Breast    no breast discharge; no breast changes; no breast pain  Musculoskeletal    no myalgias; no arthralgias; no back pain  Psychiatric           + depressed mood; no anxiety    Hematologic              no tender lymph nodes; no noticeable swellings or lumps   Endocrine    no hot flashes; no heat/cold intolerance; + thyroid        Neurological   no tremor; no numbness and tingling; no headaches; no difficulty sleeping      Past Medical  History:    Past Medical History:   Diagnosis Date     Cancer (H) August 2019    Uterus removed     Depression      Depressive disorder 1999    taking prosac     Hypothyroidism      Overweight          Past Surgical History:    Past Surgical History:   Procedure Laterality Date     ABDOMEN SURGERY  Oct 2019    Robotic Hysterectomy     BIOPSY  early 2000's    left breast - non event     COLONOSCOPY  within last 6 years ?    OK     DAVINCI HYSTERECTOMY TOTAL, BILATERAL SALPINGO-OOPHORECTOMY, NODE DISSECTION, COMBINED N/A 10/4/2019    Procedure: Robot-assisted total laparoscopic hysterectomy, Bilateral salpingectomy and Right Oophorectomy, Lysis of adhesion, Cervical Indocyanine Green injection, Bilateral sentinel lymph node dissection, Repair of vaginal laceration.;  Surgeon: Perez Reddy MD;  Location: [facility] OR     DILATION AND CURETTAGE N/A 9/12/2019    Procedure: DILATION AND CURETTAGE, UTERUS;  Surgeon: Navdeep Barajas MD;  Location: [facility] OR     OPERATIVE HYSTEROSCOPY WITH MORCELLATOR N/A 9/12/2019    Procedure: HYSTEROSCOPY WITH MORCELLATOR (MYOSURE);  Surgeon: Navdeep Barajas MD;  Location: [facility] OR     SALPINGO OOPHORECTOMY,R/L/SHANDA Left 1980s    Left ovarian with endometriois     US BREAST BIOPSY LT Left          Health Maintenance Due   Topic Date Due     ZOSTER IMMUNIZATION (2 of 3) 10/13/2014     INFLUENZA VACCINE (1) 09/01/2020     PHQ-9  01/17/2021       Current Medications:     Current Outpatient Medications   Medication Sig Dispense Refill     Apple Cider Vinegar 600 MG CAPS Take 1 capsule by mouth daily        CALCIUM PO Take 20 mg by mouth daily        cholecalciferol (VITAMIN D3) 5000 units TABS tablet Take by mouth daily       clobetasol (TEMOVATE) 0.05 % external ointment Apply sparingly then once or twice a week as needed 45 g 0     Cyanocobalamin 1500 MCG TBDP Take 1 tablet by mouth daily        FLUoxetine (PROZAC) 20 MG capsule Take 1 capsule (20 mg) by mouth daily 90 capsule 3      Lactobacillus (ACIDOPHILUS PO) Take 1 tablet by mouth daily        levothyroxine (SYNTHROID/LEVOTHROID) 100 MCG tablet Take 1 tablet (100 mcg) by mouth every morning 90 tablet 0     Omega 3-6-9 Fatty Acids (OMEGA-3-6-9 PO) Take 1 capsule by mouth daily        Zinc Picolinate 25 MG TABS Take 1 tablet by mouth daily            Allergies:      No Known Allergies     Social History:     Social History     Tobacco Use     Smoking status: Former Smoker     Packs/day: 1.00     Years: 15.00     Pack years: 15.00     Types: Cigarettes     Start date: 1972     Quit date: 1987     Years since quittin.2     Smokeless tobacco: Never Used   Substance Use Topics     Alcohol use: Yes     Frequency: 2-3 times a week     Comment: a beer here and there - never more than 2 -  2 times/week       History   Drug Use Unknown         Family History:     The patient's family history is notable for:    Family History   Problem Relation Age of Onset     Acute myelogenous leukemia Mother      Coronary Artery Disease Mother         bypass surgery      Osteoporosis Mother      Obesity Mother      Parkinsonism Father      Prostate Cancer Father         Diagnosed late in life in his 80's     Obesity Sister      Heart Disease Sister      Coronary Artery Disease Sister         heart attacK in 2019     Uterine Cancer Maternal Grandmother 40        Uterine versus cervical     No Known Problems Sister      Leukemia Brother      Leukemia Nephew      Coronary Artery Disease Brother         Carotid Artery Surgery     Obesity Brother      Substance Abuse Brother         alcohol         Physical Exam:     LMP  (LMP Unknown)   There is no height or weight on file to calculate BMI.    General Appearance: healthy and alert, no distress    Eyes:  Eyes grossly normal to inspection.  No discharge or erythema, or obvious scleral/conjunctival abnormalities.    Respiratory: No audible wheeze, cough, or visible cyanosis.  No visible  retractions or increased work of breathing.     Musculoskeletal: extremities non tender and without edema    Skin: no lesions or rashes on visible skin    Neurological: normal gait, no gross defects     Psychiatric: appropriate mood and affect. Mentation appears normal, affect normal/bright, judgement and insight intact, normal speech and appearance well-groomed                            The rest of a comprehensive physical examination is deferred due to PHE (public health emergency) video visit restrictions.        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  She completed brachytherapy 12/2019.  She is here today for a surveillance visit. In light of the recent COVID19 pandemic, and in accordance with our group and national guidelines this patients care has been reviewed and it is felt that presenting for her visit would be more likely to increase rather than decrease her relative risks.  Therefore this visit was conducted by video.     24 minutes spent on the date of the encounter doing chart review, history and exam, documentation and further activities as noted above      Plan:     1.)        Patient to RTC in 3 months for her next surveillance visit.  Reviewed recommendations from [facility] not to perform surveillance pap smears in women diagnosed with endometrial cancer as this does not improve detection of local recurrence. Reviewed signs and symptoms for when she should contact the clinic or seek additional care. Patient to contact the clinic with any questions or concerns in the interim.  Answered all of her questions to the best of my ability. Continue to use her dilator. Discussed vulvar hygiene.      2.) Genetic risk factors were assessed and she had a loss of MLH1 and PMS2; hypermethylation of MLH1 promotor positive. MLH1 promoter methylation is typically associated with sporadic microsatellite unstable tumors of the  colon/rectum or endometrium.    3.) Labs and/or tests ordered include: none.      4.) Health maintenance issues addressed today include annual health maintenance and non-gynecologic issues with PCP.    ROSEMARY Lance, WHNP-BC, ANP-BC  Women's Health Nurse Practitioner  Adult Nurse Practitioner  Division of Gynecologic Oncology          CC  Patient Care Team:  Angeli Nolan MD as PCP - General (Family Practice)  Angeli Nolan MD as Assigned PCP  Beatrice Reyes APRN CNP as Assigned OBGYN Provider  Perez Reddy MD as Assigned Cancer Care Provider

## 2021-03-07 ENCOUNTER — IMMUNIZATION (OUTPATIENT)
Dept: NURSING | Facility: CLINIC | Age: 66
End: 2021-03-07
Payer: COMMERCIAL

## 2021-03-07 PROCEDURE — 0031A PR COVID VAC JANSSEN AD26 0.5ML: CPT

## 2021-03-07 PROCEDURE — 91303 PR COVID VAC JANSSEN AD26 0.5ML: CPT

## 2021-03-15 ENCOUNTER — HOSPITAL ENCOUNTER (EMERGENCY)
Facility: CLINIC | Age: 66
Discharge: HOME | End: 2021-03-15
Attending: FAMILY MEDICINE | Admitting: FAMILY MEDICINE
Payer: COMMERCIAL

## 2021-03-15 ENCOUNTER — APPOINTMENT (OUTPATIENT)
Dept: GENERAL RADIOLOGY | Facility: CLINIC | Age: 66
End: 2021-03-15
Attending: FAMILY MEDICINE
Payer: COMMERCIAL

## 2021-03-15 VITALS
TEMPERATURE: 98.1 F | RESPIRATION RATE: 18 BRPM | HEART RATE: 81 BPM | DIASTOLIC BLOOD PRESSURE: 54 MMHG | SYSTOLIC BLOOD PRESSURE: 131 MMHG | OXYGEN SATURATION: 98 %

## 2021-03-15 DIAGNOSIS — S61.451A CAT BITE OF HAND, RIGHT, INITIAL ENCOUNTER: ICD-10-CM
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
## 2021-03-15 LAB
ANION GAP SERPL CALCULATED.3IONS-SCNC: 4 MMOL/L (ref 3–14)
BASOPHILS # BLD AUTO: 0 10E9/L (ref 0–0.2)
BASOPHILS NFR BLD AUTO: 0.4 %
BUN SERPL-MCNC: 18 MG/DL (ref 7–30)
CALCIUM SERPL-MCNC: 10.1 MG/DL (ref 8.5–10.1)
CHLORIDE SERPL-SCNC: 108 MMOL/L (ref 94–109)
CO2 SERPL-SCNC: 26 MMOL/L (ref 20–32)
CREAT SERPL-MCNC: 1.06 MG/DL (ref 0.52–1.04)
CRP SERPL-MCNC: 16 MG/L (ref 0–8)
DIFFERENTIAL METHOD BLD: NORMAL
EOSINOPHIL # BLD AUTO: 0.2 10E9/L (ref 0–0.7)
EOSINOPHIL NFR BLD AUTO: 2.8 %
ERYTHROCYTE [DISTWIDTH] IN BLOOD BY AUTOMATED COUNT: 12.4 % (ref 10–15)
GFR SERPL CREATININE-BSD FRML MDRD: 54 ML/MIN/{1.73_M2}
GLUCOSE SERPL-MCNC: 130 MG/DL (ref 70–99)
HCT VFR BLD AUTO: 39.6 % (ref 35–47)
HGB BLD-MCNC: 12.8 G/DL (ref 11.7–15.7)
IMM GRANULOCYTES # BLD: 0 10E9/L (ref 0–0.4)
IMM GRANULOCYTES NFR BLD: 0.3 %
LYMPHOCYTES # BLD AUTO: 1.5 10E9/L (ref 0.8–5.3)
LYMPHOCYTES NFR BLD AUTO: 19.4 %
MCH RBC QN AUTO: 31.2 PG (ref 26.5–33)
MCHC RBC AUTO-ENTMCNC: 32.3 G/DL (ref 31.5–36.5)
MCV RBC AUTO: 97 FL (ref 78–100)
MONOCYTES # BLD AUTO: 0.5 10E9/L (ref 0–1.3)
MONOCYTES NFR BLD AUTO: 6.7 %
NEUTROPHILS # BLD AUTO: 5.3 10E9/L (ref 1.6–8.3)
NEUTROPHILS NFR BLD AUTO: 70.4 %
NRBC # BLD AUTO: 0 10*3/UL
NRBC BLD AUTO-RTO: 0 /100
PLATELET # BLD AUTO: 202 10E9/L (ref 150–450)
POTASSIUM SERPL-SCNC: 4.1 MMOL/L (ref 3.4–5.3)
RBC # BLD AUTO: 4.1 10E12/L (ref 3.8–5.2)
SODIUM SERPL-SCNC: 138 MMOL/L (ref 133–144)
WBC # BLD AUTO: 7.6 10E9/L (ref 4–11)

## 2021-03-15 PROCEDURE — 85025 COMPLETE CBC W/AUTO DIFF WBC: CPT | Performed by: FAMILY MEDICINE

## 2021-03-15 PROCEDURE — 96365 THER/PROPH/DIAG IV INF INIT: CPT | Performed by: FAMILY MEDICINE

## 2021-03-15 PROCEDURE — 80048 BASIC METABOLIC PNL TOTAL CA: CPT | Performed by: FAMILY MEDICINE

## 2021-03-15 PROCEDURE — 73130 X-RAY EXAM OF HAND: CPT | Mod: RT

## 2021-03-15 PROCEDURE — 250N000011 HC RX IP 250 OP 636: Performed by: FAMILY MEDICINE

## 2021-03-15 PROCEDURE — 86140 C-REACTIVE PROTEIN: CPT | Performed by: FAMILY MEDICINE

## 2021-03-15 PROCEDURE — 99284 EMERGENCY DEPT VISIT MOD MDM: CPT | Mod: 25 | Performed by: FAMILY MEDICINE

## 2021-03-15 PROCEDURE — 99284 EMERGENCY DEPT VISIT MOD MDM: CPT | Performed by: FAMILY MEDICINE

## 2021-03-15 PROCEDURE — 73130 X-RAY EXAM OF HAND: CPT | Mod: 26 | Performed by: RADIOLOGY

## 2021-03-15 RX ORDER — AMPICILLIN AND SULBACTAM 2; 1 G/1; G/1
3 INJECTION, POWDER, FOR SOLUTION INTRAMUSCULAR; INTRAVENOUS ONCE
Status: COMPLETED | OUTPATIENT
Start: 2021-03-15 | End: 2021-03-15

## 2021-03-15 RX ORDER — LIDOCAINE 40 MG/G
CREAM TOPICAL
Status: DISCONTINUED | OUTPATIENT
Start: 2021-03-15 | End: 2021-03-15 | Stop reason: HOSPADM

## 2021-03-15 RX ADMIN — AMPICILLIN SODIUM AND SULBACTAM SODIUM 3 G: 2; 1 INJECTION, POWDER, FOR SOLUTION INTRAMUSCULAR; INTRAVENOUS at 08:59

## 2021-03-15 ASSESSMENT — ENCOUNTER SYMPTOMS
DYSPHORIC MOOD: 0
NECK STIFFNESS: 0
HEADACHES: 0
WOUND: 1
VOMITING: 0
FEVER: 0
NAUSEA: 0
ARTHRALGIAS: 0
DECREASED CONCENTRATION: 0
MYALGIAS: 1
ACTIVITY CHANGE: 1
COLOR CHANGE: 1
BRUISES/BLEEDS EASILY: 0
CONFUSION: 0
NUMBNESS: 0
WEAKNESS: 0
DIFFICULTY URINATING: 0
ABDOMINAL PAIN: 0
JOINT SWELLING: 0
SHORTNESS OF BREATH: 0
EYE REDNESS: 0

## 2021-03-15 NOTE — ED TRIAGE NOTES
Patient awake and alert. Respirations even and unlabored. Skin warm and dry. Patient bit by cat yesterday morning. Cat is in indoor cat, last vaccines on 3-. Errythema and swelling noted to the palmar side of right hand. Two small punctures noted.

## 2021-03-15 NOTE — DISCHARGE INSTRUCTIONS
Home.  Xray did not show any significant findings.  Labs with mild inflammation otherwise looks good.  You received Unasyn IV in the ER antibiotic.  Take the Augmentin twice a day for 10 days.  Elevate arm as able.  Return if red streaking or worse swelling or fever or concerns.  Follow up with Md for a recheck.    Results for orders placed or performed during the hospital encounter of 03/15/21   XR Hand Right G/E 3 Views     Status: None    Narrative    3 views right hand radiographs 3/15/2021 9:41 AM    History: cat bite     Comparison: None    Findings:    PA, oblique and lateral view(s) of the right hand were obtained.     No acute osseous abnormality.  No erosion.    Significant degenerative change of the third proximal interphalangeal  joint, possibly posttraumatic. Triscaphe and first carpal metacarpal  joint degenerative change. First interphalangeal joint degenerative  change. Slight ulnar negative variance.    Possible fifth metacarpal phalangeal joint soft tissue tissue  swelling.      Impression    Impression:  1. No acute osseous abnormality. No radiographic evidence of  osteomyelitis. Possible fifth metacarpal phalangeal joint soft tissue  swelling.  2. 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    MAGO SETH MD (Joe)   CBC with platelets differential     Status: None   Result Value Ref Range    WBC 7.6 4.0 - 11.0 10e9/L    RBC Count 4.10 3.8 - 5.2 10e12/L    Hemoglobin 12.8 11.7 - 15.7 g/dL    Hematocrit 39.6 35.0 - 47.0 %    MCV 97 78 - 100 fl    MCH 31.2 26.5 - 33.0 pg    MCHC 32.3 31.5 - 36.5 g/dL    RDW 12.4 10.0 - 15.0 %    Platelet Count 202 150 - 450 10e9/L    Diff Method Automated Method     % Neutrophils 70.4 %    % Lymphocytes 19.4 %    % Monocytes 6.7 %    % Eosinophils 2.8 %    % Basophils 0.4 %    % Immature Granulocytes 0.3 %    Nucleated RBCs 0 0 /100    Absolute Neutrophil 5.3 1.6 - 8.3 10e9/L    Absolute  Lymphocytes 1.5 0.8 - 5.3 10e9/L    Absolute Monocytes 0.5 0.0 - 1.3 10e9/L    Absolute Eosinophils 0.2 0.0 - 0.7 10e9/L    Absolute Basophils 0.0 0.0 - 0.2 10e9/L    Abs Immature Granulocytes 0.0 0 - 0.4 10e9/L    Absolute Nucleated RBC 0.0    CRP inflammation     Status: Abnormal   Result Value Ref Range    CRP Inflammation 16.0 (H) 0.0 - 8.0 mg/L   Basic metabolic panel     Status: Abnormal   Result Value Ref Range    Sodium 138 133 - 144 mmol/L    Potassium 4.1 3.4 - 5.3 mmol/L    Chloride 108 94 - 109 mmol/L    Carbon Dioxide 26 20 - 32 mmol/L    Anion Gap 4 3 - 14 mmol/L    Glucose 130 (H) 70 - 99 mg/dL    Urea Nitrogen 18 7 - 30 mg/dL    Creatinine 1.06 (H) 0.52 - 1.04 mg/dL    GFR Estimate 54 (L) >60 mL/min/[1.73_m2]    GFR Estimate If Black 63 >60 mL/min/[1.73_m2]    Calcium 10.1 8.5 - 10.1 mg/dL

## 2021-03-15 NOTE — ED PROVIDER NOTES
[place] EMERGENCY DEPARTMENT ([facility])  3/15/21  History     Chief Complaint   Patient presents with     Animal Bite     The history is provided by the patient and medical records.     Hanh Villalba is a 65 year old female with no significant past medical history who presents for evaluation of a cat bite.  Patient states she recently took ownership of her cat from her sister-in-law who had lived in her house for several years.  She reports the cat was immunized a week ago.  Yesterday while the patient was petting the cat's belly, the cat bit her her right hand.  She has puncture marks near the MP joint of her right fourth and fifth digits.  Patient notes increased swelling and pain in her right hand.  The patient is right-hand dominant.  She denies experiencing a fever.  She reports taking a form of Augmentin from [facility] [place].    Per [facility], patient's last tetanus immunization was administered on 10/31/2019.     Past Medical History:   Diagnosis Date     Cancer (H) August 2019    Uterus removed     Depression      Depressive disorder 1999    taking prosac     Hypothyroidism      Overweight        Past Surgical History:   Procedure Laterality Date     ABDOMEN SURGERY  Oct 2019    Robotic Hysterectomy     BIOPSY  early 2000's    left breast - non event     COLONOSCOPY  within last 6 years ?    OK     DAVINCI HYSTERECTOMY TOTAL, BILATERAL SALPINGO-OOPHORECTOMY, NODE DISSECTION, COMBINED N/A 10/4/2019    Procedure: Robot-assisted total laparoscopic hysterectomy, Bilateral salpingectomy and Right Oophorectomy, Lysis of adhesion, Cervical Indocyanine Green injection, Bilateral sentinel lymph node dissection, Repair of vaginal laceration.;  Surgeon: Perez Reddy MD;  Location: [facility] OR     DILATION AND CURETTAGE N/A 9/12/2019    Procedure: DILATION AND CURETTAGE, UTERUS;  Surgeon: Navdeep Barajas MD;  Location:  OR     OPERATIVE HYSTEROSCOPY WITH MORCELLATOR N/A 9/12/2019    Procedure:  HYSTEROSCOPY WITH MORCELLATOR (MYOSURE);  Surgeon: Navdeep Barajas MD;  Location: [facility] OR     SALPINGO OOPHORECTOMY,R/L/SHANDA Left     Left ovarian with endometriois     US BREAST BIOPSY LT Left        Family History   Problem Relation Age of Onset     Acute myelogenous leukemia Mother      Coronary Artery Disease Mother         bypass surgery      Osteoporosis Mother      Obesity Mother      Parkinsonism Father      Prostate Cancer Father         Diagnosed late in life in his 80's     Obesity Sister      Heart Disease Sister      Coronary Artery Disease Sister         heart attacK in 2019     Uterine Cancer Maternal Grandmother 40        Uterine versus cervical     No Known Problems Sister      Leukemia Brother      Leukemia Nephew      Coronary Artery Disease Brother         Carotid Artery Surgery     Obesity Brother      Substance Abuse Brother         alcohol       Social History     Tobacco Use     Smoking status: Former Smoker     Packs/day: 1.00     Years: 15.00     Pack years: 15.00     Types: Cigarettes     Start date: 1972     Quit date: 1987     Years since quittin.3     Smokeless tobacco: Never Used   Substance Use Topics     Alcohol use: Yes     Frequency: 2-3 times a week     Comment: a beer here and there - never more than 2 -  2 times/week     No current facility-administered medications for this encounter.      Current Outpatient Medications   Medication     amoxicillin-clavulanate (AUGMENTIN) 875-125 MG tablet     FLUoxetine (PROZAC) 20 MG capsule     levothyroxine (SYNTHROID/LEVOTHROID) 100 MCG tablet     Apple Cider Vinegar 600 MG CAPS     CALCIUM PO     cholecalciferol (VITAMIN D3) 5000 units TABS tablet     clobetasol (TEMOVATE) 0.05 % external ointment     Cyanocobalamin 1500 MCG TBDP     Lactobacillus (ACIDOPHILUS PO)     Omega 3-6-9 Fatty Acids (OMEGA-3-6-9 PO)     Zinc Picolinate 25 MG TABS     Facility-Administered Medications Ordered in Other  Encounters   Medication     sodium chloride (PF) 0.9% PF flush 10 mL      No Known Allergies      Review of Systems   Constitutional: Positive for activity change. Negative for fever.        Slight decreased flexion of the fourth fifth finger because of pain localized primarily with the bite is.  Denies any palmar swelling or pain in the wrist   HENT: Negative for congestion.    Eyes: Negative for redness and visual disturbance.   Respiratory: Negative for shortness of breath.    Cardiovascular: Negative for chest pain.   Gastrointestinal: Negative for abdominal pain, nausea and vomiting.   Genitourinary: Negative for difficulty urinating.   Musculoskeletal: Positive for myalgias. Negative for arthralgias, joint swelling and neck stiffness.        Soft tissue swelling localized   Skin: Positive for color change and wound.        Redness at the bite site palmar hand region fourth and fifth MP joints along with dorsum slightly   Allergic/Immunologic: Negative for immunocompromised state.   Neurological: Negative for weakness, numbness and headaches.   Hematological: Does not bruise/bleed easily.   Psychiatric/Behavioral: Negative for confusion, decreased concentration and dysphoric mood.   All other systems reviewed and are negative.    A complete review of systems was performed with pertinent positives and negatives noted in the HPI, and all other systems negative.    Physical Exam   BP: 131/54  Pulse: 81  Temp: 98.1  F (36.7  C)  Resp: 18  SpO2: 98 %  Physical Exam  Vitals signs and nursing note reviewed.   Constitutional:       General: She is in acute distress.      Appearance: She is well-developed. She is not ill-appearing or diaphoretic.      Comments: Patient is nontoxic photos taken refer to them in chart.   HENT:      Head: Normocephalic and atraumatic.      Nose: Nose normal.      Mouth/Throat:      Mouth: Mucous membranes are moist.   Eyes:      General: No scleral icterus.     Extraocular Movements:  Extraocular movements intact.      Conjunctiva/sclera: Conjunctivae normal.      Pupils: Pupils are equal, round, and reactive to light.   Neck:      Musculoskeletal: Normal range of motion and neck supple.   Cardiovascular:      Rate and Rhythm: Normal rate.   Pulmonary:      Effort: No respiratory distress.   Abdominal:      Tenderness: There is no guarding.   Musculoskeletal:         General: Tenderness, deformity and signs of injury present.      Comments: Refer to photo right hand cat bite   Skin:     General: Skin is warm and dry.      Capillary Refill: Capillary refill takes less than 2 seconds.      Coloration: Skin is not pale.      Findings: Erythema present. No rash.      Comments: See photo right hand cat bite   Neurological:      General: No focal deficit present.      Mental Status: She is alert and oriented to person, place, and time. Mental status is at baseline.   Psychiatric:      Comments: Appropriate.         ED Course     8:28 AM  The patient was seen and examined by Dr. Jonas Alexis in Room [facility].   Patient valuate as noted IV established labs drawn.  Patient given 3 g of Unasyn IV x1.  X-rays done of the right hand reveal no foreign body no subcu air some mild soft tissue swelling noted.  No bony abnormalities otherwise noted.  Labs were reviewed.  CBC within normal limits CRP of 16 chemistries otherwise normal.  Glucose 130 creatinine 1.06.  As noted white count 7.6 hemoglobin 12.8.    Reassessing patient the ER no sign of lymphangitic spread identified no worsening of findings noted this point appears to be localized.  Its not in the joint that I could appreciate.  No lymphangitic spread is noted otherwise.  Patient appears nontoxic had received a dose of Unasyn.  At this point will consider management at home as an outpatient with 10 days of Augmentin elevating hand soaking it twice a day watching for any signs of lymphangitic spread fever worsening swelling which should return immediately for  further evaluation patient at this point agrees with plan appears nontoxic and has no change in her findings when initially evaluated here in the ER.  Elected not to place the patient in a splint as an ulnar gutter splint would cover up the area of wound and it was more appropriate patient is to continue to elevate her hand and keep a closer eye on it etc. having it covered.    Procedures                                  Results for orders placed or performed during the hospital encounter of 03/15/21   XR Hand Right G/E 3 Views     Status: None    Narrative    3 views right hand radiographs 3/15/2021 9:41 AM    History: cat bite     Comparison: None    Findings:    PA, oblique and lateral view(s) of the right hand were obtained.     No acute osseous abnormality.  No erosion.    Significant degenerative change of the third proximal interphalangeal  joint, possibly posttraumatic. Triscaphe and first carpal metacarpal  joint degenerative change. First interphalangeal joint degenerative  change. Slight ulnar negative variance.    Possible fifth metacarpal phalangeal joint soft tissue tissue  swelling.      Impression    Impression:  1. No acute osseous abnormality. No radiographic evidence of  osteomyelitis. Possible fifth metacarpal phalangeal joint soft tissue  swelling.  2. 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    MAGO SEHT MD (Joe)   CBC with platelets differential     Status: None   Result Value Ref Range    WBC 7.6 4.0 - 11.0 10e9/L    RBC Count 4.10 3.8 - 5.2 10e12/L    Hemoglobin 12.8 11.7 - 15.7 g/dL    Hematocrit 39.6 35.0 - 47.0 %    MCV 97 78 - 100 fl    MCH 31.2 26.5 - 33.0 pg    MCHC 32.3 31.5 - 36.5 g/dL    RDW 12.4 10.0 - 15.0 %    Platelet Count 202 150 - 450 10e9/L    Diff Method Automated Method     % Neutrophils 70.4 %    % Lymphocytes 19.4 %    % Monocytes 6.7 %    % Eosinophils 2.8 %    % Basophils 0.4 %    % Immature  Granulocytes 0.3 %    Nucleated RBCs 0 0 /100    Absolute Neutrophil 5.3 1.6 - 8.3 10e9/L    Absolute Lymphocytes 1.5 0.8 - 5.3 10e9/L    Absolute Monocytes 0.5 0.0 - 1.3 10e9/L    Absolute Eosinophils 0.2 0.0 - 0.7 10e9/L    Absolute Basophils 0.0 0.0 - 0.2 10e9/L    Abs Immature Granulocytes 0.0 0 - 0.4 10e9/L    Absolute Nucleated RBC 0.0    CRP inflammation     Status: Abnormal   Result Value Ref Range    CRP Inflammation 16.0 (H) 0.0 - 8.0 mg/L   Basic metabolic panel     Status: Abnormal   Result Value Ref Range    Sodium 138 133 - 144 mmol/L    Potassium 4.1 3.4 - 5.3 mmol/L    Chloride 108 94 - 109 mmol/L    Carbon Dioxide 26 20 - 32 mmol/L    Anion Gap 4 3 - 14 mmol/L    Glucose 130 (H) 70 - 99 mg/dL    Urea Nitrogen 18 7 - 30 mg/dL    Creatinine 1.06 (H) 0.52 - 1.04 mg/dL    GFR Estimate 54 (L) >60 mL/min/[1.73_m2]    GFR Estimate If Black 63 >60 mL/min/[1.73_m2]    Calcium 10.1 8.5 - 10.1 mg/dL     Medications   ampicillin-sulbactam (UNASYN) 3 g vial to attach to  mL bag (0 g Intravenous Stopped 3/15/21 0929)        Assessments & Plan (with Medical Decision Making)  65-year-old female presents to the ER with right hand cat bite yesterday.  Patient cat who is up-to-date on immunization had bit her intermittently after patient was scratching the cats belly.  Cat otherwise acting appropriate.  As noted immunizations are up-to-date as of last week.  Patient here in the ER his tetanus is up-to-date also.  She started taking some Augmentin from [facility] [place].  Presented the ER for evaluation no fevers no lymphangitic spread x-rays were negative for any acute foreign body subcu air etc.  Patient observed in the ER given 3 g of Unasyn IV.  Other labs stable CRP is 16.  Patient here in the ER appears nontoxic and is comfortable with close observation seems very reliable we will have her soak the hand elevated throughout the day taking Augmentin twice a day for 10 days.  If any fever lymphangitic spread  worsening swelling at all she should return immediately at this point patient is comfortable with this plan.         I have reviewed the nursing notes. I have reviewed the findings, diagnosis, plan and need for follow up with the patient.    Discharge Medication List as of 3/15/2021 10:16 AM      START taking these medications    Details   amoxicillin-clavulanate (AUGMENTIN) 875-125 MG tablet Take 1 tablet by mouth 2 times daily for 10 days, Disp-20 tablet, R-0, Local Print             Final diagnoses:   Cat bite of hand, right, initial encounter     IGeorge, am serving as a trained medical scribe to document services personally performed by Jonas Alexis MD, based on the provider's statements to me.      IJonas MD, was physically present and have reviewed and verified the accuracy of this note documented by George Muro.   --  Jonas Alexis MD  [facility] EMERGENCY DEPARTMENT  3/15/2021    This note was created at least in part by the use of dragon voice dictation system. Inadvertent typographical errors may still exist.  Jonas Alexis MD.    Patient evaluated in the emergency department during the COVID-19 pandemic period. Careful attention to patients safety was addressed throughout the evaluation. Evaluation and treatment management was initiated with disposition made efficiently and appropriate as possible to minimize any risk of potential exposure to patient during this evaluation.       Jonas Alexis MD  03/15/21 4026

## 2021-04-28 ENCOUNTER — MYC MEDICAL ADVICE (OUTPATIENT)
Dept: FAMILY MEDICINE | Facility: CLINIC | Age: 66
End: 2021-04-28

## 2021-04-28 DIAGNOSIS — E03.4 HYPOTHYROIDISM DUE TO ACQUIRED ATROPHY OF THYROID: ICD-10-CM

## 2021-04-30 RX ORDER — LEVOTHYROXINE SODIUM 100 UG/1
100 TABLET ORAL EVERY MORNING
Qty: 90 TABLET | Refills: 0 | Status: SHIPPED | OUTPATIENT
Start: 2021-04-30 | End: 2021-07-30

## 2021-04-30 NOTE — TELEPHONE ENCOUNTER
Routing My Chart message to PCP    Ok with annual exam being booked for 20 min.?    Patient wants Annual Check up.  Patient tentatively scheduled for June 8th in a 20 min slot at 3pm. No 40 min available for quite some time.    Devaughn Guerra, RN, BSN, PHN  [facility]

## 2021-05-14 ASSESSMENT — ENCOUNTER SYMPTOMS
BOWEL INCONTINENCE: 0
JOINT SWELLING: 0
MUSCLE WEAKNESS: 0
ARTHRALGIAS: 1
MYALGIAS: 1
VOMITING: 0
NECK PAIN: 0
FLANK PAIN: 0
DIARRHEA: 0
CONSTIPATION: 0
MUSCLE CRAMPS: 0
NAUSEA: 0
BLOOD IN STOOL: 0
DIFFICULTY URINATING: 0
DYSURIA: 0
BACK PAIN: 1
STIFFNESS: 1
HEMATURIA: 0
ABDOMINAL PAIN: 0
HEARTBURN: 0
BLOATING: 0
RECTAL PAIN: 0
JAUNDICE: 0

## 2021-05-17 ENCOUNTER — ONCOLOGY VISIT (OUTPATIENT)
Dept: ONCOLOGY | Facility: CLINIC | Age: 66
End: 2021-05-17
Attending: NURSE PRACTITIONER
Payer: COMMERCIAL

## 2021-05-17 VITALS
TEMPERATURE: 97.9 F | WEIGHT: 263.23 LBS | HEIGHT: 68 IN | DIASTOLIC BLOOD PRESSURE: 66 MMHG | HEART RATE: 77 BPM | SYSTOLIC BLOOD PRESSURE: 105 MMHG | BODY MASS INDEX: 39.89 KG/M2 | OXYGEN SATURATION: 97 % | RESPIRATION RATE: 16 BRPM

## 2021-05-17 DIAGNOSIS — C55 ENDOMETRIOID ADENOCARCINOMA OF UTERUS (H): Primary | ICD-10-CM

## 2021-05-17 PROCEDURE — G0463 HOSPITAL OUTPT CLINIC VISIT: HCPCS

## 2021-05-17 PROCEDURE — 999N001193 HC VIDEO/TELEPHONE VISIT; NO CHARGE

## 2021-05-17 PROCEDURE — 99214 OFFICE O/P EST MOD 30 MIN: CPT | Performed by: NURSE PRACTITIONER

## 2021-05-17 ASSESSMENT — ENCOUNTER SYMPTOMS
INSOMNIA: 0
SORE THROAT: 0
RECTAL PAIN: 0
HEMATURIA: 0
NERVOUS/ANXIOUS: 0
SYNCOPE: 0
EYE REDNESS: 0
BLOATING: 0
DYSPNEA ON EXERTION: 0
DYSURIA: 0
INCREASED ENERGY: 0
TROUBLE SWALLOWING: 0
LEG PAIN: 0
EYE PAIN: 0
WEIGHT GAIN: 0
EYE WATERING: 0
WHEEZING: 0
SNORES LOUDLY: 0
HOARSE VOICE: 0
SHORTNESS OF BREATH: 0
HEADACHES: 0
EYE IRRITATION: 0
TACHYCARDIA: 0
HALLUCINATIONS: 0
JOINT SWELLING: 0
SPUTUM PRODUCTION: 0
HEMOPTYSIS: 0
MUSCLE WEAKNESS: 0
SPEECH CHANGE: 0
BLOOD IN STOOL: 0
LIGHT-HEADEDNESS: 0
PANIC: 0
MEMORY LOSS: 0
LEG SWELLING: 0
NUMBNESS: 0
NAUSEA: 0
POSTURAL DYSPNEA: 0
ORTHOPNEA: 0
BREAST PAIN: 0
BOWEL INCONTINENCE: 0
TINGLING: 0
ARTHRALGIAS: 1
HYPERTENSION: 0
DECREASED APPETITE: 0
PALPITATIONS: 0
NIGHT SWEATS: 0
DIZZINESS: 0
COUGH DISTURBING SLEEP: 0
SEIZURES: 0
ALTERED TEMPERATURE REGULATION: 0
FLANK PAIN: 0
POLYDIPSIA: 0
SINUS CONGESTION: 0
EXERCISE INTOLERANCE: 0
MUSCLE CRAMPS: 0
COUGH: 0
SKIN CHANGES: 0
VOMITING: 0
DIFFICULTY URINATING: 0
PARALYSIS: 0
SMELL DISTURBANCE: 0
DEPRESSION: 0
JAUNDICE: 0
TREMORS: 0
STIFFNESS: 1
FEVER: 0
RESPIRATORY PAIN: 0
SINUS PAIN: 0
MYALGIAS: 1
FATIGUE: 0
EXTREMITY NUMBNESS: 0
CLAUDICATION: 0
DIARRHEA: 0
HEARTBURN: 0
HOT FLASHES: 0
HYPOTENSION: 0
TASTE DISTURBANCE: 0
NAIL CHANGES: 0
NECK MASS: 0
CHILLS: 0
WEAKNESS: 0
POLYPHAGIA: 0
SLEEP DISTURBANCES DUE TO BREATHING: 0
BACK PAIN: 1
POOR WOUND HEALING: 0
WEIGHT LOSS: 0
NECK PAIN: 0
DOUBLE VISION: 0
ABDOMINAL PAIN: 0
BREAST MASS: 0
DECREASED CONCENTRATION: 0
CONSTIPATION: 0
SWOLLEN GLANDS: 0
DECREASED LIBIDO: 0
BRUISES/BLEEDS EASILY: 0
DISTURBANCES IN COORDINATION: 0
LOSS OF CONSCIOUSNESS: 0

## 2021-05-17 ASSESSMENT — PAIN SCALES - GENERAL: PAINLEVEL: NO PAIN (0)

## 2021-05-17 ASSESSMENT — MIFFLIN-ST. JEOR: SCORE: 1769.88

## 2021-05-17 NOTE — LETTER
"    2021         RE: Hanh Villalba   [address]  [place] [address]        Dear Colleague,    Thank you for referring your patient, Hanh Villalba, to the [facility]. Please see a copy of my visit note below.                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  She completed brachytherapy 2019. She is here today for her regular surveillance visit.     Oncology history:   2019 D&C with pathology showing Grade 1 endometrial cancer, loss of MLH1 and PMS2, hypermethylation of MLH1 promotor positive  10/4/2019: Robotic-assisted Total laparoscopic hysterectomy, bilateral salpingo-oophorectomy, sentinel lymph node dissection, vaginal laceration repair: Stage 1B FIGO Grade 2, DOI 17/21mm (81%), LVSI+, cytology negative, tumor 3.4cm; MSI-H (MLH1 promoter melthylation)  2019: Vaginal brachytherapy; 30Gy in 5 fx         Today she reports feeling well, no complaints. She denies chest pain, shortness of breath, lower leg swelling, abdominal discomfort, N/V. She is eating and drinking normally, no fevers/chills. She is not sexually active and denies vaginal bleeding or dryness. She uses her dilator 3 times per month for about 3 minutes. She will be seeing her PCP in July and will schedule her dexa scan, mammogram, and colonoscopy over the next few months. No change in bladder habits (occasional urine incontinence that she wears an incontinence pad). She does state her stools have been \"flaky\" and break apart when she flushes, she will discuss this with her PCP and see what her colonoscopy states. She also noticed a \"lump\" to her left lower abdomen in between her abdominal folds. She noticed this last night, it does not cause pain. .      Review of Systems "     Constitutional:  Negative for fever, chills, weight loss, weight gain, fatigue, decreased appetite, night sweats, recent stressors, height gain, height loss, post-operative complications, incisional pain, hallucinations, increased energy, hyperactivity and confused.   HENT:  Negative for ear pain, hearing loss, tinnitus, nosebleeds, trouble swallowing, hoarse voice, mouth sores, sore throat, ear discharge, tooth pain, gum tenderness, taste disturbance, smell disturbance, hearing aid, bleeding gums, dry mouth, sinus pain, sinus congestion and neck mass.    Eyes:  Negative for double vision, pain, redness, eye pain, decreased vision, eye watering, eye bulging, eye dryness, flashing lights, spots, floaters, strabismus, tunnel vision, jaundice and eye irritation.   Respiratory:   Negative for cough, hemoptysis, sputum production, shortness of breath, wheezing, sleep disturbances due to breathing, snores loudly, respiratory pain, dyspnea on exertion, cough disturbing sleep and postural dyspnea.    Cardiovascular:  Negative for chest pain, dyspnea on exertion, palpitations, orthopnea, claudication, leg swelling, fingers/toes turn blue, hypertension, hypotension, syncope, history of heart murmur, chest pain on exertion, chest pain at rest, pacemaker, few scattered varicosities, leg pain, sleep disturbances due to breathing, tachycardia, light-headedness, exercise intolerance and edema.   Gastrointestinal:  Positive for change in stool. Negative for heartburn, nausea, vomiting, abdominal pain, diarrhea, constipation, blood in stool, melena, rectal pain, bloating, bowel incontinence, jaundice and coffee ground emesis.   Genitourinary:  Positive for bladder incontinence. Negative for dysuria, urgency, hematuria, flank pain, vaginal discharge, difficulty urinating, genital sores, dyspareunia, decreased libido, nocturia, voiding less frequently, arousal difficulty, abnormal vaginal bleeding, excessive menstruation,  menstrual changes, hot flashes, vaginal dryness and postmenopausal bleeding.   Musculoskeletal:  Positive for myalgias, back pain, arthralgias and stiffness. Negative for joint swelling, muscle cramps, neck pain, bone pain, muscle weakness and fracture.   Skin:  Negative for nail changes, itching, poor wound healing, rash, hair changes, skin changes, acne, warts, poor wound healing, scarring, flaky skin, Raynaud's phenomenon, sensitivity to sunlight and skin thickening.   Neurological:  Negative for dizziness, tingling, tremors, speech change, seizures, loss of consciousness, weakness, light-headedness, numbness, headaches, disturbances in coordination, extremity numbness, memory loss, difficulty walking and paralysis.   Endo/Heme:  Negative for anemia, swollen glands and bruises/bleeds easily.   Psychiatric/Behavioral:  Negative for depression, hallucinations, memory loss, decreased concentration, mood swings and panic attacks.    Breast:  Negative for breast discharge, breast mass, breast pain and nipple retraction.   Endocrine:  Negative for altered temperature regulation, polyphagia, polydipsia, unwanted hair growth and change in facial hair.          Past Medical History:    Past Medical History:   Diagnosis Date     Cancer (H) August 2019    Uterus removed     Depression      Depressive disorder 1999    taking prosac     Hypothyroidism      Overweight          Past Surgical History:    Past Surgical History:   Procedure Laterality Date     ABDOMEN SURGERY  Oct 2019    Robotic Hysterectomy     BIOPSY  early 2000's    left breast - non event     COLONOSCOPY  within last 6 years ?    OK     DAVINCI HYSTERECTOMY TOTAL, BILATERAL SALPINGO-OOPHORECTOMY, NODE DISSECTION, COMBINED N/A 10/4/2019    Procedure: Robot-assisted total laparoscopic hysterectomy, Bilateral salpingectomy and Right Oophorectomy, Lysis of adhesion, Cervical Indocyanine Green injection, Bilateral sentinel lymph node dissection, Repair of vaginal  laceration.;  Surgeon: Perez Reddy MD;  Location: [facility] OR     DILATION AND CURETTAGE N/A 2019    Procedure: DILATION AND CURETTAGE, UTERUS;  Surgeon: Navdeep Barajas MD;  Location: [facility] OR     OPERATIVE HYSTEROSCOPY WITH MORCELLATOR N/A 2019    Procedure: HYSTEROSCOPY WITH MORCELLATOR (MYOSURE);  Surgeon: Navdeep Barajas MD;  Location: [facility] OR     SALPINGO OOPHORECTOMY,R/L/SHANDA Left 1980s    Left ovarian with endometriois     US BREAST BIOPSY LT Left          Health Maintenance Due   Topic Date Due     ZOSTER IMMUNIZATION (2 of 3) 10/13/2014     PHQ-9  2021       Current Medications:     Current Outpatient Medications   Medication Sig Dispense Refill     Apple Cider Vinegar 600 MG CAPS Take 1 capsule by mouth daily        CALCIUM PO Take 20 mg by mouth daily        cholecalciferol (VITAMIN D3) 5000 units TABS tablet Take by mouth daily       clobetasol (TEMOVATE) 0.05 % external ointment Apply sparingly then once or twice a week as needed 45 g 0     Cyanocobalamin 1500 MCG TBDP Take 1 tablet by mouth daily        FLUoxetine (PROZAC) 20 MG capsule Take 1 capsule (20 mg) by mouth daily 90 capsule 3     Lactobacillus (ACIDOPHILUS PO) Take 1 tablet by mouth daily        levothyroxine (SYNTHROID/LEVOTHROID) 100 MCG tablet Take 1 tablet (100 mcg) by mouth every morning 90 tablet 0     Omega 3-6-9 Fatty Acids (OMEGA-3-6-9 PO) Take 1 capsule by mouth daily        Zinc Picolinate 25 MG TABS Take 1 tablet by mouth daily            Allergies:      No Known Allergies     Social History:     Social History     Tobacco Use     Smoking status: Former Smoker     Packs/day: 1.00     Years: 15.00     Pack years: 15.00     Types: Cigarettes     Start date: 1972     Quit date: 1987     Years since quittin.5     Smokeless tobacco: Never Used   Substance Use Topics     Alcohol use: Yes     Frequency: 2-3 times a week     Comment: a beer here and there - never more than 2 -  2 times/week  "      History   Drug Use Unknown         Family History:     The patient's family history is notable for.    Family History   Problem Relation Age of Onset     Acute myelogenous leukemia Mother      Coronary Artery Disease Mother         bypass surgery 1989     Osteoporosis Mother      Obesity Mother      Parkinsonism Father      Prostate Cancer Father         Diagnosed late in life in his 80's     Obesity Sister      Heart Disease Sister      Coronary Artery Disease Sister         heart attacK in June of 2019     Uterine Cancer Maternal Grandmother 40        Uterine versus cervical     No Known Problems Sister      Leukemia Brother      Leukemia Nephew      Coronary Artery Disease Brother         Carotid Artery Surgery     Obesity Brother      Substance Abuse Brother         alcohol         Physical Exam:     /66 (BP Location: Right arm, Patient Position: Chair, Cuff Size: Adult Large)   Pulse 77   Temp 97.9  F (36.6  C) (Tympanic)   Resp 16   Ht 1.715 m (5' 7.52\")   Wt 119.4 kg (263 lb 3.7 oz)   LMP  (LMP Unknown)   SpO2 97%   BMI 40.59 kg/m    Body mass index is 40.59 kg/m .    General Appearance: healthy and alert, no distress     HEENT: no thyromegaly, no palpable nodules or masses        Cardiovascular: regular rate and rhythm, no gallops, rubs or murmurs     Respiratory: lungs clear, no rales, rhonchi or wheezes, normal diaphragmatic excursion    Musculoskeletal: extremities non tender and without edema    Skin: no lesions or rashes, quarter size non mobile mass noted to left abdominal fold; slight erythemas along area under pannus    Neurological: normal gait, no gross defects     Psychiatric: appropriate mood and affect                               Hematological: normal cervical, supraclavicular and inguinal lymph nodes     Gastrointestinal:       abdomen soft, non-tender, non-distended, no organomegaly or masses    Genitourinary: External genitalia with and urethral meatus appears normal.  " Vagina is smooth without nodularity or masses.  Cervix surgically absent.  Bimanual exam reveal no masses, nodularity or fullness.  Recto-vaginal exam confirms these findings.      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  She completed brachytherapy 12/2019. She is here today for her regular surveillance visit.     36 minutes spent on the date of the encounter doing chart review, history and exam, documentation and further activities as noted above      Plan:     1.)        Patient to RTC in 3 months for her next surveillance visit. Discussed continued use of dilator and extending each session to 5 minutes. Discussed current recommendations from [facility] not to perform surveillance pap smears as they do not detect local recurrence in women diagnosed with endometrial cancer. Discussed signs and symptoms for when she should contact the clinic or seek additional medical care.  Answered all questions/concerns to the best of my ability. She will contact the clinic for any concerns that arise prior to her next visit.     2.) Genetic risk factors were assessed and she had a loss of MLH1 and PMS2; hypermethylation of MLH1 promotor positive. MLH1 promoter methylation is typically associated with sporadic microsatellite unstable tumors of the colon/rectum or endometrium.      3.) Labs and/or tests ordered include:  None.     4.) Health maintenance issues addressed today include annual health maintenance and non-gynecologic issues with PCP.    5.) Mass in lower left abdominal fold: discussed watching the area given she noticed it last night and, if it increases in size or becomes reddened or painful, she should call the clinic. If after 2 weeks there is no change, she will send a [employer] message.      NERY KenneyN, RN, FNP-S  [facility]    I have reviewed & edited the note; I was present for the entire visit and double  examined the patient.    ROSEMARY Lance, WHNP-BC, ANP-BC  Women's Health Nurse Practitioner  Adult Nurse Practitioner  Division of Gynecologic Oncology          CC  Patient Care Team:  Angeli Nolan MD as PCP - General (Family Practice)  Janny Cesar MD as Assigned Cancer Care Provider

## 2021-05-17 NOTE — PROGRESS NOTES
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  She completed brachytherapy 2019. She is here today for her regular surveillance visit.     Oncology history:   2019 D&C with pathology showing Grade 1 endometrial cancer, loss of MLH1 and PMS2, hypermethylation of MLH1 promotor positive  10/4/2019: Robotic-assisted Total laparoscopic hysterectomy, bilateral salpingo-oophorectomy, sentinel lymph node dissection, vaginal laceration repair: Stage 1B FIGO Grade 2, DOI 17/21mm (81%), LVSI+, cytology negative, tumor 3.4cm; MSI-H (MLH1 promoter melthylation)  2019: Vaginal brachytherapy; 30Gy in 5 fx         Today she reports feeling well, no complaints. She denies chest pain, shortness of breath, lower leg swelling, abdominal discomfort, N/V. She is eating and drinking normally, no fevers/chills. She is not sexually active and denies vaginal bleeding or dryness. She uses her dilator 3 times per month for about 3 minutes. She will be seeing her PCP in July and will schedule her dexa scan, mammogram, and colonoscopy over the next few months. No change in bladder habits (occasional urine incontinence that she wears an incontinence pad). She does state her stools have been \"flaky\" and break apart when she flushes, she will discuss this with her PCP and see what her colonoscopy states. She also noticed a \"lump\" to her left lower abdomen in between her abdominal folds. She noticed this last night, it does not cause pain. .      Review of Systems     Constitutional:  Negative for fever, chills, weight loss, weight gain, fatigue, decreased appetite, night sweats, recent stressors, height gain, height loss, post-operative complications, incisional pain, hallucinations, increased energy, hyperactivity and confused. "   HENT:  Negative for ear pain, hearing loss, tinnitus, nosebleeds, trouble swallowing, hoarse voice, mouth sores, sore throat, ear discharge, tooth pain, gum tenderness, taste disturbance, smell disturbance, hearing aid, bleeding gums, dry mouth, sinus pain, sinus congestion and neck mass.    Eyes:  Negative for double vision, pain, redness, eye pain, decreased vision, eye watering, eye bulging, eye dryness, flashing lights, spots, floaters, strabismus, tunnel vision, jaundice and eye irritation.   Respiratory:   Negative for cough, hemoptysis, sputum production, shortness of breath, wheezing, sleep disturbances due to breathing, snores loudly, respiratory pain, dyspnea on exertion, cough disturbing sleep and postural dyspnea.    Cardiovascular:  Negative for chest pain, dyspnea on exertion, palpitations, orthopnea, claudication, leg swelling, fingers/toes turn blue, hypertension, hypotension, syncope, history of heart murmur, chest pain on exertion, chest pain at rest, pacemaker, few scattered varicosities, leg pain, sleep disturbances due to breathing, tachycardia, light-headedness, exercise intolerance and edema.   Gastrointestinal:  Positive for change in stool. Negative for heartburn, nausea, vomiting, abdominal pain, diarrhea, constipation, blood in stool, melena, rectal pain, bloating, bowel incontinence, jaundice and coffee ground emesis.   Genitourinary:  Positive for bladder incontinence. Negative for dysuria, urgency, hematuria, flank pain, vaginal discharge, difficulty urinating, genital sores, dyspareunia, decreased libido, nocturia, voiding less frequently, arousal difficulty, abnormal vaginal bleeding, excessive menstruation, menstrual changes, hot flashes, vaginal dryness and postmenopausal bleeding.   Musculoskeletal:  Positive for myalgias, back pain, arthralgias and stiffness. Negative for joint swelling, muscle cramps, neck pain, bone pain, muscle weakness and fracture.   Skin:  Negative for  nail changes, itching, poor wound healing, rash, hair changes, skin changes, acne, warts, poor wound healing, scarring, flaky skin, Raynaud's phenomenon, sensitivity to sunlight and skin thickening.   Neurological:  Negative for dizziness, tingling, tremors, speech change, seizures, loss of consciousness, weakness, light-headedness, numbness, headaches, disturbances in coordination, extremity numbness, memory loss, difficulty walking and paralysis.   Endo/Heme:  Negative for anemia, swollen glands and bruises/bleeds easily.   Psychiatric/Behavioral:  Negative for depression, hallucinations, memory loss, decreased concentration, mood swings and panic attacks.    Breast:  Negative for breast discharge, breast mass, breast pain and nipple retraction.   Endocrine:  Negative for altered temperature regulation, polyphagia, polydipsia, unwanted hair growth and change in facial hair.          Past Medical History:    Past Medical History:   Diagnosis Date     Cancer (H) August 2019    Uterus removed     Depression      Depressive disorder 1999    taking prosac     Hypothyroidism      Overweight          Past Surgical History:    Past Surgical History:   Procedure Laterality Date     ABDOMEN SURGERY  Oct 2019    Robotic Hysterectomy     BIOPSY  early 2000's    left breast - non event     COLONOSCOPY  within last 6 years ?    OK     DAVINCI HYSTERECTOMY TOTAL, BILATERAL SALPINGO-OOPHORECTOMY, NODE DISSECTION, COMBINED N/A 10/4/2019    Procedure: Robot-assisted total laparoscopic hysterectomy, Bilateral salpingectomy and Right Oophorectomy, Lysis of adhesion, Cervical Indocyanine Green injection, Bilateral sentinel lymph node dissection, Repair of vaginal laceration.;  Surgeon: Perez Reddy MD;  Location: [facility] OR     DILATION AND CURETTAGE N/A 9/12/2019    Procedure: DILATION AND CURETTAGE, UTERUS;  Surgeon: Navdeep Barajas MD;  Location:  OR     OPERATIVE HYSTEROSCOPY WITH MORCELLATOR N/A 9/12/2019    Procedure:  HYSTEROSCOPY WITH MORCELLATOR (MYOSURE);  Surgeon: Navdeep Barajas MD;  Location: [facility] OR     SALPINGO OOPHORECTOMY,R/L/SHANDA Left 1980s    Left ovarian with endometriois     US BREAST BIOPSY LT Left          Health Maintenance Due   Topic Date Due     ZOSTER IMMUNIZATION (2 of 3) 10/13/2014     PHQ-9  2021       Current Medications:     Current Outpatient Medications   Medication Sig Dispense Refill     Apple Cider Vinegar 600 MG CAPS Take 1 capsule by mouth daily        CALCIUM PO Take 20 mg by mouth daily        cholecalciferol (VITAMIN D3) 5000 units TABS tablet Take by mouth daily       clobetasol (TEMOVATE) 0.05 % external ointment Apply sparingly then once or twice a week as needed 45 g 0     Cyanocobalamin 1500 MCG TBDP Take 1 tablet by mouth daily        FLUoxetine (PROZAC) 20 MG capsule Take 1 capsule (20 mg) by mouth daily 90 capsule 3     Lactobacillus (ACIDOPHILUS PO) Take 1 tablet by mouth daily        levothyroxine (SYNTHROID/LEVOTHROID) 100 MCG tablet Take 1 tablet (100 mcg) by mouth every morning 90 tablet 0     Omega 3-6-9 Fatty Acids (OMEGA-3-6-9 PO) Take 1 capsule by mouth daily        Zinc Picolinate 25 MG TABS Take 1 tablet by mouth daily            Allergies:      No Known Allergies     Social History:     Social History     Tobacco Use     Smoking status: Former Smoker     Packs/day: 1.00     Years: 15.00     Pack years: 15.00     Types: Cigarettes     Start date: 1972     Quit date: 1987     Years since quittin.5     Smokeless tobacco: Never Used   Substance Use Topics     Alcohol use: Yes     Frequency: 2-3 times a week     Comment: a beer here and there - never more than 2 -  2 times/week       History   Drug Use Unknown         Family History:     The patient's family history is notable for.    Family History   Problem Relation Age of Onset     Acute myelogenous leukemia Mother      Coronary Artery Disease Mother         bypass surgery      Osteoporosis  "Mother      Obesity Mother      Parkinsonism Father      Prostate Cancer Father         Diagnosed late in life in his 80's     Obesity Sister      Heart Disease Sister      Coronary Artery Disease Sister         heart attacK in June of 2019     Uterine Cancer Maternal Grandmother 40        Uterine versus cervical     No Known Problems Sister      Leukemia Brother      Leukemia Nephew      Coronary Artery Disease Brother         Carotid Artery Surgery     Obesity Brother      Substance Abuse Brother         alcohol         Physical Exam:     /66 (BP Location: Right arm, Patient Position: Chair, Cuff Size: Adult Large)   Pulse 77   Temp 97.9  F (36.6  C) (Tympanic)   Resp 16   Ht 1.715 m (5' 7.52\")   Wt 119.4 kg (263 lb 3.7 oz)   LMP  (LMP Unknown)   SpO2 97%   BMI 40.59 kg/m    Body mass index is 40.59 kg/m .    General Appearance: healthy and alert, no distress     HEENT: no thyromegaly, no palpable nodules or masses        Cardiovascular: regular rate and rhythm, no gallops, rubs or murmurs     Respiratory: lungs clear, no rales, rhonchi or wheezes, normal diaphragmatic excursion    Musculoskeletal: extremities non tender and without edema    Skin: no lesions or rashes, quarter size non mobile mass noted to left abdominal fold; slight erythemas along area under pannus    Neurological: normal gait, no gross defects     Psychiatric: appropriate mood and affect                               Hematological: normal cervical, supraclavicular and inguinal lymph nodes     Gastrointestinal:       abdomen soft, non-tender, non-distended, no organomegaly or masses    Genitourinary: External genitalia with and urethral meatus appears normal.  Vagina is smooth without nodularity or masses.  Cervix surgically absent.  Bimanual exam reveal no masses, nodularity or fullness.  Recto-vaginal exam confirms these findings.      Assessment:    Hanh Villalba is a 65 year old woman with a history of stage IB grade 2 " endometrial endometrioid adenocarcinoma.  She completed brachytherapy 12/2019. She is here today for her regular surveillance visit.     36 minutes spent on the date of the encounter doing chart review, history and exam, documentation and further activities as noted above      Plan:     1.)        Patient to RTC in 3 months for her next surveillance visit. Discussed continued use of dilator and extending each session to 5 minutes. Discussed current recommendations from [facility] not to perform surveillance pap smears as they do not detect local recurrence in women diagnosed with endometrial cancer. Discussed signs and symptoms for when she should contact the clinic or seek additional medical care.  Answered all questions/concerns to the best of my ability. She will contact the clinic for any concerns that arise prior to her next visit.     2.) Genetic risk factors were assessed and she had a loss of MLH1 and PMS2; hypermethylation of MLH1 promotor positive. MLH1 promoter methylation is typically associated with sporadic microsatellite unstable tumors of the colon/rectum or endometrium.      3.) Labs and/or tests ordered include:  None.     4.) Health maintenance issues addressed today include annual health maintenance and non-gynecologic issues with PCP.    5.) Mass in lower left abdominal fold: discussed watching the area given she noticed it last night and, if it increases in size or becomes reddened or painful, she should call the clinic. If after 2 weeks there is no change, she will send a [employer] message.      KAMRON Kenney, RN, FNP-S  [facility]    I have reviewed & edited the note; I was present for the entire visit and double examined the patient.    ROSEMARY Lance, WHNP-BC, ANP-BC  Women's Health Nurse Practitioner  Adult Nurse Practitioner  Division of Gynecologic Oncology          CC  Patient Care Team:  Angeli Nolan MD as PCP - General (Family Practice)  Angeli Nolan  MD James as Assigned PCP  Beatrice Reyes APRN CNP as Assigned OBGYN Provider  Janny Cesar MD as Assigned Cancer Care Provider

## 2021-05-25 ENCOUNTER — TELEPHONE (OUTPATIENT)
Dept: ONCOLOGY | Facility: CLINIC | Age: 66
End: 2021-05-25

## 2021-05-25 NOTE — TELEPHONE ENCOUNTER
"[facility] Telephone Triage Note    Assessment:   Bry (pt) reporting the following symptoms: update on \"lump\" left lower quadrant of abdomen in between her abdominal folds.   Starting around Thu 5/20, hurts when presses on area, cramping on occasion.  This morning noticed open area about prune size square shape wound, appears dark red.   Placed sterile gauze and just removed this afternoon to check site and some sherry blood on gauze size of dime.    Denies fevers/chills, cough, sore throat, SOB, n/v, bowel & bladder issues,     Is going to send a picture of the wound.     Recommendations: Routed to provider Beatrice Reyes      Follow-Up    Instructed patient to seek care immediately for worsening symptoms, including: fever, chest pain, shortness of breath, dizziness, signs of infection at site.        "

## 2021-05-26 NOTE — TELEPHONE ENCOUNTER
Bry calls in to report that she went to the local clinic to have her wound looked at. The Dr at the local clinic lanced the cyst, drained it packed it and placed her on antibiotic. She will keep her follow up on Friday with Keyla Reyes. .

## 2021-05-26 NOTE — TELEPHONE ENCOUNTER
Per provider Beatrice Greggsandra  I could [place] Friday [place] [facility] She could also see her PCP locally if she does not want to drive all the way here from [place].      Bry (pt) called back, is in agreement to see Beatrice Escobedosandra this Friday at 8:20am.   Reporting update:  Last night around 9:00am, had a friend take a look at site and got a pink/clear fluid that drained out of it when gently pressed around wound site.   Pt has a nurse friend who thought it may be cellulitis/infection.    Pt denies fever or any new symptoms from yesterday.    This writer educated on patient to go to local urgent care in [place] to have a provider look at wound site to determine if any active infection going on and if intervention sooner than Friday is needed.     Bry will still keep apt for oncology provider in [place] for Friday 5/28 at 8:20am.

## 2021-05-27 NOTE — PROGRESS NOTES
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  She completed brachytherapy 2019.  She is here today for an acute visit.     Oncology history:   2019 D&C with pathology showing Grade 1 endometrial cancer, loss of MLH1 and PMS2, hypermethylation of MLH1 promotor positive  10/4/2019: Robotic-assisted Total laparoscopic hysterectomy, bilateral salpingo-oophorectomy, sentinel lymph node dissection, vaginal laceration repair: Stage 1B FIGO Grade 2, DOI 17/21mm (81%), LVSI+, cytology negative, tumor 3.4cm; MSI-H (MLH1 promoter melthylation)  2019: Vaginal brachytherapy; 30Gy in 5 fx      Today she reports she since her last visit, the lower abdominal \"lump\" became red and did drain some pink/clear fluid; she ultimately went to a local provide to have this drained and was put on an antibiotic (Bactrim DS BID x 10 days); a culture was also collected (results pending) and she is keeping a dressing over it. She took a shower this morning and did let the soap run down over it. She denies any fevers or chills or increased discomfort. She is otherwise doing well and is without concerns. She denies any vaginal bleeding, no changes in her bowel or bladder habits, no nausea/emesis, no lower extremity edema, and no difficulties eating or sleeping. She denies any abdominal discomfort/bloating, no fevers or chills, and no chest pain or shortness of breath.        Review of Systems:    Systemic           no weight changes; no fever; no chills; no night sweats; no appetite changes  Skin           no rashes, or lesions  Eye           no irritation; no changes in vision  Shila-Laryngeal           no dysphagia; no hoarseness   Pulmonary    no cough; no shortness of breath  Cardiovascular    no chest pain; no " palpitations  Gastrointestinal    no diarrhea; no constipation; no abdominal pain; no changes in bowel  habits; no blood in stool  Genitourinary   no urinary frequency; no urinary urgency; no dysuria; no pain; no abnormal vaginal discharge; no abnormal vaginal bleeding  Breast   no breast discharge; no breast changes; no breast pain  Musculoskeletal    no myalgias; no arthralgias; no back pain  Psychiatric           no depressed mood; no anxiety    Hematologic           no tender lymph nodes; no noticeable swellings or lumps   Endocrine    no hot flashes; no heat/cold intolerance         Neurological   no tremor; no numbness and tingling; no headaches; no difficulty  sleeping      Past Medical History:    Past Medical History:   Diagnosis Date     Cancer (H) August 2019    Uterus removed     Depression      Depressive disorder 1999    taking prosac     Hypothyroidism      Overweight          Past Surgical History:    Past Surgical History:   Procedure Laterality Date     ABDOMEN SURGERY  Oct 2019    Robotic Hysterectomy     BIOPSY  early 2000's    left breast - non event     COLONOSCOPY  within last 6 years ?    OK     DAVINCI HYSTERECTOMY TOTAL, BILATERAL SALPINGO-OOPHORECTOMY, NODE DISSECTION, COMBINED N/A 10/4/2019    Procedure: Robot-assisted total laparoscopic hysterectomy, Bilateral salpingectomy and Right Oophorectomy, Lysis of adhesion, Cervical Indocyanine Green injection, Bilateral sentinel lymph node dissection, Repair of vaginal laceration.;  Surgeon: Perez Reddy MD;  Location: [facility] OR     DILATION AND CURETTAGE N/A 9/12/2019    Procedure: DILATION AND CURETTAGE, UTERUS;  Surgeon: Navdeep Barajas MD;  Location: [facility] OR     OPERATIVE HYSTEROSCOPY WITH MORCELLATOR N/A 9/12/2019    Procedure: HYSTEROSCOPY WITH MORCELLATOR (MYOSURE);  Surgeon: Navdeep Barajas MD;  Location: [facility] OR     SALPINGO OOPHORECTOMY,R/L/SHANDA Left 1980s    Left ovarian with endometriois     US BREAST BIOPSY LT Left           Health Maintenance Due   Topic Date Due     ZOSTER IMMUNIZATION (2 of 3) 10/13/2014     PHQ-9  2021     COLORECTAL CANCER SCREENING  2021       Current Medications:     Current Outpatient Medications   Medication Sig Dispense Refill     Apple Cider Vinegar 600 MG CAPS Take 1 capsule by mouth daily        CALCIUM PO Take 20 mg by mouth daily        cholecalciferol (VITAMIN D3) 5000 units TABS tablet Take by mouth daily       clobetasol (TEMOVATE) 0.05 % external ointment Apply sparingly then once or twice a week as needed 45 g 0     Cyanocobalamin 1500 MCG TBDP Take 1 tablet by mouth daily        FLUoxetine (PROZAC) 20 MG capsule Take 1 capsule (20 mg) by mouth daily 90 capsule 3     Lactobacillus (ACIDOPHILUS PO) Take 1 tablet by mouth daily        levothyroxine (SYNTHROID/LEVOTHROID) 100 MCG tablet Take 1 tablet (100 mcg) by mouth every morning 90 tablet 0     Omega 3-6-9 Fatty Acids (OMEGA-3-6-9 PO) Take 1 capsule by mouth daily        sulfamethoxazole-trimethoprim (BACTRIM DS) 800-160 MG tablet Take 1 tablet by mouth       Zinc Picolinate 25 MG TABS Take 1 tablet by mouth daily            Allergies:      No Known Allergies     Social History:     Social History     Tobacco Use     Smoking status: Former Smoker     Packs/day: 1.00     Years: 15.00     Pack years: 15.00     Types: Cigarettes     Start date: 1972     Quit date: 1987     Years since quittin.5     Smokeless tobacco: Never Used   Substance Use Topics     Alcohol use: Yes     Frequency: 2-3 times a week     Comment: a beer here and there - never more than 2 -  2 times/week       History   Drug Use Unknown         Family History:     The patient's family history is notable for:    Family History   Problem Relation Age of Onset     Acute myelogenous leukemia Mother      Coronary Artery Disease Mother         bypass surgery      Osteoporosis Mother      Obesity Mother      Parkinsonism Father      Prostate Cancer  "Father         Diagnosed late in life in his 80's     Obesity Sister      Heart Disease Sister      Coronary Artery Disease Sister         heart attacK in June of 2019     Uterine Cancer Maternal Grandmother 40        Uterine versus cervical     No Known Problems Sister      Leukemia Brother      Leukemia Nephew      Coronary Artery Disease Brother         Carotid Artery Surgery     Obesity Brother      Substance Abuse Brother         alcohol         Physical Exam:     /75 (BP Location: Right arm, Patient Position: Chair, Cuff Size: Adult Large)   Pulse 69   Temp 97.9  F (36.6  C) (Tympanic)   Resp 16   Ht 1.715 m (5' 7.52\")   Wt 119.8 kg (264 lb 1.6 oz)   LMP  (LMP Unknown)   SpO2 98%   BMI 40.73 kg/m    Body mass index is 40.73 kg/m .    General Appearance: healthy and alert, no distress     HEENT: no thyromegaly, no palpable nodules or masses        Cardiovascular: regular rate and rhythm, no gallops, rubs or murmurs     Respiratory: lungs clear, no rales, rhonchi or wheezes, normal diaphragmatic excursion    Musculoskeletal: extremities non tender and without edema    Skin: Left lower quadrant open wound approximately 2.5 cm in length and 1 cm depth; probed and no tunneling identified; scant serosanguinous drainage on cotton swab with probing; area quite tender at lateral aspects;firm/induration along inferior to open wound. 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  She completed brachytherapy 12/2019.   She is here today for an acute visit.     20 minutes spent on the date of the encounter " "doing chart review, history and exam, documentation and further activities as noted above      Plan:     1.)        Return for her next scheduled visit as planned. Continue on antibiotic until completion and follow recommendations for wound care (ie. Monitoring for fever or signs of infection, applying a warm compress,monitoring \"firmness\" above and below wound, etc.). reviewed importance of keeping area clean/dry and avoiding moisture in the area. Will have her get a CT ap 6/8 when she sees her PCP for her annual exam to further evaluate the area. Encouraged her to contact this clinic when she gets her wound culture results back if they are \"concerning\" ie not just staph; she verbalized understanding. She can apply a thin amount of a triple antibiotic ointment around the edges of the open wound to keep it moisturized.      2.) Genetic risk factors were assessed and she had a loss of MLH1 and PMS2; hypermethylation of MLH1 promotor positive. MLH1 promoter methylation is typically associated with sporadic microsatellite unstable tumors of the colon/rectum or endometrium.     3.) Labs and/or tests ordered include:  CT ap.      4.) Health maintenance issues addressed today include annual health maintenance and non-gynecologic issues with PCP.    ROSEMARY Lance, WHNP-BC, ANP-BC  Women's Health Nurse Practitioner  Adult Nurse Practitioner  Division of Gynecologic Oncology          CC  Patient Care Team:  Angeli Nolan MD as PCP - General (Family Practice)  Angeli Nolan MD as Assigned PCP  Beatrice Reyes APRN CNP as Assigned OBGYN Provider  Janny Cesar MD as Assigned Cancer Care Provider  SELF, REFERRED  "

## 2021-05-28 ENCOUNTER — ONCOLOGY VISIT (OUTPATIENT)
Dept: ONCOLOGY | Facility: CLINIC | Age: 66
End: 2021-05-28
Attending: OBSTETRICS & GYNECOLOGY
Payer: COMMERCIAL

## 2021-05-28 VITALS
SYSTOLIC BLOOD PRESSURE: 129 MMHG | WEIGHT: 264.1 LBS | TEMPERATURE: 97.9 F | HEART RATE: 69 BPM | OXYGEN SATURATION: 98 % | HEIGHT: 68 IN | RESPIRATION RATE: 16 BRPM | DIASTOLIC BLOOD PRESSURE: 75 MMHG | BODY MASS INDEX: 40.02 KG/M2

## 2021-05-28 DIAGNOSIS — K65.1 LEFT LOWER QUADRANT ABDOMINAL ABSCESS (H): ICD-10-CM

## 2021-05-28 DIAGNOSIS — C55 ENDOMETRIOID ADENOCARCINOMA OF UTERUS (H): Primary | ICD-10-CM

## 2021-05-28 PROCEDURE — G0463 HOSPITAL OUTPT CLINIC VISIT: HCPCS

## 2021-05-28 PROCEDURE — 99213 OFFICE O/P EST LOW 20 MIN: CPT | Performed by: NURSE PRACTITIONER

## 2021-05-28 RX ORDER — SULFAMETHOXAZOLE/TRIMETHOPRIM 800-160 MG
1 TABLET ORAL
COMMUNITY
Start: 2021-05-26 | End: 2021-06-05

## 2021-05-28 ASSESSMENT — MIFFLIN-ST. JEOR: SCORE: 1773.83

## 2021-05-28 ASSESSMENT — PAIN SCALES - GENERAL: PAINLEVEL: NO PAIN (0)

## 2021-05-28 NOTE — NURSING NOTE
"Oncology Rooming Note    May 28, 2021 8:10 AM   Hanh Villalba is a 65 year old female who presents for:    Chief Complaint   Patient presents with     Oncology Clinic Visit     [facility] RETURN - ENDOMETRIOID ADENOCARCINOMA OF UTERUS     Initial Vitals: /75 (BP Location: Right arm, Patient Position: Chair, Cuff Size: Adult Large)   Pulse 69   Temp 97.9  F (36.6  C) (Tympanic)   Resp 16   Ht 1.715 m (5' 7.52\")   Wt 119.8 kg (264 lb 1.6 oz)   LMP  (LMP Unknown)   SpO2 98%   BMI 40.73 kg/m   Estimated body mass index is 40.73 kg/m  as calculated from the following:    Height as of this encounter: 1.715 m (5' 7.52\").    Weight as of this encounter: 119.8 kg (264 lb 1.6 oz). Body surface area is 2.39 meters squared.  No Pain (0) Comment: Data Unavailable   No LMP recorded (lmp unknown). Patient has had a hysterectomy.  Allergies reviewed: Yes  Medications reviewed: Yes    Medications: Medication refills not needed today.  Pharmacy name entered into [facility]: [id number] [address]    Clinical concerns: No new concerns. Eric was notified.      Bernardo Moreno LPN            "

## 2021-06-04 ENCOUNTER — ANCILLARY PROCEDURE (OUTPATIENT)
Dept: CT IMAGING | Facility: CLINIC | Age: 66
End: 2021-06-04
Attending: NURSE PRACTITIONER
Payer: COMMERCIAL

## 2021-06-04 DIAGNOSIS — C55 ENDOMETRIOID ADENOCARCINOMA OF UTERUS (H): ICD-10-CM

## 2021-06-04 DIAGNOSIS — K65.1 LEFT LOWER QUADRANT ABDOMINAL ABSCESS (H): ICD-10-CM

## 2021-06-04 LAB
CREAT BLD-MCNC: 1.4 MG/DL (ref 0.52–1.04)
GFR SERPL CREATININE-BSD FRML MDRD: 38 ML/MIN/{1.73_M2}

## 2021-06-04 PROCEDURE — 74177 CT ABD & PELVIS W/CONTRAST: CPT | Mod: GC | Performed by: RADIOLOGY

## 2021-06-04 RX ORDER — IOPAMIDOL 755 MG/ML
135 INJECTION, SOLUTION INTRAVASCULAR ONCE
Status: COMPLETED | OUTPATIENT
Start: 2021-06-04 | End: 2021-06-04

## 2021-06-04 RX ADMIN — IOPAMIDOL 135 ML: 755 INJECTION, SOLUTION INTRAVASCULAR at 17:39

## 2021-06-08 ENCOUNTER — OFFICE VISIT (OUTPATIENT)
Dept: FAMILY MEDICINE | Facility: CLINIC | Age: 66
End: 2021-06-08
Payer: COMMERCIAL

## 2021-06-08 VITALS
SYSTOLIC BLOOD PRESSURE: 124 MMHG | WEIGHT: 262.8 LBS | HEIGHT: 68 IN | TEMPERATURE: 98 F | DIASTOLIC BLOOD PRESSURE: 56 MMHG | OXYGEN SATURATION: 98 % | BODY MASS INDEX: 39.83 KG/M2 | HEART RATE: 65 BPM

## 2021-06-08 DIAGNOSIS — F33.42 RECURRENT MAJOR DEPRESSIVE DISORDER, IN FULL REMISSION (H): Primary | ICD-10-CM

## 2021-06-08 DIAGNOSIS — E03.4 HYPOTHYROIDISM DUE TO ACQUIRED ATROPHY OF THYROID: ICD-10-CM

## 2021-06-08 DIAGNOSIS — Z12.31 ENCOUNTER FOR SCREENING MAMMOGRAM FOR BREAST CANCER: ICD-10-CM

## 2021-06-08 DIAGNOSIS — N18.30 STAGE 3 CHRONIC KIDNEY DISEASE, UNSPECIFIED WHETHER STAGE 3A OR 3B CKD (H): ICD-10-CM

## 2021-06-08 DIAGNOSIS — Z12.11 SCREENING FOR COLON CANCER: ICD-10-CM

## 2021-06-08 DIAGNOSIS — L02.211 ABDOMINAL WALL ABSCESS: ICD-10-CM

## 2021-06-08 DIAGNOSIS — E66.01 MORBID OBESITY (H): ICD-10-CM

## 2021-06-08 PROCEDURE — 99214 OFFICE O/P EST MOD 30 MIN: CPT | Performed by: FAMILY MEDICINE

## 2021-06-08 ASSESSMENT — ANXIETY QUESTIONNAIRES
IF YOU CHECKED OFF ANY PROBLEMS ON THIS QUESTIONNAIRE, HOW DIFFICULT HAVE THESE PROBLEMS MADE IT FOR YOU TO DO YOUR WORK, TAKE CARE OF THINGS AT HOME, OR GET ALONG WITH OTHER PEOPLE: NOT DIFFICULT AT ALL
7. FEELING AFRAID AS IF SOMETHING AWFUL MIGHT HAPPEN: NOT AT ALL
3. WORRYING TOO MUCH ABOUT DIFFERENT THINGS: NOT AT ALL
5. BEING SO RESTLESS THAT IT IS HARD TO SIT STILL: NOT AT ALL
6. BECOMING EASILY ANNOYED OR IRRITABLE: NOT AT ALL
1. FEELING NERVOUS, ANXIOUS, OR ON EDGE: NOT AT ALL
GAD7 TOTAL SCORE: 0
2. NOT BEING ABLE TO STOP OR CONTROL WORRYING: NOT AT ALL

## 2021-06-08 ASSESSMENT — MIFFLIN-ST. JEOR: SCORE: 1767.61

## 2021-06-08 ASSESSMENT — PAIN SCALES - GENERAL: PAINLEVEL: NO PAIN (0)

## 2021-06-08 ASSESSMENT — PATIENT HEALTH QUESTIONNAIRE - PHQ9
5. POOR APPETITE OR OVEREATING: NOT AT ALL
SUM OF ALL RESPONSES TO PHQ QUESTIONS 1-9: 1

## 2021-06-08 NOTE — PROGRESS NOTES
"    Assessment & Plan     (F33.42) Recurrent major depressive disorder, in full remission (H)  (primary encounter diagnosis)  Comment: stable  Plan: FLUoxetine (PROZAC) 20 MG capsule        Continue current medication      (E66.01) Morbid obesity (H)  Comment: has lost weight  Plan: continue healthy habits.    (E03.4) Hypothyroidism due to acquired atrophy of thyroid  Comment: will be returning for labs.  Plan: adjust therapy based on labs      (N18.30) Stage 3 chronic kidney disease, unspecified whether stage 3a or 3b CKD  Comment: will be returning for lab work  Plan: adjust therapy based on labs    (Z12.31) Encounter for screening mammogram for breast cancer  Comment:   Plan: *MA Screening Digital Bilateral            (Z12.11) Screening for colon cancer  Comment:   Plan: GASTROENTEROLOGY ADULT REF PROCEDURE ONLY            (L02.211) Abdominal wall abscess  Comment: healing after I&D  Plan: Discussed warning signs/symptoms for which she needs followup.               BMI:   Estimated body mass index is 40.55 kg/m  as calculated from the following:    Height as of this encounter: 1.715 m (5' 7.5\").    Weight as of this encounter: 119.2 kg (262 lb 12.8 oz).   Weight management plan: Discussed healthy diet and exercise guidelines        Return in about 1 year (around 6/8/2022) for Preventive Health visit (physical).    Angeli Messina MD  [facility]    Trev Londono is a 65 year old who presents for the following health issues   Did just see GYN Onc - just had CT scan.    Does have an open sore.    Gyn Onc took a look at it.    Went to [place]  And while she was there it did open up - so she went to local clinic and had it lanced.  Had packing at that time.    New skin lesion on her right cheek.    Itch in right ear.  Seems to be almost daily.  qtip scratching it helps.    has noticed a slight changes in stool.  Formed but soft  Changed her acidopholus supplement. About a " month ago.    Had bladder PT. And if she does her home exercises it is better.    HPI     Depression Followup    How are you doing with your depression since your last visit? Improved     Are you having other symptoms that might be associated with depression? No    Have you had a significant life event?  No     Are you feeling anxious or having panic attacks?   No    Do you have any concerns with your use of alcohol or other drugs? No    Social History     Tobacco Use     Smoking status: Former Smoker     Packs/day: 1.00     Years: 15.00     Pack years: 15.00     Types: Cigarettes     Start date: 1972     Quit date: 1987     Years since quittin.5     Smokeless tobacco: Never Used   Substance Use Topics     Alcohol use: Yes     Frequency: 2-3 times a week     Comment: a beer here and there - never more than 2 -  2 times/week     Drug use: Never     PHQ 2020   PHQ-9 Total Score 0 1   Q9: Thoughts of better off dead/self-harm past 2 weeks Not at all Not at all     MARGI-7 SCORE 2021   Total Score 0     Last PHQ-9 2021   1.  Little interest or pleasure in doing things 0   2.  Feeling down, depressed, or hopeless 0   3.  Trouble falling or staying asleep, or sleeping too much 1   4.  Feeling tired or having little energy 0   5.  Poor appetite or overeating 0   6.  Feeling bad about yourself 0   7.  Trouble concentrating 0   8.  Moving slowly or restless 0   Q9: Thoughts of better off dead/self-harm past 2 weeks 0   PHQ-9 Total Score 1   Difficulty at work, home, or with people Not difficult at all     MARGI-7  2021   1. Feeling nervous, anxious, or on edge 0   2. Not being able to stop or control worrying 0   3. Worrying too much about different things 0   4. Trouble relaxing 0   5. Being so restless that it is hard to sit still 0   6. Becoming easily annoyed or irritable 0   7. Feeling afraid, as if something awful might happen 0   MARGI-7 Total Score 0   If you checked any problems,  "how difficult have they made it for you to do your work, take care of things at home, or get along with other people? Not difficult at all           Hypothyroidism Follow-up      Since last visit, patient describes the following symptoms: loose stools and depression      How many servings of fruits and vegetables do you eat daily?  2-3    On average, how many sweetened beverages do you drink each day (Examples: soda, juice, sweet tea, etc.  Do NOT count diet or artificially sweetened beverages)?   1    How many days per week do you exercise enough to make your heart beat faster? 5    How many minutes a day do you exercise enough to make your heart beat faster? 30 - 60    How many days per week do you miss taking your medication? 0    Concern - Wound   Onset: 2 weeks   Description: wound under fold of stomach     Therapies tried and outcome: Patient had it lanced and packed at another clinic an would like PCP to look at it         Review of Systems   Constitutional, HEENT, cardiovascular, pulmonary, gi and gu systems are negative, except as otherwise noted.      Objective    /56   Pulse 65   Temp 98  F (36.7  C) (Oral)   Ht 1.715 m (5' 7.5\")   Wt 119.2 kg (262 lb 12.8 oz)   LMP  (LMP Unknown)   SpO2 98%   BMI 40.55 kg/m    Body mass index is 40.55 kg/m .  Physical Exam   GENERAL: healthy, alert and no distress  HENT: normal cephalic/atraumatic and ear canals and TM's normal  RESP: lungs clear to auscultation - no rales, rhonchi or wheezes  CV: regular rate and rhythm, normal S1 S2, no S3 or S4, no murmur, click or rub, no peripheral edema and peripheral pulses strong  SKIN:   Face - benign appearing, well circumscribed, tan, raised, papule, 2-3mm on right cheek near nares.  Abdomen - left lower abdomen along previous scar, 1.5 cm granulating wound without redness or drainage.  Healing nicely.  PSYCH: mentation appears normal, affect normal/bright                "

## 2021-06-08 NOTE — PATIENT INSTRUCTIONS
To schedule mammogram:  [facility] (xray, mammogram, bone density, and ultrasound) [phone]        You can consider the new shingles vaccine.  Consider shingles vaccine.   It may be best to get the vaccine/shot at a pharmacy because many insurances cover this particular vaccine/shot through the pharmacy benefits (not the clinic benefits).  You need 2 shots, with the second  shot sometime 2-6 months after the first.

## 2021-06-09 ASSESSMENT — ANXIETY QUESTIONNAIRES: GAD7 TOTAL SCORE: 0

## 2021-06-15 ENCOUNTER — ANCILLARY PROCEDURE (OUTPATIENT)
Dept: MAMMOGRAPHY | Facility: CLINIC | Age: 66
End: 2021-06-15
Attending: FAMILY MEDICINE
Payer: COMMERCIAL

## 2021-06-15 DIAGNOSIS — Z12.31 ENCOUNTER FOR SCREENING MAMMOGRAM FOR BREAST CANCER: ICD-10-CM

## 2021-06-15 PROCEDURE — 77067 SCR MAMMO BI INCL CAD: CPT | Mod: GC | Performed by: STUDENT IN AN ORGANIZED HEALTH CARE EDUCATION/TRAINING PROGRAM

## 2021-06-17 DIAGNOSIS — Z11.59 ENCOUNTER FOR SCREENING FOR OTHER VIRAL DISEASES: ICD-10-CM

## 2021-06-18 ENCOUNTER — ANCILLARY PROCEDURE (OUTPATIENT)
Dept: MAMMOGRAPHY | Facility: CLINIC | Age: 66
End: 2021-06-18
Attending: FAMILY MEDICINE
Payer: COMMERCIAL

## 2021-06-18 DIAGNOSIS — R92.8 ABNORMAL MAMMOGRAM OF LEFT BREAST: ICD-10-CM

## 2021-06-18 PROCEDURE — 76642 ULTRASOUND BREAST LIMITED: CPT | Mod: LT | Performed by: RADIOLOGY

## 2021-06-25 DIAGNOSIS — E03.4 HYPOTHYROIDISM DUE TO ACQUIRED ATROPHY OF THYROID: ICD-10-CM

## 2021-06-25 DIAGNOSIS — Z13.6 CARDIOVASCULAR SCREENING; LDL GOAL LESS THAN 130: ICD-10-CM

## 2021-06-25 DIAGNOSIS — N18.30 STAGE 3 CHRONIC KIDNEY DISEASE, UNSPECIFIED WHETHER STAGE 3A OR 3B CKD (H): ICD-10-CM

## 2021-06-25 PROCEDURE — 80048 BASIC METABOLIC PNL TOTAL CA: CPT | Performed by: FAMILY MEDICINE

## 2021-06-25 PROCEDURE — 36415 COLL VENOUS BLD VENIPUNCTURE: CPT | Performed by: FAMILY MEDICINE

## 2021-06-25 PROCEDURE — 84443 ASSAY THYROID STIM HORMONE: CPT | Performed by: FAMILY MEDICINE

## 2021-06-25 PROCEDURE — 80061 LIPID PANEL: CPT | Performed by: FAMILY MEDICINE

## 2021-06-26 LAB
ANION GAP SERPL CALCULATED.3IONS-SCNC: 3 MMOL/L (ref 3–14)
BUN SERPL-MCNC: 20 MG/DL (ref 7–30)
CALCIUM SERPL-MCNC: 10 MG/DL (ref 8.5–10.1)
CHLORIDE SERPL-SCNC: 108 MMOL/L (ref 94–109)
CHOLEST SERPL-MCNC: 240 MG/DL
CO2 SERPL-SCNC: 28 MMOL/L (ref 20–32)
CREAT SERPL-MCNC: 1.04 MG/DL (ref 0.52–1.04)
GFR SERPL CREATININE-BSD FRML MDRD: 55 ML/MIN/{1.73_M2}
GLUCOSE SERPL-MCNC: 89 MG/DL (ref 70–99)
HDLC SERPL-MCNC: 40 MG/DL
LDLC SERPL CALC-MCNC: 177 MG/DL
NONHDLC SERPL-MCNC: 200 MG/DL
POTASSIUM SERPL-SCNC: 4.1 MMOL/L (ref 3.4–5.3)
SODIUM SERPL-SCNC: 139 MMOL/L (ref 133–144)
TRIGL SERPL-MCNC: 113 MG/DL
TSH SERPL DL<=0.005 MIU/L-ACNC: 1.51 MU/L (ref 0.4–4)

## 2021-07-14 ENCOUNTER — TELEPHONE (OUTPATIENT)
Dept: FAMILY MEDICINE | Facility: CLINIC | Age: 66
End: 2021-07-14

## 2021-07-14 NOTE — TELEPHONE ENCOUNTER
Forms received from [facility]/ $50 Gift card Mammogram screening for Angeli Messina MD.  Forms placed in provider 'sign me' folder.  Please mail form in self addressed stamped envelope provided after completion.    VIKRAM Hyde  [facility]

## 2021-07-14 NOTE — TELEPHONE ENCOUNTER
Completed form and mailed in envelope provided.    VIKRAM Hyde  [facility]

## 2021-07-15 ENCOUNTER — TELEPHONE (OUTPATIENT)
Dept: GASTROENTEROLOGY | Facility: CLINIC | Age: 66
End: 2021-07-15

## 2021-07-16 NOTE — TELEPHONE ENCOUNTER
Writer reviewed pre-assessment questions with patient prior to upcoming colonoscopy on 7.26.2021.  COVID test scheduled for 7.24.2021.    Reviewed miralax and magnesium citrate prep instructions with patient.  Noting no nuts, seeds, or popcorn 7 days prior to procedure.     Designated  policy reviewed with patient.     Patient verbalized understanding.  No further questions or concerns.    Shayla Morrow RN

## 2021-07-19 ENCOUNTER — MYC MEDICAL ADVICE (OUTPATIENT)
Dept: FAMILY MEDICINE | Facility: CLINIC | Age: 66
End: 2021-07-19

## 2021-07-20 NOTE — TELEPHONE ENCOUNTER
Monique sent.  Patient to call GI regarding diet question prior to colonoscopy.    Chandler Zhou RN

## 2021-07-24 ENCOUNTER — LAB (OUTPATIENT)
Dept: LAB | Facility: CLINIC | Age: 66
End: 2021-07-24
Payer: COMMERCIAL

## 2021-07-24 DIAGNOSIS — Z11.59 ENCOUNTER FOR SCREENING FOR OTHER VIRAL DISEASES: ICD-10-CM

## 2021-07-24 LAB — SARS-COV-2 RNA RESP QL NAA+PROBE: NEGATIVE

## 2021-07-24 PROCEDURE — U0003 INFECTIOUS AGENT DETECTION BY NUCLEIC ACID (DNA OR RNA); SEVERE ACUTE RESPIRATORY SYNDROME CORONAVIRUS 2 (SARS-COV-2) (CORONAVIRUS DISEASE [COVID-19]), AMPLIFIED PROBE TECHNIQUE, MAKING USE OF HIGH THROUGHPUT TECHNOLOGIES AS DESCRIBED BY CMS-2020-01-R: HCPCS | Mod: 90 | Performed by: PATHOLOGY

## 2021-07-24 PROCEDURE — U0005 INFEC AGEN DETEC AMPLI PROBE: HCPCS | Mod: 90 | Performed by: PATHOLOGY

## 2021-07-26 ENCOUNTER — ANESTHESIA EVENT (OUTPATIENT)
Dept: SURGERY | Facility: AMBULATORY SURGERY CENTER | Age: 66
End: 2021-07-26
Payer: COMMERCIAL

## 2021-07-26 ENCOUNTER — HOSPITAL ENCOUNTER (OUTPATIENT)
Facility: AMBULATORY SURGERY CENTER | Age: 66
End: 2021-07-26
Attending: INTERNAL MEDICINE
Payer: COMMERCIAL

## 2021-07-26 ENCOUNTER — ANESTHESIA (OUTPATIENT)
Dept: SURGERY | Facility: AMBULATORY SURGERY CENTER | Age: 66
End: 2021-07-26
Payer: COMMERCIAL

## 2021-07-26 VITALS
SYSTOLIC BLOOD PRESSURE: 101 MMHG | RESPIRATION RATE: 18 BRPM | OXYGEN SATURATION: 97 % | HEART RATE: 67 BPM | BODY MASS INDEX: 38.65 KG/M2 | TEMPERATURE: 97.8 F | DIASTOLIC BLOOD PRESSURE: 57 MMHG | HEIGHT: 68 IN | WEIGHT: 255 LBS

## 2021-07-26 VITALS — HEART RATE: 63 BPM

## 2021-07-26 LAB — COLONOSCOPY: NORMAL

## 2021-07-26 PROCEDURE — 45385 COLONOSCOPY W/LESION REMOVAL: CPT | Mod: PT

## 2021-07-26 PROCEDURE — 88305 TISSUE EXAM BY PATHOLOGIST: CPT | Performed by: PATHOLOGY

## 2021-07-26 RX ORDER — PROCHLORPERAZINE MALEATE 5 MG
5 TABLET ORAL EVERY 6 HOURS PRN
Status: DISCONTINUED | OUTPATIENT
Start: 2021-07-26 | End: 2021-07-27 | Stop reason: HOSPADM

## 2021-07-26 RX ORDER — NALOXONE HYDROCHLORIDE 0.4 MG/ML
0.2 INJECTION, SOLUTION INTRAMUSCULAR; INTRAVENOUS; SUBCUTANEOUS
Status: DISCONTINUED | OUTPATIENT
Start: 2021-07-26 | End: 2021-07-27 | Stop reason: HOSPADM

## 2021-07-26 RX ORDER — SODIUM CHLORIDE 9 MG/ML
500 INJECTION, SOLUTION INTRAVENOUS CONTINUOUS
Status: DISCONTINUED | OUTPATIENT
Start: 2021-07-26 | End: 2021-07-26 | Stop reason: HOSPADM

## 2021-07-26 RX ORDER — NALOXONE HYDROCHLORIDE 0.4 MG/ML
0.4 INJECTION, SOLUTION INTRAMUSCULAR; INTRAVENOUS; SUBCUTANEOUS
Status: DISCONTINUED | OUTPATIENT
Start: 2021-07-26 | End: 2021-07-27 | Stop reason: HOSPADM

## 2021-07-26 RX ORDER — ONDANSETRON 2 MG/ML
4 INJECTION INTRAMUSCULAR; INTRAVENOUS EVERY 6 HOURS PRN
Status: DISCONTINUED | OUTPATIENT
Start: 2021-07-26 | End: 2021-07-27 | Stop reason: HOSPADM

## 2021-07-26 RX ORDER — PROPOFOL 10 MG/ML
INJECTION, EMULSION INTRAVENOUS PRN
Status: DISCONTINUED | OUTPATIENT
Start: 2021-07-26 | End: 2021-07-26

## 2021-07-26 RX ORDER — PROPOFOL 10 MG/ML
INJECTION, EMULSION INTRAVENOUS CONTINUOUS PRN
Status: DISCONTINUED | OUTPATIENT
Start: 2021-07-26 | End: 2021-07-26

## 2021-07-26 RX ORDER — SIMETHICONE
LIQUID (ML) MISCELLANEOUS PRN
Status: DISCONTINUED | OUTPATIENT
Start: 2021-07-26 | End: 2021-07-26 | Stop reason: HOSPADM

## 2021-07-26 RX ORDER — LIDOCAINE 40 MG/G
CREAM TOPICAL
Status: DISCONTINUED | OUTPATIENT
Start: 2021-07-26 | End: 2021-07-26 | Stop reason: HOSPADM

## 2021-07-26 RX ORDER — ONDANSETRON 2 MG/ML
4 INJECTION INTRAMUSCULAR; INTRAVENOUS
Status: DISCONTINUED | OUTPATIENT
Start: 2021-07-26 | End: 2021-07-26 | Stop reason: HOSPADM

## 2021-07-26 RX ORDER — ONDANSETRON 4 MG/1
4 TABLET, ORALLY DISINTEGRATING ORAL EVERY 6 HOURS PRN
Status: DISCONTINUED | OUTPATIENT
Start: 2021-07-26 | End: 2021-07-27 | Stop reason: HOSPADM

## 2021-07-26 RX ORDER — FLUMAZENIL 0.1 MG/ML
0.2 INJECTION, SOLUTION INTRAVENOUS
Status: ACTIVE | OUTPATIENT
Start: 2021-07-26 | End: 2021-07-26

## 2021-07-26 RX ADMIN — PROPOFOL 175 MCG/KG/MIN: 10 INJECTION, EMULSION INTRAVENOUS at 10:41

## 2021-07-26 RX ADMIN — SODIUM CHLORIDE 500 ML: 9 INJECTION, SOLUTION INTRAVENOUS at 10:24

## 2021-07-26 RX ADMIN — PROPOFOL 60 MG: 10 INJECTION, EMULSION INTRAVENOUS at 10:41

## 2021-07-26 ASSESSMENT — LIFESTYLE VARIABLES: TOBACCO_USE: 1

## 2021-07-26 ASSESSMENT — MIFFLIN-ST. JEOR: SCORE: 1732.23

## 2021-07-26 NOTE — ANESTHESIA PREPROCEDURE EVALUATION
Anesthesia Pre-Procedure Evaluation    Patient: Hanh Villalba   MRN: [id number] : 1955        Preoperative Diagnosis: Screening for colon cancer [Z12.11]   Procedure : Procedure(s):  COLONOSCOPY     Past Medical History:   Diagnosis Date     Cancer (H) 2019    Uterus removed     Depression      Depressive disorder     taking prosac     Hypothyroidism      Overweight       Past Surgical History:   Procedure Laterality Date     ABDOMEN SURGERY  Oct 2019    Robotic Hysterectomy     BIOPSY  early     left breast - non event     COLONOSCOPY  within last 6 years ?    OK     DAVINCI HYSTERECTOMY TOTAL, BILATERAL SALPINGO-OOPHORECTOMY, NODE DISSECTION, COMBINED N/A 10/4/2019    Procedure: Robot-assisted total laparoscopic hysterectomy, Bilateral salpingectomy and Right Oophorectomy, Lysis of adhesion, Cervical Indocyanine Green injection, Bilateral sentinel lymph node dissection, Repair of vaginal laceration.;  Surgeon: Perez Reddy MD;  Location: [facility]     DILATION AND CURETTAGE N/A 2019    Procedure: DILATION AND CURETTAGE, UTERUS;  Surgeon: Navdeep Barajas MD;  Location: [facility]     OPERATIVE HYSTEROSCOPY WITH MORCELLATOR N/A 2019    Procedure: HYSTEROSCOPY WITH MORCELLATOR (MYOSURE);  Surgeon: Navdeep Barajas MD;  Location: [facility]     SALPINGO OOPHORECTOMY,R/L/SHANDA Left 1980s    Left ovarian with endometriois     US BREAST BIOPSY LT Left       No Known Allergies   Social History     Tobacco Use     Smoking status: Former Smoker     Packs/day: 1.00     Years: 15.00     Pack years: 15.00     Types: Cigarettes     Start date: 1972     Quit date: 1987     Years since quittin.7     Smokeless tobacco: Never Used   Substance Use Topics     Alcohol use: Yes     Comment: a beer here and there - never more than 2 -  2 times/week      Wt Readings from Last 1 Encounters:   21 115.7 kg (255 lb)        Anesthesia Evaluation            ROS/MED  HX  ENT/Pulmonary:  - neg pulmonary ROS   (+) tobacco use, Past use,     Neurologic:  - neg neurologic ROS     Cardiovascular:  - neg cardiovascular ROS     METS/Exercise Tolerance: >4 METS    Hematologic:  - neg hematologic  ROS     Musculoskeletal:  - neg musculoskeletal ROS     GI/Hepatic:  - neg GI/hepatic ROS     Renal/Genitourinary:     (+) renal disease, type: CRI,     Endo:     (+) thyroid problem, Obesity,     Psychiatric/Substance Use:     (+) psychiatric history depression     Infectious Disease:  - neg infectious disease ROS     Malignancy:   (+) Malignancy, History of Other.Other CA Endometrioid adenocarcinoma of uterus status post.    Other:  - neg other ROS          Physical Exam    Airway  airway exam normal      Mallampati: I   TM distance: > 3 FB   Neck ROM: full   Mouth opening: > 3 cm    Respiratory Devices and Support         Dental  no notable dental history         Cardiovascular   cardiovascular exam normal       Rhythm and rate: regular and normal     Pulmonary   pulmonary exam normal        breath sounds clear to auscultation           OUTSIDE LABS:  CBC:   Lab Results   Component Value Date    WBC 7.6 03/15/2021    WBC 8.0 09/26/2019    HGB 12.8 03/15/2021    HGB 12.7 09/26/2019    HCT 39.6 03/15/2021    HCT 39.0 09/26/2019     03/15/2021     09/26/2019     BMP:   Lab Results   Component Value Date     06/25/2021     03/15/2021    POTASSIUM 4.1 06/25/2021    POTASSIUM 4.1 03/15/2021    CHLORIDE 108 06/25/2021    CHLORIDE 108 03/15/2021    CO2 28 06/25/2021    CO2 26 03/15/2021    BUN 20 06/25/2021    BUN 18 03/15/2021    CR 1.04 06/25/2021    CR 1.06 (H) 03/15/2021    GLC 89 06/25/2021     (H) 03/15/2021     COAGS: No results found for: PTT, INR, FIBR  POC:   Lab Results   Component Value Date    BGM 97 10/04/2019     HEPATIC:   Lab Results   Component Value Date    ALBUMIN 4.0 09/26/2019    PROTTOTAL 7.1 09/26/2019    ALT 30 09/26/2019    AST 15 09/26/2019     ALKPHOS 102 09/26/2019    BILITOTAL 0.7 09/26/2019     OTHER:   Lab Results   Component Value Date    SHANDRA 10.0 06/25/2021    TSH 1.51 06/25/2021    CRP 16.0 (H) 03/15/2021       Anesthesia Plan    ASA Status:  2   NPO Status:  NPO Appropriate    Anesthesia Type: MAC.     - Reason for MAC: Deep or markedly invasive procedure (G8)   Induction: Propofol.   Maintenance: N/A.        Consents    Anesthesia Plan(s) and associated risks, benefits, and realistic alternatives discussed. Questions answered and patient/representative(s) expressed understanding.     - Discussed with:  Patient    Use of blood products discussed: No .     Postoperative Care            Comments:         H&P reviewed: Unable to attach H&P to encounter due to EHR limitations. H&P Update: appropriate H&P reviewed, patient examined. No interval changes since H&P (within 30 days).         Chirag Cash, DO

## 2021-07-26 NOTE — ANESTHESIA CARE TRANSFER NOTE
Patient: Hanh Villalba    Procedure(s):  COLONOSCOPY, WITH POLYPECTOMY    Diagnosis: Screening for colon cancer [Z12.11]  Diagnosis Additional Information: No value filed.    Anesthesia Type:   MAC     Note:    Oropharynx: spontaneously breathing  Level of Consciousness: awake  Oxygen Supplementation: room air    Independent Airway: airway patency satisfactory and stable  Dentition: dentition unchanged  Vital Signs Stable: post-procedure vital signs reviewed and stable  Report to RN Given: handoff report given  Patient transferred to: Phase II    Handoff Report: Identifed the Patient, Identified the Reponsible Provider, Reviewed the pertinent medical history, Discussed the surgical course, Reviewed Intra-OP anesthesia mangement and issues during anesthesia, Set expectations for post-procedure period and Allowed opportunity for questions and acknowledgement of understanding      Vitals:  Vitals Value Taken Time   BP     Temp     Pulse 63 07/26/21 1100   Resp     SpO2         Electronically Signed By: ROSEMARY Honeycutt CRNA  July 26, 2021  11:08 AM

## 2021-07-26 NOTE — ANESTHESIA POSTPROCEDURE EVALUATION
Patient: Hanh Villalba    Procedure(s):  COLONOSCOPY, WITH POLYPECTOMY    Diagnosis:Screening for colon cancer [Z12.11]  Diagnosis Additional Information: No value filed.    Anesthesia Type:  MAC    Note:  Disposition: Outpatient   Postop Pain Control: Uneventful            Sign Out: Well controlled pain   PONV: No   Neuro/Psych: Uneventful            Sign Out: Acceptable/Baseline neuro status   Airway/Respiratory: Uneventful            Sign Out: Acceptable/Baseline resp. status   CV/Hemodynamics: Uneventful            Sign Out: Acceptable CV status   Other NRE: NONE   DID A NON-ROUTINE EVENT OCCUR? No           Last vitals:  Vitals Value Taken Time   /57 07/26/21 1137   Temp 36.6  C (97.8  F) 07/26/21 1137   Pulse 67 07/26/21 1111   Resp 18 07/26/21 1137   SpO2 97 % 07/26/21 1137       Electronically Signed By: Chirag Cash DO  July 26, 2021  1:19 PM

## 2021-07-26 NOTE — H&P
Hanh Villalba  [id number]  female  65 year old      Reason for procedure/surgery: surveillance    Patient Active Problem List   Diagnosis     Hypothyroidism due to acquired atrophy of thyroid     Recurrent major depressive disorder, in full remission (H)     Endometrioid adenocarcinoma of uterus (H)     Morbid obesity (H)     CKD (chronic kidney disease) stage 3, GFR 30-59 ml/min     Left lower quadrant abdominal abscess (H)       Past Surgical History:    Past Surgical History:   Procedure Laterality Date     ABDOMEN SURGERY  Oct 2019    Robotic Hysterectomy     BIOPSY  early     left breast - non event     COLONOSCOPY  within last 6 years ?    OK     DAVINCI HYSTERECTOMY TOTAL, BILATERAL SALPINGO-OOPHORECTOMY, NODE DISSECTION, COMBINED N/A 10/4/2019    Procedure: Robot-assisted total laparoscopic hysterectomy, Bilateral salpingectomy and Right Oophorectomy, Lysis of adhesion, Cervical Indocyanine Green injection, Bilateral sentinel lymph node dissection, Repair of vaginal laceration.;  Surgeon: Perez Reddy MD;  Location: [facility]     DILATION AND CURETTAGE N/A 2019    Procedure: DILATION AND CURETTAGE, UTERUS;  Surgeon: Navdeep Barajas MD;  Location: [facility]     OPERATIVE HYSTEROSCOPY WITH MORCELLATOR N/A 2019    Procedure: HYSTEROSCOPY WITH MORCELLATOR (MYOSURE);  Surgeon: Navdeep Barajas MD;  Location: [facility]     SALPINGO OOPHORECTOMY,R/L/SHANDA Left 1980s    Left ovarian with endometriois     US BREAST BIOPSY LT Left        Past Medical History:   Past Medical History:   Diagnosis Date     Cancer (H) 2019    Uterus removed     Depression      Depressive disorder     taking prosac     Hypothyroidism      Overweight        Social History:   Social History     Tobacco Use     Smoking status: Former Smoker     Packs/day: 1.00     Years: 15.00     Pack years: 15.00     Types: Cigarettes     Start date: 1972     Quit date: 1987     Years since quittin.7      Smokeless tobacco: Never Used   Substance Use Topics     Alcohol use: Yes     Comment: a beer here and there - never more than 2 -  2 times/week       Family History:   Family History   Problem Relation Age of Onset     Acute myelogenous leukemia Mother      Coronary Artery Disease Mother         bypass surgery 1989     Osteoporosis Mother      Obesity Mother      Parkinsonism Father      Prostate Cancer Father         Diagnosed late in life in his 80's     Obesity Sister      Heart Disease Sister      Coronary Artery Disease Sister         heart attacK in June of 2019     Uterine Cancer Maternal Grandmother 40        Uterine versus cervical     No Known Problems Sister      Leukemia Brother      Leukemia Nephew      Coronary Artery Disease Brother         Carotid Artery Surgery     Obesity Brother      Substance Abuse Brother         alcohol       Allergies: No Known Allergies    Active Medications:   Current Outpatient Medications   Medication Sig Dispense Refill     Apple Cider Vinegar 600 MG CAPS Take 1 capsule by mouth daily        CALCIUM PO Take 20 mg by mouth daily        cholecalciferol (VITAMIN D3) 5000 units TABS tablet Take by mouth daily       clobetasol (TEMOVATE) 0.05 % external ointment Apply sparingly then once or twice a week as needed 45 g 0     Cyanocobalamin 1500 MCG TBDP Take 1 tablet by mouth daily        FLUoxetine (PROZAC) 20 MG capsule Take 1 capsule (20 mg) by mouth daily 90 capsule 3     Lactobacillus (ACIDOPHILUS PO) Take 1 tablet by mouth daily        levothyroxine (SYNTHROID/LEVOTHROID) 100 MCG tablet Take 1 tablet (100 mcg) by mouth every morning 90 tablet 0     Omega 3-6-9 Fatty Acids (OMEGA-3-6-9 PO) Take 1 capsule by mouth daily        Zinc Picolinate 25 MG TABS Take 1 tablet by mouth daily          Systemic Review:   CONSTITUTIONAL: NEGATIVE for fever, chills, change in weight  ENT/MOUTH: NEGATIVE for ear, mouth and throat problems  RESP: NEGATIVE for significant cough or SOB  CV:  "NEGATIVE for chest pain, palpitations or peripheral edema    Physical Examination:   Vital Signs: /74   Pulse 81   Temp 97.5  F (36.4  C) (Temporal)   Resp 18   Ht 1.715 m (5' 7.5\")   Wt 115.7 kg (255 lb)   LMP  (LMP Unknown)   SpO2 97%   BMI 39.35 kg/m    GENERAL: healthy, alert and no distress  NECK: no adenopathy, no asymmetry, masses, or scars  RESP: lungs clear to auscultation - no rales, rhonchi or wheezes  CV: regular rate and rhythm, normal S1 S2, no S3 or S4, no murmur, click or rub, no peripheral edema and peripheral pulses strong  ABDOMEN: soft, nontender, no hepatosplenomegaly, no masses and bowel sounds normal  MS: no gross musculoskeletal defects noted, no edema    Plan: Appropriate to proceed as scheduled.      Ryan Butterfield MD  7/26/2021    PCP:  Angeli Nolan    "

## 2021-07-27 LAB
PATH REPORT.COMMENTS IMP SPEC: NORMAL
PATH REPORT.COMMENTS IMP SPEC: NORMAL
PATH REPORT.FINAL DX SPEC: NORMAL
PATH REPORT.GROSS SPEC: NORMAL
PATH REPORT.MICROSCOPIC SPEC OTHER STN: NORMAL
PATH REPORT.RELEVANT HX SPEC: NORMAL
PHOTO IMAGE: NORMAL

## 2021-07-29 PROBLEM — Z86.0100 HISTORY OF COLONIC POLYPS: Status: ACTIVE | Noted: 2021-07-29

## 2021-08-24 ENCOUNTER — ONCOLOGY VISIT (OUTPATIENT)
Dept: ONCOLOGY | Facility: CLINIC | Age: 66
End: 2021-08-24
Attending: RADIOLOGY
Payer: COMMERCIAL

## 2021-08-24 VITALS
SYSTOLIC BLOOD PRESSURE: 101 MMHG | TEMPERATURE: 98.6 F | BODY MASS INDEX: 40.4 KG/M2 | HEART RATE: 76 BPM | OXYGEN SATURATION: 99 % | DIASTOLIC BLOOD PRESSURE: 67 MMHG | WEIGHT: 261.8 LBS

## 2021-08-24 DIAGNOSIS — C55 ENDOMETRIOID ADENOCARCINOMA OF UTERUS (H): Primary | ICD-10-CM

## 2021-08-24 PROCEDURE — G0463 HOSPITAL OUTPT CLINIC VISIT: HCPCS

## 2021-08-24 PROCEDURE — 99213 OFFICE O/P EST LOW 20 MIN: CPT | Performed by: NURSE PRACTITIONER

## 2021-08-24 ASSESSMENT — PAIN SCALES - GENERAL: PAINLEVEL: NO PAIN (0)

## 2021-08-24 NOTE — LETTER
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  She completed brachytherapy 2019.  She is here today for an acute visit.      Oncology history:   2019 D&C with pathology showing Grade 1 endometrial cancer, loss of MLH1 and PMS2, hypermethylation of MLH1 promotor positive  10/4/2019: Robotic-assisted Total laparoscopic hysterectomy, bilateral salpingo-oophorectomy, sentinel lymph node dissection, vaginal laceration repair: Stage 1B FIGO Grade 2, DOI 17/21mm (81%), LVSI+, cytology negative, tumor 3.4cm; MSI-H (MLH1 promoter melthylation)  2019: Vaginal brachytherapy; 30Gy in 5 fx        Today she comes to clinic feeling well and denies any concerns. She denies any vaginal bleeding, no changes in her bowel or bladder habits, no nausea/emesis, no lower extremity edema, and no difficulties eating or sleeping. She denies any abdominal discomfort/bloating, no fevers or chills, and no chest pain or shortness of breath. She is using her dilator once every 1.5-2 weeks for about 2 minutes while she brushes her teeth. She is not otherwise sexually active. She is current with her health screenings (per her DEXA in 2019, a 5 year follow up was suggested). She is leaving this weekend for a scuba trip and is looking forward to that.        Review of Systems:    Systemic           no weight changes; no fever; no chills; no night sweats; no appetite changes  Skin           no rashes, or lesions  Eye           no irritation; no changes in vision  Shila-Laryngeal           no dysphagia; no hoarseness   Pulmonary    no cough; no shortness of breath  Cardiovascular    no chest pain; no palpitations  Gastrointestinal    no diarrhea; no  constipation; no abdominal pain; no changes in bowel habits; no blood in stool  Genitourinary   no urinary frequency; no urinary urgency; no dysuria; no pain; no abnormal vaginal discharge; no abnormal vaginal bleeding  Breast    no breast discharge; no breast changes; no breast pain  Musculoskeletal    no myalgias; no arthralgias; no back pain  Psychiatric           no depressed mood; no anxiety    Hematologic              no tender lymph nodes; no noticeable swellings or lumps   Endocrine    no hot flashes; no heat/cold intolerance         Neurological   no tremor; no numbness and tingling; no headaches; no difficulty sleeping      Past Medical History:    Past Medical History:   Diagnosis Date     Cancer (H) August 2019    Uterus removed     Depression      Depressive disorder 1999    taking prosac     Hypothyroidism      Overweight          Past Surgical History:    Past Surgical History:   Procedure Laterality Date     ABDOMEN SURGERY  Oct 2019    Robotic Hysterectomy     BIOPSY  early 2000's    left breast - non event     COLONOSCOPY  within last 6 years ?    OK     COLONOSCOPY N/A 7/26/2021    Procedure: COLONOSCOPY, WITH POLYPECTOMY;  Surgeon: Ryan Butterfield MD;  Location: [facility] OR     DAVINCI HYSTERECTOMY TOTAL, BILATERAL SALPINGO-OOPHORECTOMY, NODE DISSECTION, COMBINED N/A 10/4/2019    Procedure: Robot-assisted total laparoscopic hysterectomy, Bilateral salpingectomy and Right Oophorectomy, Lysis of adhesion, Cervical Indocyanine Green injection, Bilateral sentinel lymph node dissection, Repair of vaginal laceration.;  Surgeon: Perez Reddy MD;  Location: [facility]     DILATION AND CURETTAGE N/A 9/12/2019    Procedure: DILATION AND CURETTAGE, UTERUS;  Surgeon: Navdeep Barajas MD;  Location: [facility]     OPERATIVE HYSTEROSCOPY WITH MORCELLATOR N/A 9/12/2019    Procedure: HYSTEROSCOPY WITH MORCELLATOR (MYOSURE);  Surgeon: Navdeep Barajas MD;  Location: [facility]     SALPINGO OOPHORECTOMY,R/L/SHANDA  Left 1980s    Left ovarian with endometriois     US BREAST BIOPSY LT Left          Health Maintenance Due   Topic Date Due     ZOSTER IMMUNIZATION (2 of 3) 10/13/2014     MEDICARE ANNUAL WELLNESS VISIT  2021     FALL RISK ASSESSMENT  2021     Pneumococcal Vaccine: 65+ Years (2 of 2 - PPSV23) 2021       Current Medications:     Current Outpatient Medications   Medication Sig Dispense Refill     Apple Cider Vinegar 600 MG CAPS Take 1 capsule by mouth daily        CALCIUM PO Take 20 mg by mouth daily        cholecalciferol (VITAMIN D3) 5000 units TABS tablet Take by mouth daily       clobetasol (TEMOVATE) 0.05 % external ointment Apply sparingly then once or twice a week as needed 45 g 0     Cyanocobalamin 1500 MCG TBDP Take 1 tablet by mouth daily        FLUoxetine (PROZAC) 20 MG capsule Take 1 capsule (20 mg) by mouth daily 90 capsule 3     Lactobacillus (ACIDOPHILUS PO) Take 1 tablet by mouth daily        levothyroxine (SYNTHROID/LEVOTHROID) 100 MCG tablet Take 1 tablet (100 mcg) by mouth every morning 90 tablet 0     Omega 3-6-9 Fatty Acids (OMEGA-3-6-9 PO) Take 1 capsule by mouth daily            Allergies:      No Known Allergies     Social History:     Social History     Tobacco Use     Smoking status: Former Smoker     Packs/day: 1.00     Years: 15.00     Pack years: 15.00     Types: Cigarettes     Start date: 1972     Quit date: 1987     Years since quittin.8     Smokeless tobacco: Never Used   Substance Use Topics     Alcohol use: Yes     Comment: a beer here and there - never more than 2 -  2 times/week       History   Drug Use Unknown         Family History:     The patient's family history is notable for:    Family History   Problem Relation Age of Onset     Acute myelogenous leukemia Mother      Coronary Artery Disease Mother         bypass surgery      Osteoporosis Mother      Obesity Mother      Parkinsonism Father      Prostate Cancer Father         Diagnosed late  in life in his 80's     Obesity Sister      Heart Disease Sister      Coronary Artery Disease Sister         heart attacK in June of 2019     Uterine Cancer Maternal Grandmother 40        Uterine versus cervical     No Known Problems Sister      Leukemia Brother      Leukemia Nephew      Coronary Artery Disease Brother         Carotid Artery Surgery     Obesity Brother      Substance Abuse Brother         alcohol         Physical Exam:     /67   Pulse 76   Temp 98.6  F (37  C) (Oral)   Wt 118.8 kg (261 lb 12.8 oz)   LMP  (LMP Unknown)   SpO2 99%   BMI 40.40 kg/m    Body mass index is 40.4 kg/m .    General Appearance: healthy and alert, no distress     HEENT: no thyromegaly, no palpable nodules or masses        Cardiovascular: regular rate and rhythm, no gallops, rubs or murmurs     Respiratory: lungs clear, no rales, rhonchi or wheezes, normal diaphragmatic excursion    Musculoskeletal: extremities non tender and without edema    Skin: no lesions or rashes     Neurological: normal gait, no gross defects     Psychiatric: appropriate mood and affect                               Hematological: normal cervical, supraclavicular and inguinal lymph nodes     Gastrointestinal:       abdomen soft, non-tender, non-distended, no organomegaly or masses    Genitourinary: External genitalia and urethral meatus appears normal.  Vagina is smooth without nodularity or masses.  Cervix surgically absent.  Bimanual exam reveal no masses, nodularity or fullness.  Recto-vaginal exam confirms these findings.      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  She completed brachytherapy 12/2019.  She is here today for an acute visit.    20 minutes spent on the date of the encounter doing chart review, history and exam, documentation and further activities as noted above      Plan:     1.)        Patient to RTC in 3 months for her next  surveillance visit which will be at her 2 year post treatment point and when she can extend her surveillance to every 6 months x 3 years. Reviewed recommendations from [facility] not to perform surveillance pap smears in women diagnosed with endometrial cancer as this does not improve detection of local recurrence. Reviewed signs and symptoms for when she should contact the clinic or seek additional care. Patient to contact the clinic with any questions or concerns in the interim. Continue to use her dilator as she has.  Answered all of her questions to the best of my ability.     2.) Genetic risk factors were assessed and she had a loss of MLH1 and PMS2; hypermethylation of MLH1 promotor positive. MLH1 promoter methylation is typically associated with sporadic microsatellite unstable tumors of the colon/rectum or endometrium.      3.) Labs and/or tests ordered include:  None.      4.) Health maintenance issues addressed today include annual health maintenance and non-gynecologic issues with PCP.    ROSEMARY Lance, WHNP-BC, ANP-BC  Women's Health Nurse Practitioner  Adult Nurse Practitioner  Division of Gynecologic Oncology      CC  Patient Care Team:  Angeli Nolan MD as PCP - General (Family Practice)  Beatrice Reyes APRN CNP as Assigned Cancer Care Provider

## 2021-08-24 NOTE — NURSING NOTE
"Oncology Rooming Note    August 24, 2021 10:14 AM   Hanh Villalba is a 65 year old female who presents for:    Chief Complaint   Patient presents with     Oncology Clinic Visit     endometrioid adenocarcinoma of uterus     Initial Vitals: /67   Pulse 76   Temp 98.6  F (37  C) (Oral)   Wt 118.8 kg (261 lb 12.8 oz)   LMP  (LMP Unknown)   SpO2 99%   BMI 40.40 kg/m   Estimated body mass index is 40.4 kg/m  as calculated from the following:    Height as of 7/26/21: 1.715 m (5' 7.5\").    Weight as of this encounter: 118.8 kg (261 lb 12.8 oz). Body surface area is 2.38 meters squared.  No Pain (0) Comment: Data Unavailable   No LMP recorded (lmp unknown). Patient has had a hysterectomy.  Allergies reviewed: Yes  Medications reviewed: Yes    Medications: Medication refills not needed today.  Pharmacy name entered into [facility]: [id number] [address]    Clinical concerns: none       Lily Saldivar CMA            "

## 2021-08-24 NOTE — LETTER
2021         RE: Hanh Villalba   [address]  [place] [address]        Dear Colleague,    Thank you for referring your patient, Hanh Villalba, to the [facility]. Please see a copy of my visit note below.                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  She completed brachytherapy 2019.  She is here today for an acute visit.      Oncology history:   2019 D&C with pathology showing Grade 1 endometrial cancer, loss of MLH1 and PMS2, hypermethylation of MLH1 promotor positive  10/4/2019: Robotic-assisted Total laparoscopic hysterectomy, bilateral salpingo-oophorectomy, sentinel lymph node dissection, vaginal laceration repair: Stage 1B FIGO Grade 2, DOI 17/21mm (81%), LVSI+, cytology negative, tumor 3.4cm; MSI-H (MLH1 promoter melthylation)  2019: Vaginal brachytherapy; 30Gy in 5 fx        Today she comes to clinic feeling well and denies any concerns. She denies any vaginal bleeding, no changes in her bowel or bladder habits, no nausea/emesis, no lower extremity edema, and no difficulties eating or sleeping. She denies any abdominal discomfort/bloating, no fevers or chills, and no chest pain or shortness of breath. She is using her dilator once every 1.5-2 weeks for about 2 minutes while she brushes her teeth. She is not otherwise sexually active. She is current with her health screenings (per her DEXA in 2019, a 5 year follow up was suggested). She is leaving this weekend for a scuba trip and is looking forward to that.        Review of Systems:    Systemic           no weight changes; no fever; no chills; no night sweats; no appetite changes  Skin           no rashes, or lesions  Eye           no irritation; no changes in vision  Shila-Laryngeal            no dysphagia; no hoarseness   Pulmonary    no cough; no shortness of breath  Cardiovascular    no chest pain; no palpitations  Gastrointestinal    no diarrhea; no constipation; no abdominal pain; no changes in bowel habits; no blood in stool  Genitourinary   no urinary frequency; no urinary urgency; no dysuria; no pain; no abnormal vaginal discharge; no abnormal vaginal bleeding  Breast    no breast discharge; no breast changes; no breast pain  Musculoskeletal    no myalgias; no arthralgias; no back pain  Psychiatric           no depressed mood; no anxiety    Hematologic              no tender lymph nodes; no noticeable swellings or lumps   Endocrine    no hot flashes; no heat/cold intolerance         Neurological   no tremor; no numbness and tingling; no headaches; no difficulty sleeping      Past Medical History:    Past Medical History:   Diagnosis Date     Cancer (H) August 2019    Uterus removed     Depression      Depressive disorder 1999    taking prosac     Hypothyroidism      Overweight          Past Surgical History:    Past Surgical History:   Procedure Laterality Date     ABDOMEN SURGERY  Oct 2019    Robotic Hysterectomy     BIOPSY  early 2000's    left breast - non event     COLONOSCOPY  within last 6 years ?    OK     COLONOSCOPY N/A 7/26/2021    Procedure: COLONOSCOPY, WITH POLYPECTOMY;  Surgeon: Ryan Butterfield MD;  Location: [facility] OR     DAVINCI HYSTERECTOMY TOTAL, BILATERAL SALPINGO-OOPHORECTOMY, NODE DISSECTION, COMBINED N/A 10/4/2019    Procedure: Robot-assisted total laparoscopic hysterectomy, Bilateral salpingectomy and Right Oophorectomy, Lysis of adhesion, Cervical Indocyanine Green injection, Bilateral sentinel lymph node dissection, Repair of vaginal laceration.;  Surgeon: Perez Reddy MD;  Location: [facility] OR     DILATION AND CURETTAGE N/A 9/12/2019    Procedure: DILATION AND CURETTAGE, UTERUS;  Surgeon: Navdeep Barajas MD;  Location: [facility] OR     OPERATIVE HYSTEROSCOPY WITH  MORCELLATOR N/A 2019    Procedure: HYSTEROSCOPY WITH MORCELLATOR (MYOSURE);  Surgeon: Navdeep Barajas MD;  Location: [facility] OR     SALPINGO OOPHORECTOMY,R/L/SHANDA Left 1980s    Left ovarian with endometriois     US BREAST BIOPSY LT Left          Health Maintenance Due   Topic Date Due     ZOSTER IMMUNIZATION (2 of 3) 10/13/2014     MEDICARE ANNUAL WELLNESS VISIT  2021     FALL RISK ASSESSMENT  2021     Pneumococcal Vaccine: 65+ Years (2 of 2 - PPSV23) 2021       Current Medications:     Current Outpatient Medications   Medication Sig Dispense Refill     Apple Cider Vinegar 600 MG CAPS Take 1 capsule by mouth daily        CALCIUM PO Take 20 mg by mouth daily        cholecalciferol (VITAMIN D3) 5000 units TABS tablet Take by mouth daily       clobetasol (TEMOVATE) 0.05 % external ointment Apply sparingly then once or twice a week as needed 45 g 0     Cyanocobalamin 1500 MCG TBDP Take 1 tablet by mouth daily        FLUoxetine (PROZAC) 20 MG capsule Take 1 capsule (20 mg) by mouth daily 90 capsule 3     Lactobacillus (ACIDOPHILUS PO) Take 1 tablet by mouth daily        levothyroxine (SYNTHROID/LEVOTHROID) 100 MCG tablet Take 1 tablet (100 mcg) by mouth every morning 90 tablet 0     Omega 3-6-9 Fatty Acids (OMEGA-3-6-9 PO) Take 1 capsule by mouth daily            Allergies:      No Known Allergies     Social History:     Social History     Tobacco Use     Smoking status: Former Smoker     Packs/day: 1.00     Years: 15.00     Pack years: 15.00     Types: Cigarettes     Start date: 1972     Quit date: 1987     Years since quittin.8     Smokeless tobacco: Never Used   Substance Use Topics     Alcohol use: Yes     Comment: a beer here and there - never more than 2 -  2 times/week       History   Drug Use Unknown         Family History:     The patient's family history is notable for:    Family History   Problem Relation Age of Onset     Acute myelogenous leukemia Mother      Coronary  Artery Disease Mother         bypass surgery 1989     Osteoporosis Mother      Obesity Mother      Parkinsonism Father      Prostate Cancer Father         Diagnosed late in life in his 80's     Obesity Sister      Heart Disease Sister      Coronary Artery Disease Sister         heart attacK in June of 2019     Uterine Cancer Maternal Grandmother 40        Uterine versus cervical     No Known Problems Sister      Leukemia Brother      Leukemia Nephew      Coronary Artery Disease Brother         Carotid Artery Surgery     Obesity Brother      Substance Abuse Brother         alcohol         Physical Exam:     /67   Pulse 76   Temp 98.6  F (37  C) (Oral)   Wt 118.8 kg (261 lb 12.8 oz)   LMP  (LMP Unknown)   SpO2 99%   BMI 40.40 kg/m    Body mass index is 40.4 kg/m .    General Appearance: healthy and alert, no distress     HEENT: no thyromegaly, no palpable nodules or masses        Cardiovascular: regular rate and rhythm, no gallops, rubs or murmurs     Respiratory: lungs clear, no rales, rhonchi or wheezes, normal diaphragmatic excursion    Musculoskeletal: extremities non tender and without edema    Skin: no lesions or rashes     Neurological: normal gait, no gross defects     Psychiatric: appropriate mood and affect                               Hematological: normal cervical, supraclavicular and inguinal lymph nodes     Gastrointestinal:       abdomen soft, non-tender, non-distended, no organomegaly or masses    Genitourinary: External genitalia and urethral meatus appears normal.  Vagina is smooth without nodularity or masses.  Cervix surgically absent.  Bimanual exam reveal no masses, nodularity or fullness.  Recto-vaginal exam confirms these findings.      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  She completed brachytherapy 12/2019.  She is here today for an acute visit.    20 minutes spent on the date of the  encounter doing chart review, history and exam, documentation and further activities as noted above      Plan:     1.)        Patient to RTC in 3 months for her next surveillance visit which will be at her 2 year post treatment point and when she can extend her surveillance to every 6 months x 3 years. Reviewed recommendations from [facility] not to perform surveillance pap smears in women diagnosed with endometrial cancer as this does not improve detection of local recurrence. Reviewed signs and symptoms for when she should contact the clinic or seek additional care. Patient to contact the clinic with any questions or concerns in the interim. Continue to use her dilator as she has.  Answered all of her questions to the best of my ability.     2.) Genetic risk factors were assessed and she had a loss of MLH1 and PMS2; hypermethylation of MLH1 promotor positive. MLH1 promoter methylation is typically associated with sporadic microsatellite unstable tumors of the colon/rectum or endometrium.      3.) Labs and/or tests ordered include:  None.      4.) Health maintenance issues addressed today include annual health maintenance and non-gynecologic issues with PCP.    ROSEMARY Lance, WHNP-BC, ANP-BC  Women's Health Nurse Practitioner  Adult Nurse Practitioner  Division of Gynecologic Oncology          CC  Patient Care Team:  Angeli Nolan MD as PCP - General (Family Practice)  Beatrice Reyes APRN CNP as Assigned Cancer Care Provider  Angeli Nolan MD as Assigned PCP  SELF, REFERRED      Again, thank you for allowing me to participate in the care of your patient.        Sincerely,        ROSEMARY Allen CNP

## 2021-08-24 NOTE — PROGRESS NOTES
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  She completed brachytherapy 2019.  She is here today for an acute visit.      Oncology history:   2019 D&C with pathology showing Grade 1 endometrial cancer, loss of MLH1 and PMS2, hypermethylation of MLH1 promotor positive  10/4/2019: Robotic-assisted Total laparoscopic hysterectomy, bilateral salpingo-oophorectomy, sentinel lymph node dissection, vaginal laceration repair: Stage 1B FIGO Grade 2, DOI 17/21mm (81%), LVSI+, cytology negative, tumor 3.4cm; MSI-H (MLH1 promoter melthylation)  2019: Vaginal brachytherapy; 30Gy in 5 fx        Today she comes to clinic feeling well and denies any concerns. She denies any vaginal bleeding, no changes in her bowel or bladder habits, no nausea/emesis, no lower extremity edema, and no difficulties eating or sleeping. She denies any abdominal discomfort/bloating, no fevers or chills, and no chest pain or shortness of breath. She is using her dilator once every 1.5-2 weeks for about 2 minutes while she brushes her teeth. She is not otherwise sexually active. She is current with her health screenings (per her DEXA in 2019, a 5 year follow up was suggested). She is leaving this weekend for a scuba trip and is looking forward to that.        Review of Systems:    Systemic           no weight changes; no fever; no chills; no night sweats; no appetite changes  Skin           no rashes, or lesions  Eye           no irritation; no changes in vision  Shila-Laryngeal           no dysphagia; no hoarseness   Pulmonary    no cough; no shortness of breath  Cardiovascular    no chest pain; no palpitations  Gastrointestinal    no diarrhea; no constipation; no abdominal pain; no changes in bowel habits; no blood in  stool  Genitourinary   no urinary frequency; no urinary urgency; no dysuria; no pain; no abnormal vaginal discharge; no abnormal vaginal bleeding  Breast    no breast discharge; no breast changes; no breast pain  Musculoskeletal    no myalgias; no arthralgias; no back pain  Psychiatric           no depressed mood; no anxiety    Hematologic              no tender lymph nodes; no noticeable swellings or lumps   Endocrine    no hot flashes; no heat/cold intolerance         Neurological   no tremor; no numbness and tingling; no headaches; no difficulty sleeping      Past Medical History:    Past Medical History:   Diagnosis Date     Cancer (H) August 2019    Uterus removed     Depression      Depressive disorder 1999    taking prosac     Hypothyroidism      Overweight          Past Surgical History:    Past Surgical History:   Procedure Laterality Date     ABDOMEN SURGERY  Oct 2019    Robotic Hysterectomy     BIOPSY  early 2000's    left breast - non event     COLONOSCOPY  within last 6 years ?    OK     COLONOSCOPY N/A 7/26/2021    Procedure: COLONOSCOPY, WITH POLYPECTOMY;  Surgeon: Ryan Butterfield MD;  Location: [facility] OR     DAVINCI HYSTERECTOMY TOTAL, BILATERAL SALPINGO-OOPHORECTOMY, NODE DISSECTION, COMBINED N/A 10/4/2019    Procedure: Robot-assisted total laparoscopic hysterectomy, Bilateral salpingectomy and Right Oophorectomy, Lysis of adhesion, Cervical Indocyanine Green injection, Bilateral sentinel lymph node dissection, Repair of vaginal laceration.;  Surgeon: Perez Reddy MD;  Location: [facility] OR     DILATION AND CURETTAGE N/A 9/12/2019    Procedure: DILATION AND CURETTAGE, UTERUS;  Surgeon: Navdeep Barajas MD;  Location: [facility] OR     OPERATIVE HYSTEROSCOPY WITH MORCELLATOR N/A 9/12/2019    Procedure: HYSTEROSCOPY WITH MORCELLATOR (MYOSURE);  Surgeon: Navdeep Barajas MD;  Location: [facility] OR     SALPINGO OOPHORECTOMY,R/L/SHANDA Left 1980s    Left ovarian with endometriois     US BREAST BIOPSY LT  Left          Health Maintenance Due   Topic Date Due     ZOSTER IMMUNIZATION (2 of 3) 10/13/2014     MEDICARE ANNUAL WELLNESS VISIT  2021     FALL RISK ASSESSMENT  2021     Pneumococcal Vaccine: 65+ Years (2 of 2 - PPSV23) 2021       Current Medications:     Current Outpatient Medications   Medication Sig Dispense Refill     Apple Cider Vinegar 600 MG CAPS Take 1 capsule by mouth daily        CALCIUM PO Take 20 mg by mouth daily        cholecalciferol (VITAMIN D3) 5000 units TABS tablet Take by mouth daily       clobetasol (TEMOVATE) 0.05 % external ointment Apply sparingly then once or twice a week as needed 45 g 0     Cyanocobalamin 1500 MCG TBDP Take 1 tablet by mouth daily        FLUoxetine (PROZAC) 20 MG capsule Take 1 capsule (20 mg) by mouth daily 90 capsule 3     Lactobacillus (ACIDOPHILUS PO) Take 1 tablet by mouth daily        levothyroxine (SYNTHROID/LEVOTHROID) 100 MCG tablet Take 1 tablet (100 mcg) by mouth every morning 90 tablet 0     Omega 3-6-9 Fatty Acids (OMEGA-3-6-9 PO) Take 1 capsule by mouth daily            Allergies:      No Known Allergies     Social History:     Social History     Tobacco Use     Smoking status: Former Smoker     Packs/day: 1.00     Years: 15.00     Pack years: 15.00     Types: Cigarettes     Start date: 1972     Quit date: 1987     Years since quittin.8     Smokeless tobacco: Never Used   Substance Use Topics     Alcohol use: Yes     Comment: a beer here and there - never more than 2 -  2 times/week       History   Drug Use Unknown         Family History:     The patient's family history is notable for:    Family History   Problem Relation Age of Onset     Acute myelogenous leukemia Mother      Coronary Artery Disease Mother         bypass surgery      Osteoporosis Mother      Obesity Mother      Parkinsonism Father      Prostate Cancer Father         Diagnosed late in life in his 80's     Obesity Sister      Heart Disease Sister       Coronary Artery Disease Sister         heart attacK in June of 2019     Uterine Cancer Maternal Grandmother 40        Uterine versus cervical     No Known Problems Sister      Leukemia Brother      Leukemia Nephew      Coronary Artery Disease Brother         Carotid Artery Surgery     Obesity Brother      Substance Abuse Brother         alcohol         Physical Exam:     /67   Pulse 76   Temp 98.6  F (37  C) (Oral)   Wt 118.8 kg (261 lb 12.8 oz)   LMP  (LMP Unknown)   SpO2 99%   BMI 40.40 kg/m    Body mass index is 40.4 kg/m .    General Appearance: healthy and alert, no distress     HEENT: no thyromegaly, no palpable nodules or masses        Cardiovascular: regular rate and rhythm, no gallops, rubs or murmurs     Respiratory: lungs clear, no rales, rhonchi or wheezes, normal diaphragmatic excursion    Musculoskeletal: extremities non tender and without edema    Skin: no lesions or rashes     Neurological: normal gait, no gross defects     Psychiatric: appropriate mood and affect                               Hematological: normal cervical, supraclavicular and inguinal lymph nodes     Gastrointestinal:       abdomen soft, non-tender, non-distended, no organomegaly or masses    Genitourinary: External genitalia and urethral meatus appears normal.  Vagina is smooth without nodularity or masses.  Cervix surgically absent.  Bimanual exam reveal no masses, nodularity or fullness.  Recto-vaginal exam confirms these findings.      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  She completed brachytherapy 12/2019.  She is here today for an acute visit.    20 minutes spent on the date of the encounter doing chart review, history and exam, documentation and further activities as noted above      Plan:     1.)        Patient to RTC in 3 months for her next surveillance visit which will be at her 2 year post treatment point and  when she can extend her surveillance to every 6 months x 3 years. Reviewed recommendations from [facility] not to perform surveillance pap smears in women diagnosed with endometrial cancer as this does not improve detection of local recurrence. Reviewed signs and symptoms for when she should contact the clinic or seek additional care. Patient to contact the clinic with any questions or concerns in the interim. Continue to use her dilator as she has.  Answered all of her questions to the best of my ability.     2.) Genetic risk factors were assessed and she had a loss of MLH1 and PMS2; hypermethylation of MLH1 promotor positive. MLH1 promoter methylation is typically associated with sporadic microsatellite unstable tumors of the colon/rectum or endometrium.      3.) Labs and/or tests ordered include:  None.      4.) Health maintenance issues addressed today include annual health maintenance and non-gynecologic issues with PCP.    ROSEMARY Lance, WHNP-BC, ANP-BC  Women's Health Nurse Practitioner  Adult Nurse Practitioner  Division of Gynecologic Oncology          CC  Patient Care Team:  Angeli Nolan MD as PCP - General (Family Practice)  Beatrice Reyes APRN CNP as Assigned Cancer Care Provider  Angeli Nolan MD as Assigned PCP  SELF, REFERRED

## 2021-08-28 ENCOUNTER — HEALTH MAINTENANCE LETTER (OUTPATIENT)
Age: 66
End: 2021-08-28

## 2021-10-23 ENCOUNTER — HEALTH MAINTENANCE LETTER (OUTPATIENT)
Age: 66
End: 2021-10-23

## 2021-11-08 ENCOUNTER — ONCOLOGY VISIT (OUTPATIENT)
Dept: ONCOLOGY | Facility: CLINIC | Age: 66
End: 2021-11-08
Attending: NURSE PRACTITIONER
Payer: COMMERCIAL

## 2021-11-08 VITALS
DIASTOLIC BLOOD PRESSURE: 81 MMHG | HEART RATE: 74 BPM | WEIGHT: 265.4 LBS | OXYGEN SATURATION: 94 % | TEMPERATURE: 98.7 F | RESPIRATION RATE: 18 BRPM | SYSTOLIC BLOOD PRESSURE: 124 MMHG | BODY MASS INDEX: 40.95 KG/M2

## 2021-11-08 DIAGNOSIS — C55 ENDOMETRIOID ADENOCARCINOMA OF UTERUS (H): Primary | ICD-10-CM

## 2021-11-08 PROCEDURE — G0463 HOSPITAL OUTPT CLINIC VISIT: HCPCS

## 2021-11-08 PROCEDURE — 99214 OFFICE O/P EST MOD 30 MIN: CPT | Performed by: NURSE PRACTITIONER

## 2021-11-08 ASSESSMENT — PAIN SCALES - GENERAL: PAINLEVEL: NO PAIN (0)

## 2021-11-08 NOTE — PROGRESS NOTES
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  She completed brachytherapy 2019.  She is here today for a surveillance visit.      Oncology history:   2019 D&C with pathology showing Grade 1 endometrial cancer, loss of MLH1 and PMS2, hypermethylation of MLH1 promotor positive  10/4/2019: Robotic-assisted Total laparoscopic hysterectomy, bilateral salpingo-oophorectomy, sentinel lymph node dissection, vaginal laceration repair: Stage 1B FIGO Grade 2, DOI 17/21mm (81%), LVSI+, cytology negative, tumor 3.4cm; MSI-H (MLH1 promoter melthylation)  2019: Vaginal brachytherapy; 30Gy in 5 fx        Today she comes to clinic and denies any concerns for her visit. She denies any vaginal bleeding, no changes in her bowel or bladder habits, no nausea/emesis, no lower extremity edema, and no difficulties eating or sleeping. She denies any abdominal discomfort/bloating, no fevers or chills, and no chest pain or shortness of breath. She is current with her health screenings (her colonoscopy and DEXA are due ). She has been using her dilator about once every 2 weeks for 2-5 minutes. She is declining the flu shot and will be changing insurance for  and plans on getting her shingles vaccine after that as it will then be covered. She will use Clobetasol occasionally for an area between her vagina and anus that will \"have a different texture\" at times; she denies any vulvar itching or irritation.  She would like a refill. She is wondering about her follow up plan.             Review of Systems:    Systemic           no weight changes; no fever; no chills; no night sweats; no appetite changes  Skin           no rashes, or lesions  Eye           no irritation; no changes in vision  Shila-Laryngeal           no " dysphagia; no hoarseness   Pulmonary    no cough; no shortness of breath  Cardiovascular    no chest pain; no palpitations  Gastrointestinal    no diarrhea; no constipation; no abdominal pain; no changes in bowel habits; no blood in stool  Genitourinary   no urinary frequency; no urinary urgency; no dysuria; no pain; no abnormal vaginal discharge; no abnormal vaginal bleeding  Breast    no breast discharge; no breast changes; no breast pain  Musculoskeletal    no myalgias; no arthralgias; no back pain  Psychiatric           no depressed mood; no anxiety    Hematologic               no tender lymph nodes; no noticeable swellings or lumps   Endocrine    no hot flashes; no heat/cold intolerance         Neurological   no tremor; no numbness and tingling; no headaches; no difficulty sleeping      Past Medical History:    Past Medical History:   Diagnosis Date     Cancer (H) August 2019    Uterus removed     Depression      Depressive disorder 1999    taking prosac     Hypothyroidism      Overweight          Past Surgical History:    Past Surgical History:   Procedure Laterality Date     ABDOMEN SURGERY  Oct 2019    Robotic Hysterectomy     BIOPSY  early 2000's    left breast - non event     COLONOSCOPY  within last 6 years ?    OK     COLONOSCOPY N/A 7/26/2021    Procedure: COLONOSCOPY, WITH POLYPECTOMY;  Surgeon: Ryan Butterfield MD;  Location: [facility] OR     DAVINCI HYSTERECTOMY TOTAL, BILATERAL SALPINGO-OOPHORECTOMY, NODE DISSECTION, COMBINED N/A 10/4/2019    Procedure: Robot-assisted total laparoscopic hysterectomy, Bilateral salpingectomy and Right Oophorectomy, Lysis of adhesion, Cervical Indocyanine Green injection, Bilateral sentinel lymph node dissection, Repair of vaginal laceration.;  Surgeon: Perez Reddy MD;  Location: [facility] OR     DILATION AND CURETTAGE N/A 9/12/2019    Procedure: DILATION AND CURETTAGE, UTERUS;  Surgeon: Navdeep Barajas MD;  Location: [facility] OR     OPERATIVE HYSTEROSCOPY WITH  MORCELLATOR N/A 2019    Procedure: HYSTEROSCOPY WITH MORCELLATOR (MYOSURE);  Surgeon: Navdeep Barajas MD;  Location: [facility] OR     SALPINGO OOPHORECTOMY,R/L/SHANDA Left 1980s    Left ovarian with endometriois     US BREAST BIOPSY LT Left          Health Maintenance Due   Topic Date Due     ZOSTER IMMUNIZATION (2 of 3) 10/13/2014     COVID-19 Vaccine (2 - Booster for Dony series) 2021     MEDICARE ANNUAL WELLNESS VISIT  2021     FALL RISK ASSESSMENT  2021     INFLUENZA VACCINE (1) 2021     Pneumococcal Vaccine: 65+ Years (2 of 2 - PPSV23) 2021     PHQ-9  2021       Current Medications:     Current Outpatient Medications   Medication Sig Dispense Refill     Apple Cider Vinegar 600 MG CAPS Take 1 capsule by mouth daily        CALCIUM PO Take 20 mg by mouth daily        cholecalciferol (VITAMIN D3) 5000 units TABS tablet Take by mouth daily       clobetasol (TEMOVATE) 0.05 % external ointment Apply sparingly then once or twice a week as needed 45 g 0     Cyanocobalamin 1500 MCG TBDP Take 1 tablet by mouth daily        FLUoxetine (PROZAC) 20 MG capsule Take 1 capsule (20 mg) by mouth daily 90 capsule 3     Lactobacillus (ACIDOPHILUS PO) Take 1 tablet by mouth daily        levothyroxine (SYNTHROID/LEVOTHROID) 100 MCG tablet Take 1 tablet (100 mcg) by mouth every morning 90 tablet 0     Omega 3-6-9 Fatty Acids (OMEGA-3-6-9 PO) Take 1 capsule by mouth daily            Allergies:      No Known Allergies     Social History:     Social History     Tobacco Use     Smoking status: Former Smoker     Packs/day: 1.00     Years: 15.00     Pack years: 15.00     Types: Cigarettes     Start date: 1972     Quit date: 1987     Years since quittin.0     Smokeless tobacco: Never Used   Substance Use Topics     Alcohol use: Yes     Comment: a beer here and there - never more than 2 -  2 times/week       History   Drug Use Unknown         Family History:     The patient's family  history is notable for:    Family History   Problem Relation Age of Onset     Acute myelogenous leukemia Mother      Coronary Artery Disease Mother         bypass surgery 1989     Osteoporosis Mother      Obesity Mother      Parkinsonism Father      Prostate Cancer Father         Diagnosed late in life in his 80's     Obesity Sister      Heart Disease Sister      Coronary Artery Disease Sister         heart attacK in June of 2019     Uterine Cancer Maternal Grandmother 40        Uterine versus cervical     No Known Problems Sister      Leukemia Brother      Leukemia Nephew      Coronary Artery Disease Brother         Carotid Artery Surgery     Obesity Brother      Substance Abuse Brother         alcohol         Physical Exam:     /81   Pulse 74   Temp 98.7  F (37.1  C) (Oral)   Resp 18   Wt 120.4 kg (265 lb 6.4 oz)   LMP  (LMP Unknown)   SpO2 94%   BMI 40.95 kg/m    Body mass index is 40.95 kg/m .    General Appearance: healthy and alert, no distress     HEENT: no thyromegaly, no palpable nodules or masses        Cardiovascular: regular rate and rhythm, no gallops, rubs or murmurs     Respiratory: lungs clear, no rales, rhonchi or wheezes, normal diaphragmatic excursion    Musculoskeletal: extremities non tender and without edema    Skin: no lesions or rashes     Neurological: normal gait, no gross defects     Psychiatric: appropriate mood and affect                               Hematological: normal cervical, supraclavicular and inguinal lymph nodes     Gastrointestinal:       abdomen soft, non-tender, non-distended, no organomegaly or masses    Genitourinary: External genitalia and urethral meatus appears normal, slightly atrophic.  Vagina is smooth without nodularity or masses.  Cervix surgically absent  Bimanual exam reveal no masses, nodularity or fullness.  Recto-vaginal exam confirms these findings.      Assessment:    Hanh Villalba is a 66 year old woman with a history of stage IB grade 2  endometrial endometrioid adenocarcinoma.  She completed brachytherapy 12/2019.  She is here today for a surveillance visit.     33 minutes spent on the date of the encounter doing chart review, history and exam, documentation and further activities as noted above      Plan:     1.)        She is now 2 years post treatment completion and can now extend visits to every 6 months for 3 years. Reviewed recommendations from [facility] not to perform surveillance pap smears in women diagnosed with endometrial cancer as this does not improve detection of local recurrence. Reviewed signs and symptoms for when she should contact the clinic or seek additional care. Patient to contact the clinic with any questions or concerns in the interim.  Answered all of her questions to the best of my ability. She can continue to use her dilator as she has been.      2.) Genetic risk factors were assessed and she had a loss of MLH1 and PMS2; hypermethylation of MLH1 promotor positive. MLH1 promoter methylation is typically associated with sporadic microsatellite unstable tumors of the colon/rectum or endometrium.    3.) Labs and/or tests ordered include:  None.      4.) Health maintenance issues addressed today include annual health maintenance and non-gynecologic issues with PCP.    5.)        Given no signs of lichen sclerosis, advised to apply organic coconut oil the her vulva and massage into the area between vaginal and anus to help moisturize the area. Use this instead of clobetasol; no refill done. Encouraged to work on drinking 64-80 oz water throughout the day.     ROSEMARY Lance, NP-BC, ANP-BC  Women's Health Nurse Practitioner  Adult Nurse Practitioner  Division of Gynecologic Oncology          CC  Patient Care Team:  Angeli Nolan MD as PCP - General (Family Practice)  Beatrice Reyes APRN CNP as Assigned Cancer Care Provider  Angeli Nolan MD as Assigned PCP

## 2021-11-08 NOTE — NURSING NOTE
"Oncology Rooming Note    November 8, 2021 9:42 AM   Hanh Villalba is a 66 year old female who presents for:    Chief Complaint   Patient presents with     Oncology Clinic Visit     Endometrioid adenocarcinoma of uterus      Initial Vitals: /81   Pulse 74   Temp 98.7  F (37.1  C) (Oral)   Resp 18   Wt 120.4 kg (265 lb 6.4 oz)   LMP  (LMP Unknown)   SpO2 94%   BMI 40.95 kg/m   Estimated body mass index is 40.95 kg/m  as calculated from the following:    Height as of 7/26/21: 1.715 m (5' 7.5\").    Weight as of this encounter: 120.4 kg (265 lb 6.4 oz). Body surface area is 2.39 meters squared.  No Pain (0) Comment: Data Unavailable   No LMP recorded (lmp unknown). Patient has had a hysterectomy.  Allergies reviewed: Yes  Medications reviewed: Yes    Medications: MEDICATION REFILLS NEEDED TODAY. Provider was notified.  Pharmacy name entered into [facility]: [id number] [place], [place] - [address]    Clinical concerns: Medication refill: clobetasol       Joanne Casillas LPN            "

## 2021-11-08 NOTE — LETTER
"    2021         RE: Hanh Villalba   [address]  [place] [address]        Dear Colleague,    Thank you for referring your patient, Hanh Villalba, to the [facility]. Please see a copy of my visit note below.                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  She completed brachytherapy 2019.  She is here today for a surveillance visit.      Oncology history:   2019 D&C with pathology showing Grade 1 endometrial cancer, loss of MLH1 and PMS2, hypermethylation of MLH1 promotor positive  10/4/2019: Robotic-assisted Total laparoscopic hysterectomy, bilateral salpingo-oophorectomy, sentinel lymph node dissection, vaginal laceration repair: Stage 1B FIGO Grade 2, DOI 17/21mm (81%), LVSI+, cytology negative, tumor 3.4cm; MSI-H (MLH1 promoter melthylation)  2019: Vaginal brachytherapy; 30Gy in 5 fx        Today she comes to clinic and denies any concerns for her visit. She denies any vaginal bleeding, no changes in her bowel or bladder habits, no nausea/emesis, no lower extremity edema, and no difficulties eating or sleeping. She denies any abdominal discomfort/bloating, no fevers or chills, and no chest pain or shortness of breath. She is current with her health screenings (her colonoscopy and DEXA are due ). She has been using her dilator about once every 2 weeks for 2-5 minutes. She is declining the flu shot and will be changing insurance for  and plans on getting her shingles vaccine after that as it will then be covered. She will use Clobetasol occasionally for an area between her vagina and anus that will \"have a different texture\" at times; she denies any vulvar itching or irritation.  She would like a refill. She is wondering about her follow " up plan.             Review of Systems:    Systemic           no weight changes; no fever; no chills; no night sweats; no appetite changes  Skin           no rashes, or lesions  Eye           no irritation; no changes in vision  Shila-Laryngeal           no dysphagia; no hoarseness   Pulmonary    no cough; no shortness of breath  Cardiovascular    no chest pain; no palpitations  Gastrointestinal    no diarrhea; no constipation; no abdominal pain; no changes in bowel habits; no blood in stool  Genitourinary   no urinary frequency; no urinary urgency; no dysuria; no pain; no abnormal vaginal discharge; no abnormal vaginal bleeding  Breast    no breast discharge; no breast changes; no breast pain  Musculoskeletal    no myalgias; no arthralgias; no back pain  Psychiatric           no depressed mood; no anxiety    Hematologic               no tender lymph nodes; no noticeable swellings or lumps   Endocrine    no hot flashes; no heat/cold intolerance         Neurological   no tremor; no numbness and tingling; no headaches; no difficulty sleeping      Past Medical History:    Past Medical History:   Diagnosis Date     Cancer (H) August 2019    Uterus removed     Depression      Depressive disorder 1999    taking prosac     Hypothyroidism      Overweight          Past Surgical History:    Past Surgical History:   Procedure Laterality Date     ABDOMEN SURGERY  Oct 2019    Robotic Hysterectomy     BIOPSY  early 2000's    left breast - non event     COLONOSCOPY  within last 6 years ?    OK     COLONOSCOPY N/A 7/26/2021    Procedure: COLONOSCOPY, WITH POLYPECTOMY;  Surgeon: Ryan Butterfield MD;  Location: [facility] OR     DAVINCI HYSTERECTOMY TOTAL, BILATERAL SALPINGO-OOPHORECTOMY, NODE DISSECTION, COMBINED N/A 10/4/2019    Procedure: Robot-assisted total laparoscopic hysterectomy, Bilateral salpingectomy and Right Oophorectomy, Lysis of adhesion, Cervical Indocyanine Green injection, Bilateral sentinel lymph node dissection,  Repair of vaginal laceration.;  Surgeon: Perez Reddy MD;  Location: [facility] OR     DILATION AND CURETTAGE N/A 9/12/2019    Procedure: DILATION AND CURETTAGE, UTERUS;  Surgeon: Navdeep Barajas MD;  Location: [facility] OR     OPERATIVE HYSTEROSCOPY WITH MORCELLATOR N/A 9/12/2019    Procedure: HYSTEROSCOPY WITH MORCELLATOR (MYOSURE);  Surgeon: Navdeep Barajas MD;  Location: [facility] OR     SALPINGO OOPHORECTOMY,R/L/SHANDA Left 1980s    Left ovarian with endometriois     US BREAST BIOPSY LT Left          Health Maintenance Due   Topic Date Due     ZOSTER IMMUNIZATION (2 of 3) 10/13/2014     COVID-19 Vaccine (2 - Booster for Dony series) 05/02/2021     MEDICARE ANNUAL WELLNESS VISIT  07/17/2021     FALL RISK ASSESSMENT  07/17/2021     INFLUENZA VACCINE (1) 09/01/2021     Pneumococcal Vaccine: 65+ Years (2 of 2 - PPSV23) 07/17/2021     PHQ-9  12/08/2021       Current Medications:     Current Outpatient Medications   Medication Sig Dispense Refill     Apple Cider Vinegar 600 MG CAPS Take 1 capsule by mouth daily        CALCIUM PO Take 20 mg by mouth daily        cholecalciferol (VITAMIN D3) 5000 units TABS tablet Take by mouth daily       clobetasol (TEMOVATE) 0.05 % external ointment Apply sparingly then once or twice a week as needed 45 g 0     Cyanocobalamin 1500 MCG TBDP Take 1 tablet by mouth daily        FLUoxetine (PROZAC) 20 MG capsule Take 1 capsule (20 mg) by mouth daily 90 capsule 3     Lactobacillus (ACIDOPHILUS PO) Take 1 tablet by mouth daily        levothyroxine (SYNTHROID/LEVOTHROID) 100 MCG tablet Take 1 tablet (100 mcg) by mouth every morning 90 tablet 0     Omega 3-6-9 Fatty Acids (OMEGA-3-6-9 PO) Take 1 capsule by mouth daily            Allergies:      No Known Allergies     Social History:     Social History     Tobacco Use     Smoking status: Former Smoker     Packs/day: 1.00     Years: 15.00     Pack years: 15.00     Types: Cigarettes     Start date: 1/11/1972     Quit date: 11/11/1987     Years  since quittin.0     Smokeless tobacco: Never Used   Substance Use Topics     Alcohol use: Yes     Comment: a beer here and there - never more than 2 -  2 times/week       History   Drug Use Unknown         Family History:     The patient's family history is notable for:    Family History   Problem Relation Age of Onset     Acute myelogenous leukemia Mother      Coronary Artery Disease Mother         bypass surgery      Osteoporosis Mother      Obesity Mother      Parkinsonism Father      Prostate Cancer Father         Diagnosed late in life in his 80's     Obesity Sister      Heart Disease Sister      Coronary Artery Disease Sister         heart attacK in 2019     Uterine Cancer Maternal Grandmother 40        Uterine versus cervical     No Known Problems Sister      Leukemia Brother      Leukemia Nephew      Coronary Artery Disease Brother         Carotid Artery Surgery     Obesity Brother      Substance Abuse Brother         alcohol         Physical Exam:     /81   Pulse 74   Temp 98.7  F (37.1  C) (Oral)   Resp 18   Wt 120.4 kg (265 lb 6.4 oz)   LMP  (LMP Unknown)   SpO2 94%   BMI 40.95 kg/m    Body mass index is 40.95 kg/m .    General Appearance: healthy and alert, no distress     HEENT: no thyromegaly, no palpable nodules or masses        Cardiovascular: regular rate and rhythm, no gallops, rubs or murmurs     Respiratory: lungs clear, no rales, rhonchi or wheezes, normal diaphragmatic excursion    Musculoskeletal: extremities non tender and without edema    Skin: no lesions or rashes     Neurological: normal gait, no gross defects     Psychiatric: appropriate mood and affect                               Hematological: normal cervical, supraclavicular and inguinal lymph nodes     Gastrointestinal:       abdomen soft, non-tender, non-distended, no organomegaly or masses    Genitourinary: External genitalia and urethral meatus appears normal, slightly atrophic.  Vagina is smooth  without nodularity or masses.  Cervix surgically absent  Bimanual exam reveal no masses, nodularity or fullness.  Recto-vaginal exam confirms these findings.      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  She completed brachytherapy 12/2019.  She is here today for a surveillance visit.     33 minutes spent on the date of the encounter doing chart review, history and exam, documentation and further activities as noted above      Plan:     1.)        She is now 2 years post treatment completion and can now extend visits to every 6 months for 3 years. Reviewed recommendations from [facility] not to perform surveillance pap smears in women diagnosed with endometrial cancer as this does not improve detection of local recurrence. Reviewed signs and symptoms for when she should contact the clinic or seek additional care. Patient to contact the clinic with any questions or concerns in the interim.  Answered all of her questions to the best of my ability. She can continue to use her dilator as she has been.      2.) Genetic risk factors were assessed and she had a loss of MLH1 and PMS2; hypermethylation of MLH1 promotor positive. MLH1 promoter methylation is typically associated with sporadic microsatellite unstable tumors of the colon/rectum or endometrium.    3.) Labs and/or tests ordered include:  None.      4.) Health maintenance issues addressed today include annual health maintenance and non-gynecologic issues with PCP.    5.)        Given no signs of lichen sclerosis, advised to apply organic coconut oil the her vulva and massage into the area between vaginal and anus to help moisturize the area. Use this instead of clobetasol; no refill done. Encouraged to work on drinking 64-80 oz water throughout the day.     ROSEMARY Lance, WHNP-BC, ANP-BC  Women's Health Nurse Practitioner  Adult Nurse Practitioner  Division of Gynecologic  Oncology          CC  Patient Care Team:  Angeli Nolan MD as PCP - General (Family Practice)  Beatrice Reyes APRN CNP as Assigned Cancer Care Provider  Angeli Nolan MD as Assigned PCP        Again, thank you for allowing me to participate in the care of your patient.        Sincerely,        ROSEMARY Allen CNP

## 2021-11-08 NOTE — LETTER
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  She completed brachytherapy 2019.  She is here today for a surveillance visit.      Oncology history:   2019 D&C with pathology showing Grade 1 endometrial cancer, loss of MLH1 and PMS2, hypermethylation of MLH1 promotor positive  10/4/2019: Robotic-assisted Total laparoscopic hysterectomy, bilateral salpingo-oophorectomy, sentinel lymph node dissection, vaginal laceration repair: Stage 1B FIGO Grade 2, DOI 17/21mm (81%), LVSI+, cytology negative, tumor 3.4cm; MSI-H (MLH1 promoter melthylation)  2019: Vaginal brachytherapy; 30Gy in 5 fx    Today she comes to clinic and denies any concerns for her visit. She denies any vaginal bleeding, no changes in her bowel or bladder habits, no nausea/emesis, no lower extremity edema, and no difficulties eating or sleeping. She denies any abdominal discomfort/bloating, no fevers or chills, and no chest pain or shortness of breath. She is current with her health screenings (her colonoscopy and DEXA are due ). She has been using her dilator about once every 2 weeks for 2-5 minutes. She is declining the flu shot and will be changing insurance for  and plans on getting her shingles vaccine after that as it will then be covered. She will use Clobetasol occasionally for an area between her vagina and anus that will \"have a different texture\" at times; she denies any vulvar itching or irritation.  She would like a refill. She is wondering about her follow up plan.             Review of Systems:    Systemic           no weight changes; no fever; no chills; no night sweats; no appetite changes  Skin           no rashes, or lesions  Eye "           no irritation; no changes in vision  Shila-Laryngeal           no dysphagia; no hoarseness   Pulmonary    no cough; no shortness of breath  Cardiovascular    no chest pain; no palpitations  Gastrointestinal    no diarrhea; no constipation; no abdominal pain; no changes in bowel habits; no blood in stool  Genitourinary   no urinary frequency; no urinary urgency; no dysuria; no pain; no abnormal vaginal discharge; no abnormal vaginal bleeding  Breast    no breast discharge; no breast changes; no breast pain  Musculoskeletal    no myalgias; no arthralgias; no back pain  Psychiatric           no depressed mood; no anxiety    Hematologic               no tender lymph nodes; no noticeable swellings or lumps   Endocrine    no hot flashes; no heat/cold intolerance         Neurological   no tremor; no numbness and tingling; no headaches; no difficulty sleeping      Past Medical History:    Past Medical History:   Diagnosis Date     Cancer (H) August 2019    Uterus removed     Depression      Depressive disorder 1999    taking prosac     Hypothyroidism      Overweight          Past Surgical History:    Past Surgical History:   Procedure Laterality Date     ABDOMEN SURGERY  Oct 2019    Robotic Hysterectomy     BIOPSY  early 2000's    left breast - non event     COLONOSCOPY  within last 6 years ?    OK     COLONOSCOPY N/A 7/26/2021    Procedure: COLONOSCOPY, WITH POLYPECTOMY;  Surgeon: Ryan Butterfield MD;  [place] [facility] [place]     DAVINCI HYSTERECTOMY TOTAL, BILATERAL SALPINGO-OOPHORECTOMY, NODE DISSECTION, COMBINED N/A 10/4/2019    Procedure: Robot-assisted total laparoscopic hysterectomy, Bilateral salpingectomy and Right Oophorectomy, Lysis of adhesion, Cervical Indocyanine Green injection, Bilateral sentinel lymph node dissection, Repair of vaginal laceration.;  Surgeon: Perez Reddy MD;  Location: [facility] OR     DILATION AND CURETTAGE N/A 9/12/2019    Procedure: DILATION AND CURETTAGE, UTERUS;  Surgeon: Karl  Navdeep Ivan MD;  Location: [facility] OR     OPERATIVE HYSTEROSCOPY WITH MORCELLATOR N/A 2019    Procedure: HYSTEROSCOPY WITH MORCELLATOR (MYOSURE);  Surgeon: Navdeep Barajas MD;  Location: [facility] OR     SALPINGO OOPHORECTOMY,R/L/SHANDA Left 1980s    Left ovarian with endometriois     US BREAST BIOPSY LT Left          Health Maintenance Due   Topic Date Due     ZOSTER IMMUNIZATION (2 of 3) 10/13/2014     COVID-19 Vaccine (2 - Booster for Dony series) 2021     MEDICARE ANNUAL WELLNESS VISIT  2021     FALL RISK ASSESSMENT  2021     INFLUENZA VACCINE (1) 2021     Pneumococcal Vaccine: 65+ Years (2 of 2 - PPSV23) 2021     PHQ-9  2021       Current Medications:     Current Outpatient Medications   Medication Sig Dispense Refill     Apple Cider Vinegar 600 MG CAPS Take 1 capsule by mouth daily        CALCIUM PO Take 20 mg by mouth daily        cholecalciferol (VITAMIN D3) 5000 units TABS tablet Take by mouth daily       clobetasol (TEMOVATE) 0.05 % external ointment Apply sparingly then once or twice a week as needed 45 g 0     Cyanocobalamin 1500 MCG TBDP Take 1 tablet by mouth daily        FLUoxetine (PROZAC) 20 MG capsule Take 1 capsule (20 mg) by mouth daily 90 capsule 3     Lactobacillus (ACIDOPHILUS PO) Take 1 tablet by mouth daily        levothyroxine (SYNTHROID/LEVOTHROID) 100 MCG tablet Take 1 tablet (100 mcg) by mouth every morning 90 tablet 0     Omega 3-6-9 Fatty Acids (OMEGA-3-6-9 PO) Take 1 capsule by mouth daily            Allergies:      No Known Allergies     Social History:     Social History     Tobacco Use     Smoking status: Former Smoker     Packs/day: 1.00     Years: 15.00     Pack years: 15.00     Types: Cigarettes     Start date: 1972     Quit date: 1987     Years since quittin.0     Smokeless tobacco: Never Used   Substance Use Topics     Alcohol use: Yes     Comment: a beer here and there - never more than 2 -  2 times/week       History    Drug Use Unknown         Family History:     The patient's family history is notable for:    Family History   Problem Relation Age of Onset     Acute myelogenous leukemia Mother      Coronary Artery Disease Mother         bypass surgery 1989     Osteoporosis Mother      Obesity Mother      Parkinsonism Father      Prostate Cancer Father         Diagnosed late in life in his 80's     Obesity Sister      Heart Disease Sister      Coronary Artery Disease Sister         heart attacK in June of 2019     Uterine Cancer Maternal Grandmother 40        Uterine versus cervical     No Known Problems Sister      Leukemia Brother      Leukemia Nephew      Coronary Artery Disease Brother         Carotid Artery Surgery     Obesity Brother      Substance Abuse Brother         alcohol         Physical Exam:     /81   Pulse 74   Temp 98.7  F (37.1  C) (Oral)   Resp 18   Wt 120.4 kg (265 lb 6.4 oz)   LMP  (LMP Unknown)   SpO2 94%   BMI 40.95 kg/m    Body mass index is 40.95 kg/m .    General Appearance: healthy and alert, no distress     HEENT: no thyromegaly, no palpable nodules or masses        Cardiovascular: regular rate and rhythm, no gallops, rubs or murmurs     Respiratory: lungs clear, no rales, rhonchi or wheezes, normal diaphragmatic excursion    Musculoskeletal: extremities non tender and without edema    Skin: no lesions or rashes     Neurological: normal gait, no gross defects     Psychiatric: appropriate mood and affect                               Hematological: normal cervical, supraclavicular and inguinal lymph nodes     Gastrointestinal:       abdomen soft, non-tender, non-distended, no organomegaly or masses    Genitourinary: External genitalia and urethral meatus appears normal, slightly atrophic.  Vagina is smooth without nodularity or masses.  Cervix surgically absent  Bimanual exam reveal no masses, nodularity or fullness.  Recto-vaginal exam confirms these findings.      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  She completed brachytherapy 12/2019.  She is here today for a surveillance visit.     33 minutes spent on the date of the encounter doing chart review, history and exam, documentation and further activities as noted above      Plan:     1.)        She is now 2 years post treatment completion and can now extend visits to every 6 months for 3 years. Reviewed recommendations from [facility] not to perform surveillance pap smears in women diagnosed with endometrial cancer as this does not improve detection of local recurrence. Reviewed signs and symptoms for when she should contact the clinic or seek additional care. Patient to contact the clinic with any questions or concerns in the interim.  Answered all of her questions to the best of my ability. She can continue to use her dilator as she has been.      2.) Genetic risk factors were assessed and she had a loss of MLH1 and PMS2; hypermethylation of MLH1 promotor positive. MLH1 promoter methylation is typically associated with sporadic microsatellite unstable tumors of the colon/rectum or endometrium.    3.) Labs and/or tests ordered include:  None.      4.) Health maintenance issues addressed today include annual health maintenance and non-gynecologic issues with PCP.    5.)        Given no signs of lichen sclerosis, advised to apply organic coconut oil the her vulva and massage into the area between vaginal and anus to help moisturize the area. Use this instead of clobetasol; no refill done. Encouraged to work on drinking 64-80 oz water throughout the day.     ROSEMARY Lance, WHNP-BC, ANP-BC  Women's Health Nurse Practitioner  Adult Nurse Practitioner  Division of Gynecologic Oncology    CC  Patient Care Team:  Angeli Nolan MD as PCP - General (Family Practice)

## 2021-11-23 ENCOUNTER — MYC MEDICAL ADVICE (OUTPATIENT)
Dept: FAMILY MEDICINE | Facility: CLINIC | Age: 66
End: 2021-11-23

## 2021-12-03 ENCOUNTER — VIRTUAL VISIT (OUTPATIENT)
Dept: FAMILY MEDICINE | Facility: CLINIC | Age: 66
End: 2021-12-03
Payer: COMMERCIAL

## 2021-12-03 DIAGNOSIS — E66.01 MORBID OBESITY (H): ICD-10-CM

## 2021-12-03 DIAGNOSIS — E03.4 HYPOTHYROIDISM DUE TO ACQUIRED ATROPHY OF THYROID: ICD-10-CM

## 2021-12-03 DIAGNOSIS — R52 ACHES: Primary | ICD-10-CM

## 2021-12-03 DIAGNOSIS — F33.42 RECURRENT MAJOR DEPRESSIVE DISORDER, IN FULL REMISSION (H): ICD-10-CM

## 2021-12-03 DIAGNOSIS — M25.50 ARTHRALGIA, UNSPECIFIED JOINT: ICD-10-CM

## 2021-12-03 DIAGNOSIS — R05.9 COUGH: ICD-10-CM

## 2021-12-03 PROBLEM — K65.1: Status: RESOLVED | Noted: 2021-05-28 | Resolved: 2021-12-03

## 2021-12-03 PROCEDURE — 99214 OFFICE O/P EST MOD 30 MIN: CPT | Mod: 95 | Performed by: FAMILY MEDICINE

## 2021-12-03 RX ORDER — LEVOTHYROXINE SODIUM 100 UG/1
100 TABLET ORAL EVERY MORNING
Qty: 90 TABLET | Refills: 1 | Status: SHIPPED | OUTPATIENT
Start: 2021-12-03 | End: 2022-01-19

## 2021-12-03 ASSESSMENT — PATIENT HEALTH QUESTIONNAIRE - PHQ9
10. IF YOU CHECKED OFF ANY PROBLEMS, HOW DIFFICULT HAVE THESE PROBLEMS MADE IT FOR YOU TO DO YOUR WORK, TAKE CARE OF THINGS AT HOME, OR GET ALONG WITH OTHER PEOPLE: NOT DIFFICULT AT ALL
SUM OF ALL RESPONSES TO PHQ QUESTIONS 1-9: 4
SUM OF ALL RESPONSES TO PHQ QUESTIONS 1-9: 4

## 2021-12-03 NOTE — PROGRESS NOTES
"Bry is a 66 year old who is being evaluated via a billable video visit.      How would you like to obtain your AVS? [employer]  If the video visit is dropped, the invitation should be resent by:   Will anyone else be joining your video visit? No      Video Start Time: 10:26 AM 10:56 AM    Assessment & Plan     (R52) Aches  (primary encounter diagnosis)  Comment: symptoms predominant in the morning and after being inactive for periods of time.  Is not exercising regularly.  Does have some swelling in a couple of PIP joints of her hands.  No other noted joint swelling.  No other skin rashes/changes.  Seems consistent with OA  Plan: discussed consideration of lab testing for other causes such as autoimmune.   Patient wishes to first discuss with her partner.  Will place future lab orders in case she decides to proceed.    Discussed the benefits of exercise on OA.  And can do further evaluation at next in person visit.    (E03.4) Hypothyroidism due to acquired atrophy of thyroid  Comment: TSH has been within range  Plan: levothyroxine (SYNTHROID/LEVOTHROID) 100 MCG         tablet        Discussed that her symptoms, since they have been long standing, are not related to her thyroid.    (F33.42) Recurrent major depressive disorder, in full remission (H)  Comment: given the lack of motivation which she attributes to her depression, will increase prozac.  Given that she is not wanting to stay on prozac \"forever\", requests a modest increase  Plan: will increase to 40qod, 20qod and she will update me in a few weeks via [employer] about how that is going    (E66.01) morbid obesity (H)  Comment: previously on redux, liked how that worked  Plan: discussed options to see a medical weight loss specialist. She would like to consider and will message me if she would like a referral    (R05.9) Cough  Comment: with a viral URI tends to have 3-4 weeks of lingering cough.    Plan: discussed that she may have a viral induced asthma.  Will " "provide her a steroid inhaler to have on hand to trial with the next Respiratory illness.      30 minutes spent on the date of the encounter doing patient visit        BMI:   Estimated body mass index is 40.95 kg/m  as calculated from the following:    Height as of 7/26/21: 1.715 m (5' 7.5\").    Weight as of 11/8/21: 120.4 kg (265 lb 6.4 oz).   Weight management plan: Discussed healthy diet and exercise guidelines        Return in about 3 months (around 3/3/2022) for Preventive Health visit (physical), with me, in person.    Angeli Messina MD  [facility]    Trev Londono is a 66 year old who presents for the following health issues   Partner Abigail with her.    Prompted to schedule the visit by Dr. TIFFANIE herr chiropractor she is friends with  Chronic aches and pains. Worse when she gets up in the morning or is sitting for a long time.  Hips hurts when she walks, sometimes bilateral and sometimes just the left.  More fatigued than she used to be.  Chronic dry skin, intolerance to cold.  Feeling a lack of motivation.    Noticing these symptoms over many years.    Was given prednisone for her cough.  Noticed that she felt better.    Has been on antidepressant for 20 years. Wonders if she needs something new.  Was for a low mood.    Chronic cough.  But went to  for a cough on 11/26    Was on Redux for weight loss.      History of Present Illness       Mental Health Follow-up:  Patient presents to follow-up on Depression.Patient's depression since last visit has been:  No change  The patient is not having other symptoms associated with depression.      Any significant life events: No  Patient is not feeling anxious or having panic attacks.  Patient has no concerns about alcohol or drug use.     Social History  Tobacco Use    Smoking status: Former Smoker      Packs/day: 1.00      Years: 15.00      Pack years: 15      Types: Cigarettes      Start date: 1/11/1972      Quit date: " 1987      Years since quittin.0    Smokeless tobacco: Never Used  Alcohol use: Yes    Comment: a beer here and there - never more than 2 -  2 times/week  Drug use: Never      Today's PHQ-9         PHQ-9 Total Score:     (P) 4   PHQ-9 Q9 Thoughts of better off dead/self-harm past 2 weeks :   (P) Not at all   Thoughts of suicide or self harm:      Self-harm Plan:        Self-harm Action:          Safety concerns for self or others:           Hypothyroidism:     Since last visit, patient describes the following symptoms::  Depression, Dry skin and Fatigue    She eats 2-3 servings of fruits and vegetables daily.She consumes 2 sweetened beverage(s) daily.She exercises with enough effort to increase her heart rate 20 to 29 minutes per day.  She exercises with enough effort to increase her heart rate 3 or less days per week.   She is taking medications regularly.             Review of Systems   Constitutional, HEENT, cardiovascular, pulmonary, gi and gu systems are negative, except as otherwise noted.      Objective           Vitals:  No vitals were obtained today due to virtual visit.    Physical Exam   GENERAL: Healthy, alert and no distress  EYES: Eyes grossly normal to inspection.  No discharge or erythema, or obvious scleral/conjunctival abnormalities.  RESP: No audible wheeze, cough, or visible cyanosis.  No visible retractions or increased work of breathing.    SKIN: Visible skin clear. No significant rash, abnormal pigmentation or lesions.  NEURO: Cranial nerves grossly intact.  Mentation and speech appropriate for age.  PSYCH: Mentation appears normal, affect normal/bright, judgement and insight intact, normal speech and appearance well-groomed.                Video-Visit Details    Type of service:  Video Visit    Video End Time:11:17 AM    Originating Location (pt. Location): Home    Distant Location (provider location):  [facility]     Platform used for Video Visit:  AmWell  Answers for HPI/ROS submitted by the patient on 12/3/2021  If you checked off any problems, how difficult have these problems made it for you to do your work, take care of things at home, or get along with other people?: Not difficult at all  PHQ9 TOTAL SCORE: 4

## 2021-12-04 ASSESSMENT — PATIENT HEALTH QUESTIONNAIRE - PHQ9: SUM OF ALL RESPONSES TO PHQ QUESTIONS 1-9: 4

## 2021-12-16 ENCOUNTER — APPOINTMENT (OUTPATIENT)
Dept: LAB | Facility: CLINIC | Age: 66
End: 2021-12-16

## 2021-12-20 ENCOUNTER — TRANSFERRED RECORDS (OUTPATIENT)
Dept: HEALTH INFORMATION MANAGEMENT | Facility: CLINIC | Age: 66
End: 2021-12-20

## 2021-12-20 ENCOUNTER — TELEPHONE (OUTPATIENT)
Dept: FAMILY MEDICINE | Facility: CLINIC | Age: 66
End: 2021-12-20

## 2021-12-20 LAB
ALT SERPL-CCNC: 16 U/L (ref 6–29)
AST SERPL-CCNC: 17 U/L (ref 10–35)
CREATININE (EXTERNAL): 1.01 MG/DL (ref 0.5–0.99)
GFR ESTIMATED (EXTERNAL): 57 ML/MIN/1.73M2
GFR ESTIMATED (IF AFRICAN AMERICAN) (EXTERNAL): 66 ML/MIN/1.73M2
GLUCOSE (EXTERNAL): 81 MG/DL (ref 65–139)
HBA1C MFR BLD: 5.1 %
POTASSIUM (EXTERNAL): 4.5 MMOL/L (ref 3.5–5.3)
TSH SERPL-ACNC: 0.79 MU/L (ref 0.4–4.5)

## 2021-12-20 NOTE — TELEPHONE ENCOUNTER
Spoke with lab- they are agreeable to have pt be scheduled with notation in lab notes that ok to release per Dr Nolan.    Patient is scheduled for 12/22.      Heather Bower RN

## 2021-12-20 NOTE — TELEPHONE ENCOUNTER
Just to explain why the labs were ordered in advance - I ordered the labs at the time the patient and I spoke so that I wouldn't have to later recall which labs I wanted to order.  The patient wanted to think about whether or not she wanted to pursue the labs.    The lab did contact me on Thursday that she was in the lab and wanted to have the labs drawn - and I did respond that they should release them.      Please feel free to contact me today if we need to discuss further.  She should be able to have her labs drawn at any time.

## 2021-12-20 NOTE — TELEPHONE ENCOUNTER
Routing to PCP  jesse sent to pt as well    Pt calling with concerns that she was told she cannot make a lab appointment until Feb 3 according to lab.    Confirmed with lab- orders are set to release on 2/3/22.    Please adjust orders if you would like patient to have these done sooner- the only lab she did not want to do was the lyme.    If you would like her to wait please provider explanation so RN can let pt know.      ZAINA Romero    Triage Nurse  [facility]  Appointment line: [phone]  [facility], 24 hour nurse line, available by calling clinic at [phone] and following prompts.

## 2022-01-11 ENCOUNTER — VIRTUAL VISIT (OUTPATIENT)
Dept: ENDOCRINOLOGY | Facility: CLINIC | Age: 67
End: 2022-01-11
Payer: COMMERCIAL

## 2022-01-11 VITALS — WEIGHT: 265 LBS | BODY MASS INDEX: 40.16 KG/M2 | HEIGHT: 68 IN

## 2022-01-11 DIAGNOSIS — E66.01 MORBID OBESITY (H): ICD-10-CM

## 2022-01-11 DIAGNOSIS — E66.9 OBESITY: ICD-10-CM

## 2022-01-11 DIAGNOSIS — E66.813 CLASS 3 SEVERE OBESITY DUE TO EXCESS CALORIES WITHOUT SERIOUS COMORBIDITY WITH BODY MASS INDEX (BMI) OF 40.0 TO 44.9 IN ADULT (H): Primary | ICD-10-CM

## 2022-01-11 DIAGNOSIS — E66.01 CLASS 3 SEVERE OBESITY DUE TO EXCESS CALORIES WITHOUT SERIOUS COMORBIDITY WITH BODY MASS INDEX (BMI) OF 40.0 TO 44.9 IN ADULT (H): Primary | ICD-10-CM

## 2022-01-11 DIAGNOSIS — Z71.3 NUTRITIONAL COUNSELING: Primary | ICD-10-CM

## 2022-01-11 DIAGNOSIS — N18.30 CKD (CHRONIC KIDNEY DISEASE) STAGE 3, GFR 30-59 ML/MIN (H): ICD-10-CM

## 2022-01-11 PROCEDURE — 99207 PR MNT INDIVIDUAL INITIAL EA 15 MIN: CPT | Mod: GT | Performed by: DIETITIAN, REGISTERED

## 2022-01-11 PROCEDURE — 99203 OFFICE O/P NEW LOW 30 MIN: CPT | Mod: GT | Performed by: INTERNAL MEDICINE

## 2022-01-11 RX ORDER — SPIRONOLACTONE 25 MG
TABLET ORAL
COMMUNITY

## 2022-01-11 RX ORDER — TOPIRAMATE 25 MG/1
TABLET, FILM COATED ORAL
Qty: 180 TABLET | Refills: 1 | Status: SHIPPED | OUTPATIENT
Start: 2022-01-11 | End: 2022-04-19

## 2022-01-11 RX ORDER — PHENTERMINE HYDROCHLORIDE 15 MG/1
15 CAPSULE ORAL EVERY MORNING
Qty: 30 CAPSULE | Refills: 1 | Status: SHIPPED | OUTPATIENT
Start: 2022-01-11 | End: 2022-03-11

## 2022-01-11 ASSESSMENT — PAIN SCALES - GENERAL: PAINLEVEL: NO PAIN (0)

## 2022-01-11 ASSESSMENT — MIFFLIN-ST. JEOR: SCORE: 1780.53

## 2022-01-11 NOTE — PROGRESS NOTES
"  New Medical Weight Management Consult    PATIENT:  Hanh Villalba  MRN:         [id number]  :         1955  RUPERT:         2022    Dear ,    I had the pleasure of seeing your patient, Hanh Villalba. Full intake/assessment was done to determine barriers to weight loss success and develop a treatment plan. Hanh Villalba is a 66 year old female interested in treatment of medical problems associated with excess weight. She has a height of 5' 8\", a weight of 265 lbs 0 oz, and the calculated Body mass index is 40.29 kg/m .    She has the following co-morbidities: hypothyroidism, depression, chronic pain    She reports weight was 160 lbs during her college. She gradually gained up to 200 lbs after finishing college. After quit smoking and started on depo-provera --> gained to 240 lbs  She is now steady at 260 lbs.    Previous attempts-- Calories counting, WW program (lost weight), was on Redux in the  (lost significant weight).    Main triggers -- eat out of boredom and rewarding. She tends to snack on chip and sweet    She usually plans her meal well weekly meal.  High quantity   Job: Retired             2022   I have the following health issues associated with obesity: High Cholesterol, Cancer, Osteoarthritis (joint disease), Hypothyroidism   I have the following symptoms associated with obesity: Depression, Back Pain, Hip Pain       Patient Goals 2022   I am interested in having a healthier weight to diminish current health problems: Yes   I am interested in having a healthier weight in order to prevent future health problems: Yes   I am interested in having a healthier weight in order to have a future surgery: No       Referring Provider 2022   Please name the provider who referred you to Medical Weight Management.  If you do not know, please answer: \"I Don't Know\". Dr. Mari Messina       Weight History 2022   How concerned are you " about your weight? Very Concerned   Would you describe your weight gain as gradual? Yes   I became overweight: As an Adult   The following factors have contributed to my weight gain:  Started on Medication that Caused Weight Gain, After Quitting Smoking, Eating Too Much, Genetic (Runs in the Family)   I have tried the following methods to lose weight: Watching Portions or Calories, Exercise, Weight Watchers, Nutrisystem, Slim Fast or Other Liquid Diets, Medications   Please list the medications you have tried.  Redux   My lowest weight since age 16 was: 165   My highest weight since age 16 was: 272   The most weight I have ever lost was: (lbs) 32   I have the following family history of obesity/being overweight:  My mother is overweight, One or more of my siblings are overweight, Many of my relatives are overweight   Has anyone in your family had weight loss surgery? No   How has your weight changed over the last year?  Gained   How many pounds? 3-6       Diet Recall Review with Patient 1/7/2022   Do you typically eat breakfast? Yes   If you do eat breakfast, what types of food do you eat? Diet Mt. Dew, Banana, [place] [employer]  or Cereal or Eggs or yogurt/ fruit   Do you typically eat lunch? Yes   If you do eat lunch, what types of food do you typically eat?  soup, salad, hot dish, open face tuna, tomato sandwich   Do you typically eat supper? Yes   If you do eat supper, what types of food do you typically eat? salad, soups, goulash,  fish/potato/veggi,  pizza,  chili,   spaghetti squash w/ tomato,  burgers, Hot dishes (lockwood's pie, enchilada)  tacos   Do you typically eat snacks? Yes   If you do snack, what types of food do you typically eat? crackers, fruits, cookies, Dots pretzels, cherry slices candy, jujubees?, watermelon,   Do you like vegetables?  Yes   Do you drink water? Yes   How many glasses of juice do you drink in a typical day? 0   How many of glasses of milk do you drink in a typical day? 0   If  you do drink milk, what type? Whole   How many 8oz glasses of sugar containing drinks such as Gabriele-Aid/sweet tea do you drink in a day? 0   How many cans/bottles of sugar pop/soda/tea/sports drinks do you drink in a day? 1   How many cans/bottles of diet pop/soda/tea or sports drink do you drink in a day? 1   How often do you have a drink of alcohol? 2-3 TImes a Week   If you do drink, how many drinks might you have in a day? 1 or 2       Eating Habits 1/7/2022   Generally, my meals include foods like these: bread, pasta, rice, potatoes, corn, crackers, sweet dessert, pop, or juice. A Few Times a Week   Generally, my meals include foods like these: fried meats, brats, burgers, french fries, pizza, cheese, chips, or ice cream. A Few Times a Week   Eat fast food (like [employer], [employer], Taco Bell). Less Than Weekly   Eat at a buffet or sit-down restaurant. Less Than Weekly   Eat most of my meals in front of the TV or computer. Almost Everyday   Often skip meals, eat at random times, have no regular eating times. Never   Rarely sit down for a meal but snack or graze throughout.  Never   Eat extra snacks between meals. Almost Everyday   Eat most of my food at the end of the day. Less Than Weekly   Eat in the middle of the night or wake up at night to eat. Never   Eat extra snacks to prevent or correct low blood sugar. Never   Eat to prevent acid reflux or stomach pain. Never   Worry about not having enough food to eat. Never   Have you been to the food shelf at least a few times this year? No   I eat when I am depressed. Once a Week   I eat when I am stressed. Less Than Weekly   I eat when I am bored. Almost Everyday   I eat when I am anxious. Less Than Weekly   I eat when I am happy or as a reward. A Few Times a Week   I feel hungry all the time even if I just have eaten. Never   Feeling full is important to me. Never   I finish all the food on my plate even if I am already full. Everyday   I eat/snack without  noticing that I am eating. A Few Times a Week   I eat when I am preparing the meal. Less Than Weekly   I eat more than usual when I see others eating. Less Than Weekly   I have trouble not eating sweets, ice cream, cookies, or chips if they are around the house. Almost Everyday   I think about food all day. Almost Everyday   What foods, if any, do you crave? Chips/Crackers       Amount of Food 1/7/2022   I make myself vomit what I have eaten or use laxatives to get rid of food. Never   I eat a large amount of food, like a loaf of bread, a box of cookies, a pint/quart of ice cream, all at once. Never   I eat a large amount of food even when I am not hungry. Weekly   I eat rapidly. Never   I eat alone because I feel embarrassed and do not want others to see how much I have eaten. Never   I eat until I am uncomfortably full. Never   I feel bad, disgusted, or guilty after I overeat. Monthly   I make myself vomit what I have eaten or use laxatives to get rid of food. Never       Activity/Exercise History 1/7/2022   How much of a typical 12 hour day do you spend sitting? Less Than Half the Day   How much of a typical 12 hour day do you spend lying down? Less Than Half the Day   How much of a typical day do you spend walking/standing? Less Than Half the Day   How many hours (not including work) do you spend on the TV/Video Games/Computer/Tablet/Phone? 4-5 Hours   How many times a week are you active for the purpose of exercise? Once a Week   What keeps you from being more active? Pain, Other   How many total minutes do you spend doing some activity for the purpose of exercising when you exercise? 15-30 Minutes       PAST MEDICAL HISTORY:  Past Medical History:   Diagnosis Date     Cancer (H) August 2019    Uterus removed     Depression      Depressive disorder 1999    taking prosac     Hypothyroidism      Overweight        Work/Social History Reviewed With Patient 1/7/2022   My employment status is: Retired   My job is:  [employer]   How much of your job is spent on the computer or phone? Less Than 50%   What is your marital status? /In a Relationship   If in a relationship, is your significant other overweight? No   Do you have children? No   If you have children, are they overweight? N/A   Who do you live with?  My partner Abigail   Are they supportive of your health goals? Yes   Who does the food shopping?  I do       Mental Health History Reviewed With Patient 1/7/2022   Have you ever been physically or sexually abused? No   If yes, do you feel that the abuse is affecting your weight? N/A   If yes, would you like to talk to a counselor about the abuse? N/A   How often in the past 2 weeks have you felt little interest or pleasure in doing things? For Several Days   Over the past 2 weeks how often have you felt down, depressed, or hopeless? For Several Days       Sleep History Reviewed With Patient 1/7/2022   How many hours do you sleep at night? 8   Do you think that you snore loudly or has anybody ever heard you snore loudly (louder than talking or so loud it can be heard behind a shut door)? Yes   Has anyone seen or heard you stop breathing during your sleep? No   Do you often feel tired, fatigued, or sleepy during the day? No   Do you have a TV/Computer in your bedroom? Yes       MEDICATIONS:   Current Outpatient Medications   Medication Sig Dispense Refill     Apple Cider Vinegar 600 MG CAPS Take 1 capsule by mouth daily        CALCIUM PO Take 20 mg by mouth daily        cholecalciferol (VITAMIN D3) 5000 units TABS tablet Take by mouth daily       clobetasol (TEMOVATE) 0.05 % external ointment Apply sparingly then once or twice a week as needed 45 g 0     Cyanocobalamin 1500 MCG TBDP Take 1 tablet by mouth daily        FLUoxetine (PROZAC) 20 MG capsule Take 1 capsule (20 mg) by mouth every 48 hours AND 2 capsules (40 mg) every 48 hours. 135 capsule 1     Lactobacillus (ACIDOPHILUS PO) Take 1 tablet by mouth daily         "levothyroxine (SYNTHROID/LEVOTHROID) 100 MCG tablet Take 1 tablet (100 mcg) by mouth every morning 90 tablet 1     Lutein 20 MG CAPS        mometasone (ASMANEX TWISTHALER) 110 MCG/INH inhaler Inhale 1 puff into the lungs every evening 1 each 1     Omega 3-6-9 Fatty Acids (OMEGA-3-6-9 PO) Take 1 capsule by mouth daily          ALLERGIES:   No Known Allergies    PHYSICAL EXAM:  Ht 1.727 m (5' 8\")   Wt 120.2 kg (265 lb)   LMP  (LMP Unknown)   BMI 40.29 kg/m      Waist circumference:      Wt Readings from Last 4 Encounters:   01/11/22 120.2 kg (265 lb)   11/08/21 120.4 kg (265 lb 6.4 oz)   08/24/21 118.8 kg (261 lb 12.8 oz)   07/26/21 115.7 kg (255 lb)     A & O x 3  HEENT: NCAT, mucous membranes moist  Respirations unlabored  Location of obesity: Mixed Obesity     Lab reviewed  ENDO DIABETES Latest Ref Rng & Units 6/25/2021   GLUCOSE 70 - 99 mg/dL 89     ENDO DIABETES Latest Ref Rng & Units 6/25/2021   CHOL <200 mg/dL 240 (H)   LDL <100 mg/dL 177 (H)   HDL >49 mg/dL 40 (L)   NHDL <130 mg/dL 200 (H)   TRIGLYCERIDES <150 mg/dL 113   CREATININE 0.52 - 1.04 mg/dL 1.04     ENDO THYROID LABS-[facility] Latest Ref Rng & Units 6/25/2021 7/17/2020   TSH 0.40 - 4.00 mU/L 1.51 0.84       ASSESSMENT/PLAN:  Hanh is a patient with mature onset obesity with significant element of familial/genetic influence and with current health consequences. She does not need aggressive weight loss plan.  Hanh Villalba eats a high carb diet, uses food as a reward, uses food as mood management and tends to snack/graze throughout day, rarely sitting to eat a true meal.    Her problem is complicated by strong craving/reward pathways    Her ability to lose weight is impacted by lack of confidence.    PLAN:    No meals in front of TV screen  Purge house of food triggers  Dietician visit of education  Calorie/low fat diet  Meal planning  Increase activity    Craving/Reward   Ancillary testing:  N/A.  Food Plan:  High protein/low carbohydrate. "   Activity Plan:  Exercise after meals.  Supplementary:  N/A.   Medication:  The patient will begin medication in pursuit of improved medical status as influenced by body weight. She will start phentermine 15 mg in the morning and topiramate: Take 25 mg (one tab) at bedtime for one week, then increase to 50 mg (two tabs) at bedtime thereafter. There is a mutual understanding of the goals and risks of this therapy. The patient is in agreement. She is educated on dosage regimen and possible side effects.      Orders Placed This Encounter   Procedures     Med Therapy Management Referral       FOLLOW-UP:   See Lauren Bloch, PharmD in 1 month(s)  See Dr.Tasma DRUMMOND in 2-3 month(s)    Start: 01/11/2022 09:21 am  Stop: 01/11/2022 09:46 am  VDO duration: 25 minutes    External notes/medical records independently reviewed, labs and imaging independently reviewed, medical management and tests to be discussed/communicated to patient.    Time: I spent 43 minutes spent on the date of the encounter preparing to see patient (including chart review and preparation), obtaining and or reviewing additional medical history, performing a physical exam and evaluation, documenting clinical information in the electronic health record, independently interpreting results, communicating results to the patient and coordinating care.    Sincerely,    Jer Tapia MD

## 2022-01-11 NOTE — PROGRESS NOTES
"Hnah Villalba is a 66 year old female who is being evaluated via a billable video visit.      The patient has been notified of following:     \"This video visit will be conducted via a call between you and your physician/provider. We have found that certain health care needs can be provided without the need for an in-person physical exam.  This service lets us provide the care you need with a video conversation.  If a prescription is necessary we can send it directly to your pharmacy.  If lab work is needed we can place an order for that and you can then stop by our lab to have the test done at a later time.    Video visits are billed at different rates depending on your insurance coverage.  Please reach out to your insurance provider with any questions.    If during the course of the call the physician/provider feels a video visit is not appropriate, you will not be charged for this service.\"    Patient has given verbal consent for Video visit? Yes  How would you like to obtain your AVS? MyChart  If you are dropped from the video visit, the video invite should be resent to: Send to e-mail at: [email or web address]  Will anyone else be joining your video visit? No  {If patient encounters technical issues they should call [phone]      Video-Visit Details    Type of service:  Video Visit    Video Start Time: 10:30 AM  Video End Time: 11:00 AM    Originating Location (pt. Location): Home    Distant Location (provider location):  [facility] [place]     Platform used for Video Visit: [employer]    During this virtual visit the patient is located in [place], patient verifies this as the location during the entirety of this visit.     New Weight Management Nutrition Consultation    Hanh Villalba is a 66 year old female presents today for new weight management nutrition consultation.  Patient referred by Dr. Randle on January 10, 2022.    Patient with Co-morbidities of obesity " "including:  Type II DM no  Renal Failure yes  Sleep apnea no  Hypertension no   Dyslipidemia no  Joint pain no  Back pain no  GERD no     Anthropometrics:  Estimated body mass index is 40.95 kg/m  as calculated from the following:    Height as of 7/26/21: 1.715 m (5' 7.5\").    Weight as of 11/8/21: 120.4 kg (265 lb 6.4 oz).     Current weight (1/11/22): 265 lbs    Medications for Weight Loss:  Phentermine, Topamax    NUTRITION HISTORY  See MD note for details.    Pt tried WW in the past and had some success. Has used [employer] in the past. Has taken a class in the past about nutrition but mainly struggles with sticking to a program. Struggles mainly with portion sizes and snacking.     Plans weekly meals.     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     Nutrition Prescription  Recommended energy/nutrient modification.    Nutrition Diagnosis  Obesity r/t long history of positive energy balance aeb BMI >30.    Nutrition Intervention  Materials/education provided on Volumetric eating to help satiety level on fewer calories; portion control and healthy food choices (Plate Method and Volumetrics handouts), 100 calorie snack choices, meal and snack planning and websites, sample meal plans     Patient demonstrates understanding.    Expected Engagement: good    Nutrition Goals  1) Focus on having one serving at meals   -wait 20 minutes to take a second serving  2) Avoid snacking if able. If snack is needed use lean protein and/or fruit/vegetable. Examples:   - 2 cup popcorn   - 1 cup mixed berries   - 15 almonds, walnuts, cashews   - carrot/celery sticks and 2 tbsp low-fat ranch   - 1 hard boiled egg   - Part-skim mozzarella cheese stick   - Low-fat, low-sugar greek yogurt with 1/2 cup berries   - Med apple or pear   - sliced bell peppers " with 1/2 cup salsa   - 1/2 cup roasted chickpeas   - sliced cucumbers with vinegar    Snack ideas:  - Banana and creamy PB dip (https://www.diabetesfoodHighfiveb.org/recipes/sweet-peanut-buttery-dip.html?home-category_id=23)  - Roasted chickpeas (https://www.diabetesfoodHighfiveb.org/recipes/roasted-and-spiced-chickpeas.html?home-category_id=23)  - Lemon Raspberry miguelito seed pudding (https://www.diabetesfoodhub.org/recipes/lemon-raspberry- miguelito-seed-pudding.html?home-category_id=23)  - Black bean hummus with carrot and celery sticks (https://www.diabetesfoodhub.org/recipes/black-bean-hummus.html?home-category_id=23)  - Greek yogurt chocolate mouse (https://www.diabetesfoodhub.org/recipes/greek-yogurt-chocolate-mousse.html?home-category_id=23)   - Broccoli Cheese Bites  (https://www.diabetesfoodHighfiveb.org/recipes/broccoli-cheese-bites.html?home-category_id=20)  - Chicken Satay with peanut sauce  (https://www.diabetesfoodHighfiveb.org/recipes/blueberry-almond-chicken-salad-lettuce-wraps.html?home-category_id=20)  - Deviled Eggs  (https://www.diabetesfoodhub.org/recipes/devilled-eggs.html?home-category_id=20)    3) Continue tracking calories on MyFitness Pal   -Keep track of emotions at meals and snacks    4) Increase physical activity as able     Portion Control Without Measuring  [email or web address]     The Plate Method  [email or web address]    Protein Sources   [email or web address]     Carbohydrates  [email or web address]     Mindful Eating  [email or web address]     Summary of Volumetrics Eating Plan  [email or web address]     Follow-Up:  1 month, PRN    Time spent with patient: 30 minutes.  APRIL WARE RD, LD

## 2022-01-11 NOTE — PROGRESS NOTES
Bry is a 66 year old who is being evaluated via a billable video visit.      How would you like to obtain your AVS? MyChart  If the video visit is dropped, the invitation should be resent by: Text to cell phone: [phone]  Will anyone else be joining your video visit? No      Video-Visit Details    Type of service:  Video Visit  Originating Location (pt. Location): Home    Distant Location (provider location):  [facility] [place]     Platform used for Video Visit: [employer]

## 2022-01-11 NOTE — LETTER
"1/11/2022       RE: Hanh Villalba  [address]  [place] [address]     Dear Colleague,    Thank you for referring your patient, Hanh Villalba, to the [facility]NAG[facility] [place] [facility]. Please see a copy of my visit note below.    Hanh Villalba is a 66 year old female who is being evaluated via a billable video visit.      The patient has been notified of following:     \"This video visit will be conducted via a call between you and your physician/provider. We have found that certain health care needs can be provided without the need for an in-person physical exam.  This service lets us provide the care you need with a video conversation.  If a prescription is necessary we can send it directly to your pharmacy.  If lab work is needed we can place an order for that and you can then stop by our lab to have the test done at a later time.    Video visits are billed at different rates depending on your insurance coverage.  Please reach out to your insurance provider with any questions.    If during the course of the call the physician/provider feels a video visit is not appropriate, you will not be charged for this service.\"    Patient has given verbal consent for Video visit? Yes  How would you like to obtain your AVS? MyChart  If you are dropped from the video visit, the video invite should be resent to: Send to e-mail at: [email or web address]  Will anyone else be joining your video visit? No  {If patient encounters technical issues they should call [phone]      Video-Visit Details    Type of service:  Video Visit    Video Start Time: 10:30 AM  Video End Time: 11:00 AM    Originating Location (pt. Location): Home    Distant Location (provider location):  [facility] WEIGHT MANAGEMENT [facility]     Platform used for Video Visit: [employer]    During this virtual visit the patient is located in [place], " "patient verifies this as the location during the entirety of this visit.     New Weight Management Nutrition Consultation    Hanh Villalba is a 66 year old female presents today for new weight management nutrition consultation.  Patient referred by Dr. Randle on January 10, 2022.    Patient with Co-morbidities of obesity including:  Type II DM no  Renal Failure yes  Sleep apnea no  Hypertension no   Dyslipidemia no  Joint pain no  Back pain no  GERD no     Anthropometrics:  Estimated body mass index is 40.95 kg/m  as calculated from the following:    Height as of 7/26/21: 1.715 m (5' 7.5\").    Weight as of 11/8/21: 120.4 kg (265 lb 6.4 oz).     Current weight (1/11/22): 265 lbs    Medications for Weight Loss:  Phentermine, Topamax    NUTRITION HISTORY  See MD note for details.    Pt tried WW in the past and had some success. Has used [employer] in the past. Has taken a class in the past about nutrition but mainly struggles with sticking to a program. Struggles mainly with portion sizes and snacking.     Plans weekly meals.     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     Nutrition Prescription  Recommended energy/nutrient modification.    Nutrition Diagnosis  Obesity r/t long history of positive energy balance aeb BMI >30.    Nutrition Intervention  Materials/education provided on Volumetric eating to help satiety level on fewer calories; portion control and healthy food choices (Plate Method and Volumetrics handouts), 100 calorie snack choices, meal and snack planning and websites, sample meal plans     Patient demonstrates understanding.    Expected Engagement: good    Nutrition Goals  1) Focus on having one serving at meals   -wait 20 minutes to take a second serving  2) Avoid snacking if able. If snack is needed use lean " protein and/or fruit/vegetable. Examples:   - 2 cup popcorn   - 1 cup mixed berries   - 15 almonds, walnuts, cashews   - carrot/celery sticks and 2 tbsp low-fat ranch   - 1 hard boiled egg   - Part-skim mozzarella cheese stick   - Low-fat, low-sugar greek yogurt with 1/2 cup berries   - Med apple or pear   - sliced bell peppers with 1/2 cup salsa   - 1/2 cup roasted chickpeas   - sliced cucumbers with vinegar    Snack ideas:  - Banana and creamy PB dip (https://www.diabetesfoodhub.org/recipes/sweet-peanut-buttery-dip.html?home-category_id=23)  - Roasted chickpeas (https://www.diabetesfoodhub.org/recipes/roasted-and-spiced-chickpeas.html?home-category_id=23)  - Lemon Raspberry miguelito seed pudding (https://www.diabetesfoodhub.org/recipes/lemon-raspberry- miguelito-seed-pudding.html?home-category_id=23)  - Black bean hummus with carrot and celery sticks (https://www.diabetesfoodhub.org/recipes/black-bean-hummus.html?home-category_id=23)  - Greek yogurt chocolate mouse (https://www.diabetesfoodhub.org/recipes/greek-yogurt-chocolate-mousse.html?home-category_id=23)   - Broccoli Cheese Bites  (https://www.diabetesfoodhub.org/recipes/broccoli-cheese-bites.html?home-category_id=20)  - Chicken Satay with peanut sauce  (https://www.diabetesfoodhub.org/recipes/blueberry-almond-chicken-salad-lettuce-wraps.html?home-category_id=20)  - Deviled Eggs  (https://www.diabetesfoodhub.org/recipes/devilled-eggs.html?home-category_id=20)    3) Continue tracking calories on MyFitness Pal   -Keep track of emotions at meals and snacks    4) Increase physical activity as able     Portion Control Without Measuring  [email or web address]     The Plate Method  [email or web address]://www.[email or web address]    Protein Sources   http://[employer]/[email or web address]     Carbohydrates  [email or web address]     Mindful Eating  http://[email or web address]     Summary of Volumetrics Eating Plan  [email or web address]     Follow-Up:  1 month,  PRN    Time spent with patient: 30 minutes.  APRIL WARE RD, LD

## 2022-01-11 NOTE — PATIENT INSTRUCTIONS
Start phentermine 15 mg in the morning  Start topiramate: Take 25 mg (one tab) at bedtime for one week, then increase to 50 mg (two tabs) at bedtime thereafter.     See Lauren Bloch, PharmD in 1 month(s)  See Dr.Tasma DRUMMOND in 2-3 month(s)    If you have any questions, please do not hesitate to call Weight management clinic at [phone] or [phone].  If you need to fax, please fax to [phone].    Sincerely,    Jer Tapia MD  Endocrinology

## 2022-01-11 NOTE — PATIENT INSTRUCTIONS
Hal Londono!    Follow-up with RD in 2/15/22    Thank you,    Cindy Rosenberg, ADAM, LD  If you would like to schedule or reschedule an appointment with the RD, please call [phone]    Nutrition Goals  1) Focus on having one serving at meals   -wait 20 minutes to take a second serving  2) Avoid snacking if able. If snack is needed use lean protein and/or fruit/vegetable. Examples:   - 2 cup popcorn   - 1 cup mixed berries   - 15 almonds, walnuts, cashews   - carrot/celery sticks and 2 tbsp low-fat ranch   - 1 hard boiled egg   - Part-skim mozzarella cheese stick   - Low-fat, low-sugar greek yogurt with 1/2 cup berries   - Med apple or pear   - sliced bell peppers with 1/2 cup salsa   - 1/2 cup roasted chickpeas   - sliced cucumbers with vinegar    Snack ideas:  - Banana and creamy PB dip (https://www.diabetesfoodhub.org/recipes/sweet-peanut-buttery-dip.html?home-category_id=23)  - Roasted chickpeas (https://www.diabetesfoodhub.org/recipes/roasted-and-spiced-chickpeas.html?home-category_id=23)  - Lemon Raspberry miguelito seed pudding (https://www.diabetesfoodhub.org/recipes/lemon-raspberry- miguelito-seed-pudding.html?home-category_id=23)  - Black bean hummus with carrot and celery sticks (https://www.diabetesfoodhub.org/recipes/black-bean-hummus.html?home-category_id=23)  - Greek yogurt chocolate mouse (https://www.diabetesfoodhub.org/recipes/greek-yogurt-chocolate-mousse.html?home-category_id=23)   - Broccoli Cheese Bites  (https://www.diabetesfoodhub.org/recipes/broccoli-cheese-bites.html?home-category_id=20)  - Chicken Satay with peanut sauce  (https://www.diabetesfoodhub.org/recipes/blueberry-almond-chicken-salad-lettuce-wraps.html?home-category_id=20)  - Deviled Eggs  (https://www.diabetesfoodhub.org/recipes/devilled-eggs.html?home-category_id=20)    3) Continue tracking calories on MyFitness Pal   -Keep track of emotions at meals and snacks    4) Increase physical activity as able     Portion Control Without  Measuring  [email or web address]     The Plate Method  [email or web address]://www.[email or web address]    Protein Sources   [email or web address]://[email or web address]/[email or web address]     Carbohydrates  [email or web address]://[email or web address]     Mindful Eating  http://[employer]/[email or web address]     Summary of Volumetrics Eating Plan  [email or web address]     Interested in working with a health ? Health coaches work with you to improve your overall health and wellbeing. They look at the whole person, and may involve discussion of different areas of life, including, but not limited to the four pillars of health (sleep, exercise, nutrition, and stress management). Discuss with your care team if you would like to start working a health .    Health Coaching-3 Pack:    $99 for three health coaching visits    Visits may be done in person or via phone    Coaching is a partnership between the  and the client; Coaches do not prescribe or diagnose    Coaching helps inspire the client to reach his/her personal goals    COMPREHENSIVE WEIGHT MANAGEMENT PROGRAM  VIRTUAL SUPPORT GROUPS    For Support Group Information:    We offer support groups for patients who are working on weight loss and considering, preparing for or have had weight loss surgery. There is no cost for this opportunity.  You are invited to attend the?Virtual Support Groups?provided by any of the following locations:    [facility] via [employer] Teams with Vani Coronado RN  2. [place] via [employer] Teams with Eladio Larson, PhD, LP  3. [place] via [employer] Teams with Richa Naik RN  4. [facility] via [employer] Teams with Richa Barton [facility]    The following Support Group information can also be found on our website:  [email or web address]    [facility] Weight Loss Surgery Support Group    [facility] Weight Loss Surgery Support Group  The support group is a patient-lead forum that meets  "monthly to share experiences, encouragement and education. It is open to those who have had weight loss surgery, are scheduled for surgery, and those who are considering surgery.  WHEN: This group meets on the 3rd Wednesday of each month from 5:00PM - 6:00PM virtually using Microsoft Teams.  FACILITATOR: Led by Vani Coronado, the program's Clinical Coordinator.  TO REGISTER: Please contact the clinic via [employer] or call the nurse line directly at [phone] to inform our staff that you would like an invite sent to you and to let us know the email you would like the invite sent to. Prior to the meeting, a link with directions on how to join the meeting will be sent to you.    2021 Meetings  August 18: \"Let's Talk\" a time for the group to share.  September 15: \"Let's Talk\" a time for the group to share.  October 20: \"Let's Talk\" a time for the group to share.  November 17: Connie Padilla RD, KEYLA \"Protein, Metabolism and Meal Planning\"  December 15: Cholo Vale RD, LD will speak, \"Recipe Modification\"    [facility] Support Groups    Connections: Bariatric Care Support Group?  This is open to all [facility] (and those external to this program) pre- and post- operative bariatric surgery patients as well as their support system.  WHEN: This group meets the 2nd Tuesday of each month from 6:30 PM - 8:00 PM virtually using Microsoft Teams.  FACILITATOR: Led by Eladio Larson, Ph.D who is a Licensed Psychologist with the [facility] Comprehensive Weight Management Program.  TO REGISTER: Please send an email to Eladio Larson, Ph.D., LP at?[email or web address]?if you would like an invitation to the group and to learn about using Microsoft Teams.    2021 Meetings  August 10: Open Forum  September 14: Guest Speaker: Richa Naik RN,CBN, CIC, [facility] Comprehensive Weight Management Program, \"Your Hospital Stay and Post-Operative Compliance\"  October 12: Open " "Forum  November 9: Guest Speaker: Татьяна Echols RD,LD, [facility] Comprehensive Weight Management Program,\"Holiday Eating\".  December 14: Guest Speaker Cesia Drake MD, MPH, [facility], [facility], \"Body Contouring Surgery for Bariatric Surgery Patients\"    Connections: Post-Operative Bariatric Surgery Support Group  This is a support group for [facility] bariatric patients (and those external to [facility]) who have had bariatric surgery and are at least 3 months post-surgery.  WHEN: This support group meets the 4th Wednesday of the month from 11:00 AM - 12:00 PM virtually using Microsoft Teams.  FACILITATOR: Led by Certified Bariatric Nurse, Richa Naik RN.  TO REGISTER: Please send an email to Richa at [email or web address] if you would like an invitation to the group and to learn about using [employer].    [facility] Healthy Lifestyle Virtual Support Group    Healthy Lifestyle Virtual Support Group?  This is 60 minutes of small group guided discussion, support and resources. All are welcome who want a healthy lifestyle.  WHEN: This group meets monthly on a Friday from 12:30 PM - to 1:30 PM virtually using Microsoft Teams.  FACILITATOR: Led by National Board Certified Health , Richa Barton, [facility].  TO REGISTER: Please send an email to Richa at?[email or web address] to receive monthly invites to the group or if you have any questions about having a health . Prior to the meeting, a link with directions on how to join the meeting will be sent to you.    2021 Meetings  August 27: Open Forum  September 24: Sleep and the 7 Types of Rest  October 29: Open Forum  November 19: Gratitude  December 10: Open Forum    "

## 2022-01-11 NOTE — LETTER
"2022       RE: Hanh Villalba   [address] [place] [address]     Dear Colleague,    Thank you for referring your patient, Hanh Villalba, to the [facility] [place] [facility]. Please see a copy of my visit note below.    Bry is a 66 year old who is being evaluated via a billable video visit.      How would you like to obtain your AVS? MyChart  If the video visit is dropped, the invitation should be resent by: Text to cell phone: [phone]  Will anyone else be joining your video visit? No      Video-Visit Details    Type of service:  Video Visit  Originating Location (pt. Location): Home    Distant Location (provider location):  [facility] [place]     Platform used for Video Visit: [facility] Weight Management Consult    PATIENT:  Hanh Villalba  MRN:         [id number]  :         1955  RUPERT:         2022    Dear ,    I had the pleasure of seeing your patient, Hanh Villalba. Full intake/assessment was done to determine barriers to weight loss success and develop a treatment plan. Hanh Villalba is a 66 year old female interested in treatment of medical problems associated with excess weight. She has a height of 5' 8\", a weight of 265 lbs 0 oz, and the calculated Body mass index is 40.29 kg/m .    She has the following co-morbidities: hypothyroidism, depression, chronic pain    She reports weight was 160 lbs during her college. She gradually gained up to 200 lbs after finishing college. After quit smoking and started on depo-provera --> gained to 240 lbs  She is now steady at 260 lbs.    Previous attempts-- Calories counting, WW program (lost weight), was on Redux in the 1980s (lost significant weight).    Main triggers -- eat out of boredom and rewarding. She tends to snack on chip and sweet    She usually plans her meal " "well weekly meal.  High quantity   Job: Retired             1/7/2022   I have the following health issues associated with obesity: High Cholesterol, Cancer, Osteoarthritis (joint disease), Hypothyroidism   I have the following symptoms associated with obesity: Depression, Back Pain, Hip Pain       Patient Goals 1/7/2022   I am interested in having a healthier weight to diminish current health problems: Yes   I am interested in having a healthier weight in order to prevent future health problems: Yes   I am interested in having a healthier weight in order to have a future surgery: No       Referring Provider 1/7/2022   Please name the provider who referred you to Medical Weight Management.  If you do not know, please answer: \"I Don't Know\". Dr. Mari Messina       Weight History 1/7/2022   How concerned are you about your weight? Very Concerned   Would you describe your weight gain as gradual? Yes   I became overweight: As an Adult   The following factors have contributed to my weight gain:  Started on Medication that Caused Weight Gain, After Quitting Smoking, Eating Too Much, Genetic (Runs in the Family)   I have tried the following methods to lose weight: Watching Portions or Calories, Exercise, Weight Watchers, Nutrisystem, Slim Fast or Other Liquid Diets, Medications   Please list the medications you have tried.  Redux   My lowest weight since age 16 was: 165   My highest weight since age 16 was: 272   The most weight I have ever lost was: (lbs) 32   I have the following family history of obesity/being overweight:  My mother is overweight, One or more of my siblings are overweight, Many of my relatives are overweight   Has anyone in your family had weight loss surgery? No   How has your weight changed over the last year?  Gained   How many pounds? 3-6       Diet Recall Review with Patient 1/7/2022   Do you typically eat breakfast? Yes   If you do eat breakfast, what types of food do you " eat? Diet Mt. Dew, Banana, [place] Bar  or Cereal or Eggs or yogurt/ fruit   Do you typically eat lunch? Yes   If you do eat lunch, what types of food do you typically eat?  soup, salad, hot dish, open face tuna, tomato sandwich   Do you typically eat supper? Yes   If you do eat supper, what types of food do you typically eat? salad, soups, goulash,  fish/potato/veggi,  pizza,  chili,   spaghetti squash w/ tomato,  burgers, Hot dishes (lockwood's pie, enchilada)  tacos   Do you typically eat snacks? Yes   If you do snack, what types of food do you typically eat? crackers, fruits, cookies, Dots pretzels, cherry slices candy, jujubees?, watermelon,   Do you like vegetables?  Yes   Do you drink water? Yes   How many glasses of juice do you drink in a typical day? 0   How many of glasses of milk do you drink in a typical day? 0   If you do drink milk, what type? Whole   How many 8oz glasses of sugar containing drinks such as Gabriele-Aid/sweet tea do you drink in a day? 0   How many cans/bottles of sugar pop/soda/tea/sports drinks do you drink in a day? 1   How many cans/bottles of diet pop/soda/tea or sports drink do you drink in a day? 1   How often do you have a drink of alcohol? 2-3 TImes a Week   If you do drink, how many drinks might you have in a day? 1 or 2       Eating Habits 1/7/2022   Generally, my meals include foods like these: bread, pasta, rice, potatoes, corn, crackers, sweet dessert, pop, or juice. A Few Times a Week   Generally, my meals include foods like these: fried meats, brats, burgers, french fries, pizza, cheese, chips, or ice cream. A Few Times a Week   Eat fast food (like McDonalds, Burger Kevin, Taco Bell). Less Than Weekly   Eat at a buffet or sit-down restaurant. Less Than Weekly   Eat most of my meals in front of the TV or computer. Almost Everyday   Often skip meals, eat at random times, have no regular eating times. Never   Rarely sit down for a meal but snack or graze throughout.  Never    Eat extra snacks between meals. Almost Everyday   Eat most of my food at the end of the day. Less Than Weekly   Eat in the middle of the night or wake up at night to eat. Never   Eat extra snacks to prevent or correct low blood sugar. Never   Eat to prevent acid reflux or stomach pain. Never   Worry about not having enough food to eat. Never   Have you been to the food shelf at least a few times this year? No   I eat when I am depressed. Once a Week   I eat when I am stressed. Less Than Weekly   I eat when I am bored. Almost Everyday   I eat when I am anxious. Less Than Weekly   I eat when I am happy or as a reward. A Few Times a Week   I feel hungry all the time even if I just have eaten. Never   Feeling full is important to me. Never   I finish all the food on my plate even if I am already full. Everyday   I eat/snack without noticing that I am eating. A Few Times a Week   I eat when I am preparing the meal. Less Than Weekly   I eat more than usual when I see others eating. Less Than Weekly   I have trouble not eating sweets, ice cream, cookies, or chips if they are around the house. Almost Everyday   I think about food all day. Almost Everyday   What foods, if any, do you crave? Chips/Crackers       Amount of Food 1/7/2022   I make myself vomit what I have eaten or use laxatives to get rid of food. Never   I eat a large amount of food, like a loaf of bread, a box of cookies, a pint/quart of ice cream, all at once. Never   I eat a large amount of food even when I am not hungry. Weekly   I eat rapidly. Never   I eat alone because I feel embarrassed and do not want others to see how much I have eaten. Never   I eat until I am uncomfortably full. Never   I feel bad, disgusted, or guilty after I overeat. Monthly   I make myself vomit what I have eaten or use laxatives to get rid of food. Never       Activity/Exercise History 1/7/2022   How much of a typical 12 hour day do you spend sitting? Less Than Half the Day    How much of a typical 12 hour day do you spend lying down? Less Than Half the Day   How much of a typical day do you spend walking/standing? Less Than Half the Day   How many hours (not including work) do you spend on the TV/Video Games/Computer/Tablet/Phone? 4-5 Hours   How many times a week are you active for the purpose of exercise? Once a Week   What keeps you from being more active? Pain, Other   How many total minutes do you spend doing some activity for the purpose of exercising when you exercise? 15-30 Minutes       PAST MEDICAL HISTORY:  Past Medical History:   Diagnosis Date     Cancer (H) August 2019    Uterus removed     Depression      Depressive disorder 1999    taking prosac     Hypothyroidism      Overweight        Work/Social History Reviewed With Patient 1/7/2022   My employment status is: Retired   My job is: [employer]   How much of your job is spent on the computer or phone? Less Than 50%   What is your marital status? /In a Relationship   If in a relationship, is your significant other overweight? No   Do you have children? No   If you have children, are they overweight? N/A   Who do you live with?  My partner Abigail   Are they supportive of your health goals? Yes   Who does the food shopping?  I do       Mental Health History Reviewed With Patient 1/7/2022   Have you ever been physically or sexually abused? No   If yes, do you feel that the abuse is affecting your weight? N/A   If yes, would you like to talk to a counselor about the abuse? N/A   How often in the past 2 weeks have you felt little interest or pleasure in doing things? For Several Days   Over the past 2 weeks how often have you felt down, depressed, or hopeless? For Several Days       Sleep History Reviewed With Patient 1/7/2022   How many hours do you sleep at night? 8   Do you think that you snore loudly or has anybody ever heard you snore loudly (louder than talking or so loud it can be heard behind a shut door)? Yes  "  Has anyone seen or heard you stop breathing during your sleep? No   Do you often feel tired, fatigued, or sleepy during the day? No   Do you have a TV/Computer in your bedroom? Yes       MEDICATIONS:   Current Outpatient Medications   Medication Sig Dispense Refill     Apple Cider Vinegar 600 MG CAPS Take 1 capsule by mouth daily        CALCIUM PO Take 20 mg by mouth daily        cholecalciferol (VITAMIN D3) 5000 units TABS tablet Take by mouth daily       clobetasol (TEMOVATE) 0.05 % external ointment Apply sparingly then once or twice a week as needed 45 g 0     Cyanocobalamin 1500 MCG TBDP Take 1 tablet by mouth daily        FLUoxetine (PROZAC) 20 MG capsule Take 1 capsule (20 mg) by mouth every 48 hours AND 2 capsules (40 mg) every 48 hours. 135 capsule 1     Lactobacillus (ACIDOPHILUS PO) Take 1 tablet by mouth daily        levothyroxine (SYNTHROID/LEVOTHROID) 100 MCG tablet Take 1 tablet (100 mcg) by mouth every morning 90 tablet 1     Lutein 20 MG CAPS        mometasone (ASMANEX TWISTHALER) 110 MCG/INH inhaler Inhale 1 puff into the lungs every evening 1 each 1     Omega 3-6-9 Fatty Acids (OMEGA-3-6-9 PO) Take 1 capsule by mouth daily          ALLERGIES:   No Known Allergies    PHYSICAL EXAM:  Ht 1.727 m (5' 8\")   Wt 120.2 kg (265 lb)   LMP  (LMP Unknown)   BMI 40.29 kg/m      Waist circumference:      Wt Readings from Last 4 Encounters:   01/11/22 120.2 kg (265 lb)   11/08/21 120.4 kg (265 lb 6.4 oz)   08/24/21 118.8 kg (261 lb 12.8 oz)   07/26/21 115.7 kg (255 lb)     A & O x 3  HEENT: NCAT, mucous membranes moist  Respirations unlabored  Location of obesity: Mixed Obesity     Lab reviewed  ENDO DIABETES Latest Ref Rng & Units 6/25/2021   GLUCOSE 70 - 99 mg/dL 89     ENDO DIABETES Latest Ref Rng & Units 6/25/2021   CHOL <200 mg/dL 240 (H)   LDL <100 mg/dL 177 (H)   HDL >49 mg/dL 40 (L)   NHDL <130 mg/dL 200 (H)   TRIGLYCERIDES <150 mg/dL 113   CREATININE 0.52 - 1.04 mg/dL 1.04     ENDO THYROID LABS-[facility] " Latest Ref Rng & Units 6/25/2021 7/17/2020   TSH 0.40 - 4.00 mU/L 1.51 0.84       ASSESSMENT/PLAN:  Hanh is a patient with mature onset obesity with significant element of familial/genetic influence and with current health consequences. She does not need aggressive weight loss plan.  Hanh Villalba eats a high carb diet, uses food as a reward, uses food as mood management and tends to snack/graze throughout day, rarely sitting to eat a true meal.    Her problem is complicated by strong craving/reward pathways    Her ability to lose weight is impacted by lack of confidence.    PLAN:    No meals in front of TV screen  Purge house of food triggers  Dietician visit of education  Calorie/low fat diet  Meal planning  Increase activity    Craving/Reward   Ancillary testing:  N/A.  Food Plan:  High protein/low carbohydrate.   Activity Plan:  Exercise after meals.  Supplementary:  N/A.   Medication:  The patient will begin medication in pursuit of improved medical status as influenced by body weight. She will start phentermine 15 mg in the morning and topiramate: Take 25 mg (one tab) at bedtime for one week, then increase to 50 mg (two tabs) at bedtime thereafter. There is a mutual understanding of the goals and risks of this therapy. The patient is in agreement. She is educated on dosage regimen and possible side effects.      Orders Placed This Encounter   Procedures     Med Therapy Management Referral       FOLLOW-UP:   See Lauren Bloch, PharmD in 1 month(s)  See Dr.Tasma DRUMMOND in 2-3 month(s)    Start: 01/11/2022 09:21 am  Stop: 01/11/2022 09:46 am  VDO duration: 25 minutes    External notes/medical records independently reviewed, labs and imaging independently reviewed, medical management and tests to be discussed/communicated to patient.    Time: I spent 43 minutes spent on the date of the encounter preparing to see patient (including chart review and preparation), obtaining and or reviewing additional medical  history, performing a physical exam and evaluation, documenting clinical information in the electronic health record, independently interpreting results, communicating results to the patient and coordinating care.    Sincerely,    Jer Tapia MD

## 2022-01-11 NOTE — NURSING NOTE
"Chief Complaint   Patient presents with     Consult     Consultation Weight Management       Vitals:    01/11/22 0847   Weight: 120.2 kg (265 lb)   Height: 1.727 m (5' 8\")       Body mass index is 40.29 kg/m .                            "

## 2022-01-19 ENCOUNTER — MYC MEDICAL ADVICE (OUTPATIENT)
Dept: FAMILY MEDICINE | Facility: CLINIC | Age: 67
End: 2022-01-19
Payer: COMMERCIAL

## 2022-01-19 DIAGNOSIS — E03.4 HYPOTHYROIDISM DUE TO ACQUIRED ATROPHY OF THYROID: ICD-10-CM

## 2022-01-19 RX ORDER — LEVOTHYROXINE SODIUM 100 UG/1
100 TABLET ORAL EVERY MORNING
Qty: 90 TABLET | Refills: 3 | Status: SHIPPED | OUTPATIENT
Start: 2022-01-19 | End: 2022-10-07

## 2022-02-14 NOTE — PROGRESS NOTES
"Hanh Villalba is a 66 year old female who is being evaluated via a billable video visit.      The patient has been notified of following:     \"This video visit will be conducted via a call between you and your physician/provider. We have found that certain health care needs can be provided without the need for an in-person physical exam.  This service lets us provide the care you need with a video conversation.  If a prescription is necessary we can send it directly to your pharmacy.  If lab work is needed we can place an order for that and you can then stop by our lab to have the test done at a later time.    Video visits are billed at different rates depending on your insurance coverage.  Please reach out to your insurance provider with any questions.    If during the course of the call the physician/provider feels a video visit is not appropriate, you will not be charged for this service.\"    Patient has given verbal consent for Video visit? Yes  How would you like to obtain your AVS? MyChart  If you are dropped from the video visit, the video invite should be resent to: Send to e-mail at: [email or web address]  Will anyone else be joining your video visit? No  {If patient encounters technical issues they should call [phone]      Video-Visit Details    Type of service:  Video Visit    Video Start Time: 9:43 AM  Video End Time: 10:08 AM    Originating Location (pt. Location): [place]    Distant Location (provider location):  [facility] [place]     Platform used for Video Visit: Un[employer]ot[employer]    During this virtual visit the patient is located in [place], patient verifies this as the location during the entirety of this visit.     Return Weight Management Nutrition Consultation    Hanh Villalba is a 66 year old female presents today for a return weight management nutrition consultation.  Patient referred by Dr. Randle on January 10, 2022.    Patient with Co-morbidities of " "obesity including:  Type II DM no  Renal Failure yes  Sleep apnea no  Hypertension no   Dyslipidemia no  Joint pain no  Back pain no  GERD no     Anthropometrics:  Estimated body mass index is 40.29 kg/m  as calculated from the following:    Height as of 1/11/22: 1.727 m (5' 8\").    Weight as of 1/11/22: 120.2 kg (265 lb).     Current weight (2/15/22): 257 lbs    Medications for Weight Loss:  Phentermine, Topamax    NUTRITION HISTORY  See MD note for details.    Pt tried WW in the past and had some success. Has used MyFitness Pal in the past. Has taken a class in the past about nutrition but mainly struggles with sticking to a program. Struggles mainly with portion sizes and snacking.     Plans weekly meals.     2/15/22: Pt reports she is doing well. Has been utilizing food journaling and reducing portions, not going back for seconds. Has noticed reduced appetite. Has been using MyFitness Pal at 1900 calories per day but often eats under this.    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     Progress on Previous Goals:  1) Focus on having one serving at meals met, continues   -wait 20 minutes to take a second serving  2) Avoid snacking if able. If snack is needed use lean protein and/or met, continues fruit/vegetable. Examples:   - 2 cup popcorn   - 1 cup mixed berries   - 15 almonds, walnuts, cashews   - carrot/celery sticks and 2 tbsp low-fat ranch   - 1 hard boiled egg   - Part-skim mozzarella cheese stick   - Low-fat, low-sugar greek yogurt with 1/2 cup berries   - Med apple or pear   - sliced bell peppers with 1/2 cup salsa   - 1/2 cup roasted chickpeas   - sliced cucumbers with vinegar    3) Continue tracking calories on MyFitness Pal met, continues   -Keep track of emotions at meals and snacks    4) Increase physical activity as " able met, continues    Nutrition Prescription  Recommended energy/nutrient modification.    Nutrition Diagnosis  Obesity r/t long history of positive energy balance aeb BMI >30.    Nutrition Intervention  Materials/education provided on Volumetric eating to help satiety level on fewer calories; portion control and healthy food choices (Plate Method and Volumetrics handouts), 100 calorie snack choices, meal and snack planning and websites, sample meal plans     Patient demonstrates understanding.    Expected Engagement: good    Nutrition Goals  1) Explore healthy snack options:    - 2 cups popcorn    - 1 cup mixed berries   - 15 almonds, walnuts, cashews   - carrot/celery sticks and 2 tbsp low-fat ranch   - 1 hard boiled egg   - Part-skim mozzarella cheese stick   - Low-fat, low-sugar greek yogurt with 1/2 cup berries   - Med apple or pear   - sliced bell peppers with 1/2 cup salsa   - 1/2 cup roasted chickpeas   - sliced cucumbers with vinegar    Snack ideas:  - Banana and creamy PB dip (https://www.diabetesfoodhub.org/recipes/sweet-peanut-buttery-dip.html?home-category_id=23)  - Roasted chickpeas (https://www.diabetesfoodIvaldib.org/recipes/roasted-and-spiced-chickpeas.html?home-category_id=23)  - Lemon Raspberry miguelito seed pudding (https://www.diabetesfoodhub.org/recipes/lemon-raspberry- miguelito-seed-pudding.html?home-category_id=23)  - Black bean hummus with carrot and celery sticks (https://www.diabetesfoodhub.org/recipes/black-bean-hummus.html?home-category_id=23)  - Greek yogurt chocolate mouse (https://www.diabetesfoodhub.org/recipes/greek-yogurt-chocolate-mousse.html?home-category_id=23)   - Broccoli Cheese Bites  (https://www.diabetesfoodhub.org/recipes/broccoli-cheese-bites.html?home-category_id=20)  - Chicken Satay with peanut sauce  (https://www.diabetesfoodhub.org/recipes/blueberry-almond-chicken-salad-lettuce-wraps.html?home-category_id=20)  - Deviled  "Eggs  (https://www.diabetesfoodhub.org/recipes/devilled-eggs.html?home-category_id=20)    2) [facility] Healthy Lifestyle Virtual Support Group    Healthy Lifestyle Virtual Support Group?  This is 60 minutes of small group guided discussion, support and resources. All are welcome who want a healthy lifestyle.  WHEN: This group meets monthly on a Friday from 12:30 PM - 1:30 PM virtually using Microsoft Teams.   FACILITATOR: Led by National Board Certified Health and , Richa Barton [facility].   TO REGISTER: Please send an email to Richa [email or web address] to receive monthly invites to the group or if you have any questions about having a health .  Prior to the meeting, a link with directions on how to join the meeting will be sent to you.    2022 Meetings  January 21: Lexy Downs MS, RN, CIC, CBN, \"Healthy Habits\"  February 25: Open Forum  March 18: \"Setting Limits and Boundaries\"  April 29: Lydia Parada RD, \"Meal Planning Made Easy\"  May 20: Open Forum  Eleanor: To be determined        3) Keep up the great work!!      Portion Control Without Measuring  [email or web address]     The Plate Method  [email or web address]    Protein Sources   [email or web address]     Carbohydrates  [email or web address]     Mindful Eating  [email or web address]     Summary of Volumetrics Eating Plan  [email or web address]     Follow-Up:  1 month, PRN    Time spent with patient: 25 minutes.  APRIL WARE RD, LD      "

## 2022-02-15 ENCOUNTER — VIRTUAL VISIT (OUTPATIENT)
Dept: ENDOCRINOLOGY | Facility: CLINIC | Age: 67
End: 2022-02-15
Payer: COMMERCIAL

## 2022-02-15 ENCOUNTER — VIRTUAL VISIT (OUTPATIENT)
Dept: PHARMACY | Facility: CLINIC | Age: 67
End: 2022-02-15
Attending: INTERNAL MEDICINE
Payer: COMMERCIAL

## 2022-02-15 DIAGNOSIS — E66.813 CLASS 3 SEVERE OBESITY DUE TO EXCESS CALORIES WITH BODY MASS INDEX (BMI) OF 40.0 TO 44.9 IN ADULT, UNSPECIFIED WHETHER SERIOUS COMORBIDITY PRESENT (H): Primary | ICD-10-CM

## 2022-02-15 DIAGNOSIS — E66.9 OBESITY: ICD-10-CM

## 2022-02-15 DIAGNOSIS — E66.01 CLASS 3 SEVERE OBESITY DUE TO EXCESS CALORIES WITH BODY MASS INDEX (BMI) OF 40.0 TO 44.9 IN ADULT, UNSPECIFIED WHETHER SERIOUS COMORBIDITY PRESENT (H): Primary | ICD-10-CM

## 2022-02-15 DIAGNOSIS — N18.31 STAGE 3A CHRONIC KIDNEY DISEASE (H): ICD-10-CM

## 2022-02-15 DIAGNOSIS — Z71.3 NUTRITIONAL COUNSELING: Primary | ICD-10-CM

## 2022-02-15 PROCEDURE — 99207 PR NO CHARGE LOS: CPT | Performed by: PHARMACIST

## 2022-02-15 PROCEDURE — 97803 MED NUTRITION INDIV SUBSEQ: CPT | Mod: GT | Performed by: DIETITIAN, REGISTERED

## 2022-02-15 NOTE — PATIENT INSTRUCTIONS
Recommendations from today's consult                                                         It was great talking to you today Bry! Thank you for taking the time! Sounds like you have made a lot of meaningful change since you spoke to Dr. Randle. Keep it up!     Plan:  1. Continue Phentermine and topiramate.  2. Can use biotene products, such as the toothpaste or gel to help with dry mouth.   3. Please get blood pressure and heart rate checked and report results back to me via Trip4realhart.   4. Follow up with dietitianCindy, on 3/30/22 and Dr. Jer Tapia on 4/19/2022 as planned.     Lauren Bloch, PharmD, BCACP   Medication Therapy Management Pharmacist   [facility] [place]

## 2022-02-15 NOTE — PATIENT INSTRUCTIONS
Hal Londono!    Follow-up with RD in 1 month    Thank you,    Cindy Rosenberg, RD, LD  If you would like to schedule or reschedule an appointment with the RD, please call [phone]    Nutrition Goals  1) Explore healthy snack options:    - 2 cups popcorn    - 1 cup mixed berries   - 15 almonds, walnuts, cashews   - carrot/celery sticks and 2 tbsp low-fat ranch   - 1 hard boiled egg   - Part-skim mozzarella cheese stick   - Low-fat, low-sugar greek yogurt with 1/2 cup berries   - Med apple or pear   - sliced bell peppers with 1/2 cup salsa   - 1/2 cup roasted chickpeas   - sliced cucumbers with vinegar    Snack ideas:  - Banana and creamy PB dip (https://www.diabetesfoodhub.org/recipes/sweet-peanut-buttery-dip.html?home-category_id=23)  - Roasted chickpeas (https://www.diabetesfoodhub.org/recipes/roasted-and-spiced-chickpeas.html?home-category_id=23)  - Lemon Raspberry miguelito seed pudding (https://www.diabetesfoodhub.org/recipes/lemon-raspberry- miguelito-seed-pudding.html?home-category_id=23)  - Black bean hummus with carrot and celery sticks (https://www.diabetesfoodhub.org/recipes/black-bean-hummus.html?home-category_id=23)  - Greek yogurt chocolate mouse (https://www.diabetesfoodhub.org/recipes/greek-yogurt-chocolate-mousse.html?home-category_id=23)   - Broccoli Cheese Bites  (https://www.diabetesfoodhub.org/recipes/broccoli-cheese-bites.html?home-category_id=20)  - Chicken Satay with peanut sauce  (https://www.diabetesfoodhub.org/recipes/blueberry-almond-chicken-salad-lettuce-wraps.html?home-category_id=20)  - Deviled Eggs  (https://www.diabetesfoodhub.org/recipes/devilled-eggs.html?home-category_id=20)    2) [facility] Healthy Lifestyle Virtual Support Group    Healthy Lifestyle Virtual Support Group?  This is 60 minutes of small group guided discussion, support and resources. All are welcome who want a healthy lifestyle.  WHEN: This group meets monthly on a Friday from  "12:30 PM - 1:30 PM virtually using Microsoft Teams.   FACILITATOR: Led by National Board Certified Health and , Richa Barton, [facility].   TO REGISTER: Please send an email to Richa at?[email or web address] to receive monthly invites to the group or if you have any questions about having a health .  Prior to the meeting, a link with directions on how to join the meeting will be sent to you.    2022 Meetings  January 21: Lexy Downs MS, RN, CIC, CBN, \"Healthy Habits\"  February 25: Open Forum  March 18: \"Setting Limits and Boundaries\"  April 29: Lydia Parada RD, \"Meal Planning Made Easy\"  May 20: Open Forum  June: To be determined        3) Keep up the great work!!      Portion Control Without Measuring  [email or web address]     The Plate Method  [email or web address]    Protein Sources   [email or web address]     Carbohydrates  [email or web address]     Mindful Eating  [email or web address]     Summary of Volumetrics Eating Plan  [email or web address]       Interested in working with a health ? Health coaches work with you to improve your overall health and wellbeing. They look at the whole person, and may involve discussion of different areas of life, including, but not limited to the four pillars of health (sleep, exercise, nutrition, and stress management). Discuss with your care team if you would like to start working a health .    Health Coaching-3 Pack:    $99 for three health coaching visits    Visits may be done in person or via phone    Coaching is a partnership between the  and the client; Coaches do not prescribe or diagnose    Coaching helps inspire the client to reach his/her personal goals                      "

## 2022-02-15 NOTE — PROGRESS NOTES
"Clinical Pharmacy Consult:                                                    Hanh Villalba is a 66 year old female called for a clinical pharmacist consult.  She was referred to me from Dr. Jer Tapia. Insurance does not cover comprehensive medication review with Medication Therapy Management (MTM). Patient refuses private pay for Medication Therapy Management (MTM).  Therefore, completed consult today.    Reason for Consult: Phentermine topiramate start - follow up     Obesity:   Phentermine 15 mg once daily  Topiramate 50 mg once daily    Followed by Dr. Jer Tapia , seen 1/11/2022 for [facility]. Patient is experiencing the follow side effects: dry mouth. She is drinking more water and using gum sometimes, but still finding bothersome at times. She reports she gets a little jittery when she takes the phentermine and goes away sooner after. She is also drinking a Mountain Dew around that time and feels that could be contributing. She hasn't gotten blood pressure checked since starting phentermine. She finds that her sleep has been good. She describes that she has limited stressors as retired.  She reports weight in 2 digit system, so she reported weight today as \"57 lb\" and find that this is helpful for her from a mental health perspective with relationship to weight.   Weight History: She reports weight was 160 lbs during her college. She gradually gained up to 200 lbs after finishing college. After quit smoking and started on depo-provera --> gained to 240 lbs  She is now steady at 260 lbs.  Previous attempts-- Calories counting, WW program (lost weight), was on Redux in the 1980s (lost significant weight).  Diet/Eating Habits: Patient reports has been reducing her nutritional intake. She has been using the hand as a tool for plate method.  She has been following with dietitian. She is measuring things now, including creamer. No longer doing second helpings. She " "hasn't been as interested in certain sweets. She still having sweets but portion controlling. She is using MyFitnessPal. Has been able to stay under 1900 calories per day.   Exercise/Activity: Patient reports that she is active throughout the day, shoveled snow yesterday for 1 hour. She goes to neighbors house to work on various motorized vehicles and walks during that time. Does leg exercises during TV commercials.     Current weight today: 257 lb  Initial Consult Weight: 265 lb   Cumulative Weight Loss: -8 lb   Wt Readings from Last 4 Encounters:   01/11/22 265 lb (120.2 kg)   11/08/21 265 lb 6.4 oz (120.4 kg)   08/24/21 261 lb 12.8 oz (118.8 kg)   07/26/21 255 lb (115.7 kg)     Estimated body mass index is 40.29 kg/m  as calculated from the following:    Height as of 1/11/22: 5' 8\" (1.727 m).    Weight as of 1/11/22: 265 lb (120.2 kg).    BP Readings from Last 3 Encounters:   11/08/21 124/81   08/24/21 101/67   07/26/21 101/57     Plan:  1. Continue Phentermine and topiramate for now.   2. Can use biotene products, such as the toothpaste or gel to help with dry mouth.   3. Please get blood pressure and heart rate checked and report results back to pharmacist via [employer].   4. Follow up with dietitianCindy, on 3/30/22 and Dr. Jer Tapia on 4/19/2022 as planned.     Lauren Bloch, PharmD, BCACP   Medication Therapy Management Pharmacist   [facility] [place]          "

## 2022-02-15 NOTE — LETTER
"2/15/2022       RE: Hanh Villalba  [address] [place] [address]     Dear Colleague,    Thank you for referring your patient, Hanh Villalba, to the [facility]NA[facility] [place] [facility]. Please see a copy of my visit note below.    Hanh Villalba is a 66 year old female who is being evaluated via a billable video visit.      The patient has been notified of following:     \"This video visit will be conducted via a call between you and your physician/provider. We have found that certain health care needs can be provided without the need for an in-person physical exam.  This service lets us provide the care you need with a video conversation.  If a prescription is necessary we can send it directly to your pharmacy.  If lab work is needed we can place an order for that and you can then stop by our lab to have the test done at a later time.    Video visits are billed at different rates depending on your insurance coverage.  Please reach out to your insurance provider with any questions.    If during the course of the call the physician/provider feels a video visit is not appropriate, you will not be charged for this service.\"    Patient has given verbal consent for Video visit? Yes  How would you like to obtain your AVS? MyChart  If you are dropped from the video visit, the video invite should be resent to: Send to e-mail at: [email or web address]  Will anyone else be joining your video visit? No  {If patient encounters technical issues they should call [phone]      Video-Visit Details    Type of service:  Video Visit    Video Start Time: 9:43 AM  Video End Time: 10:08 AM    Originating Location (pt. Location): Home    Distant Location (provider location):  [facility]NAG[facility]     Platform used for Video Visit: [email or web address]    During this virtual visit the patient is located in [place], " "patient verifies this as the location during the entirety of this visit.     Return Weight Management Nutrition Consultation    Hanh Villalba is a 66 year old female presents today for a return weight management nutrition consultation.  Patient referred by Dr. Randle on January 10, 2022.    Patient with Co-morbidities of obesity including:  Type II DM no  Renal Failure yes  Sleep apnea no  Hypertension no   Dyslipidemia no  Joint pain no  Back pain no  GERD no     Anthropometrics:  Estimated body mass index is 40.29 kg/m  as calculated from the following:    Height as of 1/11/22: 1.727 m (5' 8\").    Weight as of 1/11/22: 120.2 kg (265 lb).     Current weight (2/15/22): 257 lbs    Medications for Weight Loss:  Phentermine, Topamax    NUTRITION HISTORY  See MD note for details.    Pt tried WW in the past and had some success. Has used MyFitAuxmoney Pal in the past. Has taken a class in the past about nutrition but mainly struggles with sticking to a program. Struggles mainly with portion sizes and snacking.     Plans weekly meals.     2/15/22: Pt reports she is doing well. Has been utilizing food journaling and reducing portions, not going back for seconds. Has noticed reduced appetite. Has been using PingMe Pal at 1900 calories per day but often eats under this.    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     Progress on Previous Goals:  1) Focus on having one serving at meals met, continues   -wait 20 minutes to take a second serving  2) Avoid snacking if able. If snack is needed use lean protein and/or met, continues fruit/vegetable. Examples:   - 2 cup popcorn   - 1 cup mixed berries   - 15 almonds, walnuts, cashews   - carrot/celery sticks and 2 tbsp low-fat ranch   - 1 hard boiled egg   - Part-skim mozzarella cheese stick   - " Low-fat, low-sugar greek yogurt with 1/2 cup berries   - Med apple or pear   - sliced bell peppers with 1/2 cup salsa   - 1/2 cup roasted chickpeas   - sliced cucumbers with vinegar    3) Continue tracking calories on MyFitness Pal met, continues   -Keep track of emotions at meals and snacks    4) Increase physical activity as able met, continues    Nutrition Prescription  Recommended energy/nutrient modification.    Nutrition Diagnosis  Obesity r/t long history of positive energy balance aeb BMI >30.    Nutrition Intervention  Materials/education provided on Volumetric eating to help satiety level on fewer calories; portion control and healthy food choices (Plate Method and Volumetrics handouts), 100 calorie snack choices, meal and snack planning and websites, sample meal plans     Patient demonstrates understanding.    Expected Engagement: good    Nutrition Goals  1) Explore healthy snack options:    - 2 cups popcorn    - 1 cup mixed berries   - 15 almonds, walnuts, cashews   - carrot/celery sticks and 2 tbsp low-fat ranch   - 1 hard boiled egg   - Part-skim mozzarella cheese stick   - Low-fat, low-sugar greek yogurt with 1/2 cup berries   - Med apple or pear   - sliced bell peppers with 1/2 cup salsa   - 1/2 cup roasted chickpeas   - sliced cucumbers with vinegar    Snack ideas:  - Banana and creamy PB dip (https://www.diabetesfoodhub.org/recipes/sweet-peanut-buttery-dip.html?home-category_id=23)  - Roasted chickpeas (https://www.diabetesfoodhub.org/recipes/roasted-and-spiced-chickpeas.html?home-category_id=23)  - Lemon Raspberry miguelito seed pudding (https://www.diabetesfoodhub.org/recipes/lemon-raspberry- miguelito-seed-pudding.html?home-category_id=23)  - Black bean hummus with carrot and celery sticks (https://www.diabetesfoodhub.org/recipes/black-bean-hummus.html?home-category_id=23)  - Greek yogurt chocolate mouse (https://www.diabetesfoodhub.org/recipes/greek-yogurt-chocolate-mousse.html?home-category_id=23)   -  "Broccoli Cheese Bites  (https://www.diabetesfoodhub.org/recipes/broccoli-cheese-bites.html?home-category_id=20)  - Chicken Satay with peanut sauce  (https://www.diabetesfoodGenescob.org/recipes/blueberry-almond-chicken-salad-lettuce-wraps.html?home-category_id=20)  - Deviled Eggs  (https://www.diabetesfoodFilaExpress.org/recipes/devilled-eggs.html?home-category_id=20)    2) [facility] Healthy Lifestyle Virtual Support Group    Healthy Lifestyle Virtual Support Group?  This is 60 minutes of small group guided discussion, support and resources. All are welcome who want a healthy lifestyle.  WHEN: This group meets monthly on a Friday from 12:30 PM - 1:30 PM virtually using Microsoft Teams.   FACILITATOR: Led by National Board Certified Health and , Richa Barton, [facility].   TO REGISTER: Please send an email to Richa at?[email or web address] to receive monthly invites to the group or if you have any questions about having a health .  Prior to the meeting, a link with directions on how to join the meeting will be sent to you.    2022 Meetings  January 21: Lexy Downs MS, RN, CIC, CBN, \"Healthy Habits\"  February 25: Open Forum  March 18: \"Setting Limits and Boundaries\"  April 29: Lydia Parada RD, \"Meal Planning Made Easy\"  May 20: Open Forum  Eleanor: To be determined        3) Keep up the great work!!      Portion Control Without Measuring  [email or web address]     The Plate Method  [email or web address]    Protein Sources   [email or web address]     Carbohydrates  [email or web address]     Mindful Eating  [email or web address]     Summary of Volumetrics Eating Plan  [email or web address]     Follow-Up:  1 month, PRN    Time spent with patient: 25 minutes.  APRIL WARE RD, LD      "

## 2022-03-11 DIAGNOSIS — E66.01 CLASS 3 SEVERE OBESITY DUE TO EXCESS CALORIES WITHOUT SERIOUS COMORBIDITY WITH BODY MASS INDEX (BMI) OF 40.0 TO 44.9 IN ADULT (H): ICD-10-CM

## 2022-03-11 DIAGNOSIS — E66.813 CLASS 3 SEVERE OBESITY DUE TO EXCESS CALORIES WITHOUT SERIOUS COMORBIDITY WITH BODY MASS INDEX (BMI) OF 40.0 TO 44.9 IN ADULT (H): ICD-10-CM

## 2022-03-11 RX ORDER — PHENTERMINE HYDROCHLORIDE 15 MG/1
15 CAPSULE ORAL EVERY MORNING
Qty: 30 CAPSULE | Refills: 1 | Status: SHIPPED | OUTPATIENT
Start: 2022-03-11 | End: 2022-04-19

## 2022-03-11 NOTE — TELEPHONE ENCOUNTER
Patient requesting phentermine rx refill. Patient has f/up shceduled with Dr. Jer Tapia in April and recent blood pressure was within accetapable limits. Therefore routing to Dr. Jer Tapia to sign.     Lauren Bloch, PharmD, BCACP   Medication Therapy Management Pharmacist   [facility] [place]    2/16: 120/65 mmHg pulse 72 (patient reported)

## 2022-03-30 ENCOUNTER — VIRTUAL VISIT (OUTPATIENT)
Dept: ENDOCRINOLOGY | Facility: CLINIC | Age: 67
End: 2022-03-30
Payer: COMMERCIAL

## 2022-03-30 DIAGNOSIS — Z71.3 NUTRITIONAL COUNSELING: Primary | ICD-10-CM

## 2022-03-30 DIAGNOSIS — E66.9 OBESITY: ICD-10-CM

## 2022-03-30 DIAGNOSIS — N18.31 STAGE 3A CHRONIC KIDNEY DISEASE (H): ICD-10-CM

## 2022-03-30 PROCEDURE — 97803 MED NUTRITION INDIV SUBSEQ: CPT | Mod: GT | Performed by: DIETITIAN, REGISTERED

## 2022-03-30 NOTE — PATIENT INSTRUCTIONS
Hal Londono!    Follow-up with RD in 2 months    Thank you,    Cindy Rosenberg, ADAM, LD  If you would like to schedule or reschedule an appointment with the RD, please call [phone]    Nutrition Goals  1) Continue focusing on portion sizes  2) Continue physical activity as able  3) Continue focusing on lean proteins and non-starchy vegetables    Interested in working with a health ? Health coaches work with you to improve your overall health and wellbeing. They look at the whole person, and may involve discussion of different areas of life, including, but not limited to the four pillars of health (sleep, exercise, nutrition, and stress management). Discuss with your care team if you would like to start working a health .    Health Coaching-3 Pack:    $99 for three health coaching visits    Visits may be done in person or via phone    Coaching is a partnership between the  and the client; Coaches do not prescribe or diagnose    Coaching helps inspire the client to reach his/her personal goals    COMPREHENSIVE WEIGHT MANAGEMENT PROGRAM  VIRTUAL SUPPORT GROUPS    For Support Group Information:      We offer support groups for patients who are working on weight loss and considering, preparing for or have had weight loss surgery.   There is no cost for this opportunity.  You are invited to attend the?Virtual Support Groups?provided by any of the following locations:    1. [facility] via [employer] Teams with Vani Coronado RN  2.   [place] via [employer] with Eladio Larson, PhD, LP  3.   [place] via [employer] with Richa Naik, ZAINA  4.   [facility] via [employer] Teams with Richa Barton, [facility]    The following Support Group information can also be found on our website:  [email or web address]      [facility] Weight Loss Surgery Support Group    [facility] Weight Loss Surgery Support Group  The support group  "is a patient-lead forum that meets monthly to share experiences, encouragement and education. It is open to those who have had weight loss surgery, are scheduled for surgery, and those who are considering surgery.   WHEN: This group meets on the 3rd Wednesday of each month from 5:00PM - 6:00PM virtually using Microsoft Teams.   FACILITATOR: Led by Vani Coronado, ADAM, LD, RN, the program's Clinical Coordinator.   TO REGISTER: Please contact the clinic via [email or web address] or call the nurse line directly at [phone] to inform our staff that you would like an invite sent to you and to let us know the email you would like the invite sent to. Prior to the meeting, a link with directions on how to join the meeting will be sent to you.    2022 Meetings  January 19: \"Let's Talk\" a time for the group to share.  February 16: \"Let's Talk\" a time for the group to share.  March 16: Guest Speakers: Psychologists, Cindy Seth, PhD,LP and Najma Parikh PsyD,  April 20: Guest Speaker: Health Flor, [facility],CHES, CPT  May 18: Guest Speaker: Dietitian, Cholo Vale, ADAM, LP  Eleanor 15: \"Let's Talk\" a time for the group to share.  July 20: \"Let's Talk\" a time for the group to share.  August 17: TBA  September 21: TBA  October 19: Guest Speaker: Dr Landen Anguiano MD Pulmonologist and Sleep Medicine Physician, \"Getting a Good Night's Sleep\".  November 16: TBA  December 21: TBA    [facility] Support Groups    Connections: Bariatric Care Support Group?  This is open to all [facility] (and those external to this program) pre- and post- operative bariatric surgery patients as well as their support system.   WHEN: This group meets the 2nd Tuesday of each month from 6:30 PM - 8:00 PM virtually using Microsoft Teams.   FACILITATOR: Led by Eladio Larson, Ph.D who is a Licensed Psychologist with the [facility] Weight Management Program.   TO REGISTER: Please send an email " "to Eladio Larson, Ph.D., LP at?[email or web address]?if you would like an invitation to the group and to learn about using Microsoft Teams.    2022 Meetings January 11: Cynthia Silverman, PharmD, Pharmacy Resident at [facility], \"Medications and Bariatric Surgery\".  February 8: Open Forum  March 8  April 12  May 10  Eleanor 14    Connections: Post-Operative Bariatric Surgery Support Group  This is a support group for [facility] bariatric patients (and those external to [facility]) who have had bariatric surgery and are at least 3 months post-surgery.  WHEN: This support group meets the 4th Wednesday of the month from 11:00 AM - 12:00 PM virtually using Microsoft Teams.   FACILITATOR: Led by Certified Bariatric Nurse, Richa Naik RN.   TO REGISTER: Please send an email to Richa at [email or web address] if you would like an invitation to the group and to learn about using Microsoft Teams.    2022 Meetings  January 26  February 23  March 23  April 27  May 25  Eleanor 22    [facility] Healthy Lifestyle Virtual Support Group    Healthy Lifestyle Virtual Support Group?  This is 60 minutes of small group guided discussion, support and resources. All are welcome who want a healthy lifestyle.  WHEN: This group meets monthly on a Friday from 12:30 PM - 1:30 PM virtually using Microsoft Teams.   FACILITATOR: Led by National Board Certified Health and , Richa Barton [facility].   TO REGISTER: Please send an email to Richa at?[email or web address] to receive monthly invites to the group or if you have any questions about having a health .  Prior to the meeting, a link with directions on how to join the meeting will be sent to you.    2022 Meetings January 21: Lexy Downs MS, RN, CIC, CBN, \"Healthy Habits\"  February 25: Open Forum  March 18: \"Setting Limits and Boundaries\"  April 29: Lydia Parada RD, \"Meal Planning Made Easy\"  May 20: Open " Forum  Eleanor: To be determined

## 2022-03-30 NOTE — LETTER
"3/30/2022       RE: Hanh Villalba  [address] [place] [address]     Dear Colleague,    Thank you for referring your patient, Hanh Villalba, to the [facility] WEIGHT MANAGEMENT CLINIC [place] at [facility]. Please see a copy of my visit note below.    Hanh Villalba is a 66 year old female who is being evaluated via a billable video visit.      The patient has been notified of following:     \"This video visit will be conducted via a call between you and your physician/provider. We have found that certain health care needs can be provided without the need for an in-person physical exam.  This service lets us provide the care you need with a video conversation.  If a prescription is necessary we can send it directly to your pharmacy.  If lab work is needed we can place an order for that and you can then stop by our lab to have the test done at a later time.    Video visits are billed at different rates depending on your insurance coverage.  Please reach out to your insurance provider with any questions.    If during the course of the call the physician/provider feels a video visit is not appropriate, you will not be charged for this service.\"    Patient has given verbal consent for Video visit? Yes  How would you like to obtain your AVS? MyChart  If you are dropped from the video visit, the video invite should be resent to: Send to e-mail at: [email or web address]  Will anyone else be joining your video visit? No  {If patient encounters technical issues they should call [phone]      Video-Visit Details    Type of service:  Video Visit    Video Start Time: 9:49 AM  Video End Time: 10:03 AM    Originating Location (pt. Location): Home    Distant Location (provider location):  [facility] WEIGHT MANAGEMENT [facility]     Platform used for Video Visit: [employer]    During this virtual visit the patient is located in [place], " "patient verifies this as the location during the entirety of this visit.     Return Weight Management Nutrition Consultation    Hanh Villalba is a 66 year old female presents today for a return weight management nutrition consultation.  Patient referred by Dr. Randle on January 10, 2022.    Patient with Co-morbidities of obesity including:  Type II DM no  Renal Failure yes  Sleep apnea no  Hypertension no   Dyslipidemia no  Joint pain no  Back pain no  GERD no     Anthropometrics:  Estimated body mass index is 40.29 kg/m  as calculated from the following:    Height as of 1/11/22: 1.727 m (5' 8\").    Weight as of 1/11/22: 120.2 kg (265 lb).     Current weight (3/30/22): 250 lbs    Medications for Weight Loss:  Phentermine, Topamax    NUTRITION HISTORY  See MD note for details.    Pt tried WW in the past and had some success. Has used [employer] Pal in the past. Has taken a class in the past about nutrition but mainly struggles with sticking to a program. Struggles mainly with portion sizes and snacking.     Plans weekly meals.     2/15/22: Pt reports she is doing well. Has been utilizing food journaling and reducing portions, not going back for seconds. Has noticed reduced appetite. Has been using [employer] Pal at 1900 calories per day but often eats under this.    3/30/22: Was on vacation for a week in the [facility]. Has been doing well with her diet and exercise and has lost weight since last visit. Continues to plan meals and track meals on [employer] Pal. Is being consistent with portion sizes and only having one serving.     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     Progress on Previous Goals:  1) Explore healthy snack options:    - 2 cups popcorn    - 1 cup mixed berries   - 15 almonds, walnuts, cashews   - carrot/celery " sticks and 2 tbsp low-fat ranch   - 1 hard boiled egg   - Part-skim mozzarella cheese stick   - Low-fat, low-sugar greek yogurt with 1/2 cup berries   - Med apple or pear   - sliced bell peppers with 1/2 cup salsa   - 1/2 cup roasted chickpeas   - sliced cucumbers with vinegar    2) [facility] Healthy Lifestyle Virtual Support Group    3) Keep up the great work!!    Nutrition Prescription  Recommended energy/nutrient modification.    Nutrition Diagnosis  Obesity r/t long history of positive energy balance aeb BMI >30.    Nutrition Intervention  Materials/education provided on Volumetric eating to help satiety level on fewer calories; portion control and healthy food choices (Plate Method and Volumetrics handouts), 100 calorie snack choices, meal and snack planning and websites, sample meal plans     Patient demonstrates understanding.    Expected Engagement: good    Nutrition Goals  1) Continue focusing on portion sizes  2) Continue physical activity as able  3) Continue focusing on lean proteins and non-starchy vegetables    Portion Control Without Measuring  [email or web address]     The Plate Method  [email or web address]    Protein Sources   [email or web address]://"[email or web address]     Carbohydrates  [email or web address]     Mindful Eating  [email or web address]://"[email or web address]     Summary of Volumetrics Eating Plan  [email or web address]     Follow-Up:  1 month, PRN    Time spent with patient: 14 minutes.  APRIL WARE RD, KEYLA

## 2022-03-30 NOTE — PROGRESS NOTES
"Hanh Villalba is a 66 year old female who is being evaluated via a billable video visit.      The patient has been notified of following:     \"This video visit will be conducted via a call between you and your physician/provider. We have found that certain health care needs can be provided without the need for an in-person physical exam.  This service lets us provide the care you need with a video conversation.  If a prescription is necessary we can send it directly to your pharmacy.  If lab work is needed we can place an order for that and you can then stop by our lab to have the test done at a later time.    Video visits are billed at different rates depending on your insurance coverage.  Please reach out to your insurance provider with any questions.    If during the course of the call the physician/provider feels a video visit is not appropriate, you will not be charged for this service.\"    Patient has given verbal consent for Video visit? Yes  How would you like to obtain your AVS? MyChart  If you are dropped from the video visit, the video invite should be resent to: Send to e-mail at: [email or web address]  Will anyone else be joining your video visit? No  {If patient encounters technical issues they should call [phone]      Video-Visit Details    Type of service:  Video Visit    Video Start Time: 9:49 AM  Video End Time: 10:03 AM    Originating Location (pt. Location): [place]    Distant Location (provider location):  [facility] WEIGHT MANAGEMENT CLINIC [place]     Platform used for Video Visit: [employer]    During this virtual visit the patient is located in [place], patient verifies this as the location during the entirety of this visit.     Return Weight Management Nutrition Consultation    Hanh Villalba is a 66 year old female presents today for a return weight management nutrition consultation.  Patient referred by Dr. Randle on January 10, 2022.    Patient with Co-morbidities of " "obesity including:  Type II DM no  Renal Failure yes  Sleep apnea no  Hypertension no   Dyslipidemia no  Joint pain no  Back pain no  GERD no     Anthropometrics:  Estimated body mass index is 40.29 kg/m  as calculated from the following:    Height as of 1/11/22: 1.727 m (5' 8\").    Weight as of 1/11/22: 120.2 kg (265 lb).     Current weight (3/30/22): 250 lbs    Medications for Weight Loss:  Phentermine, Topamax    NUTRITION HISTORY  See MD note for details.    Pt tried WW in the past and had some success. Has used [employer] Pal in the past. Has taken a class in the past about nutrition but mainly struggles with sticking to a program. Struggles mainly with portion sizes and snacking.     Plans weekly meals.     2/15/22: Pt reports she is doing well. Has been utilizing food journaling and reducing portions, not going back for seconds. Has noticed reduced appetite. Has been using [employer] Pal at 1900 calories per day but often eats under this.    3/30/22: Was on vacation for a week in the [facility]. Has been doing well with her diet and exercise and has lost weight since last visit. Continues to plan meals and track meals on [employer] Pal. Is being consistent with portion sizes and only having one serving.     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     Progress on Previous Goals:  1) Explore healthy snack options:    - 2 cups popcorn    - 1 cup mixed berries   - 15 almonds, walnuts, cashews   - carrot/celery sticks and 2 tbsp low-fat ranch   - 1 hard boiled egg   - Part-skim mozzarella cheese stick   - Low-fat, low-sugar greek yogurt with 1/2 cup berries   - Med apple or pear   - sliced bell peppers with 1/2 cup salsa   - 1/2 cup roasted chickpeas   - sliced cucumbers with vinegar    2) [facility] " [facility] Healthy Lifestyle Virtual Support Group    3) Keep up the great work!!    Nutrition Prescription  Recommended energy/nutrient modification.    Nutrition Diagnosis  Obesity r/t long history of positive energy balance aeb BMI >30.    Nutrition Intervention  Materials/education provided on Volumetric eating to help satiety level on fewer calories; portion control and healthy food choices (Plate Method and Volumetrics handouts), 100 calorie snack choices, meal and snack planning and websites, sample meal plans     Patient demonstrates understanding.    Expected Engagement: good    Nutrition Goals  1) Continue focusing on portion sizes  2) Continue physical activity as able  3) Continue focusing on lean proteins and non-starchy vegetables    Portion Control Without Measuring  [email or web address]     The Plate Method  [email or web address]://[email or web address]    Protein Sources   [email or web address]://[employer] [email or web address]     Carbohydrates  [email or web address]     Mindful Eating  [email or web address]://Content [email or web address]     Summary of Volumetrics Eating Plan  [email or web address]     Follow-Up:  1 month, PRN    Time spent with patient: 14 minutes.  APRIL WARE RD, LD

## 2022-04-19 ENCOUNTER — VIRTUAL VISIT (OUTPATIENT)
Dept: ENDOCRINOLOGY | Facility: CLINIC | Age: 67
End: 2022-04-19
Payer: COMMERCIAL

## 2022-04-19 VITALS — WEIGHT: 250.8 LBS | HEIGHT: 69 IN | BODY MASS INDEX: 37.15 KG/M2

## 2022-04-19 DIAGNOSIS — E66.01 CLASS 3 SEVERE OBESITY DUE TO EXCESS CALORIES WITHOUT SERIOUS COMORBIDITY WITH BODY MASS INDEX (BMI) OF 40.0 TO 44.9 IN ADULT (H): ICD-10-CM

## 2022-04-19 DIAGNOSIS — E66.813 CLASS 3 SEVERE OBESITY DUE TO EXCESS CALORIES WITHOUT SERIOUS COMORBIDITY WITH BODY MASS INDEX (BMI) OF 40.0 TO 44.9 IN ADULT (H): ICD-10-CM

## 2022-04-19 PROCEDURE — 99213 OFFICE O/P EST LOW 20 MIN: CPT | Mod: GT | Performed by: INTERNAL MEDICINE

## 2022-04-19 RX ORDER — TOPIRAMATE 25 MG/1
50 TABLET, FILM COATED ORAL DAILY
Qty: 180 TABLET | Refills: 3 | Status: SHIPPED | OUTPATIENT
Start: 2022-04-19 | End: 2022-06-14

## 2022-04-19 RX ORDER — PHENTERMINE HYDROCHLORIDE 15 MG/1
15 CAPSULE ORAL EVERY MORNING
Qty: 30 CAPSULE | Refills: 3 | Status: SHIPPED | OUTPATIENT
Start: 2022-04-19 | End: 2022-06-14

## 2022-04-19 ASSESSMENT — PAIN SCALES - GENERAL: PAINLEVEL: NO PAIN (0)

## 2022-04-19 NOTE — NURSING NOTE
"(   Chief Complaint   Patient presents with     RECHECK     Return MW    )    ( Weight: 113.8 kg (250 lb 12.8 oz) (Patient reported) )  ( Height: 174 cm (5' 8.5\") )  ( BMI (Calculated): 37.58 )  (   )  (   )  (   )  (   )  (   )  (   )    (   )  (   )  (   )  (   )  (   )  (   )  (   )    (   Patient Active Problem List   Diagnosis     Hypothyroidism due to acquired atrophy of thyroid     Recurrent major depressive disorder, in full remission (H)     Endometrioid adenocarcinoma of uterus (H)     Overweight (H)     CKD (chronic kidney disease) stage 3, GFR 30-59 ml/min (H)     History of colonic polyps    )  (   Current Outpatient Medications   Medication Sig Dispense Refill     Apple Cider Vinegar 600 MG CAPS Take 1 capsule by mouth daily        CALCIUM PO Take 20 mg by mouth daily        cholecalciferol (VITAMIN D3) 5000 units TABS tablet Take by mouth daily       clobetasol (TEMOVATE) 0.05 % external ointment Apply sparingly then once or twice a week as needed 45 g 0     Cyanocobalamin 1500 MCG TBDP Take 1 tablet by mouth daily        FLUoxetine (PROZAC) 20 MG capsule Take 1 capsule (20 mg) by mouth every 48 hours AND 2 capsules (40 mg) every 48 hours. 135 capsule 1     Lactobacillus (ACIDOPHILUS PO) Take 1 tablet by mouth daily        levothyroxine (SYNTHROID/LEVOTHROID) 100 MCG tablet Take 1 tablet (100 mcg) by mouth every morning 90 tablet 3     Lutein 20 MG CAPS        mometasone (ASMANEX TWISTHALER) 110 MCG/INH inhaler Inhale 1 puff into the lungs every evening 1 each 1     Omega 3-6-9 Fatty Acids (OMEGA-3-6-9 PO) Take 1 capsule by mouth daily        phentermine (ADIPEX-P) 15 MG capsule Take 1 capsule (15 mg) by mouth every morning 30 capsule 1     topiramate (TOPAMAX) 25 MG tablet 25 mg at bedtime for 1 week, 50 mg at bedtime thereafter 180 tablet 1    )  ( Diabetes Eval:    )    ( Pain Eval:  No Pain (0) )    ( Wound Eval:       )    (   History   Smoking Status     Former Smoker     Packs/day: 1.00     " Years: 15.00     Types: Cigarettes     Start date: 1/11/1972     Quit date: 11/11/1987   Smokeless Tobacco     Never Used    )    ( Signed By:  Yasmin Gay, EMT; April 19, 2022; 8:35 AM )

## 2022-04-19 NOTE — PROGRESS NOTES
Return Medical Weight Management Note     Hanh Villalba  MRN:  [id number]  :  1955  RUPERT:  2022    Dear Angeli Messina MD,    I had the pleasure of seeing your patient Hanh Villalba. She is a 66 year old female who I am continuing to see for treatment of obesity related to: hypothyroidism, depression, chronic pain       2022   I have the following health issues associated with obesity: High Cholesterol, Cancer, Osteoarthritis (joint disease), Hypothyroidism   I have the following symptoms associated with obesity: Depression, Back Pain, Hip Pain     Gradual weight gain up to 200 lbs after finishing college. After quit smoking and started on depo-provera --> gained to 240 lbs  She is now steady at 260 lbs.     Main triggers -- eat out of boredom and rewarding. She tends to snack on chip and sweet    Previous attempts-- Calories counting, WW program (lost weight), was on Redux in the  (lost significant weight)    INTERVAL HISTORY:  Last seen by me 2022 and by Lauren Bloch in 2022.     Current medications:  phentermine 15 mg daily  topiramate 50 mg daily    Side effects: dry mouths after taking phentermine for 3 hours    She stated that medications helped her to be able to eat less, reduced appetite. She continues to eat regularly 3 meals per day  Lost 14-15 lbs in the past 4 months    Exercise: working in the yard a lot, walking more    CURRENT WEIGHT:   250 lbs 12.8 oz    Initial Weight (lbs): 265 lbs  Last Visits Weight: 120.2 kg (265 lb)  Cumulative weight loss (lbs): 14.2  Weight Loss Percentage: 5.36%    Changes and Difficulties 2022   I have made the following changes to my diet since my last visit: Eating less, meal planning, myfitness pal   With regards to my diet, I am still struggling with: Nothing   I have made the following changes to my activity/exercise since my last visit: Started playing [employer], yard work   With regards to my activity/exercise, I  "am still struggling with: Nothing       VITALS:  Ht 1.74 m (5' 8.5\")   Wt 113.8 kg (250 lb 12.8 oz)   LMP  (LMP Unknown)   BMI 37.58 kg/m      MEDICATIONS:   Current Outpatient Medications   Medication Sig Dispense Refill     Apple Cider Vinegar 600 MG CAPS Take 1 capsule by mouth daily        CALCIUM PO Take 20 mg by mouth daily        cholecalciferol (VITAMIN D3) 5000 units TABS tablet Take by mouth daily       clobetasol (TEMOVATE) 0.05 % external ointment Apply sparingly then once or twice a week as needed 45 g 0     Cyanocobalamin 1500 MCG TBDP Take 1 tablet by mouth daily        FLUoxetine (PROZAC) 20 MG capsule Take 1 capsule (20 mg) by mouth every 48 hours AND 2 capsules (40 mg) every 48 hours. 135 capsule 1     Lactobacillus (ACIDOPHILUS PO) Take 1 tablet by mouth daily        levothyroxine (SYNTHROID/LEVOTHROID) 100 MCG tablet Take 1 tablet (100 mcg) by mouth every morning 90 tablet 3     Lutein 20 MG CAPS        mometasone (ASMANEX TWISTHALER) 110 MCG/INH inhaler Inhale 1 puff into the lungs every evening 1 each 1     Omega 3-6-9 Fatty Acids (OMEGA-3-6-9 PO) Take 1 capsule by mouth daily        phentermine (ADIPEX-P) 15 MG capsule Take 1 capsule (15 mg) by mouth every morning 30 capsule 1     topiramate (TOPAMAX) 25 MG tablet 25 mg at bedtime for 1 week, 50 mg at bedtime thereafter 180 tablet 1       Weight Loss Medication History Reviewed With Patient 4/19/2022   Which weight loss medications are you currently taking on a regular basis?  Phentermine, Topamax (topiramate)   Are you having any side effects from the weight loss medication that we have prescribed you? Yes   If you are having side effects please describe: Dry mouth for several hours after taking the medication       ASSESSMENT:   Hanh RFEEMAN Martynakitamikel is a 66 year old female who I am continuing to see for treatment of obesity related to: hypothyroidism, depression, chronic pain    Responded well with phentermine and topiramate  Eating " healthier, able to control portion  Active with yard work    PLAN:   - continue phentermine 15 mg daily  - continue topiramate 50 mg daily    FOLLOW-UP:    See Lauren Bloch, PharmD in 2 month(s)  See Dr.Tasma DRUMMOND in 4 month(s)    Start: 04/19/2022 08:50 am  Stop: 04/19/2022 09:04 am  Duration 14 minutes    External notes/medical records independently reviewed, labs and imaging independently reviewed, medical management and tests to be discussed/communicated to patient.    Time: I spent 27 minutes spent on the date of the encounter preparing to see patient (including chart review and preparation), obtaining and or reviewing additional medical history, performing a physical exam and evaluation, documenting clinical information in the electronic health record, independently interpreting results, communicating results to the patient and coordinating care.    Sincerely,    Jer Tapia MD

## 2022-04-19 NOTE — LETTER
2022       RE: Hanh Villalba   [address] [place] [address]     Dear Colleague,    Thank you for referring your patient, Hanh Villalba, to the [facility] WEIGHT MANAGEMENT CLINIC [place] at [facility]. Please see a copy of my visit note below.    Bry is a 66 year old who is being evaluated via a billable video visit.      How would you like to obtain your AVS? MyChart  If the video visit is dropped, the invitation should be resent by: [phone]  Will anyone else be joining your video visit? No    During this virtual visit the patient is located in [place], patient verifies this as the location during the entirety of this visit.     Video-Visit Details    Type of service:  Video Visit    Originating Location (pt. Location): Home    Distant Location (provider location):  [facility] WEIGHT MANAGEMENT CLINIC [place]     Platform used for Video Visit: [employer] Medical Weight Management Note     Hanh Villalba  MRN:  [id number]  :  1955  RUPERT:  2022    Dear Angeli Messina MD,    I had the pleasure of seeing your patient Hanh Villalba. She is a 66 year old female who I am continuing to see for treatment of obesity related to: hypothyroidism, depression, chronic pain       2022   I have the following health issues associated with obesity: High Cholesterol, Cancer, Osteoarthritis (joint disease), Hypothyroidism   I have the following symptoms associated with obesity: Depression, Back Pain, Hip Pain     Gradual weight gain up to 200 lbs after finishing college. After quit smoking and started on depo-provera --> gained to 240 lbs  She is now steady at 260 lbs.     Main triggers -- eat out of boredom and rewarding. She tends to snack on chip and sweet    Previous attempts-- Calories counting, WW program (lost weight), was on Redux in the  (lost significant weight)    INTERVAL  "HISTORY:  Last seen by me Jan 2022 and by Lauren Bloch in Feb 2022.     Current medications:  phentermine 15 mg daily  topiramate 50 mg daily    Side effects: dry mouths after taking phentermine for 3 hours    She stated that medications helped her to be able to eat less, reduced appetite. She continues to eat regularly 3 meals per day  Lost 14-15 lbs in the past 4 months    Exercise: working in the yard a lot, walking more    CURRENT WEIGHT:   250 lbs 12.8 oz    Initial Weight (lbs): 265 lbs  Last Visits Weight: 120.2 kg (265 lb)  Cumulative weight loss (lbs): 14.2  Weight Loss Percentage: 5.36%    Changes and Difficulties 4/19/2022   I have made the following changes to my diet since my last visit: Eating less, meal planning, myfitness pal   With regards to my diet, I am still struggling with: Nothing   I have made the following changes to my activity/exercise since my last visit: Started playing racquetball, yard work   With regards to my activity/exercise, I am still struggling with: Nothing       VITALS:  Ht 1.74 m (5' 8.5\")   Wt 113.8 kg (250 lb 12.8 oz)   LMP  (LMP Unknown)   BMI 37.58 kg/m      MEDICATIONS:   Current Outpatient Medications   Medication Sig Dispense Refill     Apple Cider Vinegar 600 MG CAPS Take 1 capsule by mouth daily        CALCIUM PO Take 20 mg by mouth daily        cholecalciferol (VITAMIN D3) 5000 units TABS tablet Take by mouth daily       clobetasol (TEMOVATE) 0.05 % external ointment Apply sparingly then once or twice a week as needed 45 g 0     Cyanocobalamin 1500 MCG TBDP Take 1 tablet by mouth daily        FLUoxetine (PROZAC) 20 MG capsule Take 1 capsule (20 mg) by mouth every 48 hours AND 2 capsules (40 mg) every 48 hours. 135 capsule 1     Lactobacillus (ACIDOPHILUS PO) Take 1 tablet by mouth daily        levothyroxine (SYNTHROID/LEVOTHROID) 100 MCG tablet Take 1 tablet (100 mcg) by mouth every morning 90 tablet 3     Lutein 20 MG CAPS        mometasone (ASMANEX TWISTHALER) " 110 MCG/INH inhaler Inhale 1 puff into the lungs every evening 1 each 1     Omega 3-6-9 Fatty Acids (OMEGA-3-6-9 PO) Take 1 capsule by mouth daily        phentermine (ADIPEX-P) 15 MG capsule Take 1 capsule (15 mg) by mouth every morning 30 capsule 1     topiramate (TOPAMAX) 25 MG tablet 25 mg at bedtime for 1 week, 50 mg at bedtime thereafter 180 tablet 1       Weight Loss Medication History Reviewed With Patient 4/19/2022   Which weight loss medications are you currently taking on a regular basis?  Phentermine, Topamax (topiramate)   Are you having any side effects from the weight loss medication that we have prescribed you? Yes   If you are having side effects please describe: Dry mouth for several hours after taking the medication       ASSESSMENT:   Hanh Villalba is a 66 year old female who I am continuing to see for treatment of obesity related to: hypothyroidism, depression, chronic pain    Responded well with phentermine and topiramate  Eating healthier, able to control portion  Active with yard work    PLAN:   - continue phentermine 15 mg daily  - continue topiramate 50 mg daily    FOLLOW-UP:    See Lauren Bloch, PharmD in 2 month(s)  See Dr.Tasma DRUMMOND in 4 month(s)    Start: 04/19/2022 08:50 am  Stop: 04/19/2022 09:04 am  Duration 14 minutes    External notes/medical records independently reviewed, labs and imaging independently reviewed, medical management and tests to be discussed/communicated to patient.    Time: I spent 27 minutes spent on the date of the encounter preparing to see patient (including chart review and preparation), obtaining and or reviewing additional medical history, performing a physical exam and evaluation, documenting clinical information in the electronic health record, independently interpreting results, communicating results to the patient and coordinating care.    Sincerely,    Jer Tapia MD

## 2022-04-19 NOTE — PROGRESS NOTES
Bry is a 66 year old who is being evaluated via a billable video visit.      How would you like to obtain your AVS? MyChart  If the video visit is dropped, the invitation should be resent by: [phone]  Will anyone else be joining your video visit? No    During this virtual visit the patient is located in [place], patient verifies this as the location during the entirety of this visit.     Video-Visit Details    Type of service:  Video Visit    Originating Location (pt. Location): Home    Distant Location (provider location):  [facility] WEIGHT MANAGEMENT CLINIC [place]     Platform used for Video Visit: [employer]

## 2022-04-19 NOTE — PATIENT INSTRUCTIONS
- continue phentermine 15 mg daily  - continue topiramate 50 mg daily    See Lauren Bloch, PharmD in 2 month(s)  See Dr.Tasma DRUMMOND in 4 month(s)    If you have any questions, please do not hesitate to call Weight management clinic at [phone] or [phone].  If you need to fax, please fax to [phone].    Sincerely,    Jer Tapia MD  Endocrinology

## 2022-05-08 ASSESSMENT — ENCOUNTER SYMPTOMS
NECK PAIN: 1
ARTHRALGIAS: 1
EYE PAIN: 0
STIFFNESS: 0
EYE REDNESS: 0
MUSCLE WEAKNESS: 0
EYE WATERING: 0
DOUBLE VISION: 0
MUSCLE CRAMPS: 0
EYE IRRITATION: 0
JOINT SWELLING: 1
MYALGIAS: 1

## 2022-05-09 ENCOUNTER — ONCOLOGY VISIT (OUTPATIENT)
Dept: ONCOLOGY | Facility: CLINIC | Age: 67
End: 2022-05-09
Attending: NURSE PRACTITIONER
Payer: COMMERCIAL

## 2022-05-09 VITALS
BODY MASS INDEX: 37.61 KG/M2 | DIASTOLIC BLOOD PRESSURE: 75 MMHG | TEMPERATURE: 98.5 F | SYSTOLIC BLOOD PRESSURE: 122 MMHG | HEART RATE: 76 BPM | WEIGHT: 251 LBS | OXYGEN SATURATION: 98 %

## 2022-05-09 DIAGNOSIS — C55 ENDOMETRIOID ADENOCARCINOMA OF UTERUS (H): Primary | ICD-10-CM

## 2022-05-09 PROCEDURE — 99213 OFFICE O/P EST LOW 20 MIN: CPT | Performed by: NURSE PRACTITIONER

## 2022-05-09 PROCEDURE — G0463 HOSPITAL OUTPT CLINIC VISIT: HCPCS

## 2022-05-09 ASSESSMENT — ENCOUNTER SYMPTOMS
HOARSE VOICE: 0
SPEECH CHANGE: 0
COUGH DISTURBING SLEEP: 0
JOINT SWELLING: 1
DIARRHEA: 0
SPUTUM PRODUCTION: 0
EYE PAIN: 0
VOMITING: 0
HYPERTENSION: 0
DISTURBANCES IN COORDINATION: 0
COUGH: 0
BREAST MASS: 0
SEIZURES: 0
DIZZINESS: 0
EXTREMITY NUMBNESS: 0
BREAST PAIN: 0
LEG SWELLING: 0
SKIN CHANGES: 0
NAUSEA: 0
JAUNDICE: 0
MYALGIAS: 1
TINGLING: 0
NUMBNESS: 0
WHEEZING: 0
WEAKNESS: 0
SNORES LOUDLY: 0
ORTHOPNEA: 0
DYSURIA: 0
HOT FLASHES: 0
LIGHT-HEADEDNESS: 0
WEIGHT GAIN: 0
PALPITATIONS: 0
HEADACHES: 0
PARALYSIS: 0
INCREASED ENERGY: 0
TASTE DISTURBANCE: 0
SWOLLEN GLANDS: 0
EYE WATERING: 0
SHORTNESS OF BREATH: 0
WEIGHT LOSS: 0
INSOMNIA: 0
LOSS OF CONSCIOUSNESS: 0
NIGHT SWEATS: 0
BLOATING: 0
MUSCLE WEAKNESS: 0
HEMOPTYSIS: 0
POSTURAL DYSPNEA: 0
ALTERED TEMPERATURE REGULATION: 0
NAIL CHANGES: 0
CONSTIPATION: 0
EYE REDNESS: 0
MEMORY LOSS: 0
NECK PAIN: 1
CHILLS: 0
DECREASED CONCENTRATION: 0
TACHYCARDIA: 0
DOUBLE VISION: 0
NERVOUS/ANXIOUS: 0
FATIGUE: 0
BLOOD IN STOOL: 0
CLAUDICATION: 0
SYNCOPE: 0
ABDOMINAL PAIN: 0
SLEEP DISTURBANCES DUE TO BREATHING: 0
DECREASED LIBIDO: 0
RECTAL PAIN: 0
POLYPHAGIA: 0
TREMORS: 0
DIFFICULTY URINATING: 0
HYPOTENSION: 0
LEG PAIN: 0
EYE IRRITATION: 0
HALLUCINATIONS: 0
BOWEL INCONTINENCE: 0
ARTHRALGIAS: 1
EXERCISE INTOLERANCE: 0
POLYDIPSIA: 0
BRUISES/BLEEDS EASILY: 0
MUSCLE CRAMPS: 0
SINUS CONGESTION: 0
RECTAL BLEEDING: 0
RESPIRATORY PAIN: 0
NECK MASS: 0
HEARTBURN: 0
DECREASED APPETITE: 0
HEMATURIA: 0
SMELL DISTURBANCE: 0
SINUS PAIN: 0
SORE THROAT: 0
STIFFNESS: 0
FLANK PAIN: 0
FEVER: 0
DEPRESSION: 0
PANIC: 0
DYSPNEA ON EXERTION: 0
TROUBLE SWALLOWING: 0
POOR WOUND HEALING: 0

## 2022-05-09 ASSESSMENT — PAIN SCALES - GENERAL: PAINLEVEL: MILD PAIN (2)

## 2022-05-09 NOTE — NURSING NOTE
"Oncology Rooming Note    May 9, 2022 9:50 AM   Hanh Villalba is a 66 year old female who presents for:    Chief Complaint   Patient presents with     Oncology Clinic Visit     Endometrial adenocarcinoma of uterus     Initial Vitals: /75   Pulse 76   Temp 98.5  F (36.9  C) (Oral)   Wt 113.9 kg (251 lb)   LMP  (LMP Unknown)   SpO2 98%   BMI 37.61 kg/m   Estimated body mass index is 37.61 kg/m  as calculated from the following:    Height as of 4/19/22: 1.74 m (5' 8.5\").    Weight as of this encounter: 113.9 kg (251 lb). Body surface area is 2.35 meters squared.  Mild Pain (2) Comment: Data Unavailable   No LMP recorded (lmp unknown). Patient has had a hysterectomy.  Allergies reviewed: Yes  Medications reviewed: Yes    Medications: MEDICATION REFILLS NEEDED TODAY. Provider was notified. Needs phentermine refilled, but is part of a [facility] weight loss program. Wondering if we are able to fill    Pharmacy name entered into [facility]: [id number] [place], [place] - [address]    Clinical concerns: none       Lily Saldivar CMA            "

## 2022-05-09 NOTE — LETTER
2022         RE: Hanh Villalba   [address]  [place] [address]        Dear Colleague,    Thank you for referring your patient, Hanh Villalba, to the [facility]. Please see a copy of my visit note below.                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  She completed brachytherapy 2019.  She is here today for a surveillance visit.      Oncology history:   2019 D&C with pathology showing Grade 1 endometrial cancer, loss of MLH1 and PMS2, hypermethylation of MLH1 promotor positive  10/4/2019: Robotic-assisted Total laparoscopic hysterectomy, bilateral salpingo-oophorectomy, sentinel lymph node dissection, vaginal laceration repair: Stage 1B FIGO Grade 2, DOI 17/21mm (81%), LVSI+, cytology negative, tumor 3.4cm; MSI-H (MLH1 promoter melthylation)  2019: Vaginal brachytherapy; 30Gy in 5 fx       Today she comes to clinic and denies concerns for her visit. She denies any vaginal bleeding, no changes in her bowel or bladder habits, no nausea/emesis, no lower extremity edema, and no difficulties eating or sleeping. She denies any abdominal discomfort/bloating, no fevers or chills, and no chest pain or shortness of breath. She is due to see her PCP for her annual exam; her mammogram is due next month; and her DEXA and colon cancer screening are due in . She is using her dilator every 3 weeks or so and denies any issues. She had her eyes checked about 1.5 weeks ago and did get a new Rx; the flashing has decreased and she was told she has the beginning of cataracts. She is in a weight loss study and has noticed the medications give her a dry mouth. She also noticed a lump on her throat.     Review of Systems     Constitutional:  Negative for fever, chills,  weight loss, weight gain, fatigue, decreased appetite, night sweats, recent stressors, height gain, height loss, post-operative complications, incisional pain, hallucinations, increased energy, hyperactivity and confused.   HENT:  Negative for ear pain, hearing loss, tinnitus, nosebleeds, trouble swallowing, hoarse voice, mouth sores, sore throat, ear discharge, tooth pain, gum tenderness, taste disturbance, smell disturbance, hearing aid, bleeding gums, dry mouth, sinus pain, sinus congestion and neck mass.    Eyes:  Positive for flashing lights and floaters. Negative for double vision, pain, redness, eye pain, decreased vision, eye watering, eye bulging, eye dryness, spots, strabismus, tunnel vision, jaundice and eye irritation.   Respiratory:   Negative for cough, hemoptysis, sputum production, shortness of breath, wheezing, sleep disturbances due to breathing, snores loudly, respiratory pain, dyspnea on exertion, cough disturbing sleep and postural dyspnea.    Cardiovascular:  Negative for chest pain, dyspnea on exertion, palpitations, orthopnea, claudication, leg swelling, fingers/toes turn blue, hypertension, hypotension, syncope, history of heart murmur, chest pain on exertion, chest pain at rest, pacemaker, few scattered varicosities, leg pain, sleep disturbances due to breathing, tachycardia, light-headedness, exercise intolerance and edema.   Gastrointestinal:  Negative for heartburn, nausea, vomiting, abdominal pain, diarrhea, constipation, blood in stool, melena, rectal pain, bloating, hemorrhoids, bowel incontinence, jaundice, rectal bleeding, coffee ground emesis and change in stool.   Genitourinary:  Negative for bladder incontinence, dysuria, urgency, hematuria, flank pain, vaginal discharge, difficulty urinating, genital sores, dyspareunia, decreased libido, nocturia, voiding less frequently, arousal difficulty, abnormal vaginal bleeding, excessive menstruation, menstrual changes, hot flashes,  vaginal dryness and postmenopausal bleeding.   Musculoskeletal:  Positive for myalgias, joint swelling, arthralgias and neck pain. Negative for stiffness, muscle cramps, bone pain, muscle weakness and fracture.   Skin:  Negative for nail changes, itching, poor wound healing, rash, hair changes, skin changes, acne, warts, poor wound healing, scarring, flaky skin, Raynaud's phenomenon, sensitivity to sunlight and skin thickening.   Neurological:  Negative for dizziness, tingling, tremors, speech change, seizures, loss of consciousness, weakness, light-headedness, numbness, headaches, disturbances in coordination, extremity numbness, memory loss, difficulty walking and paralysis.   Endo/Heme:  Negative for anemia, swollen glands and bruises/bleeds easily.   Psychiatric/Behavioral:  Negative for depression, hallucinations, memory loss, decreased concentration, mood swings and panic attacks.    Breast:  Negative for breast discharge, breast mass, breast pain and nipple retraction.   Endocrine:  Negative for altered temperature regulation, polyphagia, polydipsia, unwanted hair growth and change in facial hair.          Past Medical History:    Past Medical History:   Diagnosis Date     Cancer (H) August 2019    Uterus removed     Depression      Depressive disorder 1999    taking prosac     Hypothyroidism      Overweight          Past Surgical History:    Past Surgical History:   Procedure Laterality Date     ABDOMEN SURGERY  Oct 2019    Robotic Hysterectomy     BIOPSY  early 2000's    left breast - non event     COLONOSCOPY  within last 6 years ?    OK     COLONOSCOPY N/A 7/26/2021    Procedure: COLONOSCOPY, WITH POLYPECTOMY;  Surgeon: Ryan Butterfield MD;  Location: [facility] OR     [facility] HYSTERECTOMY TOTAL, BILATERAL SALPINGO-OOPHORECTOMY, NODE DISSECTION, COMBINED N/A 10/4/2019    Procedure: Robot-assisted total laparoscopic hysterectomy, Bilateral salpingectomy and Right Oophorectomy, Lysis of adhesion, Cervical  Indocyanine Green injection, Bilateral sentinel lymph node dissection, Repair of vaginal laceration.;  Surgeon: Perez Reddy MD;  Location: [facility] OR     DILATION AND CURETTAGE N/A 9/12/2019    Procedure: DILATION AND CURETTAGE, UTERUS;  Surgeon: Navdeep Barajas MD;  Location: [facility] OR     OPERATIVE HYSTEROSCOPY WITH MORCELLATOR N/A 9/12/2019    Procedure: HYSTEROSCOPY WITH MORCELLATOR (MYOSURE);  Surgeon: Navdeep Barajas MD;  Location: [facility] OR     SALPINGO OOPHORECTOMY,R/L/SHANDA Left 1980s    Left ovarian with endometriois     US BREAST BIOPSY LT Left          Health Maintenance Due   Topic Date Due     MICROALBUMIN  Never done     ZOSTER IMMUNIZATION (2 of 3) 10/13/2014     COVID-19 Vaccine (2 - Booster for Dony series) 05/02/2021     MEDICARE ANNUAL WELLNESS VISIT  07/17/2021     FALL RISK ASSESSMENT  07/17/2021     Pneumococcal Vaccine: 65+ Years (2 - PPSV23 or PCV20) 07/17/2021     PHQ-9  06/03/2022       Current Medications:     Current Outpatient Medications   Medication Sig Dispense Refill     Apple Cider Vinegar 600 MG CAPS Take 1 capsule by mouth daily        CALCIUM PO Take 20 mg by mouth daily        cholecalciferol (VITAMIN D3) 5000 units TABS tablet Take by mouth daily       clobetasol (TEMOVATE) 0.05 % external ointment Apply sparingly then once or twice a week as needed 45 g 0     Cyanocobalamin 1500 MCG TBDP Take 1 tablet by mouth daily        FLUoxetine (PROZAC) 20 MG capsule Take 1 capsule (20 mg) by mouth every 48 hours AND 2 capsules (40 mg) every 48 hours. 135 capsule 1     Lactobacillus (ACIDOPHILUS PO) Take 1 tablet by mouth daily        levothyroxine (SYNTHROID/LEVOTHROID) 100 MCG tablet Take 1 tablet (100 mcg) by mouth every morning 90 tablet 3     Lutein 20 MG CAPS        mometasone (ASMANEX TWISTHALER) 110 MCG/INH inhaler Inhale 1 puff into the lungs every evening 1 each 1     Omega 3-6-9 Fatty Acids (OMEGA-3-6-9 PO) Take 1 capsule by mouth daily        phentermine (ADIPEX-P)  15 MG capsule Take 1 capsule (15 mg) by mouth every morning 30 capsule 3     topiramate (TOPAMAX) 25 MG tablet Take 2 tablets (50 mg) by mouth daily 180 tablet 3         Allergies:      No Known Allergies     Social History:     Social History     Tobacco Use     Smoking status: Former Smoker     Packs/day: 1.00     Years: 15.00     Pack years: 15.00     Types: Cigarettes     Start date: 1972     Quit date: 1987     Years since quittin.5     Smokeless tobacco: Never Used   Substance Use Topics     Alcohol use: Yes     Comment: a beer here and there - never more than 2 -  2 times/week       History   Drug Use Unknown         Family History:     The patient's family history is notable for:    Family History   Problem Relation Age of Onset     Acute myelogenous leukemia Mother      Coronary Artery Disease Mother         bypass surgery      Osteoporosis Mother      Obesity Mother      Parkinsonism Father      Prostate Cancer Father         Diagnosed late in life in his 80's     Obesity Sister      Heart Disease Sister      Coronary Artery Disease Sister         heart attacK in 2019     Uterine Cancer Maternal Grandmother 40        Uterine versus cervical     No Known Problems Sister      Leukemia Brother      Leukemia Nephew      Coronary Artery Disease Brother         Carotid Artery Surgery     Obesity Brother      Substance Abuse Brother         alcohol         Physical Exam:     /75   Pulse 76   Temp 98.5  F (36.9  C) (Oral)   Wt 113.9 kg (251 lb)   LMP  (LMP Unknown)   SpO2 98%   BMI 37.61 kg/m    Body mass index is 37.61 kg/m .    General Appearance: healthy and alert, no distress     HEENT: no thyromegaly, no palpable nodules or masses; skin colored superficial bump        Cardiovascular: regular rate and rhythm, no gallops, rubs or murmurs     Respiratory: lungs clear, no rales, rhonchi or wheezes, normal diaphragmatic excursion    Musculoskeletal: extremities non tender  and without edema    Skin: no lesions or rashes     Neurological: normal gait, no gross defects     Psychiatric: appropriate mood and affect                               Hematological: normal cervical, supraclavicular and inguinal lymph nodes     Gastrointestinal:       abdomen soft, non-tender, non-distended, no organomegaly or masses    Genitourinary: External genitalia and urethral meatus appears normal.  Vagina is smooth without nodularity or masses.  Cervix surgically absent  Bimanual exam reveal no masses, nodularity or fullness.  Recto-vaginal exam confirms these findings.      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  She completed brachytherapy 12/2019.  She is here today for a surveillance visit.      26 minutes spent on the date of the encounter doing chart review, history and exam, documentation and further activities as noted above      Plan:     1.)       Patient to RTC in 6 months for her next surveillance visit. Reviewed recommendations from [facility] not to perform surveillance pap smears in women diagnosed with endometrial cancer as this does not improve detection of local recurrence. Reviewed signs and symptoms for when she should contact the clinic or seek additional care.  Reviewed signs and symptoms for when she should contact the clinic or seek additional care.  Answered all of her questions to the best of my ability. She can use her dilator as she is or can use if once a month.      2.) Genetic risk factors were assessed and she had a loss of MLH1 and PMS2; hypermethylation of MLH1 promotor positive. MLH1 promoter methylation is typically associated with sporadic microsatellite unstable tumors of the colon/rectum or endometrium.    3.) Labs and/or tests ordered include:  None.     4.) Health maintenance issues addressed today include annual health maintenance and non-gynecologic issues with PCP. Follow up  with PCP.     ROSEMARY Lance, WHNP-BC, ANP-BC  Women's Health Nurse Practitioner  Adult Nurse Practitioner  Division of Gynecologic Oncology      CC  Patient Care Team:  Angeli Nolan MD as PCP - General (Family Practice)  Beatrice Reyes APRN CNP as Assigned Cancer Care Provider  Jer Tapia MD as Assigned Endocrinology Provider

## 2022-05-09 NOTE — PROGRESS NOTES
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  She completed brachytherapy 2019.  She is here today for a surveillance visit.      Oncology history:   2019 D&C with pathology showing Grade 1 endometrial cancer, loss of MLH1 and PMS2, hypermethylation of MLH1 promotor positive  10/4/2019: Robotic-assisted Total laparoscopic hysterectomy, bilateral salpingo-oophorectomy, sentinel lymph node dissection, vaginal laceration repair: Stage 1B FIGO Grade 2, DOI 17/21mm (81%), LVSI+, cytology negative, tumor 3.4cm; MSI-H (MLH1 promoter melthylation)  2019: Vaginal brachytherapy; 30Gy in 5 fx       Today she comes to clinic and denies concerns for her visit. She denies any vaginal bleeding, no changes in her bowel or bladder habits, no nausea/emesis, no lower extremity edema, and no difficulties eating or sleeping. She denies any abdominal discomfort/bloating, no fevers or chills, and no chest pain or shortness of breath. She is due to see her PCP for her annual exam; her mammogram is due next month; and her DEXA and colon cancer screening are due in . She is using her dilator every 3 weeks or so and denies any issues. She had her eyes checked about 1.5 weeks ago and did get a new Rx; the flashing has decreased and she was told she has the beginning of cataracts. She is in a weight loss study and has noticed the medications give her a dry mouth. She also noticed a lump on her throat.     Review of Systems     Constitutional:  Negative for fever, chills, weight loss, weight gain, fatigue, decreased appetite, night sweats, recent stressors, height gain, height loss, post-operative complications, incisional pain, hallucinations, increased energy, hyperactivity and confused.   HENT:  Negative for ear pain, hearing  loss, tinnitus, nosebleeds, trouble swallowing, hoarse voice, mouth sores, sore throat, ear discharge, tooth pain, gum tenderness, taste disturbance, smell disturbance, hearing aid, bleeding gums, dry mouth, sinus pain, sinus congestion and neck mass.    Eyes:  Positive for flashing lights and floaters. Negative for double vision, pain, redness, eye pain, decreased vision, eye watering, eye bulging, eye dryness, spots, strabismus, tunnel vision, jaundice and eye irritation.   Respiratory:   Negative for cough, hemoptysis, sputum production, shortness of breath, wheezing, sleep disturbances due to breathing, snores loudly, respiratory pain, dyspnea on exertion, cough disturbing sleep and postural dyspnea.    Cardiovascular:  Negative for chest pain, dyspnea on exertion, palpitations, orthopnea, claudication, leg swelling, fingers/toes turn blue, hypertension, hypotension, syncope, history of heart murmur, chest pain on exertion, chest pain at rest, pacemaker, few scattered varicosities, leg pain, sleep disturbances due to breathing, tachycardia, light-headedness, exercise intolerance and edema.   Gastrointestinal:  Negative for heartburn, nausea, vomiting, abdominal pain, diarrhea, constipation, blood in stool, melena, rectal pain, bloating, hemorrhoids, bowel incontinence, jaundice, rectal bleeding, coffee ground emesis and change in stool.   Genitourinary:  Negative for bladder incontinence, dysuria, urgency, hematuria, flank pain, vaginal discharge, difficulty urinating, genital sores, dyspareunia, decreased libido, nocturia, voiding less frequently, arousal difficulty, abnormal vaginal bleeding, excessive menstruation, menstrual changes, hot flashes, vaginal dryness and postmenopausal bleeding.   Musculoskeletal:  Positive for myalgias, joint swelling, arthralgias and neck pain. Negative for stiffness, muscle cramps, bone pain, muscle weakness and fracture.   Skin:  Negative for nail changes, itching, poor wound  healing, rash, hair changes, skin changes, acne, warts, poor wound healing, scarring, flaky skin, Raynaud's phenomenon, sensitivity to sunlight and skin thickening.   Neurological:  Negative for dizziness, tingling, tremors, speech change, seizures, loss of consciousness, weakness, light-headedness, numbness, headaches, disturbances in coordination, extremity numbness, memory loss, difficulty walking and paralysis.   Endo/Heme:  Negative for anemia, swollen glands and bruises/bleeds easily.   Psychiatric/Behavioral:  Negative for depression, hallucinations, memory loss, decreased concentration, mood swings and panic attacks.    Breast:  Negative for breast discharge, breast mass, breast pain and nipple retraction.   Endocrine:  Negative for altered temperature regulation, polyphagia, polydipsia, unwanted hair growth and change in facial hair.          Past Medical History:    Past Medical History:   Diagnosis Date     Cancer (H) August 2019    Uterus removed     Depression      Depressive disorder 1999    taking prosac     Hypothyroidism      Overweight          Past Surgical History:    Past Surgical History:   Procedure Laterality Date     ABDOMEN SURGERY  Oct 2019    Robotic Hysterectomy     BIOPSY  early 2000's    left breast - non event     COLONOSCOPY  within last 6 years ?    OK     COLONOSCOPY N/A 7/26/2021    Procedure: COLONOSCOPY, WITH POLYPECTOMY;  Surgeon: Ryan Butterfield MD;  Location: [facility]     DAVINCI HYSTERECTOMY TOTAL, BILATERAL SALPINGO-OOPHORECTOMY, NODE DISSECTION, COMBINED N/A 10/4/2019    Procedure: Robot-assisted total laparoscopic hysterectomy, Bilateral salpingectomy and Right Oophorectomy, Lysis of adhesion, Cervical Indocyanine Green injection, Bilateral sentinel lymph node dissection, Repair of vaginal laceration.;  Surgeon: Perez Reddy MD;  Location: [facility] OR     DILATION AND CURETTAGE N/A 9/12/2019    Procedure: DILATION AND CURETTAGE, UTERUS;  Surgeon: Navdeep Barajas,  MD;  Location: [facility] OR     OPERATIVE HYSTEROSCOPY WITH MORCELLATOR N/A 9/12/2019    Procedure: HYSTEROSCOPY WITH MORCELLATOR (MYOSURE);  Surgeon: Navdeep Barajas MD;  Location: [facility] OR     SALPINGO OOPHORECTOMY,R/L/SHANDA Left 1980s    Left ovarian with endometriois     US BREAST BIOPSY LT Left          Health Maintenance Due   Topic Date Due     MICROALBUMIN  Never done     ZOSTER IMMUNIZATION (2 of 3) 10/13/2014     COVID-19 Vaccine (2 - Booster for Dony series) 05/02/2021     MEDICARE ANNUAL WELLNESS VISIT  07/17/2021     FALL RISK ASSESSMENT  07/17/2021     Pneumococcal Vaccine: 65+ Years (2 - PPSV23 or PCV20) 07/17/2021     PHQ-9  06/03/2022       Current Medications:     Current Outpatient Medications   Medication Sig Dispense Refill     Apple Cider Vinegar 600 MG CAPS Take 1 capsule by mouth daily        CALCIUM PO Take 20 mg by mouth daily        cholecalciferol (VITAMIN D3) 5000 units TABS tablet Take by mouth daily       clobetasol (TEMOVATE) 0.05 % external ointment Apply sparingly then once or twice a week as needed 45 g 0     Cyanocobalamin 1500 MCG TBDP Take 1 tablet by mouth daily        FLUoxetine (PROZAC) 20 MG capsule Take 1 capsule (20 mg) by mouth every 48 hours AND 2 capsules (40 mg) every 48 hours. 135 capsule 1     Lactobacillus (ACIDOPHILUS PO) Take 1 tablet by mouth daily        levothyroxine (SYNTHROID/LEVOTHROID) 100 MCG tablet Take 1 tablet (100 mcg) by mouth every morning 90 tablet 3     Lutein 20 MG CAPS        mometasone (ASMANEX TWISTHALER) 110 MCG/INH inhaler Inhale 1 puff into the lungs every evening 1 each 1     Omega 3-6-9 Fatty Acids (OMEGA-3-6-9 PO) Take 1 capsule by mouth daily        phentermine (ADIPEX-P) 15 MG capsule Take 1 capsule (15 mg) by mouth every morning 30 capsule 3     topiramate (TOPAMAX) 25 MG tablet Take 2 tablets (50 mg) by mouth daily 180 tablet 3         Allergies:      No Known Allergies     Social History:     Social History     Tobacco Use     Smoking  status: Former Smoker     Packs/day: 1.00     Years: 15.00     Pack years: 15.00     Types: Cigarettes     Start date: 1972     Quit date: 1987     Years since quittin.5     Smokeless tobacco: Never Used   Substance Use Topics     Alcohol use: Yes     Comment: a beer here and there - never more than 2 -  2 times/week       History   Drug Use Unknown         Family History:     The patient's family history is notable for:    Family History   Problem Relation Age of Onset     Acute myelogenous leukemia Mother      Coronary Artery Disease Mother         bypass surgery      Osteoporosis Mother      Obesity Mother      Parkinsonism Father      Prostate Cancer Father         Diagnosed late in life in his 80's     Obesity Sister      Heart Disease Sister      Coronary Artery Disease Sister         heart attacK in 2019     Uterine Cancer Maternal Grandmother 40        Uterine versus cervical     No Known Problems Sister      Leukemia Brother      Leukemia Nephew      Coronary Artery Disease Brother         Carotid Artery Surgery     Obesity Brother      Substance Abuse Brother         alcohol         Physical Exam:     /75   Pulse 76   Temp 98.5  F (36.9  C) (Oral)   Wt 113.9 kg (251 lb)   LMP  (LMP Unknown)   SpO2 98%   BMI 37.61 kg/m    Body mass index is 37.61 kg/m .    General Appearance: healthy and alert, no distress     HEENT: no thyromegaly, no palpable nodules or masses; skin colored superficial bump        Cardiovascular: regular rate and rhythm, no gallops, rubs or murmurs     Respiratory: lungs clear, no rales, rhonchi or wheezes, normal diaphragmatic excursion    Musculoskeletal: extremities non tender and without edema    Skin: no lesions or rashes     Neurological: normal gait, no gross defects     Psychiatric: appropriate mood and affect                               Hematological: normal cervical, supraclavicular and inguinal lymph nodes     Gastrointestinal:        abdomen soft, non-tender, non-distended, no organomegaly or masses    Genitourinary: External genitalia and urethral meatus appears normal.  Vagina is smooth without nodularity or masses.  Cervix surgically absent  Bimanual exam reveal no masses, nodularity or fullness.  Recto-vaginal exam confirms these findings.      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  She completed brachytherapy 12/2019.  She is here today for a surveillance visit.      26 minutes spent on the date of the encounter doing chart review, history and exam, documentation and further activities as noted above      Plan:     1.)       Patient to RTC in 6 months for her next surveillance visit. Reviewed recommendations from [facility] not to perform surveillance pap smears in women diagnosed with endometrial cancer as this does not improve detection of local recurrence. Reviewed signs and symptoms for when she should contact the clinic or seek additional care.  Reviewed signs and symptoms for when she should contact the clinic or seek additional care.  Answered all of her questions to the best of my ability. She can use her dilator as she is or can use if once a month.      2.) Genetic risk factors were assessed and she had a loss of MLH1 and PMS2; hypermethylation of MLH1 promotor positive. MLH1 promoter methylation is typically associated with sporadic microsatellite unstable tumors of the colon/rectum or endometrium.    3.) Labs and/or tests ordered include:  None.     4.) Health maintenance issues addressed today include annual health maintenance and non-gynecologic issues with PCP. Follow up with PCP.     ROSEMARY Lance, WHNP-BC, ANP-BC  Women's Health Nurse Practitioner  Adult Nurse Practitioner  Division of Gynecologic Oncology          CC  Patient Care Team:  Angeli Nolan MD as PCP - General (Family Practice)  Beatrice Ryees  APRN CNP as Assigned Cancer Care Provider  Angeli Nolan MD as Assigned PCP  Jer Tapia MD as Assigned Endocrinology Provider  SELF, REFERRED

## 2022-05-27 ENCOUNTER — VIRTUAL VISIT (OUTPATIENT)
Dept: ENDOCRINOLOGY | Facility: CLINIC | Age: 67
End: 2022-05-27
Payer: COMMERCIAL

## 2022-05-27 DIAGNOSIS — E66.813 CLASS 3 SEVERE OBESITY DUE TO EXCESS CALORIES WITHOUT SERIOUS COMORBIDITY WITH BODY MASS INDEX (BMI) OF 40.0 TO 44.9 IN ADULT (H): ICD-10-CM

## 2022-05-27 DIAGNOSIS — Z71.3 NUTRITIONAL COUNSELING: Primary | ICD-10-CM

## 2022-05-27 DIAGNOSIS — N18.31 STAGE 3A CHRONIC KIDNEY DISEASE (H): ICD-10-CM

## 2022-05-27 DIAGNOSIS — E66.01 CLASS 3 SEVERE OBESITY DUE TO EXCESS CALORIES WITHOUT SERIOUS COMORBIDITY WITH BODY MASS INDEX (BMI) OF 40.0 TO 44.9 IN ADULT (H): ICD-10-CM

## 2022-05-27 PROCEDURE — 97803 MED NUTRITION INDIV SUBSEQ: CPT | Mod: GT | Performed by: DIETITIAN, REGISTERED

## 2022-05-27 NOTE — LETTER
"5/27/2022       RE: Hanh Villalba  [address]  [place] [address]     Dear Colleague,    Thank you for referring your patient, Hanh Villalba, to the [facility] [place] [facility]. Please see a copy of my visit note below.    Hanh Villalba is a 66 year old female who is being evaluated via a billable video visit.      The patient has been notified of following:     \"This video visit will be conducted via a call between you and your physician/provider. We have found that certain health care needs can be provided without the need for an in-person physical exam.  This service lets us provide the care you need with a video conversation.  If a prescription is necessary we can send it directly to your pharmacy.  If lab work is needed we can place an order for that and you can then stop by our lab to have the test done at a later time.    Video visits are billed at different rates depending on your insurance coverage.  Please reach out to your insurance provider with any questions.    If during the course of the call the physician/provider feels a video visit is not appropriate, you will not be charged for this service.\"    Patient has given verbal consent for Video visit? Yes  How would you like to obtain your AVS? MyChart  If you are dropped from the video visit, the video invite should be resent to: Send to e-mail at: [email or web address]  Will anyone else be joining your video visit? No  {If patient encounters technical issues they should call [phone]      Video-Visit Details    Type of service:  Video Visit    Video Start Time: 9:29 AM  Video End Time: 9:39 AM    Originating Location (pt. Location): Home    Distant Location (provider location):  [facility]     Platform used for Video Visit: "[employer]    During this virtual visit the patient is located in [place], " "patient verifies this as the location during the entirety of this visit.     Return Weight Management Nutrition Consultation    Hanh Villalba is a 66 year old female presents today for a return weight management nutrition consultation.  Patient referred by Dr. Randle on January 10, 2022.    Patient with Co-morbidities of obesity including:  Type II DM no  Renal Failure yes  Sleep apnea no  Hypertension no   Dyslipidemia no  Joint pain no  Back pain no  GERD no     Anthropometrics:  Estimated body mass index is 37.61 kg/m  as calculated from the following:    Height as of 4/19/22: 1.74 m (5' 8.5\").    Weight as of 5/9/22: 113.9 kg (251 lb).     Current weight (5/27/22): 244 lbs per pt    Medications for Weight Loss:  Phentermine, Topamax    NUTRITION HISTORY  See MD note for details.    Pt tried WW in the past and had some success. Has used [employer] Pal in the past. Has taken a class in the past about nutrition but mainly struggles with sticking to a program. Struggles mainly with portion sizes and snacking.     Plans weekly meals.     2/15/22: Pt reports she is doing well. Has been utilizing food journaling and reducing portions, not going back for seconds. Has noticed reduced appetite. Has been using [employer] Pal at 1900 calories per day but often eats under this.    3/30/22: Was on vacation for a week in the [facility]. Has been doing well with her diet and exercise and has lost weight since last visit. Continues to plan meals and track meals on [employer] Pal. Is being consistent with portion sizes and only having one serving.     5/27/22: Pt reports stopping medication d/t dizziness. Still planning meals and recording meals and snacks. Wants to increase physical activity.     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 "     Progress on Previous Goals:  1) Continue focusing on portion sizes met, continues   2) Continue physical activity as able met, continues- yard work  3) Continue focusing on lean proteins and non-starchy vegetables improving, continues    Nutrition Prescription  Recommended energy/nutrient modification.    Nutrition Diagnosis  Obesity r/t long history of positive energy balance aeb BMI >30.    Nutrition Intervention  Materials/education provided on Volumetric eating to help satiety level on fewer calories; portion control and healthy food choices (Plate Method and Volumetrics handouts), 100 calorie snack choices, meal and snack planning and websites, sample meal plans     Patient demonstrates understanding.    Expected Engagement: good    Nutrition Goals  1) Continue focusing on portion sizes   2) Continue physical activity as able   3) Continue focusing on lean proteins and non-starchy vegetables    Portion Control Without Measuring  [email or web address]     Protein Sources   [email or web address]     Carbohydrates  [email or web address]     Mindful Eating  [email or web address]     Summary of Volumetrics Eating Plan  [email or web address]     Follow-Up:  PRN    Time spent with patient: 10 minutes.  APRIL WARE RD, LD

## 2022-05-27 NOTE — PROGRESS NOTES
"Hanh Villalba is a 66 year old female who is being evaluated via a billable video visit.      The patient has been notified of following:     \"This video visit will be conducted via a call between you and your physician/provider. We have found that certain health care needs can be provided without the need for an in-person physical exam.  This service lets us provide the care you need with a video conversation.  If a prescription is necessary we can send it directly to your pharmacy.  If lab work is needed we can place an order for that and you can then stop by our lab to have the test done at a later time.    Video visits are billed at different rates depending on your insurance coverage.  Please reach out to your insurance provider with any questions.    If during the course of the call the physician/provider feels a video visit is not appropriate, you will not be charged for this service.\"    Patient has given verbal consent for Video visit? Yes  How would you like to obtain your AVS? MyChart  If you are dropped from the video visit, the video invite should be resent to: Send to e-mail at: [email or web address]  Will anyone else be joining your video visit? No  {If patient encounters technical issues they should call [phone]      Video-Visit Details    Type of service:  Video Visit    Video Start Time: 9:29 AM  Video End Time: 9:39 AM    Originating Location (pt. Location): [place]    Distant Location (provider location):  [facility] [place]     Platform used for Video Visit: [employer]    During this virtual visit the patient is located in [place], patient verifies this as the location during the entirety of this visit.     Return Weight Management Nutrition Consultation    Hanh Villalba is a 66 year old female presents today for a return weight management nutrition consultation.  Patient referred by Dr. Randle on January 10, 2022.    Patient with Co-morbidities of " "obesity including:  Type II DM no  Renal Failure yes  Sleep apnea no  Hypertension no   Dyslipidemia no  Joint pain no  Back pain no  GERD no     Anthropometrics:  Estimated body mass index is 37.61 kg/m  as calculated from the following:    Height as of 4/19/22: 1.74 m (5' 8.5\").    Weight as of 5/9/22: 113.9 kg (251 lb).     Current weight (5/27/22): 244 lbs per pt    Medications for Weight Loss:  Phentermine, Topamax    NUTRITION HISTORY  See MD note for details.    Pt tried WW in the past and had some success. Has used [employer] Pal in the past. Has taken a class in the past about nutrition but mainly struggles with sticking to a program. Struggles mainly with portion sizes and snacking.     Plans weekly meals.     2/15/22: Pt reports she is doing well. Has been utilizing food journaling and reducing portions, not going back for seconds. Has noticed reduced appetite. Has been using [employer] Pal at 1900 calories per day but often eats under this.    3/30/22: Was on vacation for a week in the [facility]. Has been doing well with her diet and exercise and has lost weight since last visit. Continues to plan meals and track meals on [employer] Pal. Is being consistent with portion sizes and only having one serving.     5/27/22: Pt reports stopping medication d/t dizziness. Still planning meals and recording meals and snacks. Wants to increase physical activity.     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     Progress on Previous Goals:  1) Continue focusing on portion sizes met, continues   2) Continue physical activity as able met, continues- yard work  3) Continue focusing on lean proteins and non-starchy vegetables improving, continues    Nutrition Prescription  Recommended energy/nutrient modification.    Nutrition " Diagnosis  Obesity r/t long history of positive energy balance aeb BMI >30.    Nutrition Intervention  Materials/education provided on Volumetric eating to help satiety level on fewer calories; portion control and healthy food choices (Plate Method and Volumetrics handouts), 100 calorie snack choices, meal and snack planning and websites, sample meal plans     Patient demonstrates understanding.    Expected Engagement: good    Nutrition Goals  1) Continue focusing on portion sizes   2) Continue physical activity as able   3) Continue focusing on lean proteins and non-starchy vegetables    Portion Control Without Measuring  [email or web address]     Protein Sources   [email or web address]     Carbohydrates  [email or web address]     Mindful Eating  [email or web address]     Summary of Volumetrics Eating Plan  [email or web address]     Follow-Up:  PRN    Time spent with patient: 10 minutes.  APRIL WARE RD, LD

## 2022-05-27 NOTE — PATIENT INSTRUCTIONS
Hal Londono!    Follow-up with RD as needed    Thank you,    Cindy Rosenberg, ADAM, LD  If you would like to schedule or reschedule an appointment with the RD, please call [phone]    Nutrition Goals  1) Continue focusing on portion sizes   2) Continue physical activity as able   3) Continue focusing on lean proteins and non-starchy vegetables    Interested in working with a health ? Health coaches work with you to improve your overall health and wellbeing. They look at the whole person, and may involve discussion of different areas of life, including, but not limited to the four pillars of health (sleep, exercise, nutrition, and stress management). Discuss with your care team if you would like to start working a health .    Health Coaching-3 Pack:    $99 for three health coaching visits    Visits may be done in person or via phone    Coaching is a partnership between the  and the client; Coaches do not prescribe or diagnose    Coaching helps inspire the client to reach his/her personal goals    COMPREHENSIVE WEIGHT MANAGEMENT PROGRAM  VIRTUAL SUPPORT GROUPS    For Support Group Information:      We offer support groups for patients who are working on weight loss and considering, preparing for or have had weight loss surgery.   There is no cost for this opportunity.  You are invited to attend the?Virtual Support Groups?provided by any of the following locations:    [facility] via [employer] Teams with Vani Coronado RN  2.   [place] via [facility] with Eladio Larson, PhD, LP  3.   [place] via [facility] with Richa Naik RN  4.   [facility] via [employer] Teams with Richa Barton, [facility]    The following Support Group information can also be found on our website:  [email or web address]      [facility] Weight Loss Surgery Support Group    [facility] Weight Loss Surgery Support Group  The support group is a  "patient-lead forum that meets monthly to share experiences, encouragement and education. It is open to those who have had weight loss surgery, are scheduled for surgery, and those who are considering surgery.   WHEN: This group meets on the 3rd Wednesday of each month from 5:00PM - 6:00PM virtually using Microsoft Teams.   FACILITATOR: Led by Vani Coronado RD, LD, RN, the program's Clinical Coordinator.   TO REGISTER: Please contact the clinic via [employer] or call the nurse line directly at [phone] to inform our staff that you would like an invite sent to you and to let us know the email you would like the invite sent to. Prior to the meeting, a link with directions on how to join the meeting will be sent to you.    2022 Meetings  January 19: \"Let's Talk\" a time for the group to share.  February 16: \"Let's Talk\" a time for the group to share.  March 16: Guest Speakers: Psychologists, Cindy Seth, PhD,LP and Njama Parikh PsyD,  April 20: Guest Speaker: Health , Flor Sharpe, [facility],CHES, CPT  May 18: Guest Speaker: Dietitian, Cholo Vale, ADAM, LP  Eleanor 15: \"Let's Talk\" a time for the group to share.  July 20: \"Let's Talk\" a time for the group to share.  August 17: TBA  September 21: TBA  October 19: Guest Speaker: Dr Landen Anguiano MD Pulmonologist and Sleep Medicine Physician, \"Getting a Good Night's Sleep\".  November 16: TBA  December 21: TBA    [facility] Support Groups    Connections: Bariatric Care Support Group?  This is open to all [facility] (and those external to this program) pre- and post- operative bariatric surgery patients as well as their support system.   WHEN: This group meets the 2nd Tuesday of each month from 6:30 PM - 8:00 PM virtually using Microsoft Teams.   FACILITATOR: Led by Eladio Larson, Ph.D who is a Licensed Psychologist with the [facility] Comprehensive Weight Management Program.   TO REGISTER: Please send an email to " "Eladio Larson, Ph.D., LP at?[email or web address]?if you would like an invitation to the group and to learn about using Microsoft Teams.    2022 Meetings January 11: Cynthia Silverman, PharmD, Pharmacy Resident at [facility], \"Medications and Bariatric Surgery\".  February 8: Open Forum  March 8  April 12  May 10  Eleanor 14    Connections: Post-Operative Bariatric Surgery Support Group  This is a support group for [facility] bariatric patients (and those external to [facility]) who have had bariatric surgery and are at least 3 months post-surgery.  WHEN: This support group meets the 4th Wednesday of the month from 11:00 AM - 12:00 PM virtually using Microsoft Teams.   FACILITATOR: Led by Certified Bariatric Nurse, Richa Naik RN.   TO REGISTER: Please send an email to Richa at [email or web address] if you would like an invitation to the group and to learn about using Microsoft Teams.    2022 Meetings  January 26  February 23  March 23  April 27  May 25  Eleanor 22    [facility] Healthy Lifestyle Virtual Support Group    Healthy Lifestyle Virtual Support Group?  This is 60 minutes of small group guided discussion, support and resources. All are welcome who want a healthy lifestyle.  WHEN: This group meets monthly on a Friday from 12:30 PM - 1:30 PM virtually using Microsoft Teams.   FACILITATOR: Led by National Board Certified Health and , Richa Barton [facility].   TO REGISTER: Please send an email to Richa [email or web address] to receive monthly invites to the group or if you have any questions about having a health .  Prior to the meeting, a link with directions on how to join the meeting will be sent to you.    2022 Meetings January 21: Lexy Downs MS, RN, CIC, CBN, \"Healthy Habits\"  February 25: Open Forum  March 18: \"Setting Limits and Boundaries\"  April 29: Lydia Parada RD, \"Meal Planning Made Easy\"  May 20: Open " Forum  Eleanor: To be determined

## 2022-07-27 ENCOUNTER — TRANSFERRED RECORDS (OUTPATIENT)
Dept: HEALTH INFORMATION MANAGEMENT | Facility: CLINIC | Age: 67
End: 2022-07-27

## 2022-09-02 ENCOUNTER — MYC MEDICAL ADVICE (OUTPATIENT)
Dept: FAMILY MEDICINE | Facility: CLINIC | Age: 67
End: 2022-09-02

## 2022-09-07 DIAGNOSIS — E66.01 CLASS 3 SEVERE OBESITY DUE TO EXCESS CALORIES WITHOUT SERIOUS COMORBIDITY WITH BODY MASS INDEX (BMI) OF 40.0 TO 44.9 IN ADULT (H): ICD-10-CM

## 2022-09-07 DIAGNOSIS — E66.813 CLASS 3 SEVERE OBESITY DUE TO EXCESS CALORIES WITHOUT SERIOUS COMORBIDITY WITH BODY MASS INDEX (BMI) OF 40.0 TO 44.9 IN ADULT (H): ICD-10-CM

## 2022-09-09 ASSESSMENT — ENCOUNTER SYMPTOMS
SORE THROAT: 0
JOINT SWELLING: 1
PARESTHESIAS: 0
FREQUENCY: 0
HEMATOCHEZIA: 0
ABDOMINAL PAIN: 0
COUGH: 1
HEADACHES: 0
WEAKNESS: 0
BREAST MASS: 0
PALPITATIONS: 0
DYSURIA: 0
EYE PAIN: 0
HEARTBURN: 0
DIARRHEA: 0
NERVOUS/ANXIOUS: 0
ARTHRALGIAS: 1
CHILLS: 0
SHORTNESS OF BREATH: 0
HEMATURIA: 0
FEVER: 0
MYALGIAS: 1
CONSTIPATION: 0
DIZZINESS: 0
NAUSEA: 0

## 2022-09-09 ASSESSMENT — ACTIVITIES OF DAILY LIVING (ADL): CURRENT_FUNCTION: NO ASSISTANCE NEEDED

## 2022-09-12 ENCOUNTER — TELEPHONE (OUTPATIENT)
Dept: ENDOCRINOLOGY | Facility: CLINIC | Age: 67
End: 2022-09-12

## 2022-09-12 RX ORDER — TOPIRAMATE 25 MG/1
TABLET, FILM COATED ORAL
Qty: 180 TABLET | Refills: 0 | OUTPATIENT
Start: 2022-09-12

## 2022-09-12 NOTE — TELEPHONE ENCOUNTER
Patient is no longer taking weight loss medications. She sent [employer] message in June 2022 that she wanted to cancel her appointment, as she is no longer taking the Topiramate.

## 2022-09-12 NOTE — TELEPHONE ENCOUNTER
Received refill request for topiramate  . Patient needs appointment scheduled prior to any refills. Clinic Coordinator notified and will follow up with the paieent as appropriate. The pharmacy has been notified that the medication will not be refilled prior to an appointment being scheduled.    End date 6/14/22    lv 4/22  Nv: none

## 2022-09-12 NOTE — TELEPHONE ENCOUNTER
Left VM 9/12  Please call to schedule. Wt mgmnt   Jer Tapia MD   4/22  nv none  med: topiramate

## 2022-09-13 ENCOUNTER — OFFICE VISIT (OUTPATIENT)
Dept: FAMILY MEDICINE | Facility: CLINIC | Age: 67
End: 2022-09-13
Payer: COMMERCIAL

## 2022-09-13 VITALS
WEIGHT: 244.6 LBS | DIASTOLIC BLOOD PRESSURE: 74 MMHG | OXYGEN SATURATION: 98 % | HEIGHT: 68 IN | TEMPERATURE: 98.2 F | RESPIRATION RATE: 16 BRPM | BODY MASS INDEX: 37.07 KG/M2 | SYSTOLIC BLOOD PRESSURE: 126 MMHG | HEART RATE: 68 BPM

## 2022-09-13 DIAGNOSIS — Z00.00 ENCOUNTER FOR MEDICARE ANNUAL WELLNESS EXAM: Primary | ICD-10-CM

## 2022-09-13 DIAGNOSIS — R89.9 ABNORMAL LABORATORY TEST RESULT: ICD-10-CM

## 2022-09-13 DIAGNOSIS — E66.01 MORBID OBESITY (H): ICD-10-CM

## 2022-09-13 DIAGNOSIS — Z13.220 SCREENING FOR HYPERLIPIDEMIA: ICD-10-CM

## 2022-09-13 DIAGNOSIS — N18.30 STAGE 3 CHRONIC KIDNEY DISEASE, UNSPECIFIED WHETHER STAGE 3A OR 3B CKD (H): ICD-10-CM

## 2022-09-13 DIAGNOSIS — F33.42 RECURRENT MAJOR DEPRESSIVE DISORDER, IN FULL REMISSION (H): ICD-10-CM

## 2022-09-13 DIAGNOSIS — Z23 NEED FOR PNEUMOCOCCAL VACCINE: ICD-10-CM

## 2022-09-13 PROCEDURE — 80061 LIPID PANEL: CPT | Performed by: FAMILY MEDICINE

## 2022-09-13 PROCEDURE — G0009 ADMIN PNEUMOCOCCAL VACCINE: HCPCS | Performed by: FAMILY MEDICINE

## 2022-09-13 PROCEDURE — G0439 PPPS, SUBSEQ VISIT: HCPCS | Performed by: FAMILY MEDICINE

## 2022-09-13 PROCEDURE — 36415 COLL VENOUS BLD VENIPUNCTURE: CPT | Performed by: FAMILY MEDICINE

## 2022-09-13 PROCEDURE — 80048 BASIC METABOLIC PNL TOTAL CA: CPT | Performed by: FAMILY MEDICINE

## 2022-09-13 PROCEDURE — 82043 UR ALBUMIN QUANTITATIVE: CPT | Performed by: FAMILY MEDICINE

## 2022-09-13 PROCEDURE — 90677 PCV20 VACCINE IM: CPT | Performed by: FAMILY MEDICINE

## 2022-09-13 PROCEDURE — 99213 OFFICE O/P EST LOW 20 MIN: CPT | Mod: 25 | Performed by: FAMILY MEDICINE

## 2022-09-13 ASSESSMENT — ENCOUNTER SYMPTOMS
EYE PAIN: 0
FREQUENCY: 0
NAUSEA: 0
FEVER: 0
COUGH: 1
DIZZINESS: 0
DYSURIA: 0
NERVOUS/ANXIOUS: 0
SHORTNESS OF BREATH: 0
SORE THROAT: 0
CHILLS: 0
DIARRHEA: 0
CONSTIPATION: 0
WEAKNESS: 0
ARTHRALGIAS: 1
BREAST MASS: 0
MYALGIAS: 1
JOINT SWELLING: 1
HEADACHES: 0
HEMATURIA: 0
PALPITATIONS: 0
HEMATOCHEZIA: 0
ABDOMINAL PAIN: 0
HEARTBURN: 0
PARESTHESIAS: 0

## 2022-09-13 ASSESSMENT — ACTIVITIES OF DAILY LIVING (ADL): CURRENT_FUNCTION: NO ASSISTANCE NEEDED

## 2022-09-13 ASSESSMENT — PATIENT HEALTH QUESTIONNAIRE - PHQ9
10. IF YOU CHECKED OFF ANY PROBLEMS, HOW DIFFICULT HAVE THESE PROBLEMS MADE IT FOR YOU TO DO YOUR WORK, TAKE CARE OF THINGS AT HOME, OR GET ALONG WITH OTHER PEOPLE: NOT DIFFICULT AT ALL
SUM OF ALL RESPONSES TO PHQ QUESTIONS 1-9: 0
SUM OF ALL RESPONSES TO PHQ QUESTIONS 1-9: 0

## 2022-09-13 ASSESSMENT — PAIN SCALES - GENERAL: PAINLEVEL: NO PAIN (0)

## 2022-09-13 NOTE — PROGRESS NOTES
"SUBJECTIVE:   Bry is a 66 year old who presents for Preventive Visit.      Patient has been advised of split billing requirements and indicates understanding: Yes  Are you in the first 12 months of your Medicare coverage?  No    Healthy Habits:     In general, how would you rate your overall health?  Excellent    Duration of exercise:  Less than 15 minutes    Do you usually eat at least 4 servings of fruit and vegetables a day, include whole grains    & fiber and avoid regularly eating high fat or \"junk\" foods?  Yes    Taking medications regularly:  Yes    Medication side effects:  None    Ability to successfully perform activities of daily living:  No assistance needed    Home Safety:  No safety concerns identified    Hearing Impairment:  No hearing concerns    In the past 6 months, have you been bothered by leaking of urine? Yes    In general, how would you rate your overall mental or emotional health?  Excellent      PHQ-2 Total Score: 0    Additional concerns today:  Yes    Do you feel safe in your environment? YES    Have you ever done Advance Care Planning? (For example, a Health Directive, POLST, or a discussion with a medical provider or your loved ones about your wishes): Yes, advance care planning is on file.       Fall risk  Fallen 2 or more times in the past year?: Yes  Any fall with injury in the past year?: No    Cognitive Screening   1) Repeat 3 items (Leader, Season, Table)      2) Clock draw: NORMAL  3) 3 item recall: Recalls 3 objects  Results: 3 items recalled: COGNITIVE IMPAIRMENT LESS LIKELY    Mini-CogTM Copyright SHELLEY [employer]. Licensed by the author for use in [facility]; reprinted with permission ([email or web address]@.[email or web address]). All rights reserved.      Do you have sleep apnea, excessive snoring or daytime drowsiness?: snores     Reviewed and updated as needed this visit by clinical staff   Tobacco  Allergies  Meds  Problems               Reviewed and updated as needed this visit by Provider   "   Meds  Problems              Social History     Tobacco Use     Smoking status: Former Smoker     Packs/day: 1.00     Years: 15.00     Pack years: 15.00     Types: Cigarettes     Start date: 1972     Quit date: 1987     Years since quittin.8     Smokeless tobacco: Never Used   Substance Use Topics     Alcohol use: Yes     Comment: a beer here and there - never more than 2 -  2 times/week     If you drink alcohol do you typically have >3 drinks per day or >7 drinks per week? No    No flowsheet data found.      Current providers sharing in care for this patient include:   Patient Care Team:  Angeli Nolan MD as PCP - General (Family Practice)  Beatrice Reyes APRN CNP as Assigned Cancer Care Provider  Angeli Nolan MD as Assigned PCP  Jer Tapia MD as Assigned Endocrinology Provider  Bloch, Lauren Turner, [facility] as Assigned MTM Pharmacist    The following health maintenance items are reviewed in Epic and correct as of today:  Health Maintenance   Topic Date Due     MICROALBUMIN  Never done     ANNUAL REVIEW OF HM ORDERS  Never done     ZOSTER IMMUNIZATION (2 of 3) 10/13/2014     COVID-19 Vaccine (2 - Booster for Dony series) 2021     Pneumococcal Vaccine: 65+ Years (2 - PPSV23 or PCV20) 2021     BMP  2022     LIPID  2022     INFLUENZA VACCINE (1) 2022     TSH W/FREE T4 REFLEX  2022     HEMOGLOBIN  2022     PHQ-9  2023     MAMMO SCREENING  06/15/2023     MEDICARE ANNUAL WELLNESS VISIT  2023     FALL RISK ASSESSMENT  2023     COLORECTAL CANCER SCREENING  2024     ADVANCE CARE PLANNING  2027     DTAP/TDAP/TD IMMUNIZATION (3 - Td or Tdap) 10/31/2029     DEXA  2034     HEPATITIS C SCREENING  Completed     DEPRESSION ACTION PLAN  Completed     URINALYSIS  Completed     IPV IMMUNIZATION  Aged Out     MENINGITIS IMMUNIZATION  Aged Out     HEPATITIS B IMMUNIZATION  Aged Out  "        FHS-7: No flowsheet data found.      Pertinent mammograms are reviewed under the imaging tab.    Review of Systems   Constitutional: Negative for chills and fever.   HENT: Negative for congestion, ear pain, hearing loss and sore throat.    Eyes: Negative for pain and visual disturbance.   Respiratory: Positive for cough. Negative for shortness of breath.    Cardiovascular: Negative for chest pain, palpitations and peripheral edema.   Gastrointestinal: Negative for abdominal pain, constipation, diarrhea, heartburn, hematochezia and nausea.   Breasts:  Negative for tenderness, breast mass and discharge.   Genitourinary: Negative for dysuria, frequency, genital sores, hematuria, pelvic pain, urgency, vaginal bleeding and vaginal discharge.   Musculoskeletal: Positive for arthralgias, joint swelling and myalgias.   Skin: Negative for rash.   Neurological: Negative for dizziness, weakness, headaches and paresthesias.   Psychiatric/Behavioral: Negative for mood changes. The patient is not nervous/anxious.      When she gets a cold she gets an ongoing cough.  Has not needed the inhaler.  Cough resolves when she is well.    Hip pain both.  Did see ortho for this.  Had xrays.  Was told the xrays looked good and was told she had bursitis.    Does a lot of gardening.    Is working on weight loss.  Started taking phentermine and topiramate.  But she got dizziness.  So has stopped the medication.    Lump on her neck.    OBJECTIVE:   /74 (BP Location: Right arm, Patient Position: Sitting, Cuff Size: Adult Large)   Pulse 68   Temp 98.2  F (36.8  C) (Oral)   Resp 16   Ht 1.715 m (5' 7.5\")   Wt 110.9 kg (244 lb 9.6 oz)   LMP  (LMP Unknown)   SpO2 98%   BMI 37.74 kg/m   Estimated body mass index is 37.74 kg/m  as calculated from the following:    Height as of this encounter: 1.715 m (5' 7.5\").    Weight as of this encounter: 110.9 kg (244 lb 9.6 oz).  Physical Exam  GENERAL: healthy, alert and no distress  NECK: " no adenopathy, no asymmetry, masses, or scars and thyroid normal to palpation  RESP: lungs clear to auscultation - no rales, rhonchi or wheezes  CV: regular rate and rhythm, normal S1 S2, no S3 or S4, no murmur, click or rub, no peripheral edema and peripheral pulses strong  SKIN: 3-4mm subcutaneous nodule on right lower anterior neck.  Mobile and soft.  Believe this represents a lipoma  PSYCH: mentation appears normal, affect normal/bright    Diagnostic Test Results:  Labs reviewed in Epic    ASSESSMENT / PLAN:   (Z00.00) Encounter for Medicare annual wellness exam  (primary encounter diagnosis)  Comment: healthy  Plan: Health Care Maintenance reviewed, measures not yet completed have been discussed.  Declines shingles vaccine, COVID vaccine, and flu vaccine.    Reassurance provided about neck nodule - Discussed warning signs/symptoms for which she needs followup.      (Z13.220) Screening for hyperlipidemia  Comment:   Plan: REVIEW OF HEALTH MAINTENANCE PROTOCOL ORDERS,         Lipid panel reflex to direct LDL Fasting        adjust therapy/treatment based on results      (E66.01) morbid obesity (H)  Comment: was being seen by weight management  Plan: has been able to maintain weight loss.    (N18.30) Stage 3 chronic kidney disease, unspecified whether stage 3a or 3b CKD (H)  Comment:   Plan: Albumin Random Urine Quantitative with Creat         Ratio, REVIEW OF HEALTH MAINTENANCE PROTOCOL         ORDERS, BASIC METABOLIC PANEL        adjust therapy/treatment based on results  Discussed low dose lisinopril.  She is not inclined to take it but would consider if kidneys are showing additional protein.    (F33.42) Recurrent major depressive disorder, in full remission (H)  Comment: stable  Plan: continue to monitor    (Z23) Need for pneumococcal vaccine  Comment:   Plan: Pneumococcal 20 Valent Conjugate (PCV20)                  COUNSELING:  Reviewed preventive health counseling, as reflected in patient  "instructions    Estimated body mass index is 37.74 kg/m  as calculated from the following:    Height as of this encounter: 1.715 m (5' 7.5\").    Weight as of this encounter: 110.9 kg (244 lb 9.6 oz).    Weight management plan: Discussed healthy diet and exercise guidelines    She reports that she quit smoking about 34 years ago. Her smoking use included cigarettes. She started smoking about 50 years ago. She has a 15.00 pack-year smoking history. She has never used smokeless tobacco.      Appropriate preventive services were discussed with this patient, including applicable screening as appropriate for cardiovascular disease, diabetes, osteopenia/osteoporosis, and glaucoma.  As appropriate for age/gender, discussed screening for colorectal cancer, prostate cancer, breast cancer, and cervical cancer. Checklist reviewing preventive services available has been given to the patient.    Reviewed patients plan of care and provided an AVS. The Basic Care Plan (routine screening as documented in Health Maintenance) for Hanh meets the Care Plan requirement. This Care Plan has been established and reviewed with the Patient.    Counseling Resources:  ATP IV Guidelines  Pooled Cohorts Equation Calculator  Breast Cancer Risk Calculator  Breast Cancer: Medication to Reduce Risk  FRAX Risk Assessment  ICSI Preventive Guidelines  Dietary Guidelines for Americans, 2010  [employer]'s MyPlate  ASA Prophylaxis  Lung CA Screening    Angeli Messina MD  [facility]    Identified Health Risks:  Answers for HPI/ROS submitted by the patient on 9/13/2022  If you checked off any problems, how difficult have these problems made it for you to do your work, take care of things at home, or get along with other people?: Not difficult at all  PHQ9 TOTAL SCORE: 0      "

## 2022-09-13 NOTE — PATIENT INSTRUCTIONS
Patient Education     Urinary Incontinence, Female (Adult)   Urinary incontinence means loss of bladder control. This problem affects many women, especially as they get older. If you have incontinence, you may be embarrassed to ask for help. But know that this problem can be treated.   Types of Incontinence  There are different types of incontinence. Two of the main types are described here. You can have more than one type.     Stress incontinence. With this type, urine leaks when pressure (stress) is put on the bladder. This may happen when you cough, sneeze, or laugh. Stress incontinence most often occurs because the pelvic floor muscles that support the bladder and urethra are weak. This can happen after pregnancy and vaginal childbirth or a hysterectomy. It can also be due to excess body weight or hormone changes.    Urge incontinence (also called overactive bladder). With this type, a sudden urge to urinate is felt often. This may happen even though there may not be much urine in the bladder. The need to urinate often during the night is common. Urge incontinence most often occurs because of bladder spasms. This may be due to bladder irritation or infection. Damage to bladder nerves or pelvic muscles, constipation, and certain medicines can also lead to urge incontinence.  Treatment depends on the cause. Further evaluation is needed to find the type you have. This will likely include an exam and certain tests. Based on the results, you and your healthcare provider can then plan treatment. Until a diagnosis is made, the home care tips below can help ease symptoms.   Home care    Do pelvic floor muscle exercises, if they are prescribed. The pelvic floor muscles help support the bladder and urethra. Many women find that their symptoms improve when doing special exercises that strengthen these muscles. To do the exercises, contract the muscles you would use to stop your stream of urine. But do this when you re  not urinating. Hold for 10 seconds, then relax. Repeat 10 to 20 times in a row, at least 3 times a day. Your healthcare provider may give you other instructions for how to do the exercises and how often.    Keep a bladder diary. This helps track how often and how much you urinate over a set period of time. Bring this diary with you to your next visit with the provider. The information can help your provider learn more about your bladder problem.    Lose weight, if advised to by your provider. Extra weight puts pressure on the bladder. Your provider can help you create a weight-loss plan that s right for you. This may include exercising more and making certain diet changes.    Don't have foods and drinks that may irritate the bladder. These can include alcohol and caffeinated drinks.    Quit smoking. Smoking and other tobacco use can lead to a long-term (chronic) cough that strains the pelvic floor muscles. Smoking may also damage the bladder and urethra. Talk with your provider about treatments or methods you can use to quit smoking.    If drinking large amounts of fluid makes you have symptoms, you may be advised to limit your fluid intake. You may also be advised to drink most of your fluids during the day and to limit fluids at night.    If you re worried about urine leakage or accidents, you may wear absorbent pads to catch urine. Change the pads often. This helps reduce discomfort. It may also reduce the risk of skin or bladder infections.    Follow-up care  Follow up with your healthcare provider, or as directed. It may take some to find the right treatment for your problem. But healthy lifestyle changes can be made right away. These include such things as exercising on a regular basis, eating a healthy diet, losing weight (if needed), and quitting smoking. Your treatment plan may include special therapies or medicines. Certain procedures or surgery may also be options. Talk about any questions you have with  your provider.   When to seek medical advice  Call the healthcare provider right away if any of these occur:    Fever of 100.4 F (38 C) or higher, or as directed by your provider    Bladder pain or fullness    Belly swelling    Nausea or vomiting    Back pain    Weakness, dizziness, or fainting  Erinn last reviewed this educational content on 1/1/2020 2000-2021 [employer]. All rights reserved. This information is not intended as a substitute for professional medical care. Always follow your healthcare professional's instructions.

## 2022-09-14 LAB
ANION GAP SERPL CALCULATED.3IONS-SCNC: <1 MMOL/L (ref 3–14)
BUN SERPL-MCNC: 17 MG/DL (ref 7–30)
CALCIUM SERPL-MCNC: 10.4 MG/DL (ref 8.5–10.1)
CHLORIDE BLD-SCNC: 110 MMOL/L (ref 94–109)
CHOLEST SERPL-MCNC: 206 MG/DL
CO2 SERPL-SCNC: 30 MMOL/L (ref 20–32)
CREAT SERPL-MCNC: 1.07 MG/DL (ref 0.52–1.04)
CREAT UR-MCNC: 85 MG/DL
FASTING STATUS PATIENT QL REPORTED: NO
GFR SERPL CREATININE-BSD FRML MDRD: 56 ML/MIN/1.73M2
GLUCOSE BLD-MCNC: 93 MG/DL (ref 70–99)
HDLC SERPL-MCNC: 46 MG/DL
LDLC SERPL CALC-MCNC: 143 MG/DL
MICROALBUMIN UR-MCNC: <5 MG/L
MICROALBUMIN/CREAT UR: NORMAL MG/G{CREAT}
NONHDLC SERPL-MCNC: 160 MG/DL
POTASSIUM BLD-SCNC: 4.7 MMOL/L (ref 3.4–5.3)
SODIUM SERPL-SCNC: 139 MMOL/L (ref 133–144)
TRIGL SERPL-MCNC: 86 MG/DL

## 2022-09-22 ENCOUNTER — MYC MEDICAL ADVICE (OUTPATIENT)
Dept: FAMILY MEDICINE | Facility: CLINIC | Age: 67
End: 2022-09-22

## 2022-09-24 ENCOUNTER — TRANSFERRED RECORDS (OUTPATIENT)
Dept: HEALTH INFORMATION MANAGEMENT | Facility: CLINIC | Age: 67
End: 2022-09-24

## 2022-09-24 LAB — HEP C HIM: NORMAL

## 2022-09-27 NOTE — TELEPHONE ENCOUNTER
I have not received a response back from this patient, which I sent last week hoping to talk to her last Friday.    Can you reach out to see if she would like to see up a future phone visit?

## 2022-10-03 ENCOUNTER — TELEPHONE (OUTPATIENT)
Dept: FAMILY MEDICINE | Facility: CLINIC | Age: 67
End: 2022-10-03

## 2022-10-03 NOTE — TELEPHONE ENCOUNTER
RN received call from patient.    Patient returning call to clinic.  RN reviewed providers message regarding appointment to discuss kidney's, Lisinopril.    Patient interested in appointment.    RN scheduled patient for virtual visit.    Patient asking regarding Ionized calcium test.    Rn advised it was a blood test.  Patient will call to schedule lab appointment.    Devaughn Guerra RN, BSN, PHN  [facility]

## 2022-10-07 ENCOUNTER — VIRTUAL VISIT (OUTPATIENT)
Dept: FAMILY MEDICINE | Facility: CLINIC | Age: 67
End: 2022-10-07
Payer: COMMERCIAL

## 2022-10-07 DIAGNOSIS — E03.4 HYPOTHYROIDISM DUE TO ACQUIRED ATROPHY OF THYROID: ICD-10-CM

## 2022-10-07 DIAGNOSIS — F33.42 RECURRENT MAJOR DEPRESSIVE DISORDER, IN FULL REMISSION (H): ICD-10-CM

## 2022-10-07 DIAGNOSIS — N18.31 STAGE 3A CHRONIC KIDNEY DISEASE (H): Primary | ICD-10-CM

## 2022-10-07 PROCEDURE — 99213 OFFICE O/P EST LOW 20 MIN: CPT | Mod: 95 | Performed by: FAMILY MEDICINE

## 2022-10-07 RX ORDER — LISINOPRIL 2.5 MG/1
2.5 TABLET ORAL DAILY
Qty: 90 TABLET | Refills: 3 | Status: SHIPPED | OUTPATIENT
Start: 2022-10-07 | End: 2022-12-16

## 2022-10-07 RX ORDER — LEVOTHYROXINE SODIUM 100 UG/1
100 TABLET ORAL EVERY MORNING
Qty: 90 TABLET | Refills: 3 | Status: SHIPPED | OUTPATIENT
Start: 2022-10-07 | End: 2023-02-16

## 2022-10-07 NOTE — PROGRESS NOTES
Bry is a 66 year old who is being evaluated via a billable video visit.      How would you like to obtain your AVS? MyChart  If the video visit is dropped, the invitation should be resent by:   Will anyone else be joining your video visit? No          Assessment & Plan     (N18.31) Stage 3a chronic kidney disease (H)  (primary encounter diagnosis)  Comment: discussed risks and benefits of starting AceI  Plan: Basic metabolic panel  (Ca, Cl, CO2, Creat,         Gluc, K, Na, BUN), lisinopril (ZESTRIL) 2.5 MG         tablet        I discussed with the patient risks and benefits of the new medications prescribed including potential side effects.  The patient had opportunity to ask questions and is comfortable with and interested in medications as prescribed.  Recheck labs in 2-4 weeks    (F33.42) Recurrent major depressive disorder, in full remission (H)  Comment: stable  Plan: FLUoxetine (PROZAC) 20 MG capsule        Continue current medication      (E03.4) Hypothyroidism due to acquired atrophy of thyroid  Comment: asymptomatic   Plan: TSH with free T4 reflex, levothyroxine         (SYNTHROID/LEVOTHROID) 100 MCG tablet        Continue current medication  Due for lab recheck.  adjust therapy/treatment based on results                     Return in about 4 weeks (around 11/4/2022) for non-fasting lab visit.    Angeli Messina MD  [facility]   Bry is a 66 year old, presenting for the following health issues:  Chronic Kidney Disease     Would like to discuss recommendations about renal protective medication    History of Present Illness       CKD: She is not using over the counter pain medicine.     She eats 2-3 servings of fruits and vegetables daily.She consumes 1 sweetened beverage(s) daily.She exercises with enough effort to increase her heart rate 10 to 19 minutes per day.  She exercises with enough effort to increase her heart rate 3 or less days per week.   She is  taking medications regularly.               Review of Systems   Constitutional, HEENT, cardiovascular, pulmonary, gi and gu systems are negative, except as otherwise noted.      Objective           Vitals:  No vitals were obtained today due to virtual visit.    Physical Exam   GENERAL: Healthy, alert and no distress  EYES: Eyes grossly normal to inspection.  No discharge or erythema, or obvious scleral/conjunctival abnormalities.  RESP: No audible wheeze, cough, or visible cyanosis.  No visible retractions or increased work of breathing.    SKIN: Visible skin clear. No significant rash, abnormal pigmentation or lesions.  NEURO: Cranial nerves grossly intact.  Mentation and speech appropriate for age.  PSYCH: Mentation appears normal, affect normal/bright, judgement and insight intact, normal speech and appearance well-groomed.                Video-Visit Details    Video Start Time: 10:39 AM    Type of service:  Video Visit    Video End Time:10:52 AM    Originating Location (pt. Location): Home    Distant Location (provider location):  [facility]     Platform used for Video Visit: About[employer]Work

## 2022-11-07 ENCOUNTER — OFFICE VISIT (OUTPATIENT)
Dept: FAMILY MEDICINE | Facility: CLINIC | Age: 67
End: 2022-11-07
Payer: COMMERCIAL

## 2022-11-07 ENCOUNTER — LAB (OUTPATIENT)
Dept: LAB | Facility: CLINIC | Age: 67
End: 2022-11-07
Payer: COMMERCIAL

## 2022-11-07 VITALS
SYSTOLIC BLOOD PRESSURE: 96 MMHG | OXYGEN SATURATION: 96 % | HEIGHT: 68 IN | HEART RATE: 78 BPM | BODY MASS INDEX: 38.49 KG/M2 | RESPIRATION RATE: 12 BRPM | DIASTOLIC BLOOD PRESSURE: 62 MMHG | WEIGHT: 254 LBS | TEMPERATURE: 98.3 F

## 2022-11-07 DIAGNOSIS — N18.31 STAGE 3A CHRONIC KIDNEY DISEASE (H): ICD-10-CM

## 2022-11-07 DIAGNOSIS — R89.9 ABNORMAL LABORATORY TEST RESULT: ICD-10-CM

## 2022-11-07 DIAGNOSIS — R05.8 DRY COUGH: Primary | ICD-10-CM

## 2022-11-07 DIAGNOSIS — E03.4 HYPOTHYROIDISM DUE TO ACQUIRED ATROPHY OF THYROID: ICD-10-CM

## 2022-11-07 LAB — CA-I BLD-MCNC: 5.5 MG/DL (ref 4.4–5.2)

## 2022-11-07 PROCEDURE — 82330 ASSAY OF CALCIUM: CPT

## 2022-11-07 PROCEDURE — 36415 COLL VENOUS BLD VENIPUNCTURE: CPT

## 2022-11-07 PROCEDURE — 80048 BASIC METABOLIC PNL TOTAL CA: CPT

## 2022-11-07 PROCEDURE — 99214 OFFICE O/P EST MOD 30 MIN: CPT

## 2022-11-07 PROCEDURE — 84443 ASSAY THYROID STIM HORMONE: CPT

## 2022-11-07 RX ORDER — FLUTICASONE PROPIONATE 50 MCG
1 SPRAY, SUSPENSION (ML) NASAL DAILY
Qty: 9.9 ML | Refills: 1 | Status: SHIPPED | OUTPATIENT
Start: 2022-11-07 | End: 2022-12-16

## 2022-11-07 RX ORDER — CODEINE PHOSPHATE/GUAIFENESIN 10-100MG/5
5 LIQUID (ML) ORAL EVERY 4 HOURS PRN
Qty: 236 ML | Refills: 0 | Status: SHIPPED | OUTPATIENT
Start: 2022-11-07 | End: 2022-12-16

## 2022-11-07 ASSESSMENT — ENCOUNTER SYMPTOMS: COUGH: 1

## 2022-11-07 ASSESSMENT — PAIN SCALES - GENERAL: PAINLEVEL: NO PAIN (0)

## 2022-11-07 NOTE — PATIENT INSTRUCTIONS
Stop taking lisinopril  Start Flonase  Start codeine cough syrup if needed  - don't take with tessalon

## 2022-11-07 NOTE — PROGRESS NOTES
Assessment & Plan     Dry cough  Subacute. Symptoms and history consistent with adverse effect of ACE inhibitor. We discussed stopping lisinopril and starting an ARB. Patient declined starting an ARB and states the lisinopril was for prevention of kidney worsening rather than BP control. She would like to discuss any new medications with her PCP.  Patient has flight to [facility] this week and would like medication to prevent coughing on her long flight. Send Rx for Flonase and guaifenesin +codeine medication given even if pt stops lisinopril her cough will not likely resolve immediately.  - guaiFENesin-codeine (GUAIFENESIN AC) 100-10 MG/5ML syrup  Dispense: 236 mL; Refill: 0  - fluticasone (FLONASE) 50 MCG/ACT nasal spray  Dispense: 9.9 mL; Refill: 1    Stage 3a chronic kidney disease (H)  Chronic. Stable. Had recent lab checks but hasn't had hemoglobin checked, pending future lab draw for patient to schedule when able.   - Hemoglobin      Prescription drug management      PATIENT INSTRUCTIONS  Stop taking lisinopril  Start Flonase  Start codeine cough syrup if needed  - don't take with tessalon          No follow-ups on file.    ROSEMARY Palomino CNP  [facility]    Trev Londono is a 67 year old, presenting for the following health issues:  Cough      Cough    History of Present Illness       Reason for visit:  CoughSaschae consumes 1 sweetened beverage(s) daily.   She is taking medications regularly.     covid at home negative yesterday.     Acute Illness  Acute illness concerns: 1 week - persistent, not improving, worst at night.   Onset/Duration: cough  Symptoms:  Fever: No, chills or body aches.  Chills/Sweats: No  Headache (location?): No  Sinus Pressure: No  Conjunctivitis:  No  Ear Pain: no  Rhinorrhea: No, infrequently  Congestion: No  Sore Throat: No  Cough: YES-non-productive, sometimes phlegm comes out, coughing fit. Clear sputum.  Wheeze: No  Decreased Appetite: No  Nausea:  No  Vomiting: No  Diarrhea: No, constipation  Dysuria/Freq.: No, incontinence issues  Dysuria or Hematuria: No  Fatigue/Achiness: YES, fatigued, not feeling like herself.   Sick/Strep Exposure: No  Therapies tried and outcome: Nyquil, vicks rub, cough drops, black berry dipti, honey, honey tea, delsym, mucinex, benzonate capsules    Had covid in October. Did not have a cough with that, main symptoms were headache and fatigue. Hasn't been back to baseline activity level since.     Eating and drinking normally.   Has gained weight since covid.     Started low dose lisinopril around 10/7/22 for prevention of worsening kidney disease. Endorses that her cough is dry/tickle in her throat, non-productive and it is there constantly.           Review of Systems   Respiratory: Positive for cough.       Constitutional, HEENT, cardiovascular, pulmonary, gi and gu systems are negative, except as otherwise noted.      Objective    LMP  (LMP Unknown)   There is no height or weight on file to calculate BMI.  Physical Exam   GENERAL: healthy, alert and no distress  EYES: Eyes grossly normal to inspection, PERRL and conjunctivae and sclerae normal  HENT: ear canals and TM's normal, nose and mouth without ulcers or lesions  NECK: no adenopathy, no asymmetry, masses, or scars and thyroid normal to palpation  RESP: lungs clear to auscultation - no rales, rhonchi or wheezes  CV: regular rate and rhythm, normal S1 S2, no S3 or S4, no murmur, click or rub, no peripheral edema and peripheral pulses strong  MS: no gross musculoskeletal defects noted, no edema  SKIN: no suspicious lesions or rashes  NEURO: Normal strength and tone, mentation intact and speech normal    Results for orders placed or performed in visit on 11/07/22   TSH with free T4 reflex     Status: Normal   Result Value Ref Range    TSH 1.03 0.40 - 4.00 mU/L   Basic metabolic panel  (Ca, Cl, CO2, Creat, Gluc, K, Na, BUN)     Status: Abnormal   Result Value Ref Range     Sodium 141 133 - 144 mmol/L    Potassium 4.8 3.4 - 5.3 mmol/L    Chloride 107 94 - 109 mmol/L    Carbon Dioxide (CO2) 30 20 - 32 mmol/L    Anion Gap 4 3 - 14 mmol/L    Urea Nitrogen 15 7 - 30 mg/dL    Creatinine 1.25 (H) 0.52 - 1.04 mg/dL    Calcium 10.4 (H) 8.5 - 10.1 mg/dL    Glucose 109 (H) 70 - 99 mg/dL    GFR Estimate 46 (L) >60 mL/min/1.73m2   Ionized Calcium     Status: Abnormal   Result Value Ref Range    Calcium Ionized 5.5 (H) 4.4 - 5.2 mg/dL

## 2022-11-08 LAB
ANION GAP SERPL CALCULATED.3IONS-SCNC: 4 MMOL/L (ref 3–14)
BUN SERPL-MCNC: 15 MG/DL (ref 7–30)
CALCIUM SERPL-MCNC: 10.4 MG/DL (ref 8.5–10.1)
CHLORIDE BLD-SCNC: 107 MMOL/L (ref 94–109)
CO2 SERPL-SCNC: 30 MMOL/L (ref 20–32)
CREAT SERPL-MCNC: 1.25 MG/DL (ref 0.52–1.04)
GFR SERPL CREATININE-BSD FRML MDRD: 46 ML/MIN/1.73M2
GLUCOSE BLD-MCNC: 109 MG/DL (ref 70–99)
POTASSIUM BLD-SCNC: 4.8 MMOL/L (ref 3.4–5.3)
SODIUM SERPL-SCNC: 141 MMOL/L (ref 133–144)
TSH SERPL DL<=0.005 MIU/L-ACNC: 1.03 MU/L (ref 0.4–4)

## 2022-11-30 ENCOUNTER — LAB (OUTPATIENT)
Dept: LAB | Facility: CLINIC | Age: 67
End: 2022-11-30
Payer: COMMERCIAL

## 2022-11-30 ENCOUNTER — ONCOLOGY VISIT (OUTPATIENT)
Dept: ONCOLOGY | Facility: CLINIC | Age: 67
End: 2022-11-30
Attending: NURSE PRACTITIONER
Payer: COMMERCIAL

## 2022-11-30 VITALS
DIASTOLIC BLOOD PRESSURE: 75 MMHG | WEIGHT: 250 LBS | BODY MASS INDEX: 38.58 KG/M2 | SYSTOLIC BLOOD PRESSURE: 133 MMHG | OXYGEN SATURATION: 98 % | TEMPERATURE: 98.7 F | HEART RATE: 77 BPM

## 2022-11-30 DIAGNOSIS — N18.31 STAGE 3A CHRONIC KIDNEY DISEASE (H): ICD-10-CM

## 2022-11-30 DIAGNOSIS — C55 ENDOMETRIOID ADENOCARCINOMA OF UTERUS (H): Primary | ICD-10-CM

## 2022-11-30 DIAGNOSIS — E83.52 SERUM CALCIUM ELEVATED: ICD-10-CM

## 2022-11-30 LAB
HGB BLD-MCNC: 12.3 G/DL (ref 11.7–15.7)
PHOSPHATE SERPL-MCNC: 3 MG/DL (ref 2.5–4.5)
PTH-INTACT SERPL-MCNC: 86 PG/ML (ref 15–65)

## 2022-11-30 PROCEDURE — 36415 COLL VENOUS BLD VENIPUNCTURE: CPT

## 2022-11-30 PROCEDURE — G0463 HOSPITAL OUTPT CLINIC VISIT: HCPCS

## 2022-11-30 PROCEDURE — 82306 VITAMIN D 25 HYDROXY: CPT

## 2022-11-30 PROCEDURE — 85018 HEMOGLOBIN: CPT

## 2022-11-30 PROCEDURE — 84100 ASSAY OF PHOSPHORUS: CPT

## 2022-11-30 PROCEDURE — 99214 OFFICE O/P EST MOD 30 MIN: CPT | Performed by: NURSE PRACTITIONER

## 2022-11-30 PROCEDURE — 83970 ASSAY OF PARATHORMONE: CPT

## 2022-11-30 ASSESSMENT — PAIN SCALES - GENERAL: PAINLEVEL: NO PAIN (0)

## 2022-11-30 NOTE — LETTER
2022         RE: Hanh Villalba   [address]  [place] [address]        Dear Colleague,    Thank you for referring your patient, Hanh Villalba, to the [facility]. Please see a copy of my visit note below.                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  She completed brachytherapy 2019.  She is here today for a surveillance visit.      Oncology history:   2019 D&C with pathology showing Grade 1 endometrial cancer, loss of MLH1 and PMS2, hypermethylation of MLH1 promotor positive  10/4/2019: Robotic-assisted Total laparoscopic hysterectomy, bilateral salpingo-oophorectomy, sentinel lymph node dissection, vaginal laceration repair: Stage 1B FIGO Grade 2, DOI 17/21mm (81%), LVSI+, cytology negative, tumor 3.4cm; MSI-H (MLH1 promoter melthylation)  2019: Vaginal brachytherapy; 30Gy in 5 fx        Today she comes to clinic feeling well and denies any concerns. She denies any vaginal bleeding, no changes in her bowel or bladder habits, no nausea/emesis, no lower extremity edema, and no difficulties eating or sleeping. She denies any abdominal discomfort/bloating, no fevers or chills, and no chest pain or shortness of breath. She is due for her mammogram, saw her PCP  for her annual and is following up on CKD; she was started on Lisinopril but then developed a cough so has stopped; she has been taking cough drops regularly and then developed some thrush on her tongue from the sugar so she has been doing home regimen and this has been helping. Her DEXA and colon cancer screening are due in . She has been using her dilator about once a month for about 4 minutes. She recently returned from [place] diving trip.              Review of  Systems:    Systemic           no weight changes; no fever; no chills; no night sweats; no appetite changes  Skin           no rashes, or lesions  Eye           no irritation; no changes in vision  Shila-Laryngeal           no dysphagia; no hoarseness   Pulmonary    no cough; no shortness of breath  Cardiovascular    no chest pain; no palpitations  Gastrointestinal    no diarrhea; no constipation; no abdominal pain; no changes in bowel habits; no blood in stool  Genitourinary   no urinary frequency; no urinary urgency; no dysuria; no pain; no abnormal vaginal discharge; no abnormal vaginal bleeding  Breast    no breast discharge; no breast changes; no breast pain  Musculoskeletal    no myalgias; no arthralgias; no back pain  Psychiatric           no depressed mood; no anxiety    Hematologic              no tender lymph nodes; no noticeable swellings or lumps   Endocrine    no hot flashes; no heat/cold intolerance         Neurological   no tremor; no numbness and tingling; no headaches; no difficulty sleeping      Past Medical History:    Past Medical History:   Diagnosis Date     Cancer (H) August 2019    Uterus removed     Depression      Depressive disorder 1999    taking prosac     Hypothyroidism      Overweight          Past Surgical History:    Past Surgical History:   Procedure Laterality Date     ABDOMEN SURGERY  Oct 2019    Robotic Hysterectomy     BIOPSY  early 2000's    left breast - non event     COLONOSCOPY  within last 6 years ?    OK     COLONOSCOPY N/A 7/26/2021    Procedure: COLONOSCOPY, WITH POLYPECTOMY;  Surgeon: Ryan Butterfield MD;  Location: [facility] [place] [place]     DAVINCI HYSTERECTOMY TOTAL, BILATERAL SALPINGO-OOPHORECTOMY, NODE DISSECTION, COMBINED N/A 10/4/2019    Procedure: Robot-assisted total laparoscopic hysterectomy, Bilateral salpingectomy and Right Oophorectomy, Lysis of adhesion, Cervical Indocyanine Green injection, Bilateral sentinel lymph node dissection, Repair of vaginal laceration.;   Surgeon: Perez Reddy MD;  Location: [facility] OR     DILATION AND CURETTAGE N/A 9/12/2019    Procedure: DILATION AND CURETTAGE, UTERUS;  Surgeon: Navdeep Barajas MD;  Location: [facility] OR     OPERATIVE HYSTEROSCOPY WITH MORCELLATOR N/A 9/12/2019    Procedure: HYSTEROSCOPY WITH MORCELLATOR (MYOSURE);  Surgeon: Navdeep Barajas MD;  Location: [facility] OR     SALPINGO OOPHORECTOMY,R/L/SHANDA Left 1980s    Left ovarian with endometriois     US BREAST BIOPSY LT Left          Health Maintenance Due   Topic Date Due     HEMOGLOBIN  12/20/2022       Current Medications:     Current Outpatient Medications   Medication Sig Dispense Refill     Apple Cider Vinegar 600 MG CAPS Take 1 capsule by mouth daily        CALCIUM PO Take 20 mg by mouth daily        cholecalciferol (VITAMIN D3) 5000 units TABS tablet Take by mouth daily       clobetasol (TEMOVATE) 0.05 % external ointment Apply sparingly then once or twice a week as needed 45 g 0     Cyanocobalamin 1500 MCG TBDP Take 1 tablet by mouth daily        FLUoxetine (PROZAC) 20 MG capsule Take 1 capsule (20 mg) by mouth every 48 hours AND 2 capsules (40 mg) every 48 hours. 135 capsule 1     fluticasone (FLONASE) 50 MCG/ACT nasal spray Spray 1 spray into both nostrils daily 9.9 mL 1     guaiFENesin-codeine (GUAIFENESIN AC) 100-10 MG/5ML syrup Take 5 mLs by mouth every 4 hours as needed for cough 236 mL 0     Lactobacillus (ACIDOPHILUS PO) Take 1 tablet by mouth daily        levothyroxine (SYNTHROID/LEVOTHROID) 100 MCG tablet Take 1 tablet (100 mcg) by mouth every morning 90 tablet 3     lisinopril (ZESTRIL) 2.5 MG tablet Take 1 tablet (2.5 mg) by mouth daily 90 tablet 3     Lutein 20 MG CAPS        Omega 3-6-9 Fatty Acids (OMEGA-3-6-9 PO) Take 1 capsule by mouth daily            Allergies:      No Known Allergies     Social History:     Social History     Tobacco Use     Smoking status: Former     Packs/day: 1.00     Years: 15.00     Pack years: 15.00     Types: Cigarettes     Start  date: 1972     Quit date: 1987     Years since quittin.0     Smokeless tobacco: Never   Substance Use Topics     Alcohol use: Yes     Comment: a beer here and there - never more than 2 -  2 times/week       History   Drug Use Unknown         Family History:     The patient's family history is notable for:    Family History   Problem Relation Age of Onset     Acute myelogenous leukemia Mother      Coronary Artery Disease Mother         bypass surgery      Osteoporosis Mother      Obesity Mother      Parkinsonism Father      Prostate Cancer Father         Diagnosed late in life in his 80's     Obesity Sister      Heart Disease Sister      Coronary Artery Disease Sister         heart attacK in 2019     Uterine Cancer Maternal Grandmother 40        Uterine versus cervical     No Known Problems Sister      Leukemia Brother      Leukemia Nephew      Coronary Artery Disease Brother         Carotid Artery Surgery     Obesity Brother      Substance Abuse Brother         alcohol         Physical Exam:     /75 (BP Location: Right arm, Patient Position: Sitting, Cuff Size: Adult Regular)   Pulse 77   Temp 98.7  F (37.1  C) (Oral)   Wt 113.4 kg (250 lb)   LMP  (LMP Unknown)   SpO2 98%   BMI 38.58 kg/m    Body mass index is 38.58 kg/m .    General Appearance: healthy and alert, no distress     HEENT: no thyromegaly, no palpable nodules or masses        Cardiovascular: regular rate and rhythm, no gallops, rubs or murmurs     Respiratory: lungs clear, no rales, rhonchi or wheezes, normal diaphragmatic excursion    Musculoskeletal: extremities non tender and without edema    Skin: no lesions or rashes     Neurological: normal gait, no gross defects     Psychiatric: appropriate mood and affect                               Hematological: normal cervical, supraclavicular and inguinal lymph nodes     Gastrointestinal:       abdomen soft, non-tender, non-distended, no organomegaly or  masses    Genitourinary: External genitalia and urethral meatus appears atrophic with bilateral chaffing.  Vagina is smooth without nodularity or masses.  Cervix surgically absent. Bimanual exam reveal no masses, nodularity or fullness.  Recto-vaginal exam confirms these findings.      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  She completed brachytherapy 12/2019.  She is here today for a surveillance visit.     30 minutes spent on the date of the encounter doing chart review, history and exam, documentation and further activities as noted above      Plan:     1.)        Patient to RTC in 6 months for her next surveillance visit. Reviewed recommendations from [facility] not to perform surveillance pap smears in women diagnosed with endometrial cancer as this does not improve detection of local recurrence. Reviewed signs and symptoms for when she should contact the clinic or seek additional care. Patient to contact the clinic with any questions or concerns in the interim.  Answered all of her questions to the best of my ability. Discussed using Aquaphor ointment as a barrier to protect her skin from her pantiliner.      2.) Genetic risk factors were assessed and she had a loss of MLH1 and PMS2; hypermethylation of MLH1 promotor positive. MLH1 promoter methylation is typically associated with sporadic microsatellite unstable tumors of the colon/rectum or endometrium.    3.) Labs and/or tests ordered include:  None.     4.) Health maintenance issues addressed today include annual health maintenance and non-gynecologic issues with PCP.    ROSEMARY Lance, WHNP-BC, ANP-BC  Women's Health Nurse Practitioner  Adult Nurse Practitioner  Division of Gynecologic Oncology      CC  Patient Care Team:  Angeli Nolan MD as PCP - General (Family Practice)  Jer Tapia MD as Assigned Endocrinology Provider  Bloch, Lauren  IDA Young as Assigned MTM Pharmacist

## 2022-11-30 NOTE — NURSING NOTE
"Oncology Rooming Note    November 30, 2022 9:52 AM   Hanh Villalba is a 67 year old female who presents for:    Chief Complaint   Patient presents with     Oncology Clinic Visit     Endometrioid adenocarcinoma      Initial Vitals: /75 (BP Location: Right arm, Patient Position: Sitting, Cuff Size: Adult Regular)   Pulse 77   Temp 98.7  F (37.1  C) (Oral)   Wt 113.4 kg (250 lb)   LMP  (LMP Unknown)   SpO2 98%   BMI 38.58 kg/m   Estimated body mass index is 38.58 kg/m  as calculated from the following:    Height as of 11/7/22: 1.715 m (5' 7.5\").    Weight as of this encounter: 113.4 kg (250 lb). Body surface area is 2.32 meters squared.  No Pain (0) Comment: Data Unavailable   No LMP recorded (lmp unknown). Patient has had a hysterectomy.  Allergies reviewed: Yes  Medications reviewed: Yes    Medications: Medication refills not needed today.  Pharmacy name entered into [facility]: [id number] [place], [place] - [address]    Clinical concerns: none.       Rajendra Mccarthy"

## 2022-11-30 NOTE — PROGRESS NOTES
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  She completed brachytherapy 2019.  She is here today for a surveillance visit.      Oncology history:   2019 D&C with pathology showing Grade 1 endometrial cancer, loss of MLH1 and PMS2, hypermethylation of MLH1 promotor positive  10/4/2019: Robotic-assisted Total laparoscopic hysterectomy, bilateral salpingo-oophorectomy, sentinel lymph node dissection, vaginal laceration repair: Stage 1B FIGO Grade 2, DOI 17/21mm (81%), LVSI+, cytology negative, tumor 3.4cm; MSI-H (MLH1 promoter melthylation)  2019: Vaginal brachytherapy; 30Gy in 5 fx        Today she comes to clinic feeling well and denies any concerns. She denies any vaginal bleeding, no changes in her bowel or bladder habits, no nausea/emesis, no lower extremity edema, and no difficulties eating or sleeping. She denies any abdominal discomfort/bloating, no fevers or chills, and no chest pain or shortness of breath. She is due for her mammogram, saw her PCP  for her annual and is following up on CKD; she was started on Lisinopril but then developed a cough so has stopped; she has been taking cough drops regularly and then developed some thrush on her tongue from the sugar so she has been doing home regimen and this has been helping. Her DEXA and colon cancer screening are due in . She has been using her dilator about once a month for about 4 minutes. She recently returned from [place] diving trip.              Review of Systems:    Systemic           no weight changes; no fever; no chills; no night sweats; no appetite changes  Skin           no rashes, or lesions  Eye           no irritation; no changes in vision  Shila-Laryngeal           no dysphagia; no hoarseness   Pulmonary    no cough; no  shortness of breath  Cardiovascular    no chest pain; no palpitations  Gastrointestinal    no diarrhea; no constipation; no abdominal pain; no changes in bowel habits; no blood in stool  Genitourinary   no urinary frequency; no urinary urgency; no dysuria; no pain; no abnormal vaginal discharge; no abnormal vaginal bleeding  Breast    no breast discharge; no breast changes; no breast pain  Musculoskeletal    no myalgias; no arthralgias; no back pain  Psychiatric           no depressed mood; no anxiety    Hematologic              no tender lymph nodes; no noticeable swellings or lumps   Endocrine    no hot flashes; no heat/cold intolerance         Neurological   no tremor; no numbness and tingling; no headaches; no difficulty sleeping      Past Medical History:    Past Medical History:   Diagnosis Date     Cancer (H) August 2019    Uterus removed     Depression      Depressive disorder 1999    taking prosac     Hypothyroidism      Overweight          Past Surgical History:    Past Surgical History:   Procedure Laterality Date     ABDOMEN SURGERY  Oct 2019    Robotic Hysterectomy     BIOPSY  early 2000's    left breast - non event     COLONOSCOPY  within last 6 years ?    OK     COLONOSCOPY N/A 7/26/2021    Procedure: COLONOSCOPY, WITH POLYPECTOMY;  Surgeon: Ryan Butterfield MD;  [place] [facility] [place]     DAVINCI HYSTERECTOMY TOTAL, BILATERAL SALPINGO-OOPHORECTOMY, NODE DISSECTION, COMBINED N/A 10/4/2019    Procedure: Robot-assisted total laparoscopic hysterectomy, Bilateral salpingectomy and Right Oophorectomy, Lysis of adhesion, Cervical Indocyanine Green injection, Bilateral sentinel lymph node dissection, Repair of vaginal laceration.;  Surgeon: Perez Reddy MD;  Location: [facility] OR     DILATION AND CURETTAGE N/A 9/12/2019    Procedure: DILATION AND CURETTAGE, UTERUS;  Surgeon: Navdeep Barajas MD;  Location: [facility] OR     OPERATIVE HYSTEROSCOPY WITH MORCELLATOR N/A 9/12/2019    Procedure: HYSTEROSCOPY WITH  MORCELLATOR (MYOSURE);  Surgeon: Navdeep Barajas MD;  Location: [facility] OR     SALPINGO OOPHORECTOMY,R/L/SHANDA Left 1980s    Left ovarian with endometriois     US BREAST BIOPSY LT Left          Health Maintenance Due   Topic Date Due     HEMOGLOBIN  2022       Current Medications:     Current Outpatient Medications   Medication Sig Dispense Refill     Apple Cider Vinegar 600 MG CAPS Take 1 capsule by mouth daily        CALCIUM PO Take 20 mg by mouth daily        cholecalciferol (VITAMIN D3) 5000 units TABS tablet Take by mouth daily       clobetasol (TEMOVATE) 0.05 % external ointment Apply sparingly then once or twice a week as needed 45 g 0     Cyanocobalamin 1500 MCG TBDP Take 1 tablet by mouth daily        FLUoxetine (PROZAC) 20 MG capsule Take 1 capsule (20 mg) by mouth every 48 hours AND 2 capsules (40 mg) every 48 hours. 135 capsule 1     fluticasone (FLONASE) 50 MCG/ACT nasal spray Spray 1 spray into both nostrils daily 9.9 mL 1     guaiFENesin-codeine (GUAIFENESIN AC) 100-10 MG/5ML syrup Take 5 mLs by mouth every 4 hours as needed for cough 236 mL 0     Lactobacillus (ACIDOPHILUS PO) Take 1 tablet by mouth daily        levothyroxine (SYNTHROID/LEVOTHROID) 100 MCG tablet Take 1 tablet (100 mcg) by mouth every morning 90 tablet 3     lisinopril (ZESTRIL) 2.5 MG tablet Take 1 tablet (2.5 mg) by mouth daily 90 tablet 3     Lutein 20 MG CAPS        Omega 3-6-9 Fatty Acids (OMEGA-3-6-9 PO) Take 1 capsule by mouth daily            Allergies:      No Known Allergies     Social History:     Social History     Tobacco Use     Smoking status: Former     Packs/day: 1.00     Years: 15.00     Pack years: 15.00     Types: Cigarettes     Start date: 1972     Quit date: 1987     Years since quittin.0     Smokeless tobacco: Never   Substance Use Topics     Alcohol use: Yes     Comment: a beer here and there - never more than 2 -  2 times/week       History   Drug Use Unknown         Family History:      The patient's family history is notable for:    Family History   Problem Relation Age of Onset     Acute myelogenous leukemia Mother      Coronary Artery Disease Mother         bypass surgery 1989     Osteoporosis Mother      Obesity Mother      Parkinsonism Father      Prostate Cancer Father         Diagnosed late in life in his 80's     Obesity Sister      Heart Disease Sister      Coronary Artery Disease Sister         heart attacK in June of 2019     Uterine Cancer Maternal Grandmother 40        Uterine versus cervical     No Known Problems Sister      Leukemia Brother      Leukemia Nephew      Coronary Artery Disease Brother         Carotid Artery Surgery     Obesity Brother      Substance Abuse Brother         alcohol         Physical Exam:     /75 (BP Location: Right arm, Patient Position: Sitting, Cuff Size: Adult Regular)   Pulse 77   Temp 98.7  F (37.1  C) (Oral)   Wt 113.4 kg (250 lb)   LMP  (LMP Unknown)   SpO2 98%   BMI 38.58 kg/m    Body mass index is 38.58 kg/m .    General Appearance: healthy and alert, no distress     HEENT: no thyromegaly, no palpable nodules or masses        Cardiovascular: regular rate and rhythm, no gallops, rubs or murmurs     Respiratory: lungs clear, no rales, rhonchi or wheezes, normal diaphragmatic excursion    Musculoskeletal: extremities non tender and without edema    Skin: no lesions or rashes     Neurological: normal gait, no gross defects     Psychiatric: appropriate mood and affect                               Hematological: normal cervical, supraclavicular and inguinal lymph nodes     Gastrointestinal:       abdomen soft, non-tender, non-distended, no organomegaly or masses    Genitourinary: External genitalia and urethral meatus appears atrophic with bilateral chaffing.  Vagina is smooth without nodularity or masses.  Cervix surgically absent. Bimanual exam reveal no masses, nodularity or fullness.  Recto-vaginal exam confirms these  findings.      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  She completed brachytherapy 12/2019.  She is here today for a surveillance visit.     30 minutes spent on the date of the encounter doing chart review, history and exam, documentation and further activities as noted above      Plan:     1.)        Patient to RTC in 6 months for her next surveillance visit. Reviewed recommendations from [facility] not to perform surveillance pap smears in women diagnosed with endometrial cancer as this does not improve detection of local recurrence. Reviewed signs and symptoms for when she should contact the clinic or seek additional care. Patient to contact the clinic with any questions or concerns in the interim.  Answered all of her questions to the best of my ability. Discussed using Aquaphor ointment as a barrier to protect her skin from her pantiliner.      2.) Genetic risk factors were assessed and she had a loss of MLH1 and PMS2; hypermethylation of MLH1 promotor positive. MLH1 promoter methylation is typically associated with sporadic microsatellite unstable tumors of the colon/rectum or endometrium.    3.) Labs and/or tests ordered include:  None.     4.) Health maintenance issues addressed today include annual health maintenance and non-gynecologic issues with PCP.    ROSEMARY Lance, WHNP-BC, ANP-BC  Women's Health Nurse Practitioner  Adult Nurse Practitioner  Division of Gynecologic Oncology          CC  Patient Care Team:  Angeli Nolan MD as PCP - General (Family Practice)  Beatrice Reyes APRN CNP as Assigned Cancer Care Provider  Angeli Nolan MD as Assigned PCP  Jer Tapia MD as Assigned Endocrinology Provider  Bloch, Lauren Turner, [facility] as Assigned MTM Pharmacist  SELF, REFERRED

## 2022-12-02 LAB — DEPRECATED CALCIDIOL+CALCIFEROL SERPL-MC: 58 UG/L (ref 20–75)

## 2022-12-06 ENCOUNTER — TRANSFERRED RECORDS (OUTPATIENT)
Dept: HEALTH INFORMATION MANAGEMENT | Facility: CLINIC | Age: 67
End: 2022-12-06

## 2022-12-07 ENCOUNTER — E-CONSULT (OUTPATIENT)
Dept: ENDOCRINOLOGY | Facility: CLINIC | Age: 67
End: 2022-12-07
Payer: COMMERCIAL

## 2022-12-07 PROCEDURE — 99451 NTRPROF PH1/NTRNET/EHR 5/>: CPT

## 2022-12-08 ENCOUNTER — MYC MEDICAL ADVICE (OUTPATIENT)
Dept: FAMILY MEDICINE | Facility: CLINIC | Age: 67
End: 2022-12-08

## 2022-12-08 DIAGNOSIS — E83.52 SERUM CALCIUM ELEVATED: Primary | ICD-10-CM

## 2022-12-08 DIAGNOSIS — Z78.0 ASYMPTOMATIC POSTMENOPAUSAL STATUS: ICD-10-CM

## 2022-12-08 NOTE — PROGRESS NOTES
2022     E-Consult has been accepted.    Interprofessional consultation requested by:  Angeli Nolan MD      Clinical Question/Purpose: MY CLINICAL QUESTION IS: please assist in advising additional work up (or if consultation is needed) regarding elevated calcium and PTH    Patient assessment and information reviewed:   Hypothyroidism, uterine cancer,   No history of kidney stones     19 DXA : lowest T-score -0.7 right femoral neck  19 Ca 10.4, creatinine 1.04  22 Ca 10.4,   22 Ca 10.4, iCa 5.5, creatinine 1.25, eGFR 46  22 PTH 86, 25OHD 58, phosphorus 3.0    Recommendations:   DXA including wrist on the same machine as was used 19  24 hour urine calcium and creatinine along with serum calcium and creatinine to determine calcium creatinine clearance ratio.  Ratio < 0.01 is more suggestive of FHH and > 0.02 more suggestive of primary hyperparathyroidism.  [email or web address]    IF the ratio supports the diagnosis of primary hyperparathyroidism you could then do parathyroid US to try to localize abnormal parathyroid in anticipation of surgery.    Indications for surgical treatment of hyperparathyroidism include the followin.  Calcium > 1 mg/dl over normal  2.  Kidney stones  3.  Osteoporosis at any site  4.  Hypercalciuria  > 250 mg/day in women   5. Declining renal function; eGFR < 60 ml/min- -- she has this     The recommendations provided in this E-Consult are based on a review of clinical data pertinent to the clinical question presented, without a review of the patient's complete medical record or, the benefit of a comprehensive in-person or virtual patient evaluation. This consultation should not replace the clinical judgement and evaluation of the provider ordering this E-Consult. Any new clinical issues, or changes in patient status since the filing of this E-Consult will need to be taken into account when assessing these recommendations. Please  contact me if you have further questions.    My total time spent reviewing clinical information and formulating assessment was 10 minutes.        Sweta Pedroza MD

## 2022-12-10 ENCOUNTER — ANCILLARY PROCEDURE (OUTPATIENT)
Dept: CT IMAGING | Facility: CLINIC | Age: 67
End: 2022-12-10
Attending: INTERNAL MEDICINE
Payer: COMMERCIAL

## 2022-12-10 DIAGNOSIS — R05.3 CHRONIC COUGH: ICD-10-CM

## 2022-12-10 PROCEDURE — 71250 CT THORAX DX C-: CPT | Performed by: RADIOLOGY

## 2022-12-12 ENCOUNTER — LAB (OUTPATIENT)
Dept: LAB | Facility: CLINIC | Age: 67
End: 2022-12-12
Payer: COMMERCIAL

## 2022-12-12 DIAGNOSIS — E83.52 SERUM CALCIUM ELEVATED: ICD-10-CM

## 2022-12-12 LAB
ANION GAP SERPL CALCULATED.3IONS-SCNC: 9 MMOL/L (ref 7–15)
BUN SERPL-MCNC: 15.6 MG/DL (ref 8–23)
CALCIUM SERPL-MCNC: 11.1 MG/DL (ref 8.8–10.2)
CHLORIDE SERPL-SCNC: 105 MMOL/L (ref 98–107)
CREAT SERPL-MCNC: 1.1 MG/DL (ref 0.51–0.95)
DEPRECATED HCO3 PLAS-SCNC: 26 MMOL/L (ref 22–29)
GFR SERPL CREATININE-BSD FRML MDRD: 54 ML/MIN/1.73M2
GLUCOSE SERPL-MCNC: 97 MG/DL (ref 70–99)
POTASSIUM SERPL-SCNC: 4.5 MMOL/L (ref 3.4–5.3)
RADIOLOGIST FLAGS: ABNORMAL
SODIUM SERPL-SCNC: 140 MMOL/L (ref 136–145)

## 2022-12-12 PROCEDURE — 81050 URINALYSIS VOLUME MEASURE: CPT

## 2022-12-12 PROCEDURE — 82570 ASSAY OF URINE CREATININE: CPT

## 2022-12-12 PROCEDURE — 80048 BASIC METABOLIC PNL TOTAL CA: CPT

## 2022-12-12 PROCEDURE — 36415 COLL VENOUS BLD VENIPUNCTURE: CPT

## 2022-12-12 PROCEDURE — 82340 ASSAY OF CALCIUM IN URINE: CPT

## 2022-12-13 ENCOUNTER — APPOINTMENT (OUTPATIENT)
Dept: LAB | Facility: CLINIC | Age: 67
End: 2022-12-13
Payer: COMMERCIAL

## 2022-12-13 ENCOUNTER — TELEPHONE (OUTPATIENT)
Dept: NURSING | Facility: CLINIC | Age: 67
End: 2022-12-13

## 2022-12-13 LAB
CALCIUM 24H UR-MRATE: 0.22 G/SPEC (ref 0.1–0.3)
CALCIUM UR-MCNC: 6.5 MG/DL
COLLECT DURATION TIME UR: 24 H
COLLECT DURATION TIME UR: 24 H
CREAT 24H UR-MRATE: 1.63 G/(24.H) (ref 0.72–1.51)
CREAT UR-MCNC: 48.8 MG/DL
SPECIMEN VOL UR: 3350 ML
SPECIMEN VOL UR: 3350 ML

## 2022-12-13 NOTE — TELEPHONE ENCOUNTER
Patient calling to report that she is doing a 24 hour urine collection, that ends today around 4:30. Feels like she won't have enough room in her jug.    Outbound call to [facility] and patient instructed to go to clinic and  another jug. Message relayed to patient and she will go now.    Did not triage.    Margo Berrios RN on 12/13/2022 at 8:57 AM

## 2022-12-16 ENCOUNTER — VIRTUAL VISIT (OUTPATIENT)
Dept: FAMILY MEDICINE | Facility: CLINIC | Age: 67
End: 2022-12-16
Payer: COMMERCIAL

## 2022-12-16 DIAGNOSIS — R05.9 COUGH, UNSPECIFIED TYPE: ICD-10-CM

## 2022-12-16 DIAGNOSIS — E83.52 SERUM CALCIUM ELEVATED: Primary | ICD-10-CM

## 2022-12-16 PROCEDURE — 99213 OFFICE O/P EST LOW 20 MIN: CPT | Mod: GT | Performed by: FAMILY MEDICINE

## 2022-12-16 NOTE — PROGRESS NOTES
Bry is a 67 year old who is being evaluated via a billable video visit.      How would you like to obtain your AVS? MyChart  If the video visit is dropped, the invitation should be resent by: Send to e-mail at: [email or web address]  Will anyone else be joining your video visit? Abigail - partner           Assessment & Plan   (E83.52) Serum calcium elevated  (primary encounter diagnosis)  Comment: completing work up, awaiting DEXA  Plan: will contact with DEXA results.  Discussed next steps could include endocrine referral for discussion about parathyroid removal.    (R05.9) Cough, unspecified type  Comment: improving with steroid inhaler  Plan: likely related to COVID.  Working with pulmologist.             No follow-ups on file.    Angeli Messina MD  [facility]   Bry is a 67 year old accompanied by her partner, presenting for the following health issues:  Thyroid Problem (Para thyroid )  Answers for HPI/ROS submitted by the patient on 12/16/2022  Do you take any over the counter pain medicine?  : No  How many servings of fruits and vegetables do you eat daily?: 2-3  On average, how many sweetened beverages do you drink each day (Examples: soda, juice, sweet tea, etc.  Do NOT count diet or artificially sweetened beverages)?: 2  How many minutes a day do you exercise enough to make your heart beat faster?: 20 to 29  How many days a week do you exercise enough to make your heart beat faster?: 3 or less  How many days per week do you miss taking your medication?: 0        History of Present Illness       CKD: She is not using over the counter pain medicine.     She eats 2-3 servings of fruits and vegetables daily.She consumes 2 sweetened beverage(s) daily.She exercises with enough effort to increase her heart rate 20 to 29 minutes per day.  She exercises with enough effort to increase her heart rate 3 or less days per week.   She is taking medications regularly.        Hypothyroidism Follow-up      Since last visit, patient describes the following symptoms: Weight stable, no hair loss, no skin changes, no constipation, no loose stools, dry skin, fatigue and hair loss    Cough - seeing pulmonologist 12/5/22  Did have COVID 10/14/22  And that's when the cough started.  Now on inhaler which is helping greatly.      Review of Systems   Constitutional, HEENT, cardiovascular, pulmonary, gi and gu systems are negative, except as otherwise noted.      Objective           Vitals:  No vitals were obtained today due to virtual visit.    Physical Exam   GENERAL: Healthy, alert and no distress  EYES: Eyes grossly normal to inspection.  No discharge or erythema, or obvious scleral/conjunctival abnormalities.  RESP: No audible wheeze, cough, or visible cyanosis.  No visible retractions or increased work of breathing.    SKIN: Visible skin clear. No significant rash, abnormal pigmentation or lesions.  NEURO: Cranial nerves grossly intact.  Mentation and speech appropriate for age.  PSYCH: Mentation appears normal, affect normal/bright, judgement and insight intact, normal speech and appearance well-groomed.    Lab results reviewed            Video-Visit Details    Video Start Time: 10:15 AM    Type of service:  Video Visit    Video End Time:10:34 AM    Originating Location (pt. Location): Home        Distant Location (provider location):  On-site    Platform used for Video Visit: Anaid

## 2022-12-23 ENCOUNTER — ANCILLARY PROCEDURE (OUTPATIENT)
Dept: MAMMOGRAPHY | Facility: CLINIC | Age: 67
End: 2022-12-23
Attending: FAMILY MEDICINE
Payer: COMMERCIAL

## 2022-12-23 ENCOUNTER — ANCILLARY PROCEDURE (OUTPATIENT)
Dept: BONE DENSITY | Facility: CLINIC | Age: 67
End: 2022-12-23
Attending: FAMILY MEDICINE
Payer: COMMERCIAL

## 2022-12-23 DIAGNOSIS — Z12.31 VISIT FOR SCREENING MAMMOGRAM: ICD-10-CM

## 2022-12-23 DIAGNOSIS — E83.52 SERUM CALCIUM ELEVATED: ICD-10-CM

## 2022-12-23 DIAGNOSIS — Z78.0 ASYMPTOMATIC POSTMENOPAUSAL STATUS: ICD-10-CM

## 2022-12-23 PROCEDURE — 77080 DXA BONE DENSITY AXIAL: CPT | Mod: XU | Performed by: INTERNAL MEDICINE

## 2022-12-23 PROCEDURE — 77081 DXA BONE DENSITY APPENDICULR: CPT | Performed by: INTERNAL MEDICINE

## 2022-12-23 PROCEDURE — 77067 SCR MAMMO BI INCL CAD: CPT | Mod: TC | Performed by: RADIOLOGY

## 2023-02-16 ENCOUNTER — TELEPHONE (OUTPATIENT)
Dept: FAMILY MEDICINE | Facility: CLINIC | Age: 68
End: 2023-02-16
Payer: COMMERCIAL

## 2023-02-16 DIAGNOSIS — F33.42 RECURRENT MAJOR DEPRESSIVE DISORDER, IN FULL REMISSION (H): ICD-10-CM

## 2023-02-16 DIAGNOSIS — E03.4 HYPOTHYROIDISM DUE TO ACQUIRED ATROPHY OF THYROID: ICD-10-CM

## 2023-02-16 RX ORDER — LEVOTHYROXINE SODIUM 100 UG/1
100 TABLET ORAL EVERY MORNING
Qty: 90 TABLET | Refills: 3 | Status: SHIPPED | OUTPATIENT
Start: 2023-02-16 | End: 2023-09-26

## 2023-02-16 NOTE — TELEPHONE ENCOUNTER
[facility] faxed Clarification Request re: FLUoxetine (PROZAC) 20 MG capsule    Please clarify the DIRECTIONS for Fluoxetine Cap 20MG.    (please choose one:)  ____ Take 1 capsule every other day alternating with 2 capsules every other day.    ____Other:___________________________________

## 2023-02-17 NOTE — TELEPHONE ENCOUNTER
Yes, that is correct, she takes 20mg one day and 40mg the next and then that repeats.    This is not a new RX for her, simply a refill.

## 2023-02-17 NOTE — TELEPHONE ENCOUNTER
RN called and clarified dosing with pharmacy.    Devaughn Guerra RN, BSN, PHN  [facility]

## 2023-02-17 NOTE — TELEPHONE ENCOUNTER
RECORDS RECEIVED FROM: internal /ce   DATE RECEIVED: 5.11.23    NOTES (FOR ALL VISITS) STATUS DETAILS   OFFICE NOTES from referring provider internal  Angeli Nolan     MEDICATION LIST internal     IMAGING      DEXASCAN internal  12.23.22     XR (Chest) ce [facility]-    CT (HEAD/NECK/CHEST/ABDOMEN) internal  12.10.22, 6.4.21      LABS     DIABETES: HBGA1C, CREATININE, FASTING LIPIDS, MICROALBUMIN URINE, POTASSIUM, TSH, T4    THYROID: TSH, T4, CBC, THYRODLONULIN, TOTAL T3, FREE T4, CALCITONIN, CEA internal  Calcium- 12.12.22  Creatinine- 12.12.22  BMP- 12.12.22  Vitamin D- 11.30.22  Parathyroid- 11.30.22  TSH/T4- 11/7/22  Received labs- 9/24/22  HBGA1C- 12.20.21

## 2023-02-17 NOTE — TELEPHONE ENCOUNTER
Routing to PCP    Pharmacy asking for clarification on Fluoxetine script.    Patient is to  Take 1 capsule every other day alternating with 2 capsules every other day?    Devaughn Guerra, RN, BSN, PHN  [facility]

## 2023-05-04 ENCOUNTER — LAB (OUTPATIENT)
Dept: LAB | Facility: CLINIC | Age: 68
End: 2023-05-04
Payer: COMMERCIAL

## 2023-05-04 DIAGNOSIS — E83.52 HYPERCALCEMIA: ICD-10-CM

## 2023-05-04 LAB
ALBUMIN SERPL BCG-MCNC: 4.4 G/DL (ref 3.5–5.2)
CALCIUM SERPL-MCNC: 10.3 MG/DL (ref 8.8–10.2)
CREAT SERPL-MCNC: 1.27 MG/DL (ref 0.51–0.95)
GFR SERPL CREATININE-BSD FRML MDRD: 46 ML/MIN/1.73M2
PHOSPHATE SERPL-MCNC: 2.9 MG/DL (ref 2.5–4.5)
PTH-INTACT SERPL-MCNC: 74 PG/ML (ref 15–65)

## 2023-05-04 PROCEDURE — 36415 COLL VENOUS BLD VENIPUNCTURE: CPT

## 2023-05-04 PROCEDURE — 82565 ASSAY OF CREATININE: CPT

## 2023-05-04 PROCEDURE — 82306 VITAMIN D 25 HYDROXY: CPT

## 2023-05-04 PROCEDURE — 82310 ASSAY OF CALCIUM: CPT

## 2023-05-04 PROCEDURE — 82040 ASSAY OF SERUM ALBUMIN: CPT

## 2023-05-04 PROCEDURE — 83970 ASSAY OF PARATHORMONE: CPT

## 2023-05-04 PROCEDURE — 84100 ASSAY OF PHOSPHORUS: CPT

## 2023-05-05 ASSESSMENT — ENCOUNTER SYMPTOMS
MUSCLE CRAMPS: 1
JOINT SWELLING: 1
STIFFNESS: 1

## 2023-05-07 LAB
DEPRECATED CALCIDIOL+CALCIFEROL SERPL-MC: <57 UG/L (ref 20–75)
VITAMIN D2 SERPL-MCNC: <5 UG/L
VITAMIN D3 SERPL-MCNC: 52 UG/L

## 2023-05-11 ENCOUNTER — PRE VISIT (OUTPATIENT)
Dept: ENDOCRINOLOGY | Facility: CLINIC | Age: 68
End: 2023-05-11

## 2023-05-11 ENCOUNTER — TELEPHONE (OUTPATIENT)
Dept: ENDOCRINOLOGY | Facility: CLINIC | Age: 68
End: 2023-05-11

## 2023-05-11 ENCOUNTER — VIRTUAL VISIT (OUTPATIENT)
Dept: ENDOCRINOLOGY | Facility: CLINIC | Age: 68
End: 2023-05-11
Attending: FAMILY MEDICINE
Payer: COMMERCIAL

## 2023-05-11 DIAGNOSIS — E83.52 SERUM CALCIUM ELEVATED: ICD-10-CM

## 2023-05-11 DIAGNOSIS — N18.31 STAGE 3A CHRONIC KIDNEY DISEASE (H): Primary | ICD-10-CM

## 2023-05-11 PROCEDURE — 99215 OFFICE O/P EST HI 40 MIN: CPT | Mod: VID | Performed by: STUDENT IN AN ORGANIZED HEALTH CARE EDUCATION/TRAINING PROGRAM

## 2023-05-11 NOTE — TELEPHONE ENCOUNTER
Provider: Felice Madera MD Department: [facility] DIABETES     Visit Disposition    Dispositions   0 Return in about 6 months (around 2023).     Writer contacted patient to schedule follow up per check out notes above from provider. Patient's name and  verified during call to ensure right patient.  Patient scheduled appointment with writer. Patient in agreement with time/date/mode of appointment and also requested lab appointments to be scheduled per provider request. Lab appointments scheduled per provider request. Patient wondered about referral order and VF noted someone will contact her to set that up. Patient verbalized understanding of the conversation and did not verbalize any further questions at this time.  Stacia DRUMMOND Virtual Visit Facilitator 11:08 AM May 11, 2023

## 2023-05-11 NOTE — NURSING NOTE
Is the patient currently in the state of [place]? YES    Visit mode:VIDEO    If the visit is dropped, the patient can be reconnected by: VIDEO VISIT: Send to e-mail at: [email or web address]    Will anyone else be joining the visit? Partner may join video call in same room.       How would you like to obtain your AVS? MyChart    Are changes needed to the allergy or medication list? NO    Reason for visit: Video Visit (New Patient- patient has concerns regarding kidney/thyroid trouble. Patient wondering if something can be done to treat all her concerns. Patient notes she has experiencing hair loss. )

## 2023-05-11 NOTE — PROGRESS NOTES
Endocrinology Clinic Visit 5/11/2023      Video-Visit Details    Type of service:  Video Visit    Joined the call at 5/11/2023, 9:39:29 am.  Left the call at 5/11/2023, 10: 20:51 am.    Originating Location (pt. Location): Home        Distant Location (provider location):  Off-site    Mode of Communication:  Video Conference via I-frontdesk    Physician has received verbal consent for a Video Visit from the patient? Yes    I spent a total of 60 minutes on the date of encounter reviewing medical records, evaluating the patient, coordinating care and documenting in the EHR, as detailed above.      NAME:  Hanh Villalba  PCP:  Angeli Nolan  MRN:  [id number]  Reason for Consult:  Primary hyperparathyroidism  Requesting Provider:  Angeli Nolan    Chief Complaint     Chief Complaint   Patient presents with     Video Visit     New Patient- patient has concerns regarding kidney/thyroid trouble. Patient wondering if something can be done to treat all her concerns. Patient notes she has experiencing hair loss.        History of Present Illness     Hanh Villalba is a 67 year old female who is seen in video visit for primary hyperparathyroidism      She has a PMH of chronic hypothyroidism on lt4 and uterine cancer s/p TAHBSO 3 years ago. She noted high calcium back in 2020, work up started in December. She never felt any different it was just noted on routine BMP. Based on chart reviewed ca was 10.4 on 9/2019. Previously normal.    Symptoms of hypercalcemia: no constipation, no dyspepsia, no mental status changes/lethargy, no polyuria.    Calcium intake: Dietary: limited diary intake, Supplements: stopped since 2020    Vitamin D intake: Supplements: 2000 international unit(s) daily    - Previous fractures: No  - Family history of fragility fracture in parent: No  - History of kidney stones: No  - History of kidney disease: Yes: CKD stage 3a. I reviewed her old record from . She had GFR of >  60 in 2005, since 2008 her GFR ranging between mid 40s and mid 50s.  - Family history of any calcium problems or kidney stones: No  - History of vitamin D deficiency: No  - History of HCTZ use: No  - History of Lithium use: No  - History of malabsorption (IBD, Celiac, gastric bypass ): No  - History of thyroid disease: Yes: controlled hypothyroidism  - Weight history: stable.       Work up:    dexa scan 12/2022: lowest Ts core -1.2. no significant change in bone density of the lumbar spine. There has been significant decrease in bone density of the hip.     Latest Reference Range & Units 11/07/22 12:50 11/30/22 11:48 12/12/22 09:54 12/12/22 16:00 05/04/23 13:34   Creatinine 0.51 - 0.95 mg/dL 1.25 (H)  1.10 (H)  1.27 (H)   GFR Estimate >60 mL/min/1.73m2 46 (L)  54 (L)  46 (L)   Calcium 8.8 - 10.2 mg/dL 10.4 (H)  11.1 (H)  10.3 (H)   Phosphorus 2.5 - 4.5 mg/dL  3.0   2.9   25 OH Vit D total 20 - 75 ug/L     <57   Calcium Ionized Whole Blood 4.4 - 5.2 mg/dL 5.5 (H)       Calcium Urine g/24 h 0.10 - 0.30 g/spec    0.22    Calcium Urine mg/dL mg/dL    6.5    Parathyroid Hormone Intact 15 - 65 pg/mL  86 (H)   74 (H)         Social: she is retired . quit smoking longtime ago. Alcohol occasional.  Problem List     Patient Active Problem List   Diagnosis     Hypothyroidism due to acquired atrophy of thyroid     Recurrent major depressive disorder, in full remission (H)     Endometrioid adenocarcinoma of uterus (H)     Overweight (H)     Stage 3a chronic kidney disease (H)     History of colonic polyps        Medications     Current Outpatient Medications   Medication     Apple Cider Vinegar 600 MG CAPS     cholecalciferol (VITAMIN D3) 5000 units TABS tablet     clobetasol (TEMOVATE) 0.05 % external ointment     Cyanocobalamin 1500 MCG TBDP     FLUoxetine (PROZAC) 20 MG capsule     Lactobacillus (ACIDOPHILUS PO)     levothyroxine (SYNTHROID/LEVOTHROID) 100 MCG tablet     Lutein 20 MG CAPS     CALCIUM PO      Omega 3-6-9 Fatty Acids (OMEGA-3-6-9 PO)     No current facility-administered medications for this visit.     Facility-Administered Medications Ordered in Other Visits   Medication     sodium chloride (PF) 0.9% PF flush 10 mL        Allergies     No Known Allergies    Medical / Surgical History     Past Medical History:   Diagnosis Date     Cancer (H) August 2019    Uterus removed     Depression      Depressive disorder 1999    taking prosac     Hypothyroidism      Overweight      Past Surgical History:   Procedure Laterality Date     ABDOMEN SURGERY  Oct 2019    Robotic Hysterectomy     BIOPSY  early 2000's    left breast - non event     COLONOSCOPY  within last 6 years ?    OK     COLONOSCOPY N/A 7/26/2021    Procedure: COLONOSCOPY, WITH POLYPECTOMY;  Surgeon: Ryan Butterfield MD;  Location: [facility] OR     DAVINCI HYSTERECTOMY TOTAL, BILATERAL SALPINGO-OOPHORECTOMY, NODE DISSECTION, COMBINED N/A 10/4/2019    Procedure: Robot-assisted total laparoscopic hysterectomy, Bilateral salpingectomy and Right Oophorectomy, Lysis of adhesion, Cervical Indocyanine Green injection, Bilateral sentinel lymph node dissection, Repair of vaginal laceration.;  Surgeon: Perez Reddy MD;  Location: [facility] OR     DILATION AND CURETTAGE N/A 9/12/2019    Procedure: DILATION AND CURETTAGE, UTERUS;  Surgeon: Navdeep Barajas MD;  Location: [facility] OR     OPERATIVE HYSTEROSCOPY WITH MORCELLATOR N/A 9/12/2019    Procedure: HYSTEROSCOPY WITH MORCELLATOR (MYOSURE);  Surgeon: Navdeep Barajas MD;  Location: [facility] OR     SALPINGO OOPHORECTOMY,R/L/SHANDA Left 1980s    Left ovarian with endometriois     US BREAST BIOPSY LT Left        Social History     Social History     Socioeconomic History     Marital status: Single     Spouse name: Not on file     Number of children: Not on file     Years of education: Not on file     Highest education level: Not on file   Occupational History     Occupation: retired     Comment: high school biology  teacher   Tobacco Use     Smoking status: Former     Packs/day: 1.00     Years: 15.00     Pack years: 15.00     Types: Cigarettes     Start date: 1972     Quit date: 1987     Years since quittin.5     Smokeless tobacco: Never   Vaping Use     Vaping status: Not on file   Substance and Sexual Activity     Alcohol use: Yes     Comment: a beer here and there - never more than 2 -  2 times/week     Drug use: Never     Sexual activity: Not Currently     Partners: Female     Birth control/protection: None   Other Topics Concern     Parent/sibling w/ CABG, MI or angioplasty before 65F 55M? Yes   Social History Narrative     Not on file     Social Determinants of Health     Financial Resource Strain: Not on file   Food Insecurity: Not on file   Transportation Needs: Not on file   Physical Activity: Not on file   Stress: Not on file   Social Connections: Not on file   Intimate Partner Violence: Not At Risk (2022)    Humiliation, Afraid, Rape, and Kick questionnaire      Fear of Current or Ex-Partner: No      Emotionally Abused: No      Physically Abused: No      Sexually Abused: No   Housing Stability: Not on file       Family History     Family History   Problem Relation Age of Onset     Acute myelogenous leukemia Mother      Coronary Artery Disease Mother         bypass surgery      Osteoporosis Mother      Obesity Mother      Parkinsonism Father      Prostate Cancer Father         Diagnosed late in life in his 80's     Obesity Sister      Heart Disease Sister      Coronary Artery Disease Sister         heart attacK in 2019     Uterine Cancer Maternal Grandmother 40        Uterine versus cervical     No Known Problems Sister      Leukemia Brother      Leukemia Nephew      Coronary Artery Disease Brother         Carotid Artery Surgery     Obesity Brother      Substance Abuse Brother         alcohol       ROS     12 ROS completed, pertinent positive and negative in HPI  Answers for HPI/ROS  submitted by the patient on 5/5/2023  General Symptoms: No  Skin Symptoms: No  HENT Symptoms: No  EYE SYMPTOMS: No  HEART SYMPTOMS: No  LUNG SYMPTOMS: No  INTESTINAL SYMPTOMS: No  URINARY SYMPTOMS: No  GYNECOLOGIC SYMPTOMS: No  BREAST SYMPTOMS: No  SKELETAL SYMPTOMS: Yes  BLOOD SYMPTOMS: No  NERVOUS SYSTEM SYMPTOMS: No  MENTAL HEALTH SYMPTOMS: No  Swollen joints: Yes  Muscle cramps: Yes  Joint stiffness: Yes      Physical Exam   LMP  (LMP Unknown)    GENERAL: Healthy, alert and no distress  EYES: Eyes grossly normal to inspection.  No discharge or erythema, or obvious scleral/conjunctival abnormalities.  RESP: No audible wheeze, cough, or visible cyanosis.  No visible retractions or increased work of breathing.    SKIN: Visible skin clear. No significant rash, abnormal pigmentation or lesions.  NEURO: Cranial nerves grossly intact.  Mentation and speech appropriate for age.  PSYCH: Mentation appears normal, affect normal/bright, judgement and insight intact, normal speech and appearance well-groomed.     Labs/Imaging     Pertinent Labs were reviewed and updated in EPIC and discussed briefly.  Radiology Results were  reviewed and updated in EPIC and discussed briefly.    Summary of recent findings:   No results found for: A1C    TSH   Date Value Ref Range Status   11/07/2022 1.03 0.40 - 4.00 mU/L Final   06/25/2021 1.51 0.40 - 4.00 mU/L Final   07/17/2020 0.84 0.40 - 4.00 mU/L Final   07/19/2019 1.24 0.40 - 4.00 mU/L Final   05/04/2017 0.97 0.30 - 4.50 uIU/mL Final       Creatinine   Date Value Ref Range Status   05/04/2023 1.27 (H) 0.51 - 0.95 mg/dL Final   06/25/2021 1.04 0.52 - 1.04 mg/dL Final       Recent Labs   Lab Test 09/13/22  1553 06/25/21  0715   CHOL 206* 240*   HDL 46* 40*   * 177*   TRIG 86 113       No results found for: [id number], [id number], [id number]    I personally reviewed the patient's outside records from [facility] EMR and Care Everywhere. Summary of pertinent findings in HPI.    Impression  / Plan     1. Hypercalcemia.   2. Elevated PTH  3. CKD 3a  With elevated PTH, the most likely diagnosis is primary hyperparathyroidism. Unlikely to be due to CKD given elevated calcium. Her 24 hr urine collection for calcium was 220 mg, with an intermediate ratio. It was done while on very low calcium intake which would explain the results. We could consider repeating it.    We dicussed today the latest guidelines conclude that surgery for hyperparathyroidism is indicated in symptomatic patients, or in asymptomatic patients who meet any one of the following conditions:  Serum calcium concentration of 1.0 mg/dL (0.25 mmol/L) or more above the upper limit of normal   Creatnine clearance reduced to < 60 mL/min, or 24-hour urine for calcium > 400 mg/day and increased stone risk by biochemical stone risk analysis, or nephrolithiasis or nephrocalcinosis on imaging studyCreatinine clearance that is reduced to <60 mL/min   Bone density at the hip, lumbar spine, or distal radius that is more than 2.5 standard deviations below peak bone mass (T score <-2.5) and/or previous fragility fracture   Age less than 50 years  Operative intervention can be delayed in patients over 50 years of age who are asymptomatic or minimally symptomatic and who have serum calcium concentrations <1.0 mg/dL (0.2 mmol/L) above the upper limit of normal, and in patients who are medically unfit for surgery.        Her CKD is of unclear etiology started since 2008, unlikely to be related to current problems with elevated calcium dn PTH. We discussed that ongoing primary hyperparathyroidism could increase the risk of GFR decline in the future; at this point now it seems to be steady around 45-55.   She had one level of calcium of 11.1 which corrected down to 10.4.   She really does not have clear cut indication for surgery now. We discussed monitoring with close labs [id number].  She would like to see a nephrologist to discuss her CKD. We discussed that  usually no major intervention would happen at stage 3a especially in her case where it is holding steady. I will check her urine protein and place referral for her.     As far as calcium intake, even in cases of hyperparathyroidism, it is recommended to keep up calcium intake to about 1000 mg daily, to prevent further increase in PTH and bone disease. I advised that to the patient.    Test and/or medications prescribed today:  No orders of the defined types were placed in this encounter.        Follow up: 6 month      Felice Madera MD  Endocrinology, Diabetes and Metabolism  [facility]

## 2023-05-12 ENCOUNTER — TELEPHONE (OUTPATIENT)
Dept: NEPHROLOGY | Facility: CLINIC | Age: 68
End: 2023-05-12
Payer: COMMERCIAL

## 2023-05-12 DIAGNOSIS — N18.31 STAGE 3A CHRONIC KIDNEY DISEASE (H): Primary | ICD-10-CM

## 2023-05-12 NOTE — TELEPHONE ENCOUNTER
Health Call Center    Phone Message    May a detailed message be left on voicemail: yes     Reason for Call: Order(s): Lab Orders   Reason for requested: Patient has a lab appointment on 06/14  Date needed: June  Provider name: Marcos Thomas      Please place lab orders for patient. Thank you      Action Taken: Message routed to:  [facility] ([facility] Nephrology    Travel Screening: Not Applicable

## 2023-05-15 NOTE — CONFIDENTIAL NOTE
DIAGNOSIS: Stage 3a chronic kidney disease    DATE RECEIVED: 06.14.2023   NOTES STATUS DETAILS   OFFICE NOTE from referring provider Internal 05.11.2023 Felice Madera MD   OFFICE NOTE from other specialist  Internal 11.07.2022    Anthony Howell, APRN CNP     05.27.2022 Cindy Rosenberg, ADAM    06.08.2021 Angeli Nolan MD   *Only VASCULITIS or LUPUS gather office notes for the following     *PULMONARY       *ENT     *DERMATOLOGY     *RHEUMATOLOGY     DISCHARGE SUMMARY from hospital     DISCHARGE REPORT from the ER     MEDICATION LIST Internal    IMAGING  (NEED IMAGES AND REPORTS)     KIDNEY CT SCAN     KIDNEY ULTRASOUND     MR ABDOMEN     NUCLEAR MEDICINE RENAL     LABS     CBC Internal 03.15.2021   CMP Internal 03.15.2021   BMP Internal 12.12.2022   UA Internal 11.05.2019   URINE PROTEIN Internal 11.05.2019   RENAL PANEL     BIOPSY     KIDNEY BIOPSY

## 2023-05-18 ENCOUNTER — NURSE TRIAGE (OUTPATIENT)
Dept: FAMILY MEDICINE | Facility: CLINIC | Age: 68
End: 2023-05-18
Payer: COMMERCIAL

## 2023-05-18 NOTE — TELEPHONE ENCOUNTER
Patient returning call. States that she was instructed to speak with triage RN related to recent symptoms that she is having and scheduling a sooner appointment.    Patient notes that she has a growth on neck that she would like to have assessed, previously assessed in a visit with PCP, states that she feels that it has grown in size.    Patient also states that she has been having hip pain for the past year, mainly within the last 6 months. States that she has had bursitis in greater trochanter. States that occasionally she feels numbness present/tingling in thigh/leg related to the pain. Unable to take ibuprofen due to other health issues.     Writer assisted patient in making appointment with provider to be assessed due to report of tingling with hip pain.    Reason for Disposition    Numbness in a leg or foot (i.e., loss of sensation)    Additional Information    Negative: Looks like a broken bone or dislocated joint (e.g., crooked or deformed)    Negative: Sounds like a life-threatening emergency to the triager    Negative: Followed a hip injury    Negative: Leg pain is main symptom    Negative: Back pain radiating (shooting) into hip    Negative: SEVERE pain (e.g., excruciating, unable to do any normal activities) and fever    Negative: Can't stand (bear weight) or walk    Negative: Fever and red area (or area very tender to touch)    Negative: Patient sounds very sick or weak to the triager    Negative: SEVERE pain (e.g., excruciating, unable to do any normal activities)    Negative: Red area or streak > 2 inches (or 5 cm)    Negative: Painful rash with multiple small blisters grouped together (i.e., dermatomal distribution or 'band' or 'stripe')    Negative: Looks like a boil, infected sore, deep ulcer, or other infected rash (spreading redness, pus)    Negative: Localized rash is very painful (no fever)    Protocols used: HIP PAIN-A-KAMRON Sanchez RN  [facility]

## 2023-05-19 ENCOUNTER — OFFICE VISIT (OUTPATIENT)
Dept: FAMILY MEDICINE | Facility: CLINIC | Age: 68
End: 2023-05-19
Payer: COMMERCIAL

## 2023-05-19 VITALS
OXYGEN SATURATION: 96 % | HEART RATE: 67 BPM | BODY MASS INDEX: 38.86 KG/M2 | WEIGHT: 256.4 LBS | TEMPERATURE: 97.9 F | SYSTOLIC BLOOD PRESSURE: 130 MMHG | DIASTOLIC BLOOD PRESSURE: 60 MMHG | HEIGHT: 68 IN | RESPIRATION RATE: 16 BRPM

## 2023-05-19 DIAGNOSIS — E66.01 MORBID OBESITY (H): ICD-10-CM

## 2023-05-19 DIAGNOSIS — M25.551 RIGHT HIP PAIN: Primary | ICD-10-CM

## 2023-05-19 DIAGNOSIS — F33.42 RECURRENT MAJOR DEPRESSIVE DISORDER, IN FULL REMISSION (H): ICD-10-CM

## 2023-05-19 DIAGNOSIS — L72.0 EPIDERMAL CYST: ICD-10-CM

## 2023-05-19 DIAGNOSIS — M19.041 ARTHRITIS OF FINGER OF BOTH HANDS: ICD-10-CM

## 2023-05-19 DIAGNOSIS — M19.042 ARTHRITIS OF FINGER OF BOTH HANDS: ICD-10-CM

## 2023-05-19 DIAGNOSIS — C55 ENDOMETRIOID ADENOCARCINOMA OF UTERUS (H): ICD-10-CM

## 2023-05-19 PROCEDURE — 99214 OFFICE O/P EST MOD 30 MIN: CPT | Performed by: NURSE PRACTITIONER

## 2023-05-19 ASSESSMENT — PATIENT HEALTH QUESTIONNAIRE - PHQ9
10. IF YOU CHECKED OFF ANY PROBLEMS, HOW DIFFICULT HAVE THESE PROBLEMS MADE IT FOR YOU TO DO YOUR WORK, TAKE CARE OF THINGS AT HOME, OR GET ALONG WITH OTHER PEOPLE: NOT DIFFICULT AT ALL
SUM OF ALL RESPONSES TO PHQ QUESTIONS 1-9: 1
SUM OF ALL RESPONSES TO PHQ QUESTIONS 1-9: 1

## 2023-05-19 ASSESSMENT — PAIN SCALES - GENERAL: PAINLEVEL: WORST PAIN (10)

## 2023-05-19 NOTE — PATIENT INSTRUCTIONS
To help with arthritis pain, consider trying:  - Contrast baths (alternating between cold and hot baths) (https://[email or web address]/Documents/contrast.pdf)  - Paraffin wax baths (http://[email or web address]/handcare/procedures-and-treatment/paraffin-wax-units)  - Voltaren 1% gel

## 2023-05-19 NOTE — PROGRESS NOTES
"  Assessment & Plan     Right hip pain  Unfortunately we did not have x-ray technician today in clinic so could not complete x-ray at time of her visit.  She is travelling to [place] but says she can get the x-ray done next Thursday.  Advised she should call the clinic first before coming to get on the x-ray schedule.  - XR Hip Right 2-3 Views; Future  - Physical Therapy Referral; Future  Recommend Physical Therapy for evaluation and to make recommendations for mobility/strengthening.    Epidermal cyst  Recommend follow up with Dermatology for excision as this has been causing some pain.  - Adult Dermatology Referral; Future    Arthritis of finger of both hands  Reviewed and discussed over the counter voltaren gel, exercises, can consider contrast baths or paraffin wax baths    Recurrent major depressive disorder, in full remission (H)  Known issue that I take into account for their medical decisions, no current exacerbations or new concerns.     Morbid obesity (H)  Known issue that I take into account for their medical decisions, no current exacerbations or new concerns.     Endometrioid adenocarcinoma of uterus (H)  Known issue that I take into account for their medical decisions, no current exacerbations or new concerns.                BMI:   Estimated body mass index is 39.57 kg/m  as calculated from the following:    Height as of this encounter: 1.715 m (5' 7.5\").    Weight as of this encounter: 116.3 kg (256 lb 6.4 oz).         Amina Garcia NP  [facility]    Trev Londono is a 67 year old, presenting for the following health issues:  Pain (Right Hip pain/And right middle finger ) and Derm Problem (Growth on neck , times 6 months marble size.   )    Pain    History of Present Illness       Reason for visit:  Growth on neck, hip pain, arthritis in finger  Symptom onset:  More than a month  Symptoms include:  Pain  Symptom intensity:  Moderate  Symptom progression:  " "Worsening  Had these symptoms before:  Yes  What makes it worse:  Lifting, stairs  What makes it better:  Rest, ice    She eats 2-3 servings of fruits and vegetables daily.She consumes 1 sweetened beverage(s) daily.She exercises with enough effort to increase her heart rate 20 to 29 minutes per day.  She exercises with enough effort to increase her heart rate 5 days per week.   She is taking medications regularly.    Today's PHQ-9         PHQ-9 Total Score: 1    PHQ-9 Q9 Thoughts of better off dead/self-harm past 2 weeks :   Not at all    How difficult have these problems made it for you to do your work, take care of things at home, or get along with other people: Not difficult at all     Lump in skin of neck for 9 months since started.  Gradually getting bigger.  The size of a marble.  Tender with touching.    Right hip pain- pain for the past 6 months.  says the pain is deep in her hip.  Some days better and other days worse.  Gardens a lot and stays active.  When the pain is bad it can flareup severely.  Says she got a shot into right hip for bursitis in the past- but it did not help.  Has some CKD so does not use Ibuprofen.    Also gets pain and swelling in PIPs of third digits.    .  Likes to dive, interested in marine biology.    Patient's MyChart message from yesterday to her PCP copied here:  \"when I last saw you looked at a small growth on my neck.  It is in the front about 4\" below my jaw.  It  now seems to be larger,  inflamed a bit, and bothersome. -- My dentist said it seems to be limited to my skin, but suggested I get it checked (taken care of)... Can you recommend (refer) me to someone?  2- I am having some severe discomfort in my right hip. In general it aches most of the time and I have episodes  (after working)  where I have severe spikes of pain and do not want to even put weight on it.  I would like to have it checked out soon.  What might be a path going forward to do " "that?\"      Review of Systems    Constitutional, HEENT, cardiovascular, pulmonary, gi and gu systems are negative, except as otherwise noted.      Objective    /60 (BP Location: Right arm, Patient Position: Sitting, Cuff Size: Adult Large)   Pulse 67   Temp 97.9  F (36.6  C) (Oral)   Resp 16   Ht 1.715 m (5' 7.5\")   Wt 116.3 kg (256 lb 6.4 oz)   LMP  (LMP Unknown)   SpO2 96%   BMI 39.57 kg/m    Body mass index is 39.57 kg/m .  Physical Exam   GENERAL: healthy, alert, no distress and obese  MS: there is swelling and tenderness of bilateral PIP joints of 3rd digits.  Tenderness to right lateral hip, decreased external rotation.  Left hip ROM normal.  SKIN: there is a 2-3 cm round, firm, non tender subcutaneous mass on anterior neck  PSYCH: mentation appears normal, affect normal/bright                  "

## 2023-05-23 NOTE — LETTER
"    2021         RE: Hanh Villalba   [address]  [place] [address]        Dear Colleague,    Thank you for referring your patient, Hanh Villalba, to the [facility]. Please see a copy of my visit note below.                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  She completed brachytherapy 2019.  She is here today for an acute visit.     Oncology history:   2019 D&C with pathology showing Grade 1 endometrial cancer, loss of MLH1 and PMS2, hypermethylation of MLH1 promotor positive  10/4/2019: Robotic-assisted Total laparoscopic hysterectomy, bilateral salpingo-oophorectomy, sentinel lymph node dissection, vaginal laceration repair: Stage 1B FIGO Grade 2, DOI 17/21mm (81%), LVSI+, cytology negative, tumor 3.4cm; MSI-H (MLH1 promoter melthylation)  2019: Vaginal brachytherapy; 30Gy in 5 fx      Today she reports she since her last visit, the lower abdominal \"lump\" became red and did drain some pink/clear fluid; she ultimately went to a local provide to have this drained and was put on an antibiotic (Bactrim DS BID x 10 days); a culture was also collected (results pending) and she is keeping a dressing over it. She took a shower this morning and did let the soap run down over it. She denies any fevers or chills or increased discomfort. She is otherwise doing well and is without concerns. She denies any vaginal bleeding, no changes in her bowel or bladder habits, no nausea/emesis, no lower extremity edema, and no difficulties eating or sleeping. She denies any abdominal discomfort/bloating, no fevers or chills, and no chest pain or shortness of breath.        Review of Systems:    Systemic           no weight changes; no fever; no chills; no night sweats; no " appetite changes  Skin           no rashes, or lesions  Eye           no irritation; no changes in vision  Shila-Laryngeal           no dysphagia; no hoarseness   Pulmonary    no cough; no shortness of breath  Cardiovascular    no chest pain; no palpitations  Gastrointestinal    no diarrhea; no constipation; no abdominal pain; no changes in bowel  habits; no blood in stool  Genitourinary   no urinary frequency; no urinary urgency; no dysuria; no pain; no abnormal vaginal discharge; no abnormal vaginal bleeding  Breast   no breast discharge; no breast changes; no breast pain  Musculoskeletal    no myalgias; no arthralgias; no back pain  Psychiatric           no depressed mood; no anxiety    Hematologic           no tender lymph nodes; no noticeable swellings or lumps   Endocrine    no hot flashes; no heat/cold intolerance         Neurological   no tremor; no numbness and tingling; no headaches; no difficulty  sleeping      Past Medical History:    Past Medical History:   Diagnosis Date     Cancer (H) August 2019    Uterus removed     Depression      Depressive disorder 1999    taking prosac     Hypothyroidism      Overweight          Past Surgical History:    Past Surgical History:   Procedure Laterality Date     ABDOMEN SURGERY  Oct 2019    Robotic Hysterectomy     BIOPSY  early 2000's    left breast - non event     COLONOSCOPY  within last 6 years ?    OK     DAVINCI HYSTERECTOMY TOTAL, BILATERAL SALPINGO-OOPHORECTOMY, NODE DISSECTION, COMBINED N/A 10/4/2019    Procedure: Robot-assisted total laparoscopic hysterectomy, Bilateral salpingectomy and Right Oophorectomy, Lysis of adhesion, Cervical Indocyanine Green injection, Bilateral sentinel lymph node dissection, Repair of vaginal laceration.;  Surgeon: Perez Reddy MD;  Location: [facility] OR     DILATION AND CURETTAGE N/A 9/12/2019    Procedure: DILATION AND CURETTAGE, UTERUS;  Surgeon: Navdeep Barajas MD;  Location:  OR     OPERATIVE HYSTEROSCOPY WITH  MORCELLATOR N/A 2019    Procedure: HYSTEROSCOPY WITH MORCELLATOR (MYOSURE);  Surgeon: Navdeep Barajas MD;  Location: [facility] OR     SALPINGO OOPHORECTOMY,R/L/SHANDA Left 1980s    Left ovarian with endometriois     US BREAST BIOPSY LT Left          Health Maintenance Due   Topic Date Due     ZOSTER IMMUNIZATION (2 of 3) 10/13/2014     PHQ-9  2021     COLORECTAL CANCER SCREENING  2021       Current Medications:     Current Outpatient Medications   Medication Sig Dispense Refill     Apple Cider Vinegar 600 MG CAPS Take 1 capsule by mouth daily        CALCIUM PO Take 20 mg by mouth daily        cholecalciferol (VITAMIN D3) 5000 units TABS tablet Take by mouth daily       clobetasol (TEMOVATE) 0.05 % external ointment Apply sparingly then once or twice a week as needed 45 g 0     Cyanocobalamin 1500 MCG TBDP Take 1 tablet by mouth daily        FLUoxetine (PROZAC) 20 MG capsule Take 1 capsule (20 mg) by mouth daily 90 capsule 3     Lactobacillus (ACIDOPHILUS PO) Take 1 tablet by mouth daily        levothyroxine (SYNTHROID/LEVOTHROID) 100 MCG tablet Take 1 tablet (100 mcg) by mouth every morning 90 tablet 0     Omega 3-6-9 Fatty Acids (OMEGA-3-6-9 PO) Take 1 capsule by mouth daily        sulfamethoxazole-trimethoprim (BACTRIM DS) 800-160 MG tablet Take 1 tablet by mouth       Zinc Picolinate 25 MG TABS Take 1 tablet by mouth daily            Allergies:      No Known Allergies     Social History:     Social History     Tobacco Use     Smoking status: Former Smoker     Packs/day: 1.00     Years: 15.00     Pack years: 15.00     Types: Cigarettes     Start date: 1972     Quit date: 1987     Years since quittin.5     Smokeless tobacco: Never Used   Substance Use Topics     Alcohol use: Yes     Frequency: 2-3 times a week     Comment: a beer here and there - never more than 2 -  2 times/week       History   Drug Use Unknown         Family History:     The patient's family history is notable  "for:    Family History   Problem Relation Age of Onset     Acute myelogenous leukemia Mother      Coronary Artery Disease Mother         bypass surgery 1989     Osteoporosis Mother      Obesity Mother      Parkinsonism Father      Prostate Cancer Father         Diagnosed late in life in his 80's     Obesity Sister      Heart Disease Sister      Coronary Artery Disease Sister         heart attacK in June of 2019     Uterine Cancer Maternal Grandmother 40        Uterine versus cervical     No Known Problems Sister      Leukemia Brother      Leukemia Nephew      Coronary Artery Disease Brother         Carotid Artery Surgery     Obesity Brother      Substance Abuse Brother         alcohol         Physical Exam:     /75 (BP Location: Right arm, Patient Position: Chair, Cuff Size: Adult Large)   Pulse 69   Temp 97.9  F (36.6  C) (Tympanic)   Resp 16   Ht 1.715 m (5' 7.52\")   Wt 119.8 kg (264 lb 1.6 oz)   LMP  (LMP Unknown)   SpO2 98%   BMI 40.73 kg/m    Body mass index is 40.73 kg/m .    General Appearance: healthy and alert, no distress     HEENT: no thyromegaly, no palpable nodules or masses        Cardiovascular: regular rate and rhythm, no gallops, rubs or murmurs     Respiratory: lungs clear, no rales, rhonchi or wheezes, normal diaphragmatic excursion    Musculoskeletal: extremities non tender and without edema    Skin: Left lower quadrant open wound approximately 2.5 cm in length and 1 cm depth; probed and no tunneling identified; scant serosanguinous drainage on cotton swab with probing; area quite tender at lateral aspects;firm/induration along inferior to open wound. 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  She completed brachytherapy 12/2019.   She is here today for an acute visit.     20 minutes spent on the date of the encounter doing chart review, history and exam, documentation and further activities as noted above      Plan:     1.)        Return for her next scheduled visit as planned. Continue on antibiotic until completion and follow recommendations for wound care (ie. Monitoring for fever or signs of infection, applying a warm compress,monitoring \"firmness\" above and below wound, etc.). reviewed importance of keeping area clean/dry and avoiding moisture in the area. Will have her get a CT ap 6/8 when she sees her PCP for her annual exam to further evaluate the area. Encouraged her to contact this clinic when she gets her wound culture results back if they are \"concerning\" ie not just staph; she verbalized understanding. She can apply a thin amount of a triple antibiotic ointment around the edges of the open wound to keep it moisturized.      2.) Genetic risk factors were assessed and she had a loss of MLH1 and PMS2; hypermethylation of MLH1 promotor positive. MLH1 promoter methylation is typically associated with sporadic microsatellite unstable tumors of the colon/rectum or endometrium.     3.) Labs and/or tests ordered include:  CT ap.      4.) Health maintenance issues addressed today include annual health maintenance and non-gynecologic issues with PCP.    ROSEMARY Lance, WHNP-BC, ANP-BC  Women's Health Nurse Practitioner  Adult Nurse Practitioner  Division of Gynecologic Oncology      CC  Patient Care Team:  Angeli Nolan MD as PCP - General (Family Practice)    Janny Cesar MD as Assigned Cancer Care Provider        " 5

## 2023-05-30 NOTE — PROGRESS NOTES
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  She completed brachytherapy 2019.  She is here today for a surveillance visit.      Oncology history:   2019 D&C with pathology showing Grade 1 endometrial cancer, loss of MLH1 and PMS2, hypermethylation of MLH1 promotor positive  10/4/2019: Robotic-assisted Total laparoscopic hysterectomy, bilateral salpingo-oophorectomy, sentinel lymph node dissection, vaginal laceration repair: Stage 1B FIGO Grade 2, DOI 17/21mm (81%), LVSI+, cytology negative, tumor 3.4cm; MSI-H (MLH1 promoter melthylation)  2019: Vaginal brachytherapy; 30Gy in 5 fx    23: right hip xray Impression:  1. No acute osseous abnormality.  2. No substantial degenerative change of the right hip. Right SI joint degenerative change.      Today she comes to clinic for her surveillance visit. Patient reports right hip pain intermittently: Pain ranging 0-5 depending on activity and time of day; she had imaging earlier today. Patient reports feeling well overall: no recent illnesses, SOB, chest pain, fevers, or dysuria. Patient denies vaginal bleeding/spotting, pain, bloating, or SOB. Patient sleeping well and has a good appetite. Patient using her dilator once a month for 3-5 minutes, denies spotting after use. Patient uses clobetasol ointment for irritation and itching monthly. Reports not using pads anymore and feels it helps with the contact dermatitis; does still continue to have stress incontinence at times. She saw her PCP  for her annual exam. Mammogram was done 2022. Bone density exam performed  and revealed osteopenia in hips. Repeat recommended in 3 years. Colonoscopy due 2026.       Review of Systems:    Systemic           no weight changes; no fever; no chills; no  night sweats; no appetite changes  Skin           no rashes, or lesions  Eye           no irritation; no changes in vision  Shila-Laryngeal           no dysphagia; no hoarseness   Pulmonary    no cough; no shortness of breath  Cardiovascular    no chest pain; no palpitations  Gastrointestinal    no diarrhea; no constipation; no abdominal pain; no changes in bowel habits; no blood in stool  Genitourinary   no urinary frequency; no urinary urgency; no dysuria; no pain; no abnormal vaginal discharge; no abnormal vaginal bleeding  Breast    no breast discharge; no breast changes; no breast pain  Musculoskeletal    no myalgias; no arthralgias; no back pain  Psychiatric           no depressed mood; no anxiety    Hematologic               no tender lymph nodes; no noticeable swellings or lumps   Endocrine    no hot flashes; no heat/cold intolerance         Neurological   no tremor; no numbness and tingling; no headaches; no difficulty sleeping      Past Medical History:    Past Medical History:   Diagnosis Date     Cancer (H) August 2019    Uterus removed     Depression      Depressive disorder 1999    taking prosac     Hypothyroidism      Overweight          Past Surgical History:    Past Surgical History:   Procedure Laterality Date     ABDOMEN SURGERY  Oct 2019    Robotic Hysterectomy     BIOPSY  early 2000's    left breast - non event     COLONOSCOPY  within last 6 years ?    OK     COLONOSCOPY N/A 7/26/2021    Procedure: COLONOSCOPY, WITH POLYPECTOMY;  Surgeon: Ryan Butterfield MD;  [place] [facility] [place]     DAVINCI HYSTERECTOMY TOTAL, BILATERAL SALPINGO-OOPHORECTOMY, NODE DISSECTION, COMBINED N/A 10/4/2019    Procedure: Robot-assisted total laparoscopic hysterectomy, Bilateral salpingectomy and Right Oophorectomy, Lysis of adhesion, Cervical Indocyanine Green injection, Bilateral sentinel lymph node dissection, Repair of vaginal laceration.;  Surgeon: Perez Reddy MD;  Location: [facility] OR     DILATION AND CURETTAGE N/A  2019    Procedure: DILATION AND CURETTAGE, UTERUS;  Surgeon: Navdeep Barajas MD;  Location: [facility] OR     OPERATIVE HYSTEROSCOPY WITH MORCELLATOR N/A 2019    Procedure: HYSTEROSCOPY WITH MORCELLATOR (MYOSURE);  Surgeon: Navdeep Barajas MD;  Location: [facility] OR     SALPINGO OOPHORECTOMY,R/L/SHANDA Left 1980s    Left ovarian with endometriois     US BREAST BIOPSY LT Left          There are no preventive care reminders to display for this patient.    Current Medications:     Current Outpatient Medications   Medication Sig Dispense Refill     Apple Cider Vinegar 600 MG CAPS Take 1 capsule by mouth daily        CALCIUM PO Take 20 mg by mouth daily       cholecalciferol (VITAMIN D3) 5000 units TABS tablet Take by mouth daily       clobetasol (TEMOVATE) 0.05 % external ointment Apply sparingly then once or twice a week as needed 45 g 0     Cyanocobalamin 1500 MCG TBDP Take 1 tablet by mouth daily        FLUoxetine (PROZAC) 20 MG capsule Take 1 capsule (20 mg) by mouth every 48 hours AND 2 capsules (40 mg) every 48 hours. 135 capsule 1     Lactobacillus (ACIDOPHILUS PO) Take 1 tablet by mouth daily        levothyroxine (SYNTHROID/LEVOTHROID) 100 MCG tablet Take 1 tablet (100 mcg) by mouth every morning 90 tablet 3     Lutein 20 MG CAPS        omeprazole (PRILOSEC) 10 MG DR capsule Take 20 mg by mouth daily       Omega 3-6-9 Fatty Acids (OMEGA-3-6-9 PO) Take 1 capsule by mouth daily  (Patient not taking: Reported on 2023)           Allergies:      No Known Allergies     Social History:     Social History     Tobacco Use     Smoking status: Former     Packs/day: 1.00     Years: 15.00     Pack years: 15.00     Types: Cigarettes     Start date: 1972     Quit date: 1987     Years since quittin.5     Smokeless tobacco: Never   Vaping Use     Vaping status: Never Used     Passive vaping exposure: Yes   Substance Use Topics     Alcohol use: Yes     Comment: a beer here and there - never more than  2 -  2 times/week       History   Drug Use Unknown         Family History:     The patient's family history is notable for:    Family History   Problem Relation Age of Onset     Acute myelogenous leukemia Mother      Coronary Artery Disease Mother         bypass surgery 1989     Osteoporosis Mother      Obesity Mother      Parkinsonism Father      Prostate Cancer Father         Diagnosed late in life in his 80's     Obesity Sister      Heart Disease Sister      Coronary Artery Disease Sister         heart attacK in June of 2019     Uterine Cancer Maternal Grandmother 40        Uterine versus cervical     No Known Problems Sister      Leukemia Brother      Leukemia Nephew      Coronary Artery Disease Brother         Carotid Artery Surgery     Obesity Brother      Substance Abuse Brother         alcohol         Physical Exam:     /77 (BP Location: Right arm, Patient Position: Sitting, Cuff Size: Adult Large)   Pulse 68   Temp 97.9  F (36.6  C) (Tympanic)   Resp 16   Wt 116.7 kg (257 lb 3.2 oz)   LMP  (LMP Unknown)   SpO2 99%   BMI 39.69 kg/m    Body mass index is 39.69 kg/m .    General Appearance: healthy and alert, no distress     HEENT: no thyromegaly, no palpable nodules or masses        Cardiovascular: regular rate and rhythm, no gallops, rubs or murmurs     Respiratory: lungs clear, no rales, rhonchi or wheezes, normal diaphragmatic excursion    Musculoskeletal: extremities non tender and without edema    Skin: no lesions or rashes     Neurological: normal gait, no gross defects     Psychiatric: appropriate mood and affect                               Hematological: normal cervical, supraclavicular and inguinal lymph nodes     Gastrointestinal:       abdomen soft, non-tender, non-distended, no organomegaly or masses    Genitourinary: External genitalia and urethral meatus appears normal. Slight erythema around introitus from incontinence.  Slight Lichen sclerosis present posteriorly. Small fissure  with scant amount of bleeding posterior introitus. Vagina is smooth without nodularity or masses.  Cervix surgically absent.  Bimanual exam reveal no masses, nodularity or fullness.  Recto-vaginal exam confirms these findings.      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  She completed brachytherapy 12/2019.  She is here today for a surveillance visit.     35 minutes spent on the date of the encounter doing chart review, history and exam, documentation and further activities as noted above      Plan:     1.)        Patient to RTC in 6 months for her next surveillance visit. She will continue to be seen every 6 months until 12/2024 and then annually thereafter. Reviewed recommendations from [facility] not to perform surveillance pap smears in women diagnosed with endometrial cancer as this does not improve detection of local recurrence. Reviewed signs and symptoms for when she should contact the clinic or seek additional care. Patient to contact the clinic with any questions or concerns in the interim.  Answered all of her questions to the best of my ability.     2.) Genetic risk factors were assessed and she had a loss of MLH1 and PMS2; hypermethylation of MLH1 promotor positive. MLH1 promoter methylation is typically associated with sporadic microsatellite unstable tumors of the colon/rectum or endometrium.    3.) Labs and/or tests ordered include: None      4.) Health maintenance issues addressed today include annual health maintenance and non-gynecologic issues with PCP.    5.) Discussed application of coconut oil for fissure. Discussed using Aquaphor for redness/irritation. Continue to use clobetasol for itching and irritation. Discussed using wet washcloth to clean any urine to reduce irritation and keeping the area dry.       Perez Marina NP Student 05/31/23      I was present for the entire exam and have reviewed/edited  the documentation.     ROSEMARY Lance, WHNP-BC, ANP-BC  Women's Health Nurse Practitioner  Adult Nurse Practitioner  Division of Gynecologic Oncology          CC  Patient Care Team:  Angeli Nolan MD as PCP - General (Family Practice)  Beatrice Reyes APRN CNP as Assigned Cancer Care Provider  Angeli Nolan MD as Assigned PCP  Jer Tapia MD as Assigned Endocrinology Provider  Bloch, Lauren Turner, [facility] as Assigned [place] Pharmacist  Felice Madera MD as MD (Endocrinology, Diabetes, and Metabolism)  Marcos Thomas MD as MD (Nephrology)

## 2023-05-31 ENCOUNTER — ONCOLOGY VISIT (OUTPATIENT)
Dept: ONCOLOGY | Facility: CLINIC | Age: 68
End: 2023-05-31
Attending: NURSE PRACTITIONER
Payer: COMMERCIAL

## 2023-05-31 ENCOUNTER — ANCILLARY PROCEDURE (OUTPATIENT)
Dept: GENERAL RADIOLOGY | Facility: CLINIC | Age: 68
End: 2023-05-31
Attending: NURSE PRACTITIONER
Payer: COMMERCIAL

## 2023-05-31 VITALS
WEIGHT: 257.2 LBS | OXYGEN SATURATION: 99 % | SYSTOLIC BLOOD PRESSURE: 114 MMHG | BODY MASS INDEX: 39.69 KG/M2 | DIASTOLIC BLOOD PRESSURE: 77 MMHG | TEMPERATURE: 97.9 F | RESPIRATION RATE: 16 BRPM | HEART RATE: 68 BPM

## 2023-05-31 DIAGNOSIS — C55 ENDOMETRIOID ADENOCARCINOMA OF UTERUS (H): Primary | ICD-10-CM

## 2023-05-31 DIAGNOSIS — M25.551 RIGHT HIP PAIN: ICD-10-CM

## 2023-05-31 PROCEDURE — G0463 HOSPITAL OUTPT CLINIC VISIT: HCPCS | Performed by: NURSE PRACTITIONER

## 2023-05-31 PROCEDURE — 73502 X-RAY EXAM HIP UNI 2-3 VIEWS: CPT | Mod: GC | Performed by: RADIOLOGY

## 2023-05-31 PROCEDURE — 99214 OFFICE O/P EST MOD 30 MIN: CPT | Performed by: NURSE PRACTITIONER

## 2023-05-31 RX ORDER — OMEPRAZOLE 10 MG/1
20 CAPSULE, DELAYED RELEASE ORAL DAILY
COMMUNITY
End: 2024-08-13

## 2023-05-31 ASSESSMENT — PAIN SCALES - GENERAL: PAINLEVEL: NO PAIN (0)

## 2023-05-31 NOTE — LETTER
2023         RE: Hanh Villalba   [address]  [place] [address]        Dear Colleague,    Thank you for referring your patient, Hanh Villalba, to the [facility]. Please see a copy of my visit note below.                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  She completed brachytherapy 2019.  She is here today for a surveillance visit.      Oncology history:   2019 D&C with pathology showing Grade 1 endometrial cancer, loss of MLH1 and PMS2, hypermethylation of MLH1 promotor positive  10/4/2019: Robotic-assisted Total laparoscopic hysterectomy, bilateral salpingo-oophorectomy, sentinel lymph node dissection, vaginal laceration repair: Stage 1B FIGO Grade 2, DOI 17/21mm (81%), LVSI+, cytology negative, tumor 3.4cm; MSI-H (MLH1 promoter melthylation)  2019: Vaginal brachytherapy; 30Gy in 5 fx    23: right hip xray Impression:  1. No acute osseous abnormality.  2. No substantial degenerative change of the right hip. Right SI joint degenerative change.      Today she comes to clinic for her surveillance visit. Patient reports right hip pain intermittently: Pain ranging 0-5 depending on activity and time of day; she had imaging earlier today. Patient reports feeling well overall: no recent illnesses, SOB, chest pain, fevers, or dysuria. Patient denies vaginal bleeding/spotting, pain, bloating, or SOB. Patient sleeping well and has a good appetite. Patient using her dilator once a month for 3-5 minutes, denies spotting after use. Patient uses clobetasol ointment for irritation and itching monthly. Reports not using pads anymore and feels it helps with the contact dermatitis; does still continue to have stress incontinence at times. She saw her PCP   for her annual exam. Mammogram was done 12/2022. Bone density exam performed 12/22 and revealed osteopenia in hips. Repeat recommended in 3 years. Colonoscopy due 07/2026.       Review of Systems:    Systemic           no weight changes; no fever; no chills; no night sweats; no appetite changes  Skin           no rashes, or lesions  Eye           no irritation; no changes in vision  Shila-Laryngeal           no dysphagia; no hoarseness   Pulmonary    no cough; no shortness of breath  Cardiovascular    no chest pain; no palpitations  Gastrointestinal    no diarrhea; no constipation; no abdominal pain; no changes in bowel habits; no blood in stool  Genitourinary   no urinary frequency; no urinary urgency; no dysuria; no pain; no abnormal vaginal discharge; no abnormal vaginal bleeding  Breast    no breast discharge; no breast changes; no breast pain  Musculoskeletal    no myalgias; no arthralgias; no back pain  Psychiatric           no depressed mood; no anxiety    Hematologic               no tender lymph nodes; no noticeable swellings or lumps   Endocrine    no hot flashes; no heat/cold intolerance         Neurological   no tremor; no numbness and tingling; no headaches; no difficulty sleeping      Past Medical History:    Past Medical History:   Diagnosis Date    Cancer (H) August 2019    Uterus removed    Depression     Depressive disorder 1999    taking prosac    Hypothyroidism     Overweight          Past Surgical History:    Past Surgical History:   Procedure Laterality Date    ABDOMEN SURGERY  Oct 2019    Robotic Hysterectomy    BIOPSY  early 2000's    left breast - non event    COLONOSCOPY  within last 6 years ?    OK    COLONOSCOPY N/A 7/26/2021    Procedure: COLONOSCOPY, WITH POLYPECTOMY;  Surgeon: Ryan Butterfield MD;  Location: [facility] OR    DAVINCI HYSTERECTOMY TOTAL, BILATERAL SALPINGO-OOPHORECTOMY, NODE DISSECTION, COMBINED N/A 10/4/2019    Procedure: Robot-assisted total laparoscopic hysterectomy,  Bilateral salpingectomy and Right Oophorectomy, Lysis of adhesion, Cervical Indocyanine Green injection, Bilateral sentinel lymph node dissection, Repair of vaginal laceration.;  Surgeon: Perez Reddy MD;  Location: [facility] OR    DILATION AND CURETTAGE N/A 9/12/2019    Procedure: DILATION AND CURETTAGE, UTERUS;  Surgeon: Navdeep Barajas MD;  Location: [facility] OR    OPERATIVE HYSTEROSCOPY WITH MORCELLATOR N/A 9/12/2019    Procedure: HYSTEROSCOPY WITH MORCELLATOR (MYOSURE);  Surgeon: Navdeep Barajas MD;  Location: [facility] OR    SALPINGO OOPHORECTOMY,R/L/SHANDA Left 1980s    Left ovarian with endometriois    US BREAST BIOPSY LT Left          There are no preventive care reminders to display for this patient.    Current Medications:     Current Outpatient Medications   Medication Sig Dispense Refill    Apple Cider Vinegar 600 MG CAPS Take 1 capsule by mouth daily       CALCIUM PO Take 20 mg by mouth daily      cholecalciferol (VITAMIN D3) 5000 units TABS tablet Take by mouth daily      clobetasol (TEMOVATE) 0.05 % external ointment Apply sparingly then once or twice a week as needed 45 g 0    Cyanocobalamin 1500 MCG TBDP Take 1 tablet by mouth daily       FLUoxetine (PROZAC) 20 MG capsule Take 1 capsule (20 mg) by mouth every 48 hours AND 2 capsules (40 mg) every 48 hours. 135 capsule 1    Lactobacillus (ACIDOPHILUS PO) Take 1 tablet by mouth daily       levothyroxine (SYNTHROID/LEVOTHROID) 100 MCG tablet Take 1 tablet (100 mcg) by mouth every morning 90 tablet 3    Lutein 20 MG CAPS       omeprazole (PRILOSEC) 10 MG DR capsule Take 20 mg by mouth daily      Omega 3-6-9 Fatty Acids (OMEGA-3-6-9 PO) Take 1 capsule by mouth daily  (Patient not taking: Reported on 5/31/2023)           Allergies:      No Known Allergies     Social History:     Social History     Tobacco Use    Smoking status: Former     Packs/day: 1.00     Years: 15.00     Pack years: 15.00     Types: Cigarettes     Start date: 1/11/1972     Quit date:  1987     Years since quittin.5    Smokeless tobacco: Never   Vaping Use    Vaping status: Never Used     Passive vaping exposure: Yes   Substance Use Topics    Alcohol use: Yes     Comment: a beer here and there - never more than 2 -  2 times/week       History   Drug Use Unknown         Family History:     The patient's family history is notable for:    Family History   Problem Relation Age of Onset    Acute myelogenous leukemia Mother     Coronary Artery Disease Mother         bypass surgery     Osteoporosis Mother     Obesity Mother     Parkinsonism Father     Prostate Cancer Father         Diagnosed late in life in his 80's    Obesity Sister     Heart Disease Sister     Coronary Artery Disease Sister         heart attacK in 2019    Uterine Cancer Maternal Grandmother 40        Uterine versus cervical    No Known Problems Sister     Leukemia Brother     Leukemia Nephew     Coronary Artery Disease Brother         Carotid Artery Surgery    Obesity Brother     Substance Abuse Brother         alcohol         Physical Exam:     /77 (BP Location: Right arm, Patient Position: Sitting, Cuff Size: Adult Large)   Pulse 68   Temp 97.9  F (36.6  C) (Tympanic)   Resp 16   Wt 116.7 kg (257 lb 3.2 oz)   LMP  (LMP Unknown)   SpO2 99%   BMI 39.69 kg/m    Body mass index is 39.69 kg/m .    General Appearance: healthy and alert, no distress     HEENT: no thyromegaly, no palpable nodules or masses        Cardiovascular: regular rate and rhythm, no gallops, rubs or murmurs     Respiratory: lungs clear, no rales, rhonchi or wheezes, normal diaphragmatic excursion    Musculoskeletal: extremities non tender and without edema    Skin: no lesions or rashes     Neurological: normal gait, no gross defects     Psychiatric: appropriate mood and affect                               Hematological: normal cervical, supraclavicular and inguinal lymph nodes     Gastrointestinal:       abdomen soft, non-tender,  non-distended, no organomegaly or masses    Genitourinary: External genitalia and urethral meatus appears normal. Slight erythema around introitus from incontinence.  Slight Lichen sclerosis present posteriorly. Small fissure with scant amount of bleeding posterior introitus. Vagina is smooth without nodularity or masses.  Cervix surgically absent.  Bimanual exam reveal no masses, nodularity or fullness.  Recto-vaginal exam confirms these findings.      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  She completed brachytherapy 12/2019.  She is here today for a surveillance visit.     35 minutes spent on the date of the encounter doing chart review, history and exam, documentation and further activities as noted above      Plan:     1.)        Patient to RTC in 6 months for her next surveillance visit. She will continue to be seen every 6 months until 12/2024 and then annually thereafter. Reviewed recommendations from [facility] not to perform surveillance pap smears in women diagnosed with endometrial cancer as this does not improve detection of local recurrence. Reviewed signs and symptoms for when she should contact the clinic or seek additional care. Patient to contact the clinic with any questions or concerns in the interim.  Answered all of her questions to the best of my ability.     2.) Genetic risk factors were assessed and she had a loss of MLH1 and PMS2; hypermethylation of MLH1 promotor positive. MLH1 promoter methylation is typically associated with sporadic microsatellite unstable tumors of the colon/rectum or endometrium.    3.) Labs and/or tests ordered include: None      4.) Health maintenance issues addressed today include annual health maintenance and non-gynecologic issues with PCP.    5.) Discussed application of coconut oil for fissure. Discussed using Aquaphor for redness/irritation. Continue to use clobetasol for  itching and irritation. Discussed using wet washcloth to clean any urine to reduce irritation and keeping the area dry.       Perez Marina NP Student 05/31/23      I was present for the entire exam and have reviewed/edited the documentation.     ROSEMARY Lance, WHNP-BC, ANP-BC  Women's Health Nurse Practitioner  Adult Nurse Practitioner  Division of Gynecologic Oncology          CC  Patient Care Team:  Angeli Nolan MD as PCP - General (Family Practice)

## 2023-06-10 NOTE — PROGRESS NOTES
Nephrology Clinic Visit  Hanh Villalba MRN: [id number] YOB: 1955  Primary Care Provider: Angeli Nolan  ----------------------------------------------------------------------------------------------------------------------  Visit 6/14/2023:  -BP Control: No issues with this leading up to establishing care with me.  No orthostatic hypotension.   --Current Regimen: None  -DM Control: No  --Current Regimen: None  -Creatinine Trend: 1.13 (6/14/2023) from 1.2 (5/4/2023)  -Nocturia: 1x nights  -Hematuria: No  -Nephrolithiasis: No  -NSAID Use: Ibuprofen/Aleve until about 1.5 years ago. Intermittently using for back or neck pain. Maybe a few doses a week. Not very heavy use even at the worst of times (maybe about 3 days in a row at most).   -Herbal/OTC Medication Use: Just what's on her chart  -Family History of Kidney Disease: None  -Family/Friends on RRT/Kidney Transplant: No/No    -Other:  --Hx of Stage IB Grade 2 Endometrial Adenocarcinoma s/p Brachytherapy (completed 12/2019) following with Onc for surveillance. No issues and doing [id number] surveillance until 12/2024 then qYear  --Robotic hysterectomy. Further checkups have been good. No complications at the time of her hysterectomy.   --Has some pyuria. No dysuria. No frequency. Can't remember her last UTI. No fevers. Having some stress incontinence.  --Having some hip discomfort    Objective:  PAST MEDICAL HISTORY:  Past Medical History:   Diagnosis Date     Cancer (H) August 2019    Uterus removed     Depression      Depressive disorder 1999    taking prosac     Hypothyroidism      Overweight        PAST SURGICAL HISTORY:  Past Surgical History:   Procedure Laterality Date     ABDOMEN SURGERY  Oct 2019    Robotic Hysterectomy     BIOPSY  early 2000's    left breast - non event     COLONOSCOPY  within last 6 years ?    OK     COLONOSCOPY N/A 7/26/2021    Procedure: COLONOSCOPY, WITH POLYPECTOMY;  Surgeon: Ryan Butterfield MD;   Location: [facility] OR     DAVINCI HYSTERECTOMY TOTAL, BILATERAL SALPINGO-OOPHORECTOMY, NODE DISSECTION, COMBINED N/A 10/4/2019    Procedure: Robot-assisted total laparoscopic hysterectomy, Bilateral salpingectomy and Right Oophorectomy, Lysis of adhesion, Cervical Indocyanine Green injection, Bilateral sentinel lymph node dissection, Repair of vaginal laceration.;  Surgeon: Perez Reddy MD;  Location: [facility] OR     DILATION AND CURETTAGE N/A 9/12/2019    Procedure: DILATION AND CURETTAGE, UTERUS;  Surgeon: Navdeep Barajas MD;  Location: [facility] OR     OPERATIVE HYSTEROSCOPY WITH MORCELLATOR N/A 9/12/2019    Procedure: HYSTEROSCOPY WITH MORCELLATOR (MYOSURE);  Surgeon: Navdeep Barajas MD;  Location: [facility] OR     SALPINGO OOPHORECTOMY,R/L/SHANDA Left 1980s    Left ovarian with endometriois     US BREAST BIOPSY LT Left        MEDICATIONS:  Current Outpatient Medications   Medication Instructions     Apple Cider Vinegar 600 MG CAPS 1 capsule, Oral, DAILY     CALCIUM PO 20 mg, Oral, DAILY     cholecalciferol (VITAMIN D3) 5000 units TABS tablet Oral, DAILY     clobetasol (TEMOVATE) 0.05 % external ointment Apply sparingly then once or twice a week as needed     Cyanocobalamin 1500 MCG TBDP 1 tablet, Oral, DAILY     FLUoxetine (PROZAC) 20 MG capsule Take 1 capsule (20 mg) by mouth every 48 hours AND 2 capsules (40 mg) every 48 hours.     Lactobacillus (ACIDOPHILUS PO) 1 tablet, Oral, DAILY     levothyroxine (SYNTHROID/LEVOTHROID) 100 mcg, Oral, EVERY MORNING     Lutein 20 MG CAPS Oral     Omega 3-6-9 Fatty Acids (OMEGA-3-6-9 PO) 1 capsule, DAILY     omeprazole (PRILOSEC) 20 mg, Oral, DAILY       FAMILY MEDICAL HISTORY:   Family History   Problem Relation Age of Onset     Acute myelogenous leukemia Mother      Coronary Artery Disease Mother         bypass surgery 1989     Osteoporosis Mother      Obesity Mother      Parkinsonism Father      Prostate Cancer Father         Diagnosed late in life in his 80's     Obesity Sister       Heart Disease Sister      Coronary Artery Disease Sister         heart attacK in June of 2019     Uterine Cancer Maternal Grandmother 40        Uterine versus cervical     No Known Problems Sister      Leukemia Brother      Leukemia Nephew      Coronary Artery Disease Brother         Carotid Artery Surgery     Obesity Brother      Substance Abuse Brother         alcohol       PHYSICAL EXAM:   /71   Pulse 69   Temp 97.8  F (36.6  C)   Wt 117.4 kg (258 lb 14.4 oz)   LMP  (LMP Unknown)   SpO2 97%   BMI 39.95 kg/m    GENERAL APPEARANCE: alert and no distress  MS: no evidence of inflammation in joints, no muscle tenderness  SKIN: no rash  NEURO: mentation intact and speech normal  PSYCH: affect normal    LABS REVIEWED BY ME:   ANEMIA  Recent Labs   Lab Test 11/30/22  1148 03/15/21  0847 09/26/19  1221   HGB 12.3 12.8 12.7       BMP  Recent Labs   Lab Test 05/04/23  1334 12/12/22  0954 11/07/22  1250 09/13/22  1553 06/25/21  0715 07/17/20  0840 09/26/19  1221   NA  --  140 141 139 139   < > 139   POTASSIUM  --  4.5 4.8 4.7 4.1   < > 4.4   CHLORIDE  --  105 107 110* 108   < > 106   CO2  --  26 30 30 28   < > 28   ANIONGAP  --  9 4 <1* 3   < > 5   BUN  --  15.6 15 17 20   < > 18   CR 1.27* 1.10* 1.25* 1.07* 1.04   < > 1.04   GFRESTIMATED 46* 54* 46* 56* 55*   < > 56*   PROTTOTAL  --   --   --   --   --   --  7.1    < > = values in this interval not displayed.       CBC  Recent Labs   Lab Test 11/30/22  1148 03/15/21  0847 09/26/19  1221   HGB 12.3 12.8 12.7   WBC  --  7.6 8.0   HCT  --  39.6 39.0   MCV  --  97 98   PLT  --  202 241       DIABETESNo lab results found.    HYPONATREMIANo lab results found.    Invalid input(s): UOSM, OSM    MBD  Recent Labs   Lab Test 05/04/23  1334 12/12/22  0954 11/30/22  1148 11/07/22  1250 09/13/22  1553 07/17/20  0840 09/26/19  1221   SHANDRA 10.3* 11.1*  --  10.4* 10.4*   < > 10.4*   ALBUMIN 4.4  --   --   --   --   --  4.0   PHOS 2.9  --  3.0  --   --   --   --    PTHI 74*   --  86*  --   --   --   --     < > = values in this interval not displayed.        URINE STUDIES  Recent Labs   Lab Test 11/05/19  1746 09/20/19 2006   COLOR Yellow Yellow   APPEARANCE Clear Cloudy   URINEGLC Negative Negative   URINEBILI Negative Negative   URINEKETONE Negative Negative   SG >1.030 1.025   UBLD Small* Large*   URINEPH 5.5 6.0   PROTEIN Negative 100*   UROBILINOGEN 0.2 0.2   NITRITE Negative Positive*   LEUKEST Small* Large*   RBCU O - 2 25-50*   WBCU 5-10* >100*     No lab results found.    ADDITIONAL LABS ORDERED/REVIEWED BY ME:  See below    Assessment/Plan  CKD Stage G3aA1  Established care with [facility] 6/14/2023    Baseline creatinine appears to be 1-1.2 dating back to at least 2008. Prior to this was 0.8-0.9 in 2005. Unclear what happened between 2005 and 2008 to result in creatinine increase but she has overal been remarkably stable over the years since. Did have one creatinine of 1.4 but this was not sustained. UA 6/2023 w/ no hematuira. Did have some pyuria but denies any UTI symptoms and her clinic history at the time of establishing care with me is not consistent with AIN/CECILIA (also her UA had 8 squamous cells so wasn't a perfect collection), UPCR 6/2023 w/ 0.11g/gr. CT Abd/Pelvis 6/2021 w/o hydro, stones, or other obvious renal issues.     Borderline hypercalcemia present since 7/2018 as well. Typically in the low 10's although did have a calcium of 11.1 on 12/12/2022. Prior to this appears calcium was normal in the mid 9's typically.     Hypercalcemia workup:  -Albumin 4.4 5/4/2023   -25-OH vitamin D 57 5/4/2023  -24 hour urine calcium/creatinine 12/2022: 0.22g/day calcium, 3.35L UOP, 14mg/kg/day creatinine excretion in sample  -PTH 74 5/4/2023  -Phos 2.9 5/4/2023  -Serum Calcium 10.3 5/4/2023    Saw Endo 5/11/2023. Discussion regarding Primary HyperPara and possible surgical intervention. Plan for close monitoring for now (as of 6/10/2023 leading up to establishing care with  me)    CKD Etiology (Biopsy Proven: No):  -Unclear etiology of mild CKD dating back to 2008. Stable since.   -Some small contribution of NSAID use is likely, but not our main  of mild CKD  -No significant history of hypertension/hypotension per her report. Not currently on any Anti-HTN  -No clinical or objective evidence of obstructive nephropathy    Plan:  -Will continue to monitor things intermittently. Her renal function has been incredibly stable over the years which is great. At our next appt we will get a creatinine and cystatin-C given her CKD G3aA1 (KDIGO Guidelines). Otherwise, continue optimal supportive care (avoid hypo/hypertension, avoid NSAIDs as much as possible, keep up with general health measures like vaccinations, avoid sick contacts, and monitor for any UTI/Obstructive symptoms  -No indication for Losartan or Jardiance at this time. Continue to intermittently monitor proteinuria and eGFR.  -Remainder per problem below      Anemia of Renal Disease  Hemoglobin: 13.2 (6/14/2023)  Ferritin:  TSAT:    Plan:  -No need for IV iron or EPO      Lipid Management in CKD  KDIGO 2013 Guidelines recommend the following for patients with CKD:  Adults >/= 49 yo w/ CKD stage 1 or 2: Statin  Adults >/= 49 yo w/ CKD stage 3-5: Statin + Ezetimibe or Statin alone  Adults 18-49 + 1 of the following (CAD, DM, Ischemic stroke, 10 yr ASCVD risk >10%): Statin    KDIGO guidelines recommend Simvastatin 20mg/Ezetimibe 10mg qDay for patients with CKD G3a-G5 (in line with the SHARP trial). If Simvastatin used alone, 40mg qDay is referenced.   Other options include: Atorvastatin 20mg qDay (4D trial) and Rosuvastatin 10mg qDay (ED trial)    Plan:  -Not interested in statin at this time.       Metabolic Acidosis of Renal Disease  Bicarb: 28    Plan:  -No need for NaHCO at this time      Mineral Bone Disease  PTH: 74 (5/4/2023)  Phos: 2.4 (6/14/2023)  Calcium: 10.3 (6/14/2023)  Vitamin D: 57  (5/4/2023)    Plan:  -Following with Endo for primary hyperpara  -No need for phos binder at thsi time    Other:  -Specialty Care Coordination Referral - CKD G1-G3b w/o imminent RRT planning/needs (Yes/no, Date Referral Placed): No  -Med Therapy Management Referral (Yes/No, Date of Referral): No    Return to clinic: 6 months    Marcos Thomas MD   of Medicine  Division of Nephrology and Hypertension  [facility]    45 minutes spent on the date of the encounter doing chart review, history and exam, documentation and further activities as noted above

## 2023-06-14 ENCOUNTER — LAB (OUTPATIENT)
Dept: LAB | Facility: CLINIC | Age: 68
End: 2023-06-14
Payer: COMMERCIAL

## 2023-06-14 ENCOUNTER — PRE VISIT (OUTPATIENT)
Dept: NEPHROLOGY | Facility: CLINIC | Age: 68
End: 2023-06-14

## 2023-06-14 ENCOUNTER — OFFICE VISIT (OUTPATIENT)
Dept: NEPHROLOGY | Facility: CLINIC | Age: 68
End: 2023-06-14
Attending: STUDENT IN AN ORGANIZED HEALTH CARE EDUCATION/TRAINING PROGRAM
Payer: COMMERCIAL

## 2023-06-14 VITALS
BODY MASS INDEX: 39.95 KG/M2 | WEIGHT: 258.9 LBS | OXYGEN SATURATION: 97 % | SYSTOLIC BLOOD PRESSURE: 111 MMHG | TEMPERATURE: 97.8 F | DIASTOLIC BLOOD PRESSURE: 71 MMHG | HEART RATE: 69 BPM

## 2023-06-14 DIAGNOSIS — N18.31 STAGE 3A CHRONIC KIDNEY DISEASE (H): ICD-10-CM

## 2023-06-14 DIAGNOSIS — E83.52 SERUM CALCIUM ELEVATED: ICD-10-CM

## 2023-06-14 LAB
ALBUMIN MFR UR ELPH: 7.3 MG/DL
ALBUMIN SERPL BCG-MCNC: 4.1 G/DL (ref 3.5–5.2)
ALBUMIN UR-MCNC: NEGATIVE MG/DL
ANION GAP SERPL CALCULATED.3IONS-SCNC: 6 MMOL/L (ref 7–15)
APPEARANCE UR: CLEAR
BILIRUB UR QL STRIP: NEGATIVE
BUN SERPL-MCNC: 18.1 MG/DL (ref 8–23)
CALCIUM SERPL-MCNC: 10.3 MG/DL (ref 8.8–10.2)
CHLORIDE SERPL-SCNC: 106 MMOL/L (ref 98–107)
COLOR UR AUTO: ABNORMAL
CREAT SERPL-MCNC: 1.13 MG/DL (ref 0.51–0.95)
CREAT UR-MCNC: 66.3 MG/DL
DEPRECATED HCO3 PLAS-SCNC: 28 MMOL/L (ref 22–29)
ERYTHROCYTE [DISTWIDTH] IN BLOOD BY AUTOMATED COUNT: 12.9 % (ref 10–15)
GFR SERPL CREATININE-BSD FRML MDRD: 52 ML/MIN/1.73M2
GLUCOSE SERPL-MCNC: 98 MG/DL (ref 70–99)
GLUCOSE UR STRIP-MCNC: NEGATIVE MG/DL
HCT VFR BLD AUTO: 40.6 % (ref 35–47)
HGB BLD-MCNC: 13.2 G/DL (ref 11.7–15.7)
HGB UR QL STRIP: NEGATIVE
KETONES UR STRIP-MCNC: NEGATIVE MG/DL
LEUKOCYTE ESTERASE UR QL STRIP: ABNORMAL
MCH RBC QN AUTO: 31.4 PG (ref 26.5–33)
MCHC RBC AUTO-ENTMCNC: 32.5 G/DL (ref 31.5–36.5)
MCV RBC AUTO: 96 FL (ref 78–100)
MUCOUS THREADS #/AREA URNS LPF: PRESENT /LPF
NITRATE UR QL: NEGATIVE
PH UR STRIP: 6.5 [PH] (ref 5–7)
PHOSPHATE SERPL-MCNC: 2.4 MG/DL (ref 2.5–4.5)
PLATELET # BLD AUTO: 220 10E3/UL (ref 150–450)
POTASSIUM SERPL-SCNC: 4.5 MMOL/L (ref 3.4–5.3)
PROT/CREAT 24H UR: 0.11 MG/MG CR (ref 0–0.2)
RBC # BLD AUTO: 4.21 10E6/UL (ref 3.8–5.2)
RBC URINE: 0 /HPF
RENAL TUB EPI: <1 /HPF
SODIUM SERPL-SCNC: 140 MMOL/L (ref 136–145)
SP GR UR STRIP: 1.01 (ref 1–1.03)
SQUAMOUS EPITHELIAL: 8 /HPF
UROBILINOGEN UR STRIP-MCNC: NORMAL MG/DL
WBC # BLD AUTO: 5 10E3/UL (ref 4–11)
WBC URINE: 27 /HPF

## 2023-06-14 PROCEDURE — 80069 RENAL FUNCTION PANEL: CPT | Performed by: PATHOLOGY

## 2023-06-14 PROCEDURE — G0463 HOSPITAL OUTPT CLINIC VISIT: HCPCS | Performed by: STUDENT IN AN ORGANIZED HEALTH CARE EDUCATION/TRAINING PROGRAM

## 2023-06-14 PROCEDURE — 84156 ASSAY OF PROTEIN URINE: CPT | Performed by: PATHOLOGY

## 2023-06-14 PROCEDURE — 99204 OFFICE O/P NEW MOD 45 MIN: CPT | Performed by: STUDENT IN AN ORGANIZED HEALTH CARE EDUCATION/TRAINING PROGRAM

## 2023-06-14 PROCEDURE — 36415 COLL VENOUS BLD VENIPUNCTURE: CPT | Performed by: PATHOLOGY

## 2023-06-14 PROCEDURE — 81001 URINALYSIS AUTO W/SCOPE: CPT | Performed by: PATHOLOGY

## 2023-06-14 PROCEDURE — 85027 COMPLETE CBC AUTOMATED: CPT | Performed by: PATHOLOGY

## 2023-06-14 ASSESSMENT — PAIN SCALES - GENERAL: PAINLEVEL: NO PAIN (0)

## 2023-06-14 NOTE — NURSING NOTE
Chief Complaint   Patient presents with     Consult     New pt consult.     Blood pressure 111/71, pulse 69, temperature 97.8  F (36.6  C), weight 117.4 kg (258 lb 14.4 oz), SpO2 97 %, not currently breastfeeding.    DANDY REYES

## 2023-06-14 NOTE — PATIENT INSTRUCTIONS
"It was a pleasure to see you in nephrology clinic today. I've included a brief summary of our discussion and care plan from today's visit below.  _______________________________________________________________________    My recommendations are summarized as follows:  -Keep a Blood Pressure log. Please make sure that you are using a validated blood pressure device (check \"www.validatebp.org\").  -Avoid all NSAID's. Examples include Ibuprofen (Advil, Motrin), naprosyn (Aleve), celebrex among others. Acetaminophen (Tylenol) is ok with maximum dose in 24 hours of 3000mg.  -Healthy lifestyle measures will keep your kidney's functioning at their current best. This includes regular exercise, maintaining a healthy body weight and smoking cessation.   -Your kidney function is hovering around the low 50% range as of our recent checks (and actually since about 2008). Estimated Glomerular Filtration Rate is about 52 (representing about 52% of kidney function at the time of your lab draw).  -No need for other medication changes at this time. We will check in on your kidney function in about 6 months  -Please try to reduce NSAID use as much as possible  -My general guidelines for fluid intake is about 60 ounces per day.  -You do not currently need the kidney protective medications (such as Jardiance or Losartan) but this is something I will continue to watch for.   -I do think your kidney, calcium, and parathyroid connection is important, but right now I agree with your Endocrinologist that watching and monitoring is the right move.     Please return to Nephrology Clinic in 6 months to review your progress.     Who do I call with any questions after my visit?  There are multiple ways to contact your nephrology care team:    -To schedule or reschedule an appointment, please call [phone].  -Reach out via [employer]. These messages are answered by your nurse or doctor during business hours and typically in 1-2 days. [employer]hart " messages are best for quick questions/clarifications/updates. Frequently, your doctor or nurse will recommend setting up a follow up appointment to address any significant questions/concerns.  -For urgent questions after business hours, you may reach the on-call Nephrology Fellow by contacting the [facility]  at [phone].    To schedule imaging:   -Please call [phone]     To schedule your lab appointment at the [facility]:  -Please call [phone]    Sincerely,    Dr. aMrcos Thomas   of Medicine  Division of Nephrology and Hypertension  [facility]

## 2023-06-14 NOTE — LETTER
6/14/2023       RE: Hanh Villalba  [address]  [place] [address]     Dear Colleague,    Thank you for referring your patient, Hanh Villalba, to the [facility] [place] at [facility]. Please see a copy of my visit note below.        Nephrology Clinic Visit  Hanh Villalba MRN: [id number] YOB: 1955  Primary Care Provider: Angeli Nolan  ----------------------------------------------------------------------------------------------------------------------  Visit 6/14/2023:  -BP Control: No issues with this leading up to establishing care with me.  No orthostatic hypotension.   --Current Regimen: None  -DM Control: No  --Current Regimen: None  -Creatinine Trend: 1.13 (6/14/2023) from 1.2 (5/4/2023)  -Nocturia: 1x nights  -Hematuria: No  -Nephrolithiasis: No  -NSAID Use: Ibuprofen/Aleve until about 1.5 years ago. Intermittently using for back or neck pain. Maybe a few doses a week. Not very heavy use even at the worst of times (maybe about 3 days in a row at most).   -Herbal/OTC Medication Use: Just what's on her chart  -Family History of Kidney Disease: None  -Family/Friends on RRT/Kidney Transplant: No/No    -Other:  --Hx of Stage IB Grade 2 Endometrial Adenocarcinoma s/p Brachytherapy (completed 12/2019) following with Onc for surveillance. No issues and doing x0Lninl surveillance until 12/2024 then qYear  --Robotic hysterectomy. Further checkups have been good. No complications at the time of her hysterectomy.   --Has some pyuria. No dysuria. No frequency. Can't remember her last UTI. No fevers. Having some stress incontinence.  --Having some hip discomfort    Objective:  PAST MEDICAL HISTORY:  Past Medical History:   Diagnosis Date     Cancer (H) August 2019    Uterus removed     Depression      Depressive disorder 1999    taking prosac     Hypothyroidism      Overweight        PAST SURGICAL  HISTORY:  Past Surgical History:   Procedure Laterality Date     ABDOMEN SURGERY  Oct 2019    Robotic Hysterectomy     BIOPSY  early 2000's    left breast - non event     COLONOSCOPY  within last 6 years ?    OK     COLONOSCOPY N/A 7/26/2021    Procedure: COLONOSCOPY, WITH POLYPECTOMY;  Surgeon: Ryan Butterfield MD;  Location: [facility] OR     DAVINCI HYSTERECTOMY TOTAL, BILATERAL SALPINGO-OOPHORECTOMY, NODE DISSECTION, COMBINED N/A 10/4/2019    Procedure: Robot-assisted total laparoscopic hysterectomy, Bilateral salpingectomy and Right Oophorectomy, Lysis of adhesion, Cervical Indocyanine Green injection, Bilateral sentinel lymph node dissection, Repair of vaginal laceration.;  Surgeon: Perez Reddy MD;  Location: [facility] OR     DILATION AND CURETTAGE N/A 9/12/2019    Procedure: DILATION AND CURETTAGE, UTERUS;  Surgeon: Navdeep Barajas MD;  Location: [facility] OR     OPERATIVE HYSTEROSCOPY WITH MORCELLATOR N/A 9/12/2019    Procedure: HYSTEROSCOPY WITH MORCELLATOR (MYOSURE);  Surgeon: Navdeep Barajas MD;  Location: [facility] OR     SALPINGO OOPHORECTOMY,R/L/SHANDA Left 1980s    Left ovarian with endometriois     US BREAST BIOPSY LT Left        MEDICATIONS:  Current Outpatient Medications   Medication Instructions     Apple Cider Vinegar 600 MG CAPS 1 capsule, Oral, DAILY     CALCIUM PO 20 mg, Oral, DAILY     cholecalciferol (VITAMIN D3) 5000 units TABS tablet Oral, DAILY     clobetasol (TEMOVATE) 0.05 % external ointment Apply sparingly then once or twice a week as needed     Cyanocobalamin 1500 MCG TBDP 1 tablet, Oral, DAILY     FLUoxetine (PROZAC) 20 MG capsule Take 1 capsule (20 mg) by mouth every 48 hours AND 2 capsules (40 mg) every 48 hours.     Lactobacillus (ACIDOPHILUS PO) 1 tablet, Oral, DAILY     levothyroxine (SYNTHROID/LEVOTHROID) 100 mcg, Oral, EVERY MORNING     Lutein 20 MG CAPS Oral     Omega 3-6-9 Fatty Acids (OMEGA-3-6-9 PO) 1 capsule, DAILY     omeprazole (PRILOSEC) 20 mg, Oral, DAILY       FAMILY  MEDICAL HISTORY:   Family History   Problem Relation Age of Onset     Acute myelogenous leukemia Mother      Coronary Artery Disease Mother         bypass surgery 1989     Osteoporosis Mother      Obesity Mother      Parkinsonism Father      Prostate Cancer Father         Diagnosed late in life in his 80's     Obesity Sister      Heart Disease Sister      Coronary Artery Disease Sister         heart attacK in June of 2019     Uterine Cancer Maternal Grandmother 40        Uterine versus cervical     No Known Problems Sister      Leukemia Brother      Leukemia Nephew      Coronary Artery Disease Brother         Carotid Artery Surgery     Obesity Brother      Substance Abuse Brother         alcohol       PHYSICAL EXAM:   /71   Pulse 69   Temp 97.8  F (36.6  C)   Wt 117.4 kg (258 lb 14.4 oz)   LMP  (LMP Unknown)   SpO2 97%   BMI 39.95 kg/m    GENERAL APPEARANCE: alert and no distress  MS: no evidence of inflammation in joints, no muscle tenderness  SKIN: no rash  NEURO: mentation intact and speech normal  PSYCH: affect normal    LABS REVIEWED BY ME:   ANEMIA  Recent Labs   Lab Test 11/30/22  1148 03/15/21  0847 09/26/19  1221   HGB 12.3 12.8 12.7       BMP  Recent Labs   Lab Test 05/04/23  1334 12/12/22  0954 11/07/22  1250 09/13/22  1553 06/25/21  0715 07/17/20  0840 09/26/19  1221   NA  --  140 141 139 139   < > 139   POTASSIUM  --  4.5 4.8 4.7 4.1   < > 4.4   CHLORIDE  --  105 107 110* 108   < > 106   CO2  --  26 30 30 28   < > 28   ANIONGAP  --  9 4 <1* 3   < > 5   BUN  --  15.6 15 17 20   < > 18   CR 1.27* 1.10* 1.25* 1.07* 1.04   < > 1.04   GFRESTIMATED 46* 54* 46* 56* 55*   < > 56*   PROTTOTAL  --   --   --   --   --   --  7.1    < > = values in this interval not displayed.       CBC  Recent Labs   Lab Test 11/30/22  1148 03/15/21  0847 09/26/19  1221   HGB 12.3 12.8 12.7   WBC  --  7.6 8.0   HCT  --  39.6 39.0   MCV  --  97 98   PLT  --  202 241       DIABETESNo lab results found.    HYPONATREMIANo  lab results found.    Invalid input(s): UOSM, OSM    MBD  Recent Labs   Lab Test 05/04/23  1334 12/12/22  0954 11/30/22  1148 11/07/22  1250 09/13/22  1553 07/17/20  0840 09/26/19  1221   SHANDRA 10.3* 11.1*  --  10.4* 10.4*   < > 10.4*   ALBUMIN 4.4  --   --   --   --   --  4.0   PHOS 2.9  --  3.0  --   --   --   --    PTHI 74*  --  86*  --   --   --   --     < > = values in this interval not displayed.        URINE STUDIES  Recent Labs   Lab Test 11/05/19  1746 09/20/19 2006   COLOR Yellow Yellow   APPEARANCE Clear Cloudy   URINEGLC Negative Negative   URINEBILI Negative Negative   URINEKETONE Negative Negative   SG >1.030 1.025   UBLD Small* Large*   URINEPH 5.5 6.0   PROTEIN Negative 100*   UROBILINOGEN 0.2 0.2   NITRITE Negative Positive*   LEUKEST Small* Large*   RBCU O - 2 25-50*   WBCU 5-10* >100*     No lab results found.    ADDITIONAL LABS ORDERED/REVIEWED BY ME:  See below    Assessment/Plan  CKD Stage G3aA1  Established care with [facility] 6/14/2023    Baseline creatinine appears to be 1-1.2 dating back to at least 2008. Prior to this was 0.8-0.9 in 2005. Unclear what happened between 2005 and 2008 to result in creatinine increase but she has overal been remarkably stable over the years since. Did have one creatinine of 1.4 but this was not sustained. UA 6/2023 w/ no hematuira. Did have some pyuria but denies any UTI symptoms and her clinic history at the time of establishing care with me is not consistent with AIN/CECILIA (also her UA had 8 squamous cells so wasn't a perfect collection), UPCR 6/2023 w/ 0.11g/gr. CT Abd/Pelvis 6/2021 w/o hydro, stones, or other obvious renal issues.     Borderline hypercalcemia present since 7/2018 as well. Typically in the low 10's although did have a calcium of 11.1 on 12/12/2022. Prior to this appears calcium was normal in the mid 9's typically.     Hypercalcemia workup:  -Albumin 4.4 5/4/2023   -25-OH vitamin D 57 5/4/2023  -24 hour urine calcium/creatinine 12/2022:  0.22g/day calcium, 3.35L UOP, 14mg/kg/day creatinine excretion in sample  -PTH 74 5/4/2023  -Phos 2.9 5/4/2023  -Serum Calcium 10.3 5/4/2023    Saw Endo 5/11/2023. Discussion regarding Primary HyperPara and possible surgical intervention. Plan for close monitoring for now (as of 6/10/2023 leading up to establishing care with me)    CKD Etiology (Biopsy Proven: No):  -Unclear etiology of mild CKD dating back to 2008. Stable since.   -Some small contribution of NSAID use is likely, but not our main  of mild CKD  -No significant history of hypertension/hypotension per her report. Not currently on any Anti-HTN  -No clinical or objective evidence of obstructive nephropathy    Plan:  -Will continue to monitor things intermittently. Her renal function has been incredibly stable over the years which is great. At our next appt we will get a creatinine and cystatin-C given her CKD G3aA1 (KDIGO Guidelines). Otherwise, continue optimal supportive care (avoid hypo/hypertension, avoid NSAIDs as much as possible, keep up with general health measures like vaccinations, avoid sick contacts, and monitor for any UTI/Obstructive symptoms  -No indication for Losartan or Jardiance at this time. Continue to intermittently monitor proteinuria and eGFR.  -Remainder per problem below      Anemia of Renal Disease  Hemoglobin: 13.2 (6/14/2023)  Ferritin:  TSAT:    Plan:  -No need for IV iron or EPO      Lipid Management in CKD  KDIGO 2013 Guidelines recommend the following for patients with CKD:  Adults >/= 51 yo w/ CKD stage 1 or 2: Statin  Adults >/= 51 yo w/ CKD stage 3-5: Statin + Ezetimibe or Statin alone  Adults 18-49 + 1 of the following (CAD, DM, Ischemic stroke, 10 yr ASCVD risk >10%): Statin    KDIGO guidelines recommend Simvastatin 20mg/Ezetimibe 10mg qDay for patients with CKD G3a-G5 (in line with the SHARP trial). If Simvastatin used alone, 40mg qDay is referenced.   Other options include: Atorvastatin 20mg qDay (4D trial)  and Rosuvastatin 10mg qDay (ED trial)    Plan:  -Not interested in statin at this time.       Metabolic Acidosis of Renal Disease  Bicarb: 28    Plan:  -No need for NaHCO at this time      Mineral Bone Disease  PTH: 74 (5/4/2023)  Phos: 2.4 (6/14/2023)  Calcium: 10.3 (6/14/2023)  Vitamin D: 57 (5/4/2023)    Plan:  -Following with Endo for primary hyperpara  -No need for phos binder at thsi time    Other:  -Specialty Care Coordination Referral - CKD G1-G3b w/o imminent RRT planning/needs (Yes/no, Date Referral Placed): No  -Med Therapy Management Referral (Yes/No, Date of Referral): No    Return to clinic: 6 months    Marcos Thomas MD   of Medicine  Division of Nephrology and Hypertension  [facility]    45 minutes spent on the date of the encounter doing chart review, history and exam, documentation and further activities as noted above      Again, thank you for allowing me to participate in the care of your patient.      Sincerely,    Marcos Thomas MD

## 2023-06-20 ASSESSMENT — ACTIVITIES OF DAILY LIVING (ADL)
JUMPING: EXTREME DIFFICULTY
HOW_WOULD_YOU_RATE_YOUR_CURRENT_LEVEL_OF_FUNCTION?: ABNORMAL
HOS_ADL_ITEM_SCORE_TOTAL: 37
WALKING_15_MINUTES_OR_GREATER: EXTREME DIFFICULTY
WALKING_15_MINUTES_OR_GREATER: EXTREME DIFFICULTY
SPORTS_SCORE(%): 0
LANDING: MODERATE DIFFICULTY
SWINGING_OBJECTS_LIKE_A_GOLF_CLUB: MODERATE DIFFICULTY
STANDING FOR 15 MINUTES: SLIGHT DIFFICULTY
CUTTING/LATERAL_MOVEMENTS: MODERATE DIFFICULTY
ABILITY_TO_PERFORM_ACTIVITY_WITH_YOUR_NORMAL_TECHNIQUE: MODERATE DIFFICULTY
HEAVY_WORK: EXTREME DIFFICULTY
HOW_WOULD_YOU_RATE_YOUR_CURRENT_LEVEL_OF_FUNCTION_DURING_YOUR_USUAL_ACTIVITIES_OF_DAILY_LIVING_FROM_0_TO_100_WITH_100_BEING_YOUR_LEVEL_OF_FUNCTION_PRIOR_TO_YOUR_HIP_PROBLEM_AND_0_BEING_THE_INABILITY_TO_PERFORM_ANY_OF_YOUR_USUAL_DAILY_ACTIVITIES?: 77
SPORTS_TOTAL_ITEM_SCORE: 0
GETTING INTO AND OUT OF AN AVERAGE CAR: SLIGHT DIFFICULTY
LIGHT_TO_MODERATE_WORK: MODERATE DIFFICULTY
GOING UP 1 FLIGHT OF STAIRS: MODERATE DIFFICULTY
HEAVY_WORK: EXTREME DIFFICULTY
SPORTS_HIGHEST_POTENTIAL_SCORE: 36
TWISTING/PIVOTING ON INVOLVED LEG: SLIGHT DIFFICULTY
WALKING_INITIALLY: NO DIFFICULTY AT ALL
PUTTING ON SOCKS AND SHOES: SLIGHT DIFFICULTY
RUNNING_ONE_MILE: UNABLE TO DO
ADL_HIGHEST_POTENTIAL_SCORE: 64
SITTING_FOR_15_MINUTES: NO DIFFICULTY AT ALL
HOS_ADL_SCORE(%): 66.07
SPORTS_COUNT: 9
ADL_COUNT: 16
ROLLING_OVER_IN_BED: SLIGHT DIFFICULTY
GOING_UP_1_FLIGHT_OF_STAIRS: MODERATE DIFFICULTY
WALKING_UP_STEEP_HILLS: SLIGHT DIFFICULTY
WALKING_FOR_APPROXIMATELY_10_MINUTES: MODERATE DIFFICULTY
WALKING_UP_STEEP_HILLS: SLIGHT DIFFICULTY
STEPPING_UP_AND_DOWN_CURBS: SLIGHT DIFFICULTY
GETTING_INTO_AND_OUT_OF_AN_AVERAGE_CAR: SLIGHT DIFFICULTY
PLEASE_INDICATE_YOR_PRIMARY_REASON_FOR_REFERRAL_TO_THERAPY:: HIP
STANDING_FOR_15_MINUTES: SLIGHT DIFFICULTY
LIGHT_TO_MODERATE_WORK: MODERATE DIFFICULTY
STARTING_AND_STOPPING_QUICKLY: MODERATE DIFFICULTY
ADL_TOTAL_ITEM_SCORE: INCOMPLETE
GOING_DOWN_1_FLIGHT_OF_STAIRS: SLIGHT DIFFICULTY
WALKING_DOWN_STEEP_HILLS: NO DIFFICULTY AT ALL
GOING DOWN 1 FLIGHT OF STAIRS: SLIGHT DIFFICULTY
ADL_SCORE(%): INCOMPLETE
SITTING FOR 15 MINUTES: NO DIFFICULTY AT ALL
HOW_WOULD_YOU_RATE_YOUR_CURRENT_LEVEL_OF_FUNCTION_DURING_YOUR_USUAL_ACTIVITIES_OF_DAILY_LIVING_FROM_0_TO_100_WITH_100_BEING_YOUR_LEVEL_OF_FUNCTION_PRIOR_TO_YOUR_HIP_PROBLEM_AND_0_BEING_THE_INABILITY_TO_PERFORM_ANY_OF_YOUR_USUAL_DAILY_ACTIVITIES?: 77
HOS_ADL_HIGHEST_POTENTIAL_SCORE: 56
WALKING_DOWN_STEEP_HILLS: NO DIFFICULTY AT ALL
TWISTING/PIVOTING_ON_INVOLVED_LEG: SLIGHT DIFFICULTY
WALKING_INITIALLY: NO DIFFICULTY AT ALL
LOW_IMPACT_ACTIVITIES_LIKE_FAST_WALKING: SLIGHT DIFFICULTY
ROLLING OVER IN BED: SLIGHT DIFFICULTY
STEPPING UP AND DOWN CURBS: SLIGHT DIFFICULTY
PUTTING_ON_SOCKS_AND_SHOES: SLIGHT DIFFICULTY
WALKING_APPROXIMATELY_10_MINUTES: MODERATE DIFFICULTY

## 2023-06-21 ENCOUNTER — THERAPY VISIT (OUTPATIENT)
Dept: PHYSICAL THERAPY | Facility: CLINIC | Age: 68
End: 2023-06-21
Attending: NURSE PRACTITIONER
Payer: COMMERCIAL

## 2023-06-21 DIAGNOSIS — M25.551 RIGHT HIP PAIN: ICD-10-CM

## 2023-06-21 PROCEDURE — 97110 THERAPEUTIC EXERCISES: CPT | Mod: GP | Performed by: PHYSICAL THERAPIST

## 2023-06-21 PROCEDURE — 97161 PT EVAL LOW COMPLEX 20 MIN: CPT | Mod: GP | Performed by: PHYSICAL THERAPIST

## 2023-06-21 NOTE — PROGRESS NOTES
PHYSICAL THERAPY EVALUATION  Type of Visit: Evaluation    See electronic medical record for Abuse and Falls Screening details.    Subjective      Presenting condition or subjective complaint: Hip Pain  Date of onset: 06/21/23 (date of MD order, onset years ago)    Relevant medical history: Arthritis; Cancer; Depression; Incontinence; Kidney disease; Menopause; Neck injury; Overweight; Radiation treatment; Smoking; Thyroid problems   Dates & types of surgery: Oophorectomy 80's, Hysterectomy 2019    Prior diagnostic imaging/testing results: X-ray; Bone scan     Prior therapy history for the same diagnosis, illness or injury: No        Living Environment  Social support: With a significant other or spouse   Type of home: House; 2-story; Basement   Stairs to enter the home: Yes 1 Is there a railing: Yes   Ramp: No   Stairs inside the home: Yes 14 Is there a railing: Yes   Help at home: None  Equipment owned:       Employment: No    Hobbies/Interests: gardening, racquetball, scuba, travel    Patient goals for therapy: Normal function    Pain assessment: Pain present     Objective   HIP EVALUATION  PAIN: Pain Level at Rest: 0/10  Pain Level with Use: 10/10  Pain Location: hip  Pain Quality: Aching  Pain Frequency: intermittent  Pain is Worst: daytime  Pain is Exacerbated By: lifting, gardening, stairs  Pain is Relieved By: NSAIDs and rest  Pain Progression: Worsened    GAIT:   Weightbearing Status: WBAT  Assistive Device(s): None  Gait Deviations: Trendelenberg R  BALANCE/PROPRIOCEPTION: Single Leg Stance Eyes Open (seconds): <2  WEIGHTBEARING ALIGNMENT: WNL  NON-WEIGHTBEARING ALIGNMENT: WNL   ROM:   (Degrees) Left AROM Left PROM  Right AROM Right PROM   Hip Flexion  100  100   Hip Extension       Hip Abduction  60  60   Hip Adduction       Hip Internal Rotation  30  30   Hip External Rotation  60  60   Knee Flexion  130  130   Knee Extension  0  0   \  STRENGTH:   Pain: - none + mild ++ moderate +++ severe  Strength  Scale: 0-5/5 Left Right   Hip Flexion 5 5   Hip Abduction 4+ 4       SPECIAL TESTS:    Left Right   CHAO Positive Positive   FADIR/Labrum/ADITYA Positive Positive       PALPATION: GT bursa, glute med tendons B      Assessment & Plan   CLINICAL IMPRESSIONS   Medical Diagnosis: Hip pain    Treatment Diagnosis: Hip Pain   Impression/Assessment: Patient is a 67 year old female with hip complaints.  The following significant findings have been identified: Pain, Decreased ROM/flexibility, Decreased strength, Impaired balance, Decreased proprioception, Impaired gait, Impaired muscle performance and Decreased activity tolerance. These impairments interfere with their ability to perform self care tasks, recreational activities, household chores, household mobility and community mobility as compared to previous level of function.     Clinical Decision Making (Complexity):   Clinical Presentation: Stable/Uncomplicated  Clinical Presentation Rationale: based on medical and personal factors listed in PT evaluation  Clinical Decision Making (Complexity): Low complexity    PLAN OF CARE  Treatment Interventions:  Interventions: Gait Training, Manual Therapy, Neuromuscular Re-education, Therapeutic Activity, Therapeutic Exercise, Self-Care/Home Management    Long Term Goals     PT Goal 1  Goal Description: Walk 30 minutes  Rationale: to maximize safety and independence with performance of ADLs and functional tasks;to maximize safety and independence within the home;to maximize safety and independence within the community;to maximize safety and independence with transportation  Target Date: 08/21/23      Frequency of Treatment: 2xmonth  Duration of Treatment: 3 months    Recommended Referrals to Other Professionals: Physical Therapy  Education Assessment:        Risks and benefits of evaluation/treatment have been explained.   Patient/Family/caregiver agrees with Plan of Care.     Evaluation Time:     PT Jaylin, Low Complexity Minutes  (33719): 15      Signing Clinician: Jill Callejas PT      [facility]                                                                                   OUTPATIENT PHYSICAL THERAPY      PLAN OF TREATMENT FOR OUTPATIENT REHABILITATION   Patient's Last Name, First Name, Hanh Gilmore YOB: 1955   Provider's Name   [facility]   Medical Record No.  [id number]     Onset Date: 06/21/23 (date of MD order, onset years ago)  Start of Care Date: 06/21/23     Medical Diagnosis:  Hip pain      PT Treatment Diagnosis:  Hip Pain Plan of Treatment  Frequency/Duration: 2xmonth/ 3 months    Certification date from 06/21/23 to 09/20/23         See note for plan of treatment details and functional goals     Jill Callejas PT                         I CERTIFY THE NEED FOR THESE SERVICES FURNISHED UNDER        THIS PLAN OF TREATMENT AND WHILE UNDER MY CARE     (Physician attestation of this document indicates review and certification of the therapy plan).                  Referring Provider:  Amina Garcia      Initial Assessment  See Epic Evaluation- Start of Care Date: 06/21/23

## 2023-06-22 PROBLEM — M25.551 RIGHT HIP PAIN: Status: ACTIVE | Noted: 2023-06-22

## 2023-06-30 ENCOUNTER — THERAPY VISIT (OUTPATIENT)
Dept: PHYSICAL THERAPY | Facility: CLINIC | Age: 68
End: 2023-06-30
Attending: NURSE PRACTITIONER
Payer: COMMERCIAL

## 2023-06-30 DIAGNOSIS — M25.551 RIGHT HIP PAIN: Primary | ICD-10-CM

## 2023-06-30 PROCEDURE — 97110 THERAPEUTIC EXERCISES: CPT | Mod: GP | Performed by: PHYSICAL THERAPIST

## 2023-06-30 PROCEDURE — 97112 NEUROMUSCULAR REEDUCATION: CPT | Mod: GP | Performed by: PHYSICAL THERAPIST

## 2023-07-06 ENCOUNTER — THERAPY VISIT (OUTPATIENT)
Dept: PHYSICAL THERAPY | Facility: CLINIC | Age: 68
End: 2023-07-06
Attending: NURSE PRACTITIONER
Payer: COMMERCIAL

## 2023-07-06 DIAGNOSIS — M25.551 RIGHT HIP PAIN: Primary | ICD-10-CM

## 2023-07-06 PROCEDURE — 97112 NEUROMUSCULAR REEDUCATION: CPT | Mod: GP | Performed by: PHYSICAL THERAPIST

## 2023-07-06 PROCEDURE — 97110 THERAPEUTIC EXERCISES: CPT | Mod: GP | Performed by: PHYSICAL THERAPIST

## 2023-07-18 ENCOUNTER — TRANSFERRED RECORDS (OUTPATIENT)
Dept: HEALTH INFORMATION MANAGEMENT | Facility: CLINIC | Age: 68
End: 2023-07-18
Payer: COMMERCIAL

## 2023-07-28 ENCOUNTER — THERAPY VISIT (OUTPATIENT)
Dept: PHYSICAL THERAPY | Facility: CLINIC | Age: 68
End: 2023-07-28
Payer: COMMERCIAL

## 2023-07-28 DIAGNOSIS — M25.551 RIGHT HIP PAIN: Primary | ICD-10-CM

## 2023-07-28 PROCEDURE — 97110 THERAPEUTIC EXERCISES: CPT | Mod: GP | Performed by: PHYSICAL THERAPIST

## 2023-07-28 PROCEDURE — 97112 NEUROMUSCULAR REEDUCATION: CPT | Mod: GP | Performed by: PHYSICAL THERAPIST

## 2023-08-22 ENCOUNTER — TRANSFERRED RECORDS (OUTPATIENT)
Dept: HEALTH INFORMATION MANAGEMENT | Facility: CLINIC | Age: 68
End: 2023-08-22
Payer: COMMERCIAL

## 2023-08-25 ENCOUNTER — LAB (OUTPATIENT)
Dept: LAB | Facility: CLINIC | Age: 68
End: 2023-08-25
Payer: COMMERCIAL

## 2023-08-25 DIAGNOSIS — N18.31 STAGE 3A CHRONIC KIDNEY DISEASE (H): ICD-10-CM

## 2023-08-25 DIAGNOSIS — E83.52 SERUM CALCIUM ELEVATED: ICD-10-CM

## 2023-08-25 DIAGNOSIS — Z13.220 SCREENING FOR HYPERLIPIDEMIA: Primary | ICD-10-CM

## 2023-08-25 LAB
ALBUMIN SERPL BCG-MCNC: 4.1 G/DL (ref 3.5–5.2)
CALCIUM SERPL-MCNC: 10.9 MG/DL (ref 8.8–10.2)
CHOLEST SERPL-MCNC: 252 MG/DL
CREAT SERPL-MCNC: 1.1 MG/DL (ref 0.51–0.95)
CREAT UR-MCNC: 96.7 MG/DL
GFR SERPL CREATININE-BSD FRML MDRD: 54 ML/MIN/1.73M2
HDLC SERPL-MCNC: 40 MG/DL
LDLC SERPL CALC-MCNC: 188 MG/DL
MAGNESIUM SERPL-MCNC: 2.3 MG/DL (ref 1.7–2.3)
MICROALBUMIN UR-MCNC: <12 MG/L
MICROALBUMIN/CREAT UR: NORMAL MG/G{CREAT}
NONHDLC SERPL-MCNC: 212 MG/DL
PHOSPHATE SERPL-MCNC: 2.8 MG/DL (ref 2.5–4.5)
PTH-INTACT SERPL-MCNC: 114 PG/ML (ref 15–65)
TRIGL SERPL-MCNC: 118 MG/DL

## 2023-08-25 PROCEDURE — 83970 ASSAY OF PARATHORMONE: CPT

## 2023-08-25 PROCEDURE — 82310 ASSAY OF CALCIUM: CPT

## 2023-08-25 PROCEDURE — 84100 ASSAY OF PHOSPHORUS: CPT

## 2023-08-25 PROCEDURE — 36415 COLL VENOUS BLD VENIPUNCTURE: CPT

## 2023-08-25 PROCEDURE — 82565 ASSAY OF CREATININE: CPT

## 2023-08-25 PROCEDURE — 82043 UR ALBUMIN QUANTITATIVE: CPT

## 2023-08-25 PROCEDURE — 80061 LIPID PANEL: CPT

## 2023-08-25 PROCEDURE — 83735 ASSAY OF MAGNESIUM: CPT

## 2023-08-25 PROCEDURE — 82570 ASSAY OF URINE CREATININE: CPT

## 2023-08-25 PROCEDURE — 82040 ASSAY OF SERUM ALBUMIN: CPT

## 2023-09-07 ENCOUNTER — TRANSFERRED RECORDS (OUTPATIENT)
Dept: HEALTH INFORMATION MANAGEMENT | Facility: CLINIC | Age: 68
End: 2023-09-07
Payer: COMMERCIAL

## 2023-09-24 ASSESSMENT — ENCOUNTER SYMPTOMS
FEVER: 0
BREAST MASS: 0
JOINT SWELLING: 0
WEAKNESS: 0
SORE THROAT: 0
MYALGIAS: 0
PARESTHESIAS: 0
NERVOUS/ANXIOUS: 1
DIZZINESS: 0
CONSTIPATION: 0
HEADACHES: 0
DIARRHEA: 0
NAUSEA: 0
DYSURIA: 0
COUGH: 0
HEARTBURN: 0
CHILLS: 0
EYE PAIN: 0
HEMATOCHEZIA: 0
ABDOMINAL PAIN: 0
HEMATURIA: 0
PALPITATIONS: 0
SHORTNESS OF BREATH: 0
ARTHRALGIAS: 1
FREQUENCY: 1

## 2023-09-24 ASSESSMENT — ACTIVITIES OF DAILY LIVING (ADL): CURRENT_FUNCTION: NO ASSISTANCE NEEDED

## 2023-09-25 ASSESSMENT — ANXIETY QUESTIONNAIRES
2. NOT BEING ABLE TO STOP OR CONTROL WORRYING: SEVERAL DAYS
1. FEELING NERVOUS, ANXIOUS, OR ON EDGE: SEVERAL DAYS
7. FEELING AFRAID AS IF SOMETHING AWFUL MIGHT HAPPEN: NOT AT ALL
4. TROUBLE RELAXING: NOT AT ALL
5. BEING SO RESTLESS THAT IT IS HARD TO SIT STILL: NOT AT ALL
6. BECOMING EASILY ANNOYED OR IRRITABLE: NOT AT ALL
GAD7 TOTAL SCORE: 2
3. WORRYING TOO MUCH ABOUT DIFFERENT THINGS: NOT AT ALL
GAD7 TOTAL SCORE: 2

## 2023-09-25 ASSESSMENT — PATIENT HEALTH QUESTIONNAIRE - PHQ9
SUM OF ALL RESPONSES TO PHQ QUESTIONS 1-9: 0
SUM OF ALL RESPONSES TO PHQ QUESTIONS 1-9: 0

## 2023-09-26 ENCOUNTER — OFFICE VISIT (OUTPATIENT)
Dept: FAMILY MEDICINE | Facility: CLINIC | Age: 68
End: 2023-09-26
Payer: COMMERCIAL

## 2023-09-26 VITALS
HEART RATE: 67 BPM | HEIGHT: 68 IN | WEIGHT: 258.2 LBS | RESPIRATION RATE: 16 BRPM | TEMPERATURE: 98 F | DIASTOLIC BLOOD PRESSURE: 60 MMHG | OXYGEN SATURATION: 97 % | SYSTOLIC BLOOD PRESSURE: 106 MMHG | BODY MASS INDEX: 39.13 KG/M2

## 2023-09-26 DIAGNOSIS — Z00.00 ENCOUNTER FOR MEDICARE ANNUAL WELLNESS EXAM: Primary | ICD-10-CM

## 2023-09-26 DIAGNOSIS — C55 ENDOMETRIOID ADENOCARCINOMA OF UTERUS (H): ICD-10-CM

## 2023-09-26 DIAGNOSIS — M26.609 TMJ (TEMPOROMANDIBULAR JOINT SYNDROME): ICD-10-CM

## 2023-09-26 DIAGNOSIS — M43.16 SPONDYLOLISTHESIS OF LUMBAR REGION: ICD-10-CM

## 2023-09-26 DIAGNOSIS — E83.52 SERUM CALCIUM ELEVATED: ICD-10-CM

## 2023-09-26 DIAGNOSIS — N18.31 STAGE 3A CHRONIC KIDNEY DISEASE (H): ICD-10-CM

## 2023-09-26 DIAGNOSIS — L90.0 LICHEN SCLEROSUS: ICD-10-CM

## 2023-09-26 DIAGNOSIS — E03.4 HYPOTHYROIDISM DUE TO ACQUIRED ATROPHY OF THYROID: ICD-10-CM

## 2023-09-26 DIAGNOSIS — F33.42 RECURRENT MAJOR DEPRESSIVE DISORDER, IN FULL REMISSION (H): ICD-10-CM

## 2023-09-26 PROCEDURE — G0439 PPPS, SUBSEQ VISIT: HCPCS | Performed by: FAMILY MEDICINE

## 2023-09-26 PROCEDURE — 99214 OFFICE O/P EST MOD 30 MIN: CPT | Mod: 25 | Performed by: FAMILY MEDICINE

## 2023-09-26 RX ORDER — LEVOTHYROXINE SODIUM 100 UG/1
100 TABLET ORAL EVERY MORNING
Qty: 90 TABLET | Refills: 3 | Status: SHIPPED | OUTPATIENT
Start: 2023-09-26 | End: 2024-08-13

## 2023-09-26 RX ORDER — CLOBETASOL PROPIONATE 0.5 MG/G
OINTMENT TOPICAL
Qty: 45 G | Refills: 0 | Status: SHIPPED | OUTPATIENT
Start: 2023-09-26 | End: 2023-09-27

## 2023-09-26 ASSESSMENT — ENCOUNTER SYMPTOMS
MYALGIAS: 0
HEMATURIA: 0
COUGH: 0
DYSURIA: 0
SHORTNESS OF BREATH: 0
HEADACHES: 0
EYE PAIN: 0
PALPITATIONS: 0
FREQUENCY: 1
PARESTHESIAS: 0
BREAST MASS: 0
WEAKNESS: 0
NERVOUS/ANXIOUS: 1
ABDOMINAL PAIN: 0
SORE THROAT: 0
HEMATOCHEZIA: 0
CONSTIPATION: 0
HEARTBURN: 0
ARTHRALGIAS: 1
NAUSEA: 0
JOINT SWELLING: 0
CHILLS: 0
DIZZINESS: 0
DIARRHEA: 0
FEVER: 0

## 2023-09-26 ASSESSMENT — PAIN SCALES - GENERAL: PAINLEVEL: MILD PAIN (3)

## 2023-09-26 ASSESSMENT — ACTIVITIES OF DAILY LIVING (ADL): CURRENT_FUNCTION: NO ASSISTANCE NEEDED

## 2023-09-26 NOTE — PROGRESS NOTES
"SUBJECTIVE:   Bry is a 67 year old who presents for Preventive Visit.      9/26/2023     1:07 PM   Additional Questions   Roomed by Mari RONQUILLO.       Are you in the first 12 months of your Medicare coverage?  No    Healthy Habits:     In general, how would you rate your overall health?  Good    Frequency of exercise:  1 day/week    Duration of exercise:  Less than 15 minutes    Do you usually eat at least 4 servings of fruit and vegetables a day, include whole grains    & fiber and avoid regularly eating high fat or \"junk\" foods?  Yes    Taking medications regularly:  Yes    Medication side effects:  None    Ability to successfully perform activities of daily living:  No assistance needed    Home Safety:  No safety concerns identified    Hearing Impairment:  No hearing concerns    In the past 6 months, have you been bothered by leaking of urine? Yes    In general, how would you rate your overall mental or emotional health?  Good    Additional concerns today:  Yes      Today's PHQ-9 Score:       9/25/2023    10:11 AM   PHQ-9 SCORE   PHQ-9 Total Score MyChart 0   PHQ-9 Total Score 0     Right lateral leg wound about 1.5 years ago  Bumped it again recently in the same spot    Kidneys  Thyroid  Parathyroid    Right hip is more painful  Did PT without benefit  Saw [facility] - xrays did not indicate OA, but had injection for bursitis, had no relief.  Then right knee began to hurt. [facility] also gave an injection to right knee.  But had low back - with spondylolithesis.  [facility] defined that as the cause of her hip and knee pain.  Had course of prednisone which helped, but now pain coming back.    TMJ pain. Yesterday was fitted for a mouth splint.  Exercise for her jaw have helped.        Have you ever done Advance Care Planning? (For example, a Health Directive, POLST, or a discussion with a medical provider or your loved ones about your wishes): Yes, patient states has an Advance Care Planning document and will bring " a copy to the clinic.     Fall risk  Fallen 2 or more times in the past year?: Yes  Any fall with injury in the past year?: No    Cognitive Screening   1) Repeat 3 items (Leader, Season, Table)      2) Clock draw: NORMAL  3) 3 item recall: Recalls 3 objects  Results: 3 items recalled: COGNITIVE IMPAIRMENT LESS LIKELY    Mini-CogTM Copyright SHELLEY Dover. Licensed by the author for use in [facility]; reprinted with permission (sara@[facility]). All rights reserved.      Do you have sleep apnea, excessive snoring or daytime drowsiness? : snores      Reviewed and updated as needed this visit by clinical staff   Tobacco  Allergies  Meds  Problems             Reviewed and updated as needed this visit by Provider    Allergies  Meds  Problems            Social History     Tobacco Use     Smoking status: Former     Packs/day: 1.00     Years: 15.00     Pack years: 15.00     Types: Cigarettes     Start date: 1972     Quit date: 1987     Years since quittin.8     Smokeless tobacco: Never   Substance Use Topics     Alcohol use: Yes     Comment: a beer here and there - never more than 2 -  2 times/week             2023     1:17 AM   Alcohol Use   Prescreen: >3 drinks/day or >7 drinks/week? No     Do you have a current opioid prescription? No  Do you use any other controlled substances or medications that are not prescribed by a provider? None            Current providers sharing in care for this patient include:   Patient Care Team:  Angeli Nolan MD as PCP - General (Family Practice)  Beatrice Reyes APRN CNP as Assigned Cancer Care Provider  Angeli Nolan MD as Assigned PCP  Felice Madera MD as MD (Endocrinology, Diabetes, and Metabolism)  Marcos Thomas MD as MD (Nephrology)  Marcos Thomas MD as Assigned Nephrology Provider  Felice Madera MD as Assigned Endocrinology Provider    The following health maintenance items are reviewed in Epic and correct  as of today:  Health Maintenance   Topic Date Due     ANNUAL REVIEW OF HM ORDERS  09/13/2023     MEDICARE ANNUAL WELLNESS VISIT  09/13/2023     INFLUENZA VACCINE (1) 06/30/2024 (Originally 9/1/2023)     COVID-19 Vaccine (2 - Booster for Dony series) 09/26/2024 (Originally 5/2/2021)     TSH W/FREE T4 REFLEX  11/07/2023     PHQ-9  03/26/2024     BMP  06/14/2024     HEMOGLOBIN  06/14/2024     COLORECTAL CANCER SCREENING  07/26/2024     LIPID  08/25/2024     MICROALBUMIN  08/25/2024     FALL RISK ASSESSMENT  09/26/2024     MAMMO SCREENING  12/23/2024     ADVANCE CARE PLANNING  09/26/2028     DTAP/TDAP/TD IMMUNIZATION (4 - Td or Tdap) 10/31/2029     DEXA  12/23/2037     HEPATITIS C SCREENING  Completed     DEPRESSION ACTION PLAN  Completed     Pneumococcal Vaccine: 65+ Years  Completed     URINALYSIS  Completed     IPV IMMUNIZATION  Aged Out     HPV IMMUNIZATION  Aged Out     MENINGITIS IMMUNIZATION  Aged Out     ZOSTER IMMUNIZATION  Discontinued     [facility]       12/23/2022    10:12 AM   Breast CA Risk Assessment (FHS-7)   Did any of your first-degree relatives have breast or ovarian cancer? No   Did any of your relatives have bilateral breast cancer? No   Did any man in your family have breast cancer? No   Did any woman in your family have breast and ovarian cancer? No   Did any woman in your family have breast cancer before age 50 y? No   Do you have 2 or more relatives with breast and/or ovarian cancer? No   Do you have 2 or more relatives with breast and/or bowel cancer? No         Pertinent mammograms are reviewed under the imaging tab.    Review of Systems   Constitutional:  Negative for chills and fever.   HENT:  Negative for congestion, ear pain, hearing loss and sore throat.    Eyes:  Negative for pain and visual disturbance.   Respiratory:  Negative for cough and shortness of breath.    Cardiovascular:  Negative for chest pain, palpitations and peripheral edema.   Gastrointestinal:  " Negative for abdominal pain, constipation, diarrhea, heartburn, hematochezia and nausea.   Breasts:  Negative for tenderness, breast mass and discharge.   Genitourinary:  Positive for frequency. Negative for dysuria, genital sores, hematuria, pelvic pain, urgency, vaginal bleeding and vaginal discharge.   Musculoskeletal:  Positive for arthralgias. Negative for joint swelling and myalgias.   Skin:  Negative for rash.   Neurological:  Negative for dizziness, weakness, headaches and paresthesias.   Psychiatric/Behavioral:  Negative for mood changes. The patient is nervous/anxious.          OBJECTIVE:   /60 (BP Location: Right arm, Patient Position: Sitting, Cuff Size: Adult Large)   Pulse 67   Temp 98  F (36.7  C) (Oral)   Resp 16   Ht 1.715 m (5' 7.5\")   Wt 117.1 kg (258 lb 3.2 oz)   LMP  (LMP Unknown)   SpO2 97%   BMI 39.84 kg/m   Estimated body mass index is 39.84 kg/m  as calculated from the following:    Height as of this encounter: 1.715 m (5' 7.5\").    Weight as of this encounter: 117.1 kg (258 lb 3.2 oz).  Physical Exam  GENERAL: healthy, alert and no distress  EYES: Eyes grossly normal to inspection, PERRL and conjunctivae and sclerae normal  HENT: normal cephalic/atraumatic and ear canals and TM's normal  NECK: no adenopathy, no asymmetry, masses, or scars and thyroid normal to palpation  RESP: lungs clear to auscultation - no rales, rhonchi or wheezes  BREAST: normal without masses, tenderness or nipple discharge and no palpable axillary masses or adenopathy  CV: regular rate and rhythm, normal S1 S2, no S3 or S4, no murmur, click or rub, no peripheral edema and peripheral pulses strong  ABDOMEN: soft, nontender, no hepatosplenomegaly, no masses and bowel sounds normal  MS: no gross musculoskeletal defects noted, no edema  SKIN: no suspicious lesions or rashes and punctate abrasion on right lateral lower leg  NEURO: Normal strength and tone, mentation intact and speech normal  PSYCH: " "mentation appears normal, affect normal/bright    Diagnostic Test Results:  Labs reviewed in Epic    ASSESSMENT / PLAN:   (Z00.00) Encounter for Medicare annual wellness exam  (primary encounter diagnosis)  Comment: stable health  Plan: Health Care Maintenance reviewed, measures not yet completed have been discussed.  Declines flu and COVID vaccines    (F33.42) Recurrent major depressive disorder, in full remission (H)  Comment: stable  Plan: Continue current medication      (E03.4) Hypothyroidism due to acquired atrophy of thyroid  Comment: stable, asymptomatic   Plan: adjust therapy/treatment based on results      (L90.0) Lichen sclerosus  Comment:   Plan: uses clobetasol sparingly and infrequently    (M43.16) Spondylolisthesis of lumbar region  Comment: followed by [facility]  Plan: continue current specialty care    (N18.31) Stage 3a chronic kidney disease (H)  Comment:   Plan: stable.    (C55) Endometrioid adenocarcinoma of uterus (H)  Comment:   Plan: followed by Gyn/onc    (E83.52) Serum calcium elevated  Comment:   Plan: evaluated by endocrine.    (M26.609) TMJ (temporomandibular joint syndrome)  Comment:   Plan: followed by dentistry          COUNSELING:  Reviewed preventive health counseling, as reflected in patient instructions      BMI:   Estimated body mass index is 39.84 kg/m  as calculated from the following:    Height as of this encounter: 1.715 m (5' 7.5\").    Weight as of this encounter: 117.1 kg (258 lb 3.2 oz).   Weight management plan: Discussed healthy diet and exercise guidelines      She reports that she quit smoking about 35 years ago. Her smoking use included cigarettes. She started smoking about 51 years ago. She has a 15.00 pack-year smoking history. She has never used smokeless tobacco.      Appropriate preventive services were discussed with this patient, including applicable screening as appropriate for cardiovascular disease, diabetes, osteopenia/osteoporosis, and glaucoma.  As " appropriate for age/gender, discussed screening for colorectal cancer, prostate cancer, breast cancer, and cervical cancer. Checklist reviewing preventive services available has been given to the patient.    Reviewed patients plan of care and provided an AVS. The Intermediate Care Plan ( asthma action plan, low back pain action plan, and migraine action plan) for Hanh meets the Care Plan requirement. This Care Plan has been established and reviewed with the Patient.          Angeli Messina MD  [facility]    Identified Health Risks:  I have reviewed Opioid Use Disorder and Substance Use Disorder risk factors and made any needed referrals. Answers submitted by the patient for this visit:  Patient Health Questionnaire (Submitted on 9/25/2023)  PHQ9 TOTAL SCORE: 0  MARGI-7 (Submitted on 9/25/2023)  MARGI 7 TOTAL SCORE: 2

## 2023-09-26 NOTE — PATIENT INSTRUCTIONS
"Patient Education   Learning About Being Physically Active  What is physical activity?     Being physically active means doing any kind of activity that gets your body moving.  The types of physical activity that can help you get fit and stay healthy include:  Aerobic or \"cardio\" activities. These make your heart beat faster and make you breathe harder, such as brisk walking, riding a bike, or running. They strengthen your heart and lungs and build up your endurance.  Strength training activities. These make your muscles work against, or \"resist,\" something. Examples include lifting weights or doing push-ups. These activities help tone and strengthen your muscles and bones.  Stretches. These let you move your joints and muscles through their full range of motion. Stretching helps you be more flexible.  Reaching a balance between these three types of physical activity is important because each one contributes to your overall fitness.  What are the benefits of being active?  Being active is one of the best things you can do for your health. It helps you to:  Feel stronger and have more energy to do all the things you like to do.  Focus better at school or work.  Feel, think, and sleep better.  Reach and stay at a healthy weight.  Lose fat and build lean muscle.  Lower your risk for serious health problems, including diabetes, heart attack, high blood pressure, and some cancers.  Keep your heart, lungs, bones, muscles, and joints strong and healthy.  How can you make being active part of your life?  Start slowly. Make it your long-term goal to get at least 30 minutes of exercise on most days of the week. Walking is a good choice. You also may want to do other activities, such as running, swimming, cycling, or playing tennis or team sports.  Pick activities that you like--ones that make your heart beat faster, your muscles stronger, and your muscles and joints more flexible. If you find more than one thing you like " "doing, do them all. You don't have to do the same thing every day.  Get your heart pumping every day. Any activity that makes your heart beat faster and keeps it at that rate for a while counts.  Here are some great ways to get your heart beating faster:  Go for a brisk walk, run, or bike ride.  Go for a hike or swim.  Go in-line skating.  Play a game of touch football, basketball, or soccer.  Ride a bike.  Play tennis or racquetball.  Climb stairs.  Even some household chores can be aerobic--just do them at a faster pace. Vacuuming, raking or mowing the lawn, sweeping the garage, and washing and waxing the car all can help get your heart rate up.  Strengthen your muscles during the week. You don't have to lift heavy weights or grow big, bulky muscles to get stronger. Doing a few simple activities that make your muscles work against, or \"resist,\" something can help you get stronger.  For example, you can:  Do push-ups or sit-ups, which use your own body weight as resistance.  Lift weights or dumbbells or use stretch bands at home or in a gym or community center.  Stretch your muscles often. Stretching will help you as you become more active. It can help you stay flexible, loosen tight muscles, and avoid injury. It can also help improve your balance and posture and can be a great way to relax.  Be sure to stretch the muscles you'll be using when you work out. It's best to warm your muscles slightly before you stretch them. Walk or do some other light aerobic activity for a few minutes, and then start stretching.  When you stretch your muscles:  Do it slowly. Stretching is not about going fast or making sudden movements.  Don't push or bounce during a stretch.  Hold each stretch for at least 15 to 30 seconds, if you can. You should feel a stretch in the muscle, but not pain.  Breathe out as you do the stretch. Then breathe in as you hold the stretch. Don't hold your breath.  If you're worried about how more activity " "might affect your health, have a checkup before you start. Follow any special advice your doctor gives you for getting a smart start.  Where can you learn more?  Go to https://www.[employer].net/patiented  Enter W332 in the search box to learn more about \"Learning About Being Physically Active.\"  Current as of: October 10, 2022               Content Version: 13.7    7203-8052 [employer].   Care instructions adapted under license by your healthcare professional. If you have questions about a medical condition or this instruction, always ask your healthcare professional. [employer] disclaims any warranty or liability for your use of this information.         "

## 2023-09-27 ENCOUNTER — TELEPHONE (OUTPATIENT)
Dept: FAMILY MEDICINE | Facility: CLINIC | Age: 68
End: 2023-09-27
Payer: COMMERCIAL

## 2023-09-27 DIAGNOSIS — L90.0 LICHEN SCLEROSUS: ICD-10-CM

## 2023-09-27 RX ORDER — CLOBETASOL PROPIONATE 0.5 MG/G
OINTMENT TOPICAL
Qty: 45 G | Refills: 0 | Status: SHIPPED | OUTPATIENT
Start: 2023-09-27

## 2023-09-27 NOTE — TELEPHONE ENCOUNTER
Medical Clarification Request for     clobetasol (TEMOVATE) 0.05 % external ointment    Please clarify the directions for lobetasol (TEMOVATE) 0.05 % external ointment      Apply to the affected areas once a week   Other (please clarify)

## 2023-09-27 NOTE — TELEPHONE ENCOUNTER
Routing to PCP    Pharmacy needing clarification on areas clobetasol needs to be applied.    Devaughn Guerra, RN, BSN, PHN  [facility]

## 2023-10-09 ENCOUNTER — MYC REFILL (OUTPATIENT)
Dept: FAMILY MEDICINE | Facility: CLINIC | Age: 68
End: 2023-10-09
Payer: COMMERCIAL

## 2023-10-09 ENCOUNTER — MYC MEDICAL ADVICE (OUTPATIENT)
Dept: FAMILY MEDICINE | Facility: CLINIC | Age: 68
End: 2023-10-09
Payer: COMMERCIAL

## 2023-10-09 DIAGNOSIS — L90.0 LICHEN SCLEROSUS: ICD-10-CM

## 2023-10-09 RX ORDER — CLOBETASOL PROPIONATE 0.5 MG/G
OINTMENT TOPICAL
Qty: 45 G | Refills: 0 | OUTPATIENT
Start: 2023-10-09

## 2023-10-10 NOTE — TELEPHONE ENCOUNTER
Called patient    Pea sized amount in claudia area-pt reports one tube last years    Called pharmacy   Pharmacy does need to know how long rx should last - they are placing the order for 45 grams to last about 90 days for insurance purposes.      Heather, RN    Triage Nurse  [facility]

## 2023-10-10 NOTE — TELEPHONE ENCOUNTER
Routing My chart message to PCP    Prescription for Temovate was sent 9/27.    However Pharmacy needing clarification      Routing to PCP     Pharmacy needing clarification on areas clobetasol needs to be applied.     Devaughn Guerra RN, BSN, PHN  [facility]    Devaughn Guerra RN, BSN, PHN  [facility]

## 2023-10-10 NOTE — TELEPHONE ENCOUNTER
Can you please contact the pharmacy and find out exactly what is needed?    The prescription is as specific as typical prescriptions I send.  I do not usually need to specify specific body parts.

## 2023-10-25 ENCOUNTER — TELEPHONE (OUTPATIENT)
Dept: NEPHROLOGY | Facility: CLINIC | Age: 68
End: 2023-10-25
Payer: COMMERCIAL

## 2023-10-25 NOTE — TELEPHONE ENCOUNTER
LVM & sent mychart // pt needs to reschedule 12.15.23 appt with Dr. Thomas // first attempt, AN 10.25.23

## 2023-10-25 NOTE — TELEPHONE ENCOUNTER
[facility] Call Center    Phone Message    May a detailed message be left on voicemail: yes     Reason for Call: Other: pt calling back to reschedule her appt, writer found no appt available, please advise     Action Taken: Other: neph    Travel Screening: Not Applicable

## 2023-10-26 ENCOUNTER — LAB (OUTPATIENT)
Dept: LAB | Facility: CLINIC | Age: 68
End: 2023-10-26
Payer: COMMERCIAL

## 2023-10-26 DIAGNOSIS — E03.4 HYPOTHYROIDISM DUE TO ACQUIRED ATROPHY OF THYROID: ICD-10-CM

## 2023-10-26 LAB — TSH SERPL DL<=0.005 MIU/L-ACNC: 1.6 UIU/ML (ref 0.3–4.2)

## 2023-10-26 PROCEDURE — 84443 ASSAY THYROID STIM HORMONE: CPT

## 2023-10-26 PROCEDURE — 36415 COLL VENOUS BLD VENIPUNCTURE: CPT

## 2023-10-26 NOTE — TELEPHONE ENCOUNTER
Spoke to patient to reschedule appointment with Dr. Thomas. She wanted to keep it virtual. I kept everything as is except for the date of the appointment // 10.26.2023 [facility]

## 2023-10-27 ENCOUNTER — TRANSFERRED RECORDS (OUTPATIENT)
Dept: HEALTH INFORMATION MANAGEMENT | Facility: CLINIC | Age: 68
End: 2023-10-27
Payer: COMMERCIAL

## 2023-11-03 ENCOUNTER — TRANSFERRED RECORDS (OUTPATIENT)
Dept: HEALTH INFORMATION MANAGEMENT | Facility: CLINIC | Age: 68
End: 2023-11-03
Payer: COMMERCIAL

## 2023-11-13 ENCOUNTER — TRANSFERRED RECORDS (OUTPATIENT)
Dept: HEALTH INFORMATION MANAGEMENT | Facility: CLINIC | Age: 68
End: 2023-11-13
Payer: COMMERCIAL

## 2023-11-16 ENCOUNTER — LAB (OUTPATIENT)
Dept: LAB | Facility: CLINIC | Age: 68
End: 2023-11-16
Payer: COMMERCIAL

## 2023-11-16 DIAGNOSIS — E83.52 SERUM CALCIUM ELEVATED: ICD-10-CM

## 2023-11-16 LAB
ALBUMIN SERPL BCG-MCNC: 4.1 G/DL (ref 3.5–5.2)
CALCIUM SERPL-MCNC: 10.5 MG/DL (ref 8.8–10.2)
CREAT SERPL-MCNC: 1 MG/DL (ref 0.51–0.95)
EGFRCR SERPLBLD CKD-EPI 2021: 60 ML/MIN/1.73M2
MAGNESIUM SERPL-MCNC: 1.8 MG/DL (ref 1.7–2.3)
PHOSPHATE SERPL-MCNC: 2.3 MG/DL (ref 2.5–4.5)
PTH-INTACT SERPL-MCNC: 82 PG/ML (ref 15–65)

## 2023-11-16 PROCEDURE — 82310 ASSAY OF CALCIUM: CPT

## 2023-11-16 PROCEDURE — 36415 COLL VENOUS BLD VENIPUNCTURE: CPT

## 2023-11-16 PROCEDURE — 83735 ASSAY OF MAGNESIUM: CPT

## 2023-11-16 PROCEDURE — 82040 ASSAY OF SERUM ALBUMIN: CPT

## 2023-11-16 PROCEDURE — 83970 ASSAY OF PARATHORMONE: CPT

## 2023-11-16 PROCEDURE — 84100 ASSAY OF PHOSPHORUS: CPT

## 2023-11-16 PROCEDURE — 82565 ASSAY OF CREATININE: CPT

## 2023-11-20 ENCOUNTER — ONCOLOGY VISIT (OUTPATIENT)
Dept: ONCOLOGY | Facility: CLINIC | Age: 68
End: 2023-11-20
Attending: NURSE PRACTITIONER
Payer: COMMERCIAL

## 2023-11-20 ENCOUNTER — VIRTUAL VISIT (OUTPATIENT)
Dept: ENDOCRINOLOGY | Facility: CLINIC | Age: 68
End: 2023-11-20
Payer: COMMERCIAL

## 2023-11-20 VITALS
WEIGHT: 258 LBS | RESPIRATION RATE: 16 BRPM | BODY MASS INDEX: 39.81 KG/M2 | TEMPERATURE: 98.3 F | OXYGEN SATURATION: 98 % | HEART RATE: 64 BPM | SYSTOLIC BLOOD PRESSURE: 131 MMHG | DIASTOLIC BLOOD PRESSURE: 78 MMHG

## 2023-11-20 DIAGNOSIS — C55 ENDOMETRIOID ADENOCARCINOMA OF UTERUS (H): Primary | ICD-10-CM

## 2023-11-20 DIAGNOSIS — N18.31 STAGE 3A CHRONIC KIDNEY DISEASE (H): ICD-10-CM

## 2023-11-20 DIAGNOSIS — E83.52 HYPERCALCEMIA: Primary | ICD-10-CM

## 2023-11-20 PROCEDURE — 99215 OFFICE O/P EST HI 40 MIN: CPT | Mod: VID | Performed by: STUDENT IN AN ORGANIZED HEALTH CARE EDUCATION/TRAINING PROGRAM

## 2023-11-20 PROCEDURE — 99213 OFFICE O/P EST LOW 20 MIN: CPT | Performed by: NURSE PRACTITIONER

## 2023-11-20 PROCEDURE — G0463 HOSPITAL OUTPT CLINIC VISIT: HCPCS | Performed by: NURSE PRACTITIONER

## 2023-11-20 ASSESSMENT — PAIN SCALES - GENERAL: PAINLEVEL: NO PAIN (0)

## 2023-11-20 NOTE — NURSING NOTE
"Oncology Rooming Note    November 20, 2023 2:55 PM   Hanh Villalba is a 68 year old female who presents for:    Chief Complaint   Patient presents with    Oncology Clinic Visit     Endometrial Ca     Initial Vitals: /78   Pulse 64   Temp 98.3  F (36.8  C) (Oral)   Resp 16   Wt 117 kg (258 lb)   LMP  (LMP Unknown)   SpO2 98%   BMI 39.81 kg/m   Estimated body mass index is 39.81 kg/m  as calculated from the following:    Height as of 9/26/23: 1.715 m (5' 7.5\").    Weight as of this encounter: 117 kg (258 lb). Body surface area is 2.36 meters squared.  No Pain (0) Comment: Data Unavailable   No LMP recorded (lmp unknown). Patient has had a hysterectomy.  Allergies reviewed: Yes  Medications reviewed: Yes    Medications: Medication refills not needed today.  Pharmacy name entered into [facility]:    [id number] - [place], [place] - [address]    Clinical concerns:        Rupali Bernal CMA              "

## 2023-11-20 NOTE — PATIENT INSTRUCTIONS
CALCIUM Recommendation 1000  mg/day in divided dose of no more than 500 mg/dose. This includes what is in your food and also what is in any supplements you are taking.      Dietary sources of calcium: These also contain vitamin D  Milk / buttermilk              8 oz            300 mg  Dry milk powder       5 Tbsp             300 mg  Yogurt                          1 cup           400 mg  Ice cream   1/2 cup          100 mg  Hard cheese                     1.5 oz          300 mg  Cottage cheese                1/2 cup        65 mg  Spinach, cooked  1 cup  240 mg  Tofu, soft regular           4 oz          120 to 390 mg  Sardines                       3 oz               370 mg  Mackerel canned         3 oz                250 mg  Canned salmon with bones 3 oz        170-210 mg  Calcium fortified cereals are available  SILK soy and almond milk products are calcium fortified  Orange juice  Fortified with Calcium       8 oz            300 mg  Low fat dairy sources are recommended

## 2023-11-20 NOTE — LETTER
2023         RE: Hanh Villalba   [address]  [place] [address]        Dear Colleague,    Thank you for referring your patient, Hanh Villalba, to the [facility]. Please see a copy of my visit note below.                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  She completed brachytherapy 2019.  She is here today for a surveillance visit.      Oncology history:   2019 D&C with pathology showing Grade 1 endometrial cancer, loss of MLH1 and PMS2, hypermethylation of MLH1 promotor positive  10/4/2019: Robotic-assisted Total laparoscopic hysterectomy, bilateral salpingo-oophorectomy, sentinel lymph node dissection, vaginal laceration repair: Stage 1B FIGO Grade 2, DOI 17/21mm (81%), LVSI+, cytology negative, tumor 3.4cm; MSI-H (MLH1 promoter melthylation)  2019: Vaginal brachytherapy; 30Gy in 5 fx     23: right hip xray Impression:  1. No acute osseous abnormality.  2. No substantial degenerative change of the right hip. Right SI joint degenerative change.        Today she comes to clinic feeling well. She denies any vaginal bleeding, no changes in her bowel or bladder habits, no nausea/emesis, no lower extremity edema, and no difficulties eating or sleeping. She denies any abdominal discomfort/bloating, no fevers or chills, and no chest pain or shortness of breath. She is using her dilator about once a month. She saw her PCP  for her annual exam, her mammogram is due, her colonoscopy is due in  and her DEXA in . She will be going on a trip to [facility] and also a scuba diving trip soon.          Review of Systems:    Systemic           no weight changes; no fever; no chills; no night sweats; no appetite changes  Skin           no rashes, or  lesions  Eye           no irritation; no changes in vision  Shila-Laryngeal           no dysphagia; no hoarseness   Pulmonary    no cough; no shortness of breath  Cardiovascular    no chest pain; no palpitations  Gastrointestinal    no diarrhea; no constipation; no abdominal pain; no changes in bowel habits; no blood in stool  Genitourinary   no urinary frequency; no urinary urgency; no dysuria; no pain; no abnormal vaginal discharge; no abnormal vaginal bleeding  Breast   no breast discharge; no breast changes; no breast pain  Musculoskeletal    no myalgias; no arthralgias; no back pain  Psychiatric           no depressed mood; no anxiety    Hematologic              no tender lymph nodes; no noticeable swellings or lumps   Endocrine    no hot flashes; no heat/cold intolerance         Neurological   no tremor; no numbness and tingling; no headaches; no difficulty sleeping      Past Medical History:    Past Medical History:   Diagnosis Date    Cancer (H) August 2019    Uterus removed    Depression     Depressive disorder 1999    taking prosac    Hypothyroidism     Overweight          Past Surgical History:    Past Surgical History:   Procedure Laterality Date    ABDOMEN SURGERY  Oct 2019    Robotic Hysterectomy    BIOPSY  early 2000's    left breast - non event    COLONOSCOPY  within last 6 years ?    OK    COLONOSCOPY N/A 7/26/2021    Procedure: COLONOSCOPY, WITH POLYPECTOMY;  Surgeon: Ryan Butterfield MD;  [place] [facility] [place]    DAVINCI HYSTERECTOMY TOTAL, BILATERAL SALPINGO-OOPHORECTOMY, NODE DISSECTION, COMBINED N/A 10/4/2019    Procedure: Robot-assisted total laparoscopic hysterectomy, Bilateral salpingectomy and Right Oophorectomy, Lysis of adhesion, Cervical Indocyanine Green injection, Bilateral sentinel lymph node dissection, Repair of vaginal laceration.;  Surgeon: Perez Reddy MD;  Location: [facility] OR    DILATION AND CURETTAGE N/A 9/12/2019    Procedure: DILATION AND CURETTAGE, UTERUS;  Surgeon: Karl  Navdeep Ivan MD;  Location: [facility] OR    OPERATIVE HYSTEROSCOPY WITH MORCELLATOR N/A 2019    Procedure: HYSTEROSCOPY WITH MORCELLATOR (MYOSURE);  Surgeon: Navdeep Barajas MD;  Location: [facility] OR    SALPINGO OOPHORECTOMY,R/L/SHANDA Left 1980s    Left ovarian with endometriois    US BREAST BIOPSY LT Left          Health Maintenance Due   Topic Date Due    IPV IMMUNIZATION (2 of 3 - Adult catch-up series) 2002    RSV VACCINE (Pregnancy & 60+) (1 - 1-dose 60+ series) Never done       Current Medications:     Current Outpatient Medications   Medication Sig Dispense Refill    Apple Cider Vinegar 600 MG CAPS Take 1 capsule by mouth daily       CALCIUM PO Take 20 mg by mouth daily      cholecalciferol (VITAMIN D3) 5000 units TABS tablet Take by mouth daily      clobetasol (TEMOVATE) 0.05 % external ointment Apply sparingly to affected area once or twice a week as needed 45 g 0    Cyanocobalamin 1500 MCG TBDP Take 1 tablet by mouth daily       FLUoxetine (PROZAC) 20 MG capsule Take 1 capsule (20 mg) by mouth every 48 hours AND 2 capsules (40 mg) every 48 hours. 135 capsule 3    Lactobacillus (ACIDOPHILUS PO) Take 1 tablet by mouth daily       levothyroxine (SYNTHROID/LEVOTHROID) 100 MCG tablet Take 1 tablet (100 mcg) by mouth every morning 90 tablet 3    Lutein 20 MG CAPS       Omega 3-6-9 Fatty Acids (OMEGA-3-6-9 PO) Take 1 capsule by mouth daily      omeprazole (PRILOSEC) 10 MG DR capsule Take 20 mg by mouth daily           Allergies:      No Known Allergies     Social History:     Social History     Tobacco Use    Smoking status: Former     Packs/day: 1.00     Years: 15.00     Additional pack years: 0.00     Total pack years: 15.00     Types: Cigarettes     Start date: 1972     Quit date: 1987     Years since quittin.0     Passive exposure: Past    Smokeless tobacco: Never   Substance Use Topics    Alcohol use: Yes     Comment: a beer here and there - never more than 2 -  2 times/week        History   Drug Use Unknown         Family History:     The patient's family history is notable for     Family History   Problem Relation Age of Onset    Acute myelogenous leukemia Mother     Coronary Artery Disease Mother         bypass surgery 1989    Osteoporosis Mother     Obesity Mother     Parkinsonism Father     Prostate Cancer Father         Diagnosed late in life in his 80's    Obesity Sister     Heart Disease Sister     Coronary Artery Disease Sister         heart attacK in June of 2019    Uterine Cancer Maternal Grandmother 40        Uterine versus cervical    No Known Problems Sister     Leukemia Brother     Leukemia Nephew     Coronary Artery Disease Brother         Carotid Artery Surgery    Obesity Brother     Substance Abuse Brother         alcohol         Physical Exam:     /78   Pulse 64   Temp 98.3  F (36.8  C) (Oral)   Resp 16   Wt 117 kg (258 lb)   LMP  (LMP Unknown)   SpO2 98%   BMI 39.81 kg/m    Body mass index is 39.81 kg/m .    General Appearance: healthy and alert, no distress     HEENT: no thyromegaly, no palpable nodules or masses        Cardiovascular: regular rate and rhythm, no gallops, rubs or murmurs     Respiratory: lungs clear, no rales, rhonchi or wheezes, normal diaphragmatic excursion    Musculoskeletal: extremities non tender and without edema    Skin: no lesions or rashes     Neurological: normal gait, no gross defects     Psychiatric: appropriate mood and affect                               Hematological: normal cervical, supraclavicular and inguinal lymph nodes     Gastrointestinal:       abdomen soft, non-tender, non-distended, no organomegaly or masses    Genitourinary: External genitalia and urethral meatus appears normal.  Vagina is smooth without nodularity or masses.  Cervix surgically absent  Bimanual exam reveal no masses, nodularity or fullness.  Recto-vaginal exam confirms these findings.      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  She completed brachytherapy 12/2019.  She is here today for a surveillance visit.     22 minutes spent on the date of the encounter doing chart review, history and exam, documentation and further activities as noted above      Plan:     1.)       Patient to RTC in 6 months for her next surveillance visit. Reviewed recommendations from SGO not to perform surveillance pap smears in women diagnosed with endometrial cancer as this does not improve detection of local recurrence. Reviewed signs and symptoms for when she should contact the clinic or seek additional care. Patient to contact the clinic with any questions or concerns in the interim.  Answered all of her questions to the best of my ability.     2.) Genetic risk factors were assessed and she had a loss of MLH1 and PMS2; hypermethylation of MLH1 promotor positive. MLH1 promoter methylation is typically associated with sporadic microsatellite unstable tumors of the colon/rectum or endometrium.     3.) Labs and/or tests ordered include:  none.     4.) Health maintenance issues addressed today include annual health maintenance and non-gynecologic issues with PCP.    ROSEMARY Lance, WHNP-BC, ANP-BC  Women's Health Nurse Practitioner  Adult Nurse Practitioner  Division of Gynecologic Oncology  .      CC  Patient Care Team:  Angeli Nolan MD as PCP - General (Family Practice)

## 2023-11-20 NOTE — LETTER
11/20/2023       RE: Hanh Villalba  [address]  [place] [address]     Dear Colleague,    Thank you for referring your patient, Hanh Villalba, to the [facility] ENDOCRINOLOG[facility] [place] at [facility]. Please see a copy of my visit note below.    Endocrinology Clinic Visit       Video-Visit Details    Type of service:  Video Visit    Joined the call at 11/20/2023, 9:37:14 am.  Left the call at 11/20/2023, 9:53:37 am.    Originating Location (pt. Location): Home        Distant Location (provider location):  Off-site    Mode of Communication:  Video Conference via [employer]    Physician has received verbal consent for a Video Visit from the patient? Yes    I spent a total of 40 minutes on the date of encounter reviewing medical records, evaluating the patient, coordinating care and documenting in the EHR, as detailed above.      NAME:  Hanh Villalba  PCP:  Angeli Nolan  MRN:  [id number]  Reason for Consult:  Primary hyperparathyroidism  Requesting Provider:  Angeli Nolan    Chief Complaint     No chief complaint on file.      History of Present Illness     Hanh Villalba is a 67 year old female who is seen in video visit for primary hyperparathyroidism. Last seen 5/2023.      She has a PMH of chronic hypothyroidism on lt4 and uterine cancer s/p TAHBSO 3 years ago.     She noted high calcium back in 2020, work up started in December. She never felt any different it was just noted on routine BMP. Based on chart reviewed ca was 10.4 on 9/2019. Previously normal. Highest caclium 11.1 one time on 12/2023, otherwise range 10.3-10.9.    Symptoms of hypercalcemia: no constipation, no dyspepsia, no mental status changes/lethargy, no polyuria.    Calcium intake: Dietary: limited diary intake, Supplements: stopped since 2020    Vitamin D intake: Supplements: 2000 international unit(s) daily    Previous fractures:  No  Family history of fragility fracture in parent: No  History of kidney stones: No  History of kidney disease: Yes: CKD stage 3a. I reviewed her old record from . She had GFR of > 60 in 2005, since 2008 her GFR ranging between mid 40s and mid 50s.  Family history of any calcium problems or kidney stones: No  History of vitamin D deficiency: No  History of HCTZ use: No  History of Lithium use: No  History of malabsorption (IBD, Celiac, gastric bypass ): No  History of thyroid disease: Yes: controlled hypothyroidism  Weight history: stable.       Work up:  24 hour urine collection 220 mg calcium  dexa scan 12/2022: lowest Ts core -1.2. no significant change in bone density of the lumbar spine. There has been significant decrease in bone density of the hip.     Latest Reference Range & Units 11/07/22 12:50 11/30/22 11:48 12/12/22 09:54 12/12/22 16:00 05/04/23 13:34   Creatinine 0.51 - 0.95 mg/dL 1.25 (H)  1.10 (H)  1.27 (H)   GFR Estimate >60 mL/min/1.73m2 46 (L)  54 (L)  46 (L)   Calcium 8.8 - 10.2 mg/dL 10.4 (H)  11.1 (H)  10.3 (H)   Phosphorus 2.5 - 4.5 mg/dL  3.0   2.9   25 OH Vit D total 20 - 75 ug/L     <57   Calcium Ionized Whole Blood 4.4 - 5.2 mg/dL 5.5 (H)       Calcium Urine g/24 h 0.10 - 0.30 g/spec    0.22    Calcium Urine mg/dL mg/dL    6.5    Parathyroid Hormone Intact 15 - 65 pg/mL  86 (H)   74 (H)         Social: she is retired . quit smoking longtime ago. Alcohol occasional.  Problem List     Patient Active Problem List   Diagnosis    Hypothyroidism due to acquired atrophy of thyroid    Recurrent major depressive disorder, in full remission (H24)    Endometrioid adenocarcinoma of uterus (H)    Overweight (H)    Stage 3a chronic kidney disease (H)    History of colonic polyps    Right hip pain    Serum calcium elevated    TMJ (temporomandibular joint syndrome)        Medications     Current Outpatient Medications   Medication    Apple Cider Vinegar 600 MG CAPS    CALCIUM PO     cholecalciferol (VITAMIN D3) 5000 units TABS tablet    clobetasol (TEMOVATE) 0.05 % external ointment    Cyanocobalamin 1500 MCG TBDP    FLUoxetine (PROZAC) 20 MG capsule    Lactobacillus (ACIDOPHILUS PO)    levothyroxine (SYNTHROID/LEVOTHROID) 100 MCG tablet    Lutein 20 MG CAPS    Omega 3-6-9 Fatty Acids (OMEGA-3-6-9 PO)    omeprazole (PRILOSEC) 10 MG DR capsule     No current facility-administered medications for this visit.     Facility-Administered Medications Ordered in Other Visits   Medication    sodium chloride (PF) 0.9% PF flush 10 mL        Allergies     No Known Allergies    Medical / Surgical History     Past Medical History:   Diagnosis Date    Cancer (H) August 2019    Uterus removed    Depression     Depressive disorder 1999    taking prosac    Hypothyroidism     Overweight      Past Surgical History:   Procedure Laterality Date    ABDOMEN SURGERY  Oct 2019    Robotic Hysterectomy    BIOPSY  early 2000's    left breast - non event    COLONOSCOPY  within last 6 years ?    OK    COLONOSCOPY N/A 7/26/2021    Procedure: COLONOSCOPY, WITH POLYPECTOMY;  Surgeon: Ryan Butterfield MD;  [place] [facility] [place]    DAVINCI HYSTERECTOMY TOTAL, BILATERAL SALPINGO-OOPHORECTOMY, NODE DISSECTION, COMBINED N/A 10/4/2019    Procedure: Robot-assisted total laparoscopic hysterectomy, Bilateral salpingectomy and Right Oophorectomy, Lysis of adhesion, Cervical Indocyanine Green injection, Bilateral sentinel lymph node dissection, Repair of vaginal laceration.;  Surgeon: Perez Reddy MD;  Location: [facility] OR    DILATION AND CURETTAGE N/A 9/12/2019    Procedure: DILATION AND CURETTAGE, UTERUS;  Surgeon: Navdeep Barajas MD;  Location: [facility] OR    OPERATIVE HYSTEROSCOPY WITH MORCELLATOR N/A 9/12/2019    Procedure: HYSTEROSCOPY WITH MORCELLATOR (MYOSURE);  Surgeon: Navdeep Barajas MD;  Location: [facility] OR    SALPINGO OOPHORECTOMY,R/L/SHANDA Left 1980s    Left ovarian with endometriois    US BREAST BIOPSY LT Left         Social History     Social History     Socioeconomic History    Marital status: Single     Spouse name: Not on file    Number of children: Not on file    Years of education: Not on file    Highest education level: Not on file   Occupational History    Occupation: retired     Comment:    Tobacco Use    Smoking status: Former     Packs/day: 1.00     Years: 15.00     Additional pack years: 0.00     Total pack years: 15.00     Types: Cigarettes     Start date: 1972     Quit date: 1987     Years since quittin.0    Smokeless tobacco: Never   Vaping Use    Vaping Use: Never used   Substance and Sexual Activity    Alcohol use: Yes     Comment: a beer here and there - never more than 2 -  2 times/week    Drug use: Never    Sexual activity: Not Currently     Partners: Female     Birth control/protection: None   Other Topics Concern    Parent/sibling w/ CABG, MI or angioplasty before 65F 55M? Yes   Social History Narrative    Not on file     Social Determinants of Health     Financial Resource Strain: Low Risk  (2023)    Financial Resource Strain     Within the past 12 months, have you or your family members you live with been unable to get utilities (heat, electricity) when it was really needed?: No   Food Insecurity: Low Risk  (2023)    Food Insecurity     Within the past 12 months, did you worry that your food would run out before you got money to buy more?: No     Within the past 12 months, did the food you bought just not last and you didn t have money to get more?: No   Transportation Needs: Low Risk  (2023)    Transportation Needs     Within the past 12 months, has lack of transportation kept you from medical appointments, getting your medicines, non-medical meetings or appointments, work, or from getting things that you need?: No   Physical Activity: Not on file   Stress: Not on file   Social Connections: Not on file   Interpersonal Safety: Low Risk   (9/26/2023)    Interpersonal Safety     Do you feel physically and emotionally safe where you currently live?: Yes     Within the past 12 months, have you been hit, slapped, kicked or otherwise physically hurt by someone?: No     Within the past 12 months, have you been humiliated or emotionally abused in other ways by your partner or ex-partner?: No   Housing Stability: Low Risk  (9/24/2023)    Housing Stability     Do you have housing? : Yes     Are you worried about losing your housing?: No       Family History     Family History   Problem Relation Age of Onset    Acute myelogenous leukemia Mother     Coronary Artery Disease Mother         bypass surgery 1989    Osteoporosis Mother     Obesity Mother     Parkinsonism Father     Prostate Cancer Father         Diagnosed late in life in his 80's    Obesity Sister     Heart Disease Sister     Coronary Artery Disease Sister         heart attacK in June of 2019    Uterine Cancer Maternal Grandmother 40        Uterine versus cervical    No Known Problems Sister     Leukemia Brother     Leukemia Nephew     Coronary Artery Disease Brother         Carotid Artery Surgery    Obesity Brother     Substance Abuse Brother         alcohol       ROS     12 ROS completed, pertinent positive and negative in HPI          Physical Exam   LMP  (LMP Unknown)    GENERAL: Healthy, alert and no distress  EYES: Eyes grossly normal to inspection.  No discharge or erythema, or obvious scleral/conjunctival abnormalities.  RESP: No audible wheeze, cough, or visible cyanosis.  No visible retractions or increased work of breathing.    SKIN: Visible skin clear. No significant rash, abnormal pigmentation or lesions.  NEURO: Cranial nerves grossly intact.  Mentation and speech appropriate for age.  PSYCH: Mentation appears normal, affect normal/bright, judgement and insight intact, normal speech and appearance well-groomed.     Labs/Imaging     Pertinent Labs were reviewed and updated in EPIC and  "discussed briefly.  Radiology Results were  reviewed and updated in EPIC and discussed briefly.    Summary of recent findings:   No results found for: A1C    TSH   Date Value Ref Range Status   10/26/2023 1.60 0.30 - 4.20 uIU/mL Final   11/07/2022 1.03 0.40 - 4.00 mU/L Final   06/25/2021 1.51 0.40 - 4.00 mU/L Final   07/17/2020 0.84 0.40 - 4.00 mU/L Final   07/19/2019 1.24 0.40 - 4.00 mU/L Final   05/04/2017 0.97 0.30 - 4.50 uIU/mL Final       Creatinine   Date Value Ref Range Status   11/16/2023 1.00 (H) 0.51 - 0.95 mg/dL Final   06/25/2021 1.04 0.52 - 1.04 mg/dL Final       Recent Labs   Lab Test 09/13/22  1553 06/25/21  0715   CHOL 206* 240*   HDL 46* 40*   * 177*   TRIG 86 113       No results found for: \"[id number]\", \"[id number]\", \"[id number]\"    I personally reviewed the patient's outside records from [facility] EMR and Care Everywhere. Summary of pertinent findings in HPI.    Impression / Plan     1. Hypercalcemia.   2. Elevated PTH  3. CKD 3a  With elevated PTH, the most likely diagnosis is primary hyperparathyroidism.  Her 24 hr urine collection for calcium was 220 mg, with an intermediate ratio. It was done while on very low calcium intake which would explain the results.      We again dicussed today the latest guidelines conclude that surgery for hyperparathyroidism is indicated in symptomatic patients, or in asymptomatic patients who meet any one of the following conditions:  Serum calcium concentration of 1.0 mg/dL (0.25 mmol/L) or more above the upper limit of normal   Creatnine clearance reduced to < 60 mL/min, or 24-hour urine for calcium > 400 mg/day and increased stone risk by biochemical stone risk analysis, or nephrolithiasis or nephrocalcinosis on imaging studyCreatinine clearance that is reduced to <60 mL/min   Bone density at the hip, lumbar spine, or distal radius that is more than 2.5 standard deviations below peak bone mass (T score <-2.5) and/or previous fragility fracture   Age less " than 50 years  Operative intervention can be delayed in patients over 50 years of age who are asymptomatic or minimally symptomatic and who have serum calcium concentrations <1.0 mg/dL (0.2 mmol/L) above the upper limit of normal, and in patients who are medically unfit for surgery.        Her CKD is of unclear etiology started since at least 2008, she established with nephrology 6/2023 seen by Dr. Thomas. Her CKD is not related to current problems with elevated calcium dn PTH. We discussed that ongoing primary hyperparathyroidism could increase the risk of GFR decline in the future; at this point now it seems to be steady around 45-55.   She had one level of calcium of 11.1 which came down to 10.4, has been ranging 10.3-10.9.   She really does not have clear cut indication for surgery now. We discussed monitoring with close labs [id number].    As far as calcium intake, even in cases of hyperparathyroidism, it is recommended to keep up calcium intake to about 1000 mg daily, to prevent further increase in PTH and bone disease. I advised that to the patient.    Dexa scan every 2 years, next 12/2024.    Test and/or medications prescribed today:  No orders of the defined types were placed in this encounter.        Follow up: PRN. She is ok following with her PCP for lab monitoring and dexa scan scans.       Felice Madera MD  Endocrinology, Diabetes and Metabolism  [facility]

## 2023-11-20 NOTE — PROGRESS NOTES
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  She completed brachytherapy 2019.  She is here today for a surveillance visit.      Oncology history:   2019 D&C with pathology showing Grade 1 endometrial cancer, loss of MLH1 and PMS2, hypermethylation of MLH1 promotor positive  10/4/2019: Robotic-assisted Total laparoscopic hysterectomy, bilateral salpingo-oophorectomy, sentinel lymph node dissection, vaginal laceration repair: Stage 1B FIGO Grade 2, DOI 17/21mm (81%), LVSI+, cytology negative, tumor 3.4cm; MSI-H (MLH1 promoter melthylation)  2019: Vaginal brachytherapy; 30Gy in 5 fx     23: right hip xray Impression:  1. No acute osseous abnormality.  2. No substantial degenerative change of the right hip. Right SI joint degenerative change.        Today she comes to clinic feeling well. She denies any vaginal bleeding, no changes in her bowel or bladder habits, no nausea/emesis, no lower extremity edema, and no difficulties eating or sleeping. She denies any abdominal discomfort/bloating, no fevers or chills, and no chest pain or shortness of breath. She is using her dilator about once a month. She saw her PCP  for her annual exam, her mammogram is due, her colonoscopy is due in  and her DEXA in 25. She will be going on a trip to [facility] and also a scuba diving trip soon.          Review of Systems:    Systemic           no weight changes; no fever; no chills; no night sweats; no appetite changes  Skin           no rashes, or lesions  Eye           no irritation; no changes in vision  Shila-Laryngeal           no dysphagia; no hoarseness   Pulmonary    no cough; no shortness of breath  Cardiovascular    no chest pain; no palpitations  Gastrointestinal    no diarrhea; no constipation; no  abdominal pain; no changes in bowel habits; no blood in stool  Genitourinary   no urinary frequency; no urinary urgency; no dysuria; no pain; no abnormal vaginal discharge; no abnormal vaginal bleeding  Breast   no breast discharge; no breast changes; no breast pain  Musculoskeletal    no myalgias; no arthralgias; no back pain  Psychiatric           no depressed mood; no anxiety    Hematologic              no tender lymph nodes; no noticeable swellings or lumps   Endocrine    no hot flashes; no heat/cold intolerance         Neurological   no tremor; no numbness and tingling; no headaches; no difficulty sleeping      Past Medical History:    Past Medical History:   Diagnosis Date    Cancer (H) August 2019    Uterus removed    Depression     Depressive disorder 1999    taking prosac    Hypothyroidism     Overweight          Past Surgical History:    Past Surgical History:   Procedure Laterality Date    ABDOMEN SURGERY  Oct 2019    Robotic Hysterectomy    BIOPSY  early 2000's    left breast - non event    COLONOSCOPY  within last 6 years ?    OK    COLONOSCOPY N/A 7/26/2021    Procedure: COLONOSCOPY, WITH POLYPECTOMY;  Surgeon: Ryan Butterfield MD;  Location: [facility] OR    DAVINCI HYSTERECTOMY TOTAL, BILATERAL SALPINGO-OOPHORECTOMY, NODE DISSECTION, COMBINED N/A 10/4/2019    Procedure: Robot-assisted total laparoscopic hysterectomy, Bilateral salpingectomy and Right Oophorectomy, Lysis of adhesion, Cervical Indocyanine Green injection, Bilateral sentinel lymph node dissection, Repair of vaginal laceration.;  Surgeon: Perez Reddy MD;  Location: [facility] OR    DILATION AND CURETTAGE N/A 9/12/2019    Procedure: DILATION AND CURETTAGE, UTERUS;  Surgeon: Navdeep Barajas MD;  Location: [facility]    OPERATIVE HYSTEROSCOPY WITH MORCELLATOR N/A 9/12/2019    Procedure: HYSTEROSCOPY WITH MORCELLATOR (MYOSURE);  Surgeon: Navdeep Barajas MD;  Location: [facility] OR    SALPINGO OOPHORECTOMY,R/L/SHANDA Left 1980s    Left ovarian  with endometriois    US BREAST BIOPSY LT Left          Health Maintenance Due   Topic Date Due    IPV IMMUNIZATION (2 of 3 - Adult catch-up series) 2002    RSV VACCINE (Pregnancy & 60+) (1 - 1-dose 60+ series) Never done       Current Medications:     Current Outpatient Medications   Medication Sig Dispense Refill    Apple Cider Vinegar 600 MG CAPS Take 1 capsule by mouth daily       CALCIUM PO Take 20 mg by mouth daily      cholecalciferol (VITAMIN D3) 5000 units TABS tablet Take by mouth daily      clobetasol (TEMOVATE) 0.05 % external ointment Apply sparingly to affected area once or twice a week as needed 45 g 0    Cyanocobalamin 1500 MCG TBDP Take 1 tablet by mouth daily       FLUoxetine (PROZAC) 20 MG capsule Take 1 capsule (20 mg) by mouth every 48 hours AND 2 capsules (40 mg) every 48 hours. 135 capsule 3    Lactobacillus (ACIDOPHILUS PO) Take 1 tablet by mouth daily       levothyroxine (SYNTHROID/LEVOTHROID) 100 MCG tablet Take 1 tablet (100 mcg) by mouth every morning 90 tablet 3    Lutein 20 MG CAPS       Omega 3-6-9 Fatty Acids (OMEGA-3-6-9 PO) Take 1 capsule by mouth daily      omeprazole (PRILOSEC) 10 MG DR capsule Take 20 mg by mouth daily           Allergies:      No Known Allergies     Social History:     Social History     Tobacco Use    Smoking status: Former     Packs/day: 1.00     Years: 15.00     Additional pack years: 0.00     Total pack years: 15.00     Types: Cigarettes     Start date: 1972     Quit date: 1987     Years since quittin.0     Passive exposure: Past    Smokeless tobacco: Never   Substance Use Topics    Alcohol use: Yes     Comment: a beer here and there - never more than 2 -  2 times/week       History   Drug Use Unknown         Family History:     The patient's family history is notable for     Family History   Problem Relation Age of Onset    Acute myelogenous leukemia Mother     Coronary Artery Disease Mother         bypass surgery     Osteoporosis  Mother     Obesity Mother     Parkinsonism Father     Prostate Cancer Father         Diagnosed late in life in his 80's    Obesity Sister     Heart Disease Sister     Coronary Artery Disease Sister         heart attacK in June of 2019    Uterine Cancer Maternal Grandmother 40        Uterine versus cervical    No Known Problems Sister     Leukemia Brother     Leukemia Nephew     Coronary Artery Disease Brother         Carotid Artery Surgery    Obesity Brother     Substance Abuse Brother         alcohol         Physical Exam:     /78   Pulse 64   Temp 98.3  F (36.8  C) (Oral)   Resp 16   Wt 117 kg (258 lb)   LMP  (LMP Unknown)   SpO2 98%   BMI 39.81 kg/m    Body mass index is 39.81 kg/m .    General Appearance: healthy and alert, no distress     HEENT: no thyromegaly, no palpable nodules or masses        Cardiovascular: regular rate and rhythm, no gallops, rubs or murmurs     Respiratory: lungs clear, no rales, rhonchi or wheezes, normal diaphragmatic excursion    Musculoskeletal: extremities non tender and without edema    Skin: no lesions or rashes     Neurological: normal gait, no gross defects     Psychiatric: appropriate mood and affect                               Hematological: normal cervical, supraclavicular and inguinal lymph nodes     Gastrointestinal:       abdomen soft, non-tender, non-distended, no organomegaly or masses    Genitourinary: External genitalia and urethral meatus appears normal.  Vagina is smooth without nodularity or masses.  Cervix surgically absent  Bimanual exam reveal no masses, nodularity or fullness.  Recto-vaginal exam confirms these findings.      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  She completed brachytherapy 12/2019.  She is here today for a surveillance visit.     22 minutes spent on the date of the encounter doing chart review, history and exam, documentation and  further activities as noted above      Plan:     1.)       Patient to RTC in 6 months for her next surveillance visit. Reviewed recommendations from SGO not to perform surveillance pap smears in women diagnosed with endometrial cancer as this does not improve detection of local recurrence. Reviewed signs and symptoms for when she should contact the clinic or seek additional care. Patient to contact the clinic with any questions or concerns in the interim.  Answered all of her questions to the best of my ability.     2.) Genetic risk factors were assessed and she had a loss of MLH1 and PMS2; hypermethylation of MLH1 promotor positive. MLH1 promoter methylation is typically associated with sporadic microsatellite unstable tumors of the colon/rectum or endometrium.     3.) Labs and/or tests ordered include:  none.     4.) Health maintenance issues addressed today include annual health maintenance and non-gynecologic issues with PCP.    ROSEMARY Lance, WHNP-BC, ANP-BC  Women's Health Nurse Practitioner  Adult Nurse Practitioner  Division of Gynecologic Oncology  .      CC  Patient Care Team:  Angeli Nolan MD as PCP - General (Family Practice)  Beatrice Reyes APRN CNP as Assigned Cancer Care Provider  Angeli Nolan MD as Assigned PCP  Felice Madera MD as MD (Endocrinology, Diabetes, and Metabolism)  Marcos Thomas MD as MD (Nephrology)  Marcos Thomas MD as Assigned Nephrology Provider  Felice Madera MD as Assigned Endocrinology Provider  SELF, REFERRED

## 2023-11-20 NOTE — NURSING NOTE
Is the patient currently in the state of [place]? YES    Visit mode:VIDEO    If the visit is dropped, the patient can be reconnected by: VIDEO VISIT: Text to cell phone:   Telephone Information:   Mobile [phone]       Will anyone else be joining the visit? NO  (If patient encounters technical issues they should call [phone]    How would you like to obtain your AVS? MyChart    Are changes needed to the allergy or medication list? No, Pt stated no changes to allergies, and Pt stated no med changes    Reason for visit: ALYSIA PIMENTEL

## 2023-11-22 PROBLEM — E21.3 HYPERPARATHYROIDISM (H): Status: ACTIVE | Noted: 2023-11-22

## 2023-12-09 NOTE — PROGRESS NOTES
Nephrology Clinic Visit  Hanh Villalba MRN: [id number] YOB: 1955  Primary Care Provider: Angeli Nolan    Video-Visit Details  Patient evaluated by billable video visit  Video Start Time:  11:08  Type of service:  Video Visit  Video End Time:11:22 AM  Originating Location (pt. Location): Home  Distant Location (provider location):  On-site  Platform used for Video Visit: Anaid    ----------------------------------------------------------------------------------------------------------------------  Visit 12/13/2023:  -BP Control: 131/78 last visit in the office.   --Current Regimen: None  -DM Control: No  --Current Regimen: None  -Creatinine Trend: 1.2 (12/11/2023) from 1.0 (11/16/2023) from 1.13 (6/14/2023) from 1.2 (5/4/2023)  -We discussed diet strategies to preserve kidney health  -We discussed her creatinine trend and that her cystatin-c eGFR is about the same as her creatinine eGFR  -We disucssed plan to follow up with [facility] for Primary hyperpara    Visit 6/14/2023:  -BP Control: No issues with this leading up to establishing care with me.  No orthostatic hypotension.   --Current Regimen: None  -DM Control: No  --Current Regimen: None  -Creatinine Trend: 1.13 (6/14/2023) from 1.2 (5/4/2023)  -Nocturia: 1x nights  -Hematuria: No  -Nephrolithiasis: No  -NSAID Use: Ibuprofen/Aleve until about 1.5 years ago. Intermittently using for back or neck pain. Maybe a few doses a week. Not very heavy use even at the worst of times (maybe about 3 days in a row at most).   -Herbal/OTC Medication Use: Just what's on her chart  -Family History of Kidney Disease: None  -Family/Friends on RRT/Kidney Transplant: No/No    -Other:  --Hx of Stage IB Grade 2 Endometrial Adenocarcinoma s/p Brachytherapy (completed 12/2019) following with Onc for surveillance. No issues and doing [id number] surveillance until 12/2024 then qYear  --Robotic hysterectomy. Further checkups have been good. No complications  at the time of her hysterectomy.   --Has some pyuria. No dysuria. No frequency. Can't remember her last UTI. No fevers. Having some stress incontinence.  --Having some hip discomfort    Objective:  PAST MEDICAL HISTORY:  Past Medical History:   Diagnosis Date    Cancer (H) August 2019    Uterus removed    Depression     Depressive disorder 1999    taking prosac    Hypothyroidism     Overweight        PAST SURGICAL HISTORY:  Past Surgical History:   Procedure Laterality Date    ABDOMEN SURGERY  Oct 2019    Robotic Hysterectomy    BIOPSY  early 2000's    left breast - non event    COLONOSCOPY  within last 6 years ?    OK    COLONOSCOPY N/A 7/26/2021    Procedure: COLONOSCOPY, WITH POLYPECTOMY;  Surgeon: Ryan Butterfield MD;  Location: [facility] OR    DAVINCI HYSTERECTOMY TOTAL, BILATERAL SALPINGO-OOPHORECTOMY, NODE DISSECTION, COMBINED N/A 10/4/2019    Procedure: Robot-assisted total laparoscopic hysterectomy, Bilateral salpingectomy and Right Oophorectomy, Lysis of adhesion, Cervical Indocyanine Green injection, Bilateral sentinel lymph node dissection, Repair of vaginal laceration.;  Surgeon: Perez Reddy MD;  Location: [facility] OR    DILATION AND CURETTAGE N/A 9/12/2019    Procedure: DILATION AND CURETTAGE, UTERUS;  Surgeon: Navdeep Barajas MD;  Location: [facility] OR    OPERATIVE HYSTEROSCOPY WITH MORCELLATOR N/A 9/12/2019    Procedure: HYSTEROSCOPY WITH MORCELLATOR (MYOSURE);  Surgeon: Navdeep Barajas MD;  Location: [facility] OR    SALPINGO OOPHORECTOMY,R/L/SHANDA Left 1980s    Left ovarian with endometriois    US BREAST BIOPSY LT Left        MEDICATIONS:  Current Outpatient Medications   Medication Instructions    Apple Cider Vinegar 600 MG CAPS 1 capsule, Oral, DAILY    CALCIUM PO 20 mg, Oral, DAILY    cholecalciferol (VITAMIN D3) 5000 units TABS tablet Oral, DAILY    clobetasol (TEMOVATE) 0.05 % external ointment Apply sparingly then once or twice a week as needed    Cyanocobalamin 1500 MCG TBDP 1 tablet, Oral,  "DAILY    FLUoxetine (PROZAC) 20 MG capsule Take 1 capsule (20 mg) by mouth every 48 hours AND 2 capsules (40 mg) every 48 hours.    Lactobacillus (ACIDOPHILUS PO) 1 tablet, Oral, DAILY    levothyroxine (SYNTHROID/LEVOTHROID) 100 mcg, Oral, EVERY MORNING    Lutein 20 MG CAPS Oral    Omega 3-6-9 Fatty Acids (OMEGA-3-6-9 PO) 1 capsule, DAILY    omeprazole (PRILOSEC) 20 mg, Oral, DAILY       FAMILY MEDICAL HISTORY:   Family History   Problem Relation Age of Onset    Acute myelogenous leukemia Mother     Coronary Artery Disease Mother         bypass surgery 1989    Osteoporosis Mother     Obesity Mother     Parkinsonism Father     Prostate Cancer Father         Diagnosed late in life in his 80's    Obesity Sister     Heart Disease Sister     Coronary Artery Disease Sister         heart attacK in June of 2019    Uterine Cancer Maternal Grandmother 40        Uterine versus cervical    No Known Problems Sister     Leukemia Brother     Leukemia Nephew     Coronary Artery Disease Brother         Carotid Artery Surgery    Obesity Brother     Substance Abuse Brother         alcohol       PHYSICAL EXAM:   Ht 1.715 m (5' 7.5\")   Wt 117 kg (258 lb)   LMP  (LMP Unknown)   BMI 39.81 kg/m    GENERAL APPEARANCE: alert and no distress  MS: no evidence of inflammation in joints, no muscle tenderness  SKIN: no rash  NEURO: mentation intact and speech normal  PSYCH: affect normal    LABS REVIEWED BY ME:   ANEMIA  Recent Labs   Lab Test 06/14/23  0718 11/30/22  1148 03/15/21  0847 09/26/19  1221   HGB 13.2 12.3 12.8 12.7       BMP  Recent Labs   Lab Test 11/16/23  1007 08/25/23  0909 06/14/23  0718 05/04/23  1334 12/12/22  0954 11/07/22  1250 09/13/22  1553 07/17/20  0840 09/26/19  1221   NA  --   --  140  --  140 141 139   < > 139   POTASSIUM  --   --  4.5  --  4.5 4.8 4.7   < > 4.4   CHLORIDE  --   --  106  --  105 107 110*   < > 106   CO2  --   --  28  --  26 30 30   < > 28   ANIONGAP  --   --  6*  --  9 4 <1*   < > 5   BUN  --   --  " "18.1  --  15.6 15 17   < > 18   CR 1.00* 1.10* 1.13* 1.27* 1.10* 1.25* 1.07*   < > 1.04   GFRESTIMATED 60* 54* 52* 46* 54* 46* 56*   < > 56*   MAG 1.8 2.3  --   --   --   --   --   --   --    PROTTOTAL  --   --   --   --   --   --   --   --  7.1    < > = values in this interval not displayed.       CBC  Recent Labs   Lab Test 06/14/23  0718 11/30/22  1148 03/15/21  0847 09/26/19  1221   HGB 13.2 12.3 12.8 12.7   WBC 5.0  --  7.6 8.0   HCT 40.6  --  39.6 39.0   MCV 96  --  97 98     --  202 241       DIABETESNo lab results found.    HYPONATREMIANo lab results found.    Invalid input(s): \"UOSM\", \"OSM\"    MBD  Recent Labs   Lab Test 11/16/23  1007 08/25/23  0909 06/14/23  0718 05/04/23  1334 12/12/22  0954 11/30/22  1148   SHANDRA 10.5* 10.9* 10.3* 10.3*   < >  --    ALBUMIN 4.1 4.1 4.1 4.4  --   --    PHOS 2.3* 2.8 2.4* 2.9  --  3.0   PTHI 82* 114*  --  74*  --  86*    < > = values in this interval not displayed.        URINE STUDIES  Recent Labs   Lab Test 06/14/23  0721 11/05/19  1746 09/20/19 2006   COLOR Light Yellow Yellow Yellow   APPEARANCE Clear Clear Cloudy   URINEGLC Negative Negative Negative   URINEBILI Negative Negative Negative   URINEKETONE Negative Negative Negative   SG 1.012 >1.030 1.025   UBLD Negative Small* Large*   URINEPH 6.5 5.5 6.0   PROTEIN Negative Negative 100*   UROBILINOGEN  --  0.2 0.2   NITRITE Negative Negative Positive*   LEUKEST Moderate* Small* Large*   RBCU 0 O - 2 25-50*   WBCU 27* 5-10* >100*     No lab results found.    ADDITIONAL LABS ORDERED/REVIEWED BY ME:  See below    Assessment/Plan  CKD Stage G3aA1  Established care with [facility] 6/14/2023    Baseline creatinine appears to be 1-1.2 dating back to at least 2008. Prior to this was 0.8-0.9 in 2005. Unclear what happened between 2005 and 2008 to result in creatinine increase but she has overal been remarkably stable over the years since. Did have one creatinine of 1.4 but this was not sustained. UA 6/2023 w/ no " hematuira. Did have some pyuria but denies any UTI symptoms and her clinic history at the time of establishing care with me is not consistent with AIN/CECILIA (also her UA had 8 squamous cells so wasn't a perfect collection), UPCR 6/2023 w/ 0.11g/gr. CT Abd/Pelvis 6/2021 w/o hydro, stones, or other obvious renal issues.     Borderline hypercalcemia present since 7/2018 as well. Typically in the low 10's although did have a calcium of 11.1 on 12/12/2022. Prior to this appears calcium was normal in the mid 9's typically.     Hypercalcemia workup:  -Albumin 4.4 5/4/2023   -25-OH vitamin D 57 5/4/2023  -24 hour urine calcium/creatinine 12/2022: 0.22g/day calcium, 3.35L UOP, 14mg/kg/day creatinine excretion in sample  -PTH 74 5/4/2023  -Phos 2.9 5/4/2023  -Serum Calcium 10.3 5/4/2023    Saw Endo 5/11/2023. Discussion regarding Primary HyperPara and possible surgical intervention. Plan for close monitoring for now.    CKD Etiology (Biopsy Proven: No):  -Unclear etiology of mild CKD dating back to 2008. Stable since.   -Perhaps some contribution of hypercalciuria from primary hyperpara  -Some small contribution of NSAID use is likely, but not our main  of mild CKD  -No significant history of hypertension/hypotension per her report. Not currently on any Anti-HTN  -No clinical or objective evidence of obstructive nephropathy    Plan:  -Will continue to monitor things intermittently. Her renal function has been incredibly stable over the years which is great. Continue optimal supportive care (avoid hypo/hypertension, avoid NSAIDs as much as possible, keep up with general health measures like vaccinations, avoid sick contacts, and monitor for any UTI/Obstructive symptoms  -No indication for Losartan or Jardiance at this time. Continue to intermittently monitor proteinuria and eGFR.  -Discussed diet and lifestyle interventions  -Remainder per problem below      Anemia of Renal Disease  Hemoglobin: 13.2  (6/14/2023)  Ferritin:  TSAT:    Plan:  -No need for IV iron or EPO      Lipid Management in CKD  KDIGO 2013 Guidelines recommend the following for patients with CKD:  Adults >/= 49 yo w/ CKD stage 1 or 2: Statin  Adults >/= 49 yo w/ CKD stage 3-5: Statin + Ezetimibe or Statin alone  Adults 18-49 + 1 of the following (CAD, DM, Ischemic stroke, 10 yr ASCVD risk >10%): Statin    KDIGO guidelines recommend Simvastatin 20mg/Ezetimibe 10mg qDay for patients with CKD G3a-G5 (in line with the SHARP trial). If Simvastatin used alone, 40mg qDay is referenced.   Other options include: Atorvastatin 20mg qDay (4D trial) and Rosuvastatin 10mg qDay (ED trial)    Plan:  -Not interested in statin at this time.       Metabolic Acidosis of Renal Disease  Bicarb: 28    Plan:  -No need for NaHCO at this time      Mineral Bone Disease  PTH: 74 (5/4/2023)  Phos: 2.4 (6/14/2023)  Calcium: 10.3 (6/14/2023)  Vitamin D: 57 (5/4/2023)    Plan:  -Following with Endo for primary hyperpara  -No need for phos binder at thsi time    Other:  -Specialty Care Coordination Referral - CKD G1-G3b w/o imminent RRT planning/needs (Yes/no, Date Referral Placed): No  -Med Therapy Management Referral (Yes/No, Date of Referral): No    Return to clinic: 6 months    Marcos Thomas MD   of Medicine  Division of Nephrology and Hypertension  [facility]    25 minutes spent on the date of the encounter doing chart review, history and exam, documentation and further activities as noted above

## 2023-12-11 ENCOUNTER — LAB (OUTPATIENT)
Dept: LAB | Facility: CLINIC | Age: 68
End: 2023-12-11
Payer: COMMERCIAL

## 2023-12-11 DIAGNOSIS — N18.31 STAGE 3A CHRONIC KIDNEY DISEASE (H): ICD-10-CM

## 2023-12-11 PROCEDURE — 80048 BASIC METABOLIC PNL TOTAL CA: CPT

## 2023-12-11 PROCEDURE — 36415 COLL VENOUS BLD VENIPUNCTURE: CPT

## 2023-12-11 PROCEDURE — 82610 CYSTATIN C: CPT

## 2023-12-12 LAB
ANION GAP SERPL CALCULATED.3IONS-SCNC: 6 MMOL/L (ref 7–15)
BUN SERPL-MCNC: 22.3 MG/DL (ref 8–23)
CALCIUM SERPL-MCNC: 10.6 MG/DL (ref 8.8–10.2)
CHLORIDE SERPL-SCNC: 105 MMOL/L (ref 98–107)
CREAT SERPL-MCNC: 1.2 MG/DL (ref 0.51–0.95)
CYSTATIN C (ROCHE): 1.3 MG/L (ref 0.6–1)
DEPRECATED HCO3 PLAS-SCNC: 29 MMOL/L (ref 22–29)
EGFRCR SERPLBLD CKD-EPI 2021: 48 ML/MIN/1.73M2
GFR SERPL CREATININE-BSD FRML MDRD: 49 ML/MIN/1.73M2
GLUCOSE SERPL-MCNC: 116 MG/DL (ref 70–99)
POTASSIUM SERPL-SCNC: 5 MMOL/L (ref 3.4–5.3)
SODIUM SERPL-SCNC: 140 MMOL/L (ref 135–145)

## 2023-12-13 ENCOUNTER — PATIENT OUTREACH (OUTPATIENT)
Dept: GASTROENTEROLOGY | Facility: CLINIC | Age: 68
End: 2023-12-13

## 2023-12-13 ENCOUNTER — VIRTUAL VISIT (OUTPATIENT)
Dept: NEPHROLOGY | Facility: CLINIC | Age: 68
End: 2023-12-13
Attending: STUDENT IN AN ORGANIZED HEALTH CARE EDUCATION/TRAINING PROGRAM
Payer: COMMERCIAL

## 2023-12-13 VITALS — WEIGHT: 258 LBS | BODY MASS INDEX: 39.1 KG/M2 | HEIGHT: 68 IN

## 2023-12-13 DIAGNOSIS — N18.31 STAGE 3A CHRONIC KIDNEY DISEASE (H): Primary | ICD-10-CM

## 2023-12-13 PROCEDURE — 99214 OFFICE O/P EST MOD 30 MIN: CPT | Mod: VID | Performed by: STUDENT IN AN ORGANIZED HEALTH CARE EDUCATION/TRAINING PROGRAM

## 2023-12-13 ASSESSMENT — PAIN SCALES - GENERAL: PAINLEVEL: NO PAIN (0)

## 2023-12-13 NOTE — NURSING NOTE
Is the patient currently in the state of [place]? YES    Visit mode:VIDEO    If the visit is dropped, the patient can be reconnected by: VIDEO VISIT: Send to e-mail at: [email or web address]    Will anyone else be joining the visit? NO  (If patient encounters technical issues they should call [phone]    How would you like to obtain your AVS? MyChart    Are changes needed to the allergy or medication list? No    Reason for visit: ALYSIA PIMENTEL

## 2023-12-13 NOTE — PATIENT INSTRUCTIONS
"It was a pleasure to see you in nephrology clinic today. I've included a brief summary of our discussion and care plan from today's visit below.  _______________________________________________________________________    My recommendations are summarized as follows:  -Keep a Blood Pressure log. Please make sure that you are using a validated blood pressure device (check \"www.validatebp.org\").  -Avoid all NSAID's. Examples include Ibuprofen (Advil, Motrin), naprosyn (Aleve), celebrex among others. Acetaminophen (Tylenol) is ok with maximum dose in 24 hours of 3000mg.  -Healthy lifestyle measures will keep your kidney's functioning at their current best. This includes regular exercise, maintaining a healthy body weight and smoking cessation.   -Please aim for about 1g/kg/day of protein intake (this is about 110 grams per day.   -Please aim for < 2.4grams of sodium per day. Foods with >8% daily value per serving are high sodium foods  -Please consider investing in a blood pressure cuff and taking your blood pressure at home 1x per week. Write these numbers down and we will review at our next appt    Please return to Nephrology Clinic in 6 months to review your progress.     Who do I call with any questions after my visit?  There are multiple ways to contact your nephrology care team:    -To schedule or reschedule an appointment, please call [phone].  -Reach out via [employer]. These messages are answered by your nurse or doctor during business hours and typically in 1-2 days. [employer] messages are best for quick questions/clarifications/updates. Frequently, your doctor or nurse will recommend setting up a follow up appointment to address any significant questions/concerns.  -For urgent questions after business hours, you may reach the on-call Nephrology Fellow by contacting the [facility]  at [phone].    To schedule imaging:   -Please call [phone]     To schedule your lab appointment at the " [facility]:  -Please call [phone]    Sincerely,    Dr. Marcos Thomas   of Medicine  Division of Nephrology and Hypertension  [facility]

## 2023-12-13 NOTE — LETTER
12/13/2023       RE: Hanh Villalba  [address]  [place] [address]     Dear Colleague,    Thank you for referring your patient, Hanh Villalba, to the [facility] [place] at [facility]. Please see a copy of my visit note below.        Nephrology Clinic Visit  Hanh Villalba MRN: [id number] YOB: 1955  Primary Care Provider: Angeli Nolan    Video-Visit Details  Patient evaluated by billable video visit  Video Start Time:  11:08  Type of service:  Video Visit  Video End Time:11:22 AM  Originating Location (pt. Location): Home  Distant Location (provider location):  On-site  Platform used for Video Visit: e[employer] by [employer]    ----------------------------------------------------------------------------------------------------------------------  Visit 12/13/2023:  -BP Control: 131/78 last visit in the office.   --Current Regimen: None  -DM Control: No  --Current Regimen: None  -Creatinine Trend: 1.2 (12/11/2023) from 1.0 (11/16/2023) from 1.13 (6/14/2023) from 1.2 (5/4/2023)  -We discussed diet strategies to preserve kidney health  -We discussed her creatinine trend and that her cystatin-c eGFR is about the same as her creatinine eGFR  -We disucssed plan to follow up with Endo for Primary hyperpara    Visit 6/14/2023:  -BP Control: No issues with this leading up to establishing care with me.  No orthostatic hypotension.   --Current Regimen: None  -DM Control: No  --Current Regimen: None  -Creatinine Trend: 1.13 (6/14/2023) from 1.2 (5/4/2023)  -Nocturia: 1x nights  -Hematuria: No  -Nephrolithiasis: No  -NSAID Use: Ibuprofen/Aleve until about 1.5 years ago. Intermittently using for back or neck pain. Maybe a few doses a week. Not very heavy use even at the worst of times (maybe about 3 days in a row at most).   -Herbal/OTC Medication Use: Just what's on her chart  -Family History of Kidney Disease:  None  -Family/Friends on RRT/Kidney Transplant: No/No    -Other:  --Hx of Stage IB Grade 2 Endometrial Adenocarcinoma s/p Brachytherapy (completed 12/2019) following with Onc for surveillance. No issues and doing r9Hetru surveillance until 12/2024 then qYear  --Robotic hysterectomy. Further checkups have been good. No complications at the time of her hysterectomy.   --Has some pyuria. No dysuria. No frequency. Can't remember her last UTI. No fevers. Having some stress incontinence.  --Having some hip discomfort    Objective:  PAST MEDICAL HISTORY:  Past Medical History:   Diagnosis Date    Cancer (H) August 2019    Uterus removed    Depression     Depressive disorder 1999    taking prosac    Hypothyroidism     Overweight        PAST SURGICAL HISTORY:  Past Surgical History:   Procedure Laterality Date    ABDOMEN SURGERY  Oct 2019    Robotic Hysterectomy    BIOPSY  early 2000's    left breast - non event    COLONOSCOPY  within last 6 years ?    OK    COLONOSCOPY N/A 7/26/2021    Procedure: COLONOSCOPY, WITH POLYPECTOMY;  Surgeon: Ryan Butterfield MD;  Location: [facility] OR    DAVINCI HYSTERECTOMY TOTAL, BILATERAL SALPINGO-OOPHORECTOMY, NODE DISSECTION, COMBINED N/A 10/4/2019    Procedure: Robot-assisted total laparoscopic hysterectomy, Bilateral salpingectomy and Right Oophorectomy, Lysis of adhesion, Cervical Indocyanine Green injection, Bilateral sentinel lymph node dissection, Repair of vaginal laceration.;  Surgeon: Perez Reddy MD;  Location: [facility] OR    DILATION AND CURETTAGE N/A 9/12/2019    Procedure: DILATION AND CURETTAGE, UTERUS;  Surgeon: Navdeep Barajas MD;  Location: [facility] OR    OPERATIVE HYSTEROSCOPY WITH MORCELLATOR N/A 9/12/2019    Procedure: HYSTEROSCOPY WITH MORCELLATOR (MYOSURE);  Surgeon: Navdeep Barajas MD;  Location: [facility] OR    SALPINGO OOPHORECTOMY,R/L/SHANDA Left 1980s    Left ovarian with endometriois    US BREAST BIOPSY LT Left        MEDICATIONS:  Current Outpatient Medications  "  Medication Instructions    Apple Cider Vinegar 600 MG CAPS 1 capsule, Oral, DAILY    CALCIUM PO 20 mg, Oral, DAILY    cholecalciferol (VITAMIN D3) 5000 units TABS tablet Oral, DAILY    clobetasol (TEMOVATE) 0.05 % external ointment Apply sparingly then once or twice a week as needed    Cyanocobalamin 1500 MCG TBDP 1 tablet, Oral, DAILY    FLUoxetine (PROZAC) 20 MG capsule Take 1 capsule (20 mg) by mouth every 48 hours AND 2 capsules (40 mg) every 48 hours.    Lactobacillus (ACIDOPHILUS PO) 1 tablet, Oral, DAILY    levothyroxine (SYNTHROID/LEVOTHROID) 100 mcg, Oral, EVERY MORNING    Lutein 20 MG CAPS Oral    Omega 3-6-9 Fatty Acids (OMEGA-3-6-9 PO) 1 capsule, DAILY    omeprazole (PRILOSEC) 20 mg, Oral, DAILY       FAMILY MEDICAL HISTORY:   Family History   Problem Relation Age of Onset    Acute myelogenous leukemia Mother     Coronary Artery Disease Mother         bypass surgery 1989    Osteoporosis Mother     Obesity Mother     Parkinsonism Father     Prostate Cancer Father         Diagnosed late in life in his 80's    Obesity Sister     Heart Disease Sister     Coronary Artery Disease Sister         heart attacK in June of 2019    Uterine Cancer Maternal Grandmother 40        Uterine versus cervical    No Known Problems Sister     Leukemia Brother     Leukemia Nephew     Coronary Artery Disease Brother         Carotid Artery Surgery    Obesity Brother     Substance Abuse Brother         alcohol       PHYSICAL EXAM:   Ht 1.715 m (5' 7.5\")   Wt 117 kg (258 lb)   LMP  (LMP Unknown)   BMI 39.81 kg/m    GENERAL APPEARANCE: alert and no distress  MS: no evidence of inflammation in joints, no muscle tenderness  SKIN: no rash  NEURO: mentation intact and speech normal  PSYCH: affect normal    LABS REVIEWED BY ME:   ANEMIA  Recent Labs   Lab Test 06/14/23  0718 11/30/22  1148 03/15/21  0847 09/26/19  1221   HGB 13.2 12.3 12.8 12.7       BMP  Recent Labs   Lab Test 11/16/23  1007 08/25/23  0909 06/14/23  0718 " "05/04/23  1334 12/12/22  0954 11/07/22  1250 09/13/22  1553 07/17/20  0840 09/26/19  1221   NA  --   --  140  --  140 141 139   < > 139   POTASSIUM  --   --  4.5  --  4.5 4.8 4.7   < > 4.4   CHLORIDE  --   --  106  --  105 107 110*   < > 106   CO2  --   --  28  --  26 30 30   < > 28   ANIONGAP  --   --  6*  --  9 4 <1*   < > 5   BUN  --   --  18.1  --  15.6 15 17   < > 18   CR 1.00* 1.10* 1.13* 1.27* 1.10* 1.25* 1.07*   < > 1.04   GFRESTIMATED 60* 54* 52* 46* 54* 46* 56*   < > 56*   MAG 1.8 2.3  --   --   --   --   --   --   --    PROTTOTAL  --   --   --   --   --   --   --   --  7.1    < > = values in this interval not displayed.       CBC  Recent Labs   Lab Test 06/14/23  0718 11/30/22  1148 03/15/21  0847 09/26/19  1221   HGB 13.2 12.3 12.8 12.7   WBC 5.0  --  7.6 8.0   HCT 40.6  --  39.6 39.0   MCV 96  --  97 98     --  202 241       DIABETESNo lab results found.    HYPONATREMIANo lab results found.    Invalid input(s): \"UOSM\", \"OSM\"    MBD  Recent Labs   Lab Test 11/16/23  1007 08/25/23  0909 06/14/23  0718 05/04/23  1334 12/12/22  0954 11/30/22  1148   SHANDRA 10.5* 10.9* 10.3* 10.3*   < >  --    ALBUMIN 4.1 4.1 4.1 4.4  --   --    PHOS 2.3* 2.8 2.4* 2.9  --  3.0   PTHI 82* 114*  --  74*  --  86*    < > = values in this interval not displayed.        URINE STUDIES  Recent Labs   Lab Test 06/14/23  0721 11/05/19  1746 09/20/19 2006   COLOR Light Yellow Yellow Yellow   APPEARANCE Clear Clear Cloudy   URINEGLC Negative Negative Negative   URINEBILI Negative Negative Negative   URINEKETONE Negative Negative Negative   SG 1.012 >1.030 1.025   UBLD Negative Small* Large*   URINEPH 6.5 5.5 6.0   PROTEIN Negative Negative 100*   UROBILINOGEN  --  0.2 0.2   NITRITE Negative Negative Positive*   LEUKEST Moderate* Small* Large*   RBCU 0 O - 2 25-50*   WBCU 27* 5-10* >100*     No lab results found.    ADDITIONAL LABS ORDERED/REVIEWED BY ME:  See below    Assessment/Plan  CKD Stage G3aA1  Established care with [facility] " Nephro 6/14/2023    Baseline creatinine appears to be 1-1.2 dating back to at least 2008. Prior to this was 0.8-0.9 in 2005. Unclear what happened between 2005 and 2008 to result in creatinine increase but she has overal been remarkably stable over the years since. Did have one creatinine of 1.4 but this was not sustained. UA 6/2023 w/ no hematuira. Did have some pyuria but denies any UTI symptoms and her clinic history at the time of establishing care with me is not consistent with AIN/CECILIA (also her UA had 8 squamous cells so wasn't a perfect collection), UPCR 6/2023 w/ 0.11g/gr. CT Abd/Pelvis 6/2021 w/o hydro, stones, or other obvious renal issues.     Borderline hypercalcemia present since 7/2018 as well. Typically in the low 10's although did have a calcium of 11.1 on 12/12/2022. Prior to this appears calcium was normal in the mid 9's typically.     Hypercalcemia workup:  -Albumin 4.4 5/4/2023   -25-OH vitamin D 57 5/4/2023  -24 hour urine calcium/creatinine 12/2022: 0.22g/day calcium, 3.35L UOP, 14mg/kg/day creatinine excretion in sample  -PTH 74 5/4/2023  -Phos 2.9 5/4/2023  -Serum Calcium 10.3 5/4/2023    Saw Endo 5/11/2023. Discussion regarding Primary HyperPara and possible surgical intervention. Plan for close monitoring for now.    CKD Etiology (Biopsy Proven: No):  -Unclear etiology of mild CKD dating back to 2008. Stable since.   -Perhaps some contribution of hypercalciuria from primary hyperpara  -Some small contribution of NSAID use is likely, but not our main  of mild CKD  -No significant history of hypertension/hypotension per her report. Not currently on any Anti-HTN  -No clinical or objective evidence of obstructive nephropathy    Plan:  -Will continue to monitor things intermittently. Her renal function has been incredibly stable over the years which is great. Continue optimal supportive care (avoid hypo/hypertension, avoid NSAIDs as much as possible, keep up with general health measures  like vaccinations, avoid sick contacts, and monitor for any UTI/Obstructive symptoms  -No indication for Losartan or Jardiance at this time. Continue to intermittently monitor proteinuria and eGFR.  -Discussed diet and lifestyle interventions  -Remainder per problem below      Anemia of Renal Disease  Hemoglobin: 13.2 (6/14/2023)  Ferritin:  TSAT:    Plan:  -No need for IV iron or EPO      Lipid Management in CKD  KDIGO 2013 Guidelines recommend the following for patients with CKD:  Adults >/= 49 yo w/ CKD stage 1 or 2: Statin  Adults >/= 49 yo w/ CKD stage 3-5: Statin + Ezetimibe or Statin alone  Adults 18-49 + 1 of the following (CAD, DM, Ischemic stroke, 10 yr ASCVD risk >10%): Statin    KDIGO guidelines recommend Simvastatin 20mg/Ezetimibe 10mg qDay for patients with CKD G3a-G5 (in line with the SHARP trial). If Simvastatin used alone, 40mg qDay is referenced.   Other options include: Atorvastatin 20mg qDay (4D trial) and Rosuvastatin 10mg qDay (ED trial)    Plan:  -Not interested in statin at this time.       Metabolic Acidosis of Renal Disease  Bicarb: 28    Plan:  -No need for NaHCO at this time      Mineral Bone Disease  PTH: 74 (5/4/2023)  Phos: 2.4 (6/14/2023)  Calcium: 10.3 (6/14/2023)  Vitamin D: 57 (5/4/2023)    Plan:  -Following with Endo for primary hyperpara  -No need for phos binder at thsi time    Other:  -Specialty Care Coordination Referral - CKD G1-G3b w/o imminent RRT planning/needs (Yes/no, Date Referral Placed): No  -Med Therapy Management Referral (Yes/No, Date of Referral): No    Return to clinic: 6 months    Marcos Thomas MD   of Medicine  Division of Nephrology and Hypertension  [facility]    25 minutes spent on the date of the encounter doing chart review, history and exam, documentation and further activities as noted above    See below

## 2023-12-18 ENCOUNTER — TELEPHONE (OUTPATIENT)
Dept: NEPHROLOGY | Facility: CLINIC | Age: 68
End: 2023-12-18
Payer: COMMERCIAL

## 2023-12-18 NOTE — TELEPHONE ENCOUNTER
pt was too busy to schedule now & sent mychart // 6 month follow up, virtual (manually schedule into return neph slot) around 6.13.24 with Dr. Thomas - per Dr. Thomas check out notes on 12.13.23 // first attempt, AN 12.18.23

## 2023-12-20 ENCOUNTER — TELEPHONE (OUTPATIENT)
Dept: NEPHROLOGY | Facility: CLINIC | Age: 68
End: 2023-12-20
Payer: COMMERCIAL

## 2023-12-20 NOTE — TELEPHONE ENCOUNTER
unable to LVM // pt needs to schedule 6 month (return neph) follow up, virtual (manually schedule into return neph slot) around 6.13.24 with Dr. Thomas - per Dr. Thomas check out notes on 12.13.23 // second attempt, AN 12.20.23

## 2023-12-20 NOTE — TELEPHONE ENCOUNTER
LVM to schedule 6 month follow up with Dr. Thomas with labs prior // 2nd attempt, max attempts reached 12.20.2023 MCE

## 2024-01-22 ENCOUNTER — ANCILLARY PROCEDURE (OUTPATIENT)
Dept: MAMMOGRAPHY | Facility: CLINIC | Age: 69
End: 2024-01-22
Attending: FAMILY MEDICINE
Payer: COMMERCIAL

## 2024-01-22 DIAGNOSIS — Z12.31 VISIT FOR SCREENING MAMMOGRAM: ICD-10-CM

## 2024-01-22 PROCEDURE — 77067 SCR MAMMO BI INCL CAD: CPT | Mod: TC | Performed by: RADIOLOGY

## 2024-02-09 ENCOUNTER — TRANSFERRED RECORDS (OUTPATIENT)
Dept: HEALTH INFORMATION MANAGEMENT | Facility: CLINIC | Age: 69
End: 2024-02-09
Payer: COMMERCIAL

## 2024-04-04 ASSESSMENT — PATIENT HEALTH QUESTIONNAIRE - PHQ9
SUM OF ALL RESPONSES TO PHQ QUESTIONS 1-9: 0
SUM OF ALL RESPONSES TO PHQ QUESTIONS 1-9: 0

## 2024-04-05 ENCOUNTER — VIRTUAL VISIT (OUTPATIENT)
Dept: FAMILY MEDICINE | Facility: CLINIC | Age: 69
End: 2024-04-05
Payer: COMMERCIAL

## 2024-04-05 DIAGNOSIS — E66.01 MORBID OBESITY (H): Primary | ICD-10-CM

## 2024-04-05 PROCEDURE — 99213 OFFICE O/P EST LOW 20 MIN: CPT | Mod: 95 | Performed by: FAMILY MEDICINE

## 2024-04-05 NOTE — PROGRESS NOTES
"Bry is a 68 year old who is being evaluated via a billable video visit.    How would you like to obtain your AVS? MyChart  If the video visit is dropped, the invitation should be resent by: Text to cell phone: [phone]  Will anyone else be joining your video visit? No      Assessment & Plan     (E66.01) morbid obesity (H)  (primary encounter diagnosis)  Comment: desires discussion about weight loss options  Plan: has tried many methods of weight loss in past.   Wonders about GLP-1 medications.  Discussed risks and benefits extensively.  She is going to consider.   If she desire, will message me. Discussed that I would have her meet with MTM to initiate therapy - to navigate insurance coverage and cost.            BMI  Estimated body mass index is 39.81 kg/m  as calculated from the following:    Height as of 12/13/23: 1.715 m (5' 7.5\").    Weight as of 12/13/23: 117 kg (258 lb).   Weight management plan: Discussed healthy diet and exercise guidelines          Subjective   Bry is a 68 year old, presenting for the following health issues:  Recheck Medication      4/5/2024     6:37 AM   Additional Questions   Roomed by [employer]     Video Start Time: 10:49 AM    History of Present Illness       Hyperlipidemia:  She presents for follow up of hyperlipidemia.   She is not taking medication to lower cholesterol. She is not having myalgia or other side effects to statin medications.    Reason for visit:  Discuss Weight Loss    She eats 2-3 servings of fruits and vegetables daily.She consumes 1 sweetened beverage(s) daily.She exercises with enough effort to increase her heart rate 10 to 19 minutes per day.  She exercises with enough effort to increase her heart rate 3 or less days per week.   She is taking medications regularly.     Weight watchers  Psychologist.  Went to Weight Mgmt  Phentermine and topiramate   Had dizziness.  Stopped them about two weeks later    Self esteem  Wear and tear on joints            Review " of Systems  Constitutional, HEENT, cardiovascular, pulmonary, gi and gu systems are negative, except as otherwise noted.      Objective           Vitals:  No vitals were obtained today due to virtual visit.    Physical Exam   GENERAL: alert and no distress  EYES: Eyes grossly normal to inspection.  No discharge or erythema, or obvious scleral/conjunctival abnormalities.  RESP: No audible wheeze, cough, or visible cyanosis.    SKIN: Visible skin clear. No significant rash, abnormal pigmentation or lesions.  NEURO: Cranial nerves grossly intact.  Mentation and speech appropriate for age.  PSYCH: Appropriate affect, tone, and pace of words          Video-Visit Details    Type of service:  Video Visit   Video End Time:11:05 AM  Originating Location (pt. Location): Home    Distant Location (provider location):  Off-site  Platform used for Video Visit: [employer]  Signed Electronically by: Angeli Messina MD

## 2024-04-26 ENCOUNTER — PATIENT OUTREACH (OUTPATIENT)
Dept: GASTROENTEROLOGY | Facility: CLINIC | Age: 69
End: 2024-04-26
Payer: COMMERCIAL

## 2024-04-26 DIAGNOSIS — Z12.11 SPECIAL SCREENING FOR MALIGNANT NEOPLASMS, COLON: Primary | ICD-10-CM

## 2024-04-26 NOTE — PROGRESS NOTES
"[facility] Screening Colonoscopy Referral Review    Patient meets the inclusion criteria for screening colonoscopy standing order.    Ordering/Referring Provider:  Angeli Nolan     BMI: Estimated body mass index is 39.81 kg/m  as calculated from the following:    Height as of 12/13/23: 1.715 m (5' 7.5\").    Weight as of 12/13/23: 117 kg (258 lb).     Sedation:  Does patient have any of the following conditions affecting sedation?  No medical conditions affecting sedation.    Previous Scopes:  Any previous recommendations or follow up needs based on previous scope?  na / No recommendations.    Medical Concerns to Postpone Order:  Does patient have any of the following medical concerns that should postpone/delay colonoscopy referral?  No medical conditions affecting colonoscopy referral.    Final Referral Details:  Based on patient's medical history patient is appropriate for referral order with moderate sedation. If patient's BMI > 50 do not schedule in ASC.  "

## 2024-05-01 ENCOUNTER — TELEPHONE (OUTPATIENT)
Dept: NEPHROLOGY | Facility: CLINIC | Age: 69
End: 2024-05-01

## 2024-05-01 ENCOUNTER — LAB (OUTPATIENT)
Dept: LAB | Facility: CLINIC | Age: 69
End: 2024-05-01
Payer: COMMERCIAL

## 2024-05-01 DIAGNOSIS — E83.52 SERUM CALCIUM ELEVATED: ICD-10-CM

## 2024-05-01 DIAGNOSIS — N18.31 STAGE 3A CHRONIC KIDNEY DISEASE (H): Primary | ICD-10-CM

## 2024-05-01 LAB — HGB BLD-MCNC: 13.3 G/DL (ref 11.7–15.7)

## 2024-05-01 PROCEDURE — 84100 ASSAY OF PHOSPHORUS: CPT

## 2024-05-01 PROCEDURE — 85018 HEMOGLOBIN: CPT

## 2024-05-01 PROCEDURE — 83970 ASSAY OF PARATHORMONE: CPT

## 2024-05-01 PROCEDURE — 82310 ASSAY OF CALCIUM: CPT

## 2024-05-01 PROCEDURE — 82565 ASSAY OF CREATININE: CPT

## 2024-05-01 PROCEDURE — 83735 ASSAY OF MAGNESIUM: CPT

## 2024-05-01 PROCEDURE — 82040 ASSAY OF SERUM ALBUMIN: CPT

## 2024-05-01 PROCEDURE — 36415 COLL VENOUS BLD VENIPUNCTURE: CPT

## 2024-05-01 NOTE — TELEPHONE ENCOUNTER
[facility] Call Center    Phone Message    May a detailed message be left on voicemail: no     Reason for Call: Order(s): Other:   Reason for requested: Neph appt labs.  Date needed: 06/10/2024  Provider name: Dr. Thomas    Patient wants to confirm that we will be placing lab orders for her follow-up appointment.    Action Taken: Message routed to:  [facility] Nephrology    Travel Screening: Not Applicable

## 2024-05-02 LAB
ALBUMIN SERPL BCG-MCNC: 4.4 G/DL (ref 3.5–5.2)
CALCIUM SERPL-MCNC: 10.3 MG/DL (ref 8.8–10.2)
CREAT SERPL-MCNC: 1.09 MG/DL (ref 0.51–0.95)
EGFRCR SERPLBLD CKD-EPI 2021: 54 ML/MIN/1.73M2
MAGNESIUM SERPL-MCNC: 1.8 MG/DL (ref 1.7–2.3)
PHOSPHATE SERPL-MCNC: 2.6 MG/DL (ref 2.5–4.5)
PTH-INTACT SERPL-MCNC: 87 PG/ML (ref 15–65)

## 2024-05-13 NOTE — PROGRESS NOTES
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  She completed brachytherapy 2019.  She is here today for a surveillance visit.     Oncology history:   2019 D&C with pathology showing Grade 1 endometrial cancer, loss of MLH1 and PMS2, hypermethylation of MLH1 promotor positive  10/4/2019: Robotic-assisted Total laparoscopic hysterectomy, bilateral salpingo-oophorectomy, sentinel lymph node dissection, vaginal laceration repair: Stage 1B FIGO Grade 2, DOI 17/21mm (81%), LVSI+, cytology negative, tumor 3.4cm; MSI-H (MLH1 promoter melthylation)  2019: Vaginal brachytherapy; 30Gy in 5 fx     23: right hip xray Impression:  1. No acute osseous abnormality.  2. No substantial degenerative change of the right hip. Right SI joint degenerative change.        Today she comes to clinic feeling well and denies any concerns. She denies any vaginal bleeding, no changes in her bowel or bladder (has some stress incontinence which is not new) habits, no nausea/emesis, no lower extremity edema, and no difficulties eating (trying to cut out gluten and soda) or sleeping. She denies any abdominal discomfort/bloating, no fevers or chills, and no chest pain or shortness of breath but will have some SHAH at times. Been using dilator for 5 minutes once a month with no bleeding. Is seeing Urology to follow up on some kidney concerns.       Annual exam with PCP:  Mammogram:  Colonoscopy:; due 24  DEXA: ; due         Review of Systems  See HPI      Past Medical History:    Past Medical History:   Diagnosis Date    Cancer (H) 2019    Uterus removed    Depression     Depressive disorder     taking prosac    Hypothyroidism     Overweight          Past Surgical History:    Past Surgical History:    Procedure Laterality Date    ABDOMEN SURGERY  Oct 2019    Robotic Hysterectomy    BIOPSY  early 2000's    left breast - non event    COLONOSCOPY  within last 6 years ?    OK    COLONOSCOPY N/A 7/26/2021    Procedure: COLONOSCOPY, WITH POLYPECTOMY;  Surgeon: Ryan Butterfield MD;  Location: [facility] OR    DAVINCI HYSTERECTOMY TOTAL, BILATERAL SALPINGO-OOPHORECTOMY, NODE DISSECTION, COMBINED N/A 10/4/2019    Procedure: Robot-assisted total laparoscopic hysterectomy, Bilateral salpingectomy and Right Oophorectomy, Lysis of adhesion, Cervical Indocyanine Green injection, Bilateral sentinel lymph node dissection, Repair of vaginal laceration.;  Surgeon: Perez Reddy MD;  Location: [facility] OR    DILATION AND CURETTAGE N/A 9/12/2019    Procedure: DILATION AND CURETTAGE, UTERUS;  Surgeon: Navdeep Barajas MD;  Location: [facility]    OPERATIVE HYSTEROSCOPY WITH MORCELLATOR N/A 9/12/2019    Procedure: HYSTEROSCOPY WITH MORCELLATOR (MYOSURE);  Surgeon: Navdeep Barajas MD;  Location: [facility] OR    SALPINGO OOPHORECTOMY,R/L/SHANDA Left 1980s    Left ovarian with endometriois    US BREAST BIOPSY LT Left        Current Medications:     Current Outpatient Medications   Medication Sig Dispense Refill    Apple Cider Vinegar 600 MG CAPS Take 1 capsule by mouth daily       CALCIUM PO Take 20 mg by mouth daily      chlorhexidine (PERIDEX) 0.12 % solution SWISH WITH 15 ML BY MOUTH FOR 30 SECONDS, THEN SPIT, TWO TIMES A DAY.*      cholecalciferol (VITAMIN D3) 5000 units TABS tablet Take by mouth daily      clobetasol (TEMOVATE) 0.05 % external ointment Apply sparingly to affected area once or twice a week as needed 45 g 0    Cyanocobalamin 1500 MCG TBDP Take 1 tablet by mouth daily       FLUoxetine (PROZAC) 20 MG capsule Take 1 capsule (20 mg) by mouth every 48 hours AND 2 capsules (40 mg) every 48 hours. 135 capsule 3    Lactobacillus (ACIDOPHILUS PO) Take 1 tablet by mouth daily       levothyroxine (SYNTHROID/LEVOTHROID) 100 MCG  tablet Take 1 tablet (100 mcg) by mouth every morning 90 tablet 3    Lutein 20 MG CAPS       Omega 3-6-9 Fatty Acids (OMEGA-3-6-9 PO) Take 1 capsule by mouth daily      omeprazole (PRILOSEC) 10 MG DR capsule Take 20 mg by mouth daily           Allergies:      No Known Allergies     Social History:     Social History     Tobacco Use    Smoking status: Former     Current packs/day: 0.00     Average packs/day: 1 pack/day for 15.8 years (15.8 ttl pk-yrs)     Types: Cigarettes     Start date: 1972     Quit date: 1987     Years since quittin.5     Passive exposure: Past    Smokeless tobacco: Never   Substance Use Topics    Alcohol use: Yes     Comment: a beer here and there - never more than 2 -  2 times/week       History   Drug Use Unknown         Family History:     The patient's family history is notable for     Family History   Problem Relation Age of Onset    Acute myelogenous leukemia Mother     Coronary Artery Disease Mother         bypass surgery     Osteoporosis Mother     Obesity Mother     Parkinsonism Father     Prostate Cancer Father         Diagnosed late in life in his 80's    Obesity Sister     Heart Disease Sister     Coronary Artery Disease Sister         heart attacK in 2019    Uterine Cancer Maternal Grandmother 40        Uterine versus cervical    No Known Problems Sister     Leukemia Brother     Leukemia Nephew     Coronary Artery Disease Brother         Carotid Artery Surgery    Obesity Brother     Substance Abuse Brother         alcohol         Physical Exam:     /73   Pulse 69   Temp 98.9  F (37.2  C)   Resp 16   Wt 119.7 kg (264 lb)   LMP  (LMP Unknown)   SpO2 97%   BMI 40.74 kg/m    Body mass index is 40.74 kg/m .    General Appearance: healthy and alert, no distress     HEENT: no thyromegaly, no palpable nodules or masses        Cardiovascular: regular rate and rhythm, no gallops, rubs or murmurs     Respiratory: lungs clear, no rales, rhonchi or  wheezes, normal diaphragmatic excursion    Musculoskeletal: extremities non tender and without edema    Skin: no lesions or rashes     Neurological: normal gait, no gross defects     Psychiatric: appropriate mood and affect                               Hematological: normal cervical, supraclavicular and inguinal lymph nodes     Gastrointestinal:       abdomen soft, non-tender, non-distended, no organomegaly or masses    Genitourinary: External genitalia and urethral meatus appears normal.  Vagina is smooth without nodularity or masses.  Cervix surgically absent.  Bimanual exam reveal no masses, nodularity or fullness.  Recto-vaginal exam confirms these findings.      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  She completed brachytherapy 12/2019.  She is here today for a surveillance visit.     25 minutes spent on the date of the encounter doing chart review, history and exam, documentation and further activities as noted above      Plan:     1.)        Patient to RTC in 6 months for her next surveillance visit at which time she will be 5 years post treatment. Reviewed recommendations from [facility] not to perform surveillance pap smears in women diagnosed with endometrial cancer as this does not improve detection of local recurrence. Reviewed signs and symptoms for when she should contact the clinic or seek additional care. Patient to contact the clinic with any questions or concerns in the interim.  Answered all of her questions to the best of my ability.    -continue to use her dilator as she has been.      2.) Genetic risk factors were assessed and she had a loss of MLH1 and PMS2; hypermethylation of MLH1 promotor positive. MLH1 promoter methylation is typically associated with sporadic microsatellite unstable tumors of the colon/rectum or endometrium.     3.) Labs and/or tests ordered include:  none.     4.) Health maintenance  issues addressed today include annual health maintenance and non-gynecologic issues with PCP.    ROSEMARY Lance, WHNP-BC, ANP-BC, AOCNP  Women's Health Nurse Practitioner  Adult Nurse Practitioner  Division of Gynecologic Oncology        CC  Patient Care Team:  Angeli Nolan MD as PCP - General (Family Practice)  Beatrice Reyes APRN CNP as Assigned Cancer Care Provider  Angeli Nolan MD as Assigned PCP  Felice Madera MD as MD (Endocrinology, Diabetes, and Metabolism)  Marcos Thomas MD as MD (Nephrology)  Marcos Thomas MD as Assigned Nephrology Provider  Felice Madera MD as Assigned Endocrinology Provider  SELF, REFERRED

## 2024-05-14 ENCOUNTER — ONCOLOGY VISIT (OUTPATIENT)
Dept: ONCOLOGY | Facility: CLINIC | Age: 69
End: 2024-05-14
Attending: NURSE PRACTITIONER
Payer: COMMERCIAL

## 2024-05-14 VITALS
HEART RATE: 69 BPM | SYSTOLIC BLOOD PRESSURE: 120 MMHG | WEIGHT: 264 LBS | OXYGEN SATURATION: 97 % | RESPIRATION RATE: 16 BRPM | DIASTOLIC BLOOD PRESSURE: 73 MMHG | BODY MASS INDEX: 40.74 KG/M2 | TEMPERATURE: 98.9 F

## 2024-05-14 DIAGNOSIS — C55 ENDOMETRIOID ADENOCARCINOMA OF UTERUS (H): Primary | ICD-10-CM

## 2024-05-14 PROCEDURE — G0463 HOSPITAL OUTPT CLINIC VISIT: HCPCS | Performed by: NURSE PRACTITIONER

## 2024-05-14 PROCEDURE — 99213 OFFICE O/P EST LOW 20 MIN: CPT | Performed by: NURSE PRACTITIONER

## 2024-05-14 RX ORDER — CHLORHEXIDINE GLUCONATE ORAL RINSE 1.2 MG/ML
SOLUTION DENTAL
COMMUNITY
Start: 2023-09-29

## 2024-05-14 ASSESSMENT — PAIN SCALES - GENERAL: PAINLEVEL: NO PAIN (0)

## 2024-05-14 NOTE — LETTER
2024         RE: Hanh Villalba   [address]  [place] [address]        Dear Colleague,    Thank you for referring your patient, Hanh Villalba, to the [facility]. Please see a copy of my visit note below.                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  She completed brachytherapy 2019.  She is here today for a surveillance visit.     Oncology history:   2019 D&C with pathology showing Grade 1 endometrial cancer, loss of MLH1 and PMS2, hypermethylation of MLH1 promotor positive  10/4/2019: Robotic-assisted Total laparoscopic hysterectomy, bilateral salpingo-oophorectomy, sentinel lymph node dissection, vaginal laceration repair: Stage 1B FIGO Grade 2, DOI 17/21mm (81%), LVSI+, cytology negative, tumor 3.4cm; MSI-H (MLH1 promoter melthylation)  2019: Vaginal brachytherapy; 30Gy in 5 fx     23: right hip xray Impression:  1. No acute osseous abnormality.  2. No substantial degenerative change of the right hip. Right SI joint degenerative change.        Today she comes to clinic feeling well and denies any concerns. She denies any vaginal bleeding, no changes in her bowel or bladder (has some stress incontinence which is not new) habits, no nausea/emesis, no lower extremity edema, and no difficulties eating (trying to cut out gluten and soda) or sleeping. She denies any abdominal discomfort/bloating, no fevers or chills, and no chest pain or shortness of breath but will have some SHAH at times. Been using dilator for 5 minutes once a month with no bleeding. Is seeing Urology to follow up on some kidney concerns.       Annual exam with PCP:  Mammogram:  Colonoscopy:; due 24  DEXA: ; due         Review of Systems  See  HPI      Past Medical History:    Past Medical History:   Diagnosis Date    Cancer (H) August 2019    Uterus removed    Depression     Depressive disorder 1999    taking prosac    Hypothyroidism     Overweight          Past Surgical History:    Past Surgical History:   Procedure Laterality Date    ABDOMEN SURGERY  Oct 2019    Robotic Hysterectomy    BIOPSY  early 2000's    left breast - non event    COLONOSCOPY  within last 6 years ?    OK    COLONOSCOPY N/A 7/26/2021    Procedure: COLONOSCOPY, WITH POLYPECTOMY;  Surgeon: Ryan Butterfield MD;  Location: [facility] OR    DAVINCI HYSTERECTOMY TOTAL, BILATERAL SALPINGO-OOPHORECTOMY, NODE DISSECTION, COMBINED N/A 10/4/2019    Procedure: Robot-assisted total laparoscopic hysterectomy, Bilateral salpingectomy and Right Oophorectomy, Lysis of adhesion, Cervical Indocyanine Green injection, Bilateral sentinel lymph node dissection, Repair of vaginal laceration.;  Surgeon: Perez Reddy MD;  Location: [facility]    DILATION AND CURETTAGE N/A 9/12/2019    Procedure: DILATION AND CURETTAGE, UTERUS;  Surgeon: Navdeep Barajas MD;  Location: [facility]    OPERATIVE HYSTEROSCOPY WITH MORCELLATOR N/A 9/12/2019    Procedure: HYSTEROSCOPY WITH MORCELLATOR (MYOSURE);  Surgeon: Navdeep Barajas MD;  Location: [facility] OR    SALPINGO OOPHORECTOMY,R/L/SHANDA Left 1980s    Left ovarian with endometriois    US BREAST BIOPSY LT Left        Current Medications:     Current Outpatient Medications   Medication Sig Dispense Refill    Apple Cider Vinegar 600 MG CAPS Take 1 capsule by mouth daily       CALCIUM PO Take 20 mg by mouth daily      chlorhexidine (PERIDEX) 0.12 % solution SWISH WITH 15 ML BY MOUTH FOR 30 SECONDS, THEN SPIT, TWO TIMES A DAY.*      cholecalciferol (VITAMIN D3) 5000 units TABS tablet Take by mouth daily      clobetasol (TEMOVATE) 0.05 % external ointment Apply sparingly to affected area once or twice a week as needed 45 g 0    Cyanocobalamin 1500 MCG TBDP Take 1 tablet by  mouth daily       FLUoxetine (PROZAC) 20 MG capsule Take 1 capsule (20 mg) by mouth every 48 hours AND 2 capsules (40 mg) every 48 hours. 135 capsule 3    Lactobacillus (ACIDOPHILUS PO) Take 1 tablet by mouth daily       levothyroxine (SYNTHROID/LEVOTHROID) 100 MCG tablet Take 1 tablet (100 mcg) by mouth every morning 90 tablet 3    Lutein 20 MG CAPS       Omega 3-6-9 Fatty Acids (OMEGA-3-6-9 PO) Take 1 capsule by mouth daily      omeprazole (PRILOSEC) 10 MG DR capsule Take 20 mg by mouth daily           Allergies:      No Known Allergies     Social History:     Social History     Tobacco Use    Smoking status: Former     Current packs/day: 0.00     Average packs/day: 1 pack/day for 15.8 years (15.8 ttl pk-yrs)     Types: Cigarettes     Start date: 1972     Quit date: 1987     Years since quittin.5     Passive exposure: Past    Smokeless tobacco: Never   Substance Use Topics    Alcohol use: Yes     Comment: a beer here and there - never more than 2 -  2 times/week       History   Drug Use Unknown         Family History:     The patient's family history is notable for     Family History   Problem Relation Age of Onset    Acute myelogenous leukemia Mother     Coronary Artery Disease Mother         bypass surgery     Osteoporosis Mother     Obesity Mother     Parkinsonism Father     Prostate Cancer Father         Diagnosed late in life in his 80's    Obesity Sister     Heart Disease Sister     Coronary Artery Disease Sister         heart attacK in 2019    Uterine Cancer Maternal Grandmother 40        Uterine versus cervical    No Known Problems Sister     Leukemia Brother     Leukemia Nephew     Coronary Artery Disease Brother         Carotid Artery Surgery    Obesity Brother     Substance Abuse Brother         alcohol         Physical Exam:     /73   Pulse 69   Temp 98.9  F (37.2  C)   Resp 16   Wt 119.7 kg (264 lb)   LMP  (LMP Unknown)   SpO2 97%   BMI 40.74 kg/m    Body mass  index is 40.74 kg/m .    General Appearance: healthy and alert, no distress     HEENT: no thyromegaly, no palpable nodules or masses        Cardiovascular: regular rate and rhythm, no gallops, rubs or murmurs     Respiratory: lungs clear, no rales, rhonchi or wheezes, normal diaphragmatic excursion    Musculoskeletal: extremities non tender and without edema    Skin: no lesions or rashes     Neurological: normal gait, no gross defects     Psychiatric: appropriate mood and affect                               Hematological: normal cervical, supraclavicular and inguinal lymph nodes     Gastrointestinal:       abdomen soft, non-tender, non-distended, no organomegaly or masses    Genitourinary: External genitalia and urethral meatus appears normal.  Vagina is smooth without nodularity or masses.  Cervix surgically absent.  Bimanual exam reveal no masses, nodularity or fullness.  Recto-vaginal exam confirms these findings.      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  She completed brachytherapy 12/2019.  She is here today for a surveillance visit.     25 minutes spent on the date of the encounter doing chart review, history and exam, documentation and further activities as noted above      Plan:     1.)        Patient to RTC in 6 months for her next surveillance visit at which time she will be 5 years post treatment. Reviewed recommendations from [facility] not to perform surveillance pap smears in women diagnosed with endometrial cancer as this does not improve detection of local recurrence. Reviewed signs and symptoms for when she should contact the clinic or seek additional care. Patient to contact the clinic with any questions or concerns in the interim.  Answered all of her questions to the best of my ability.    -continue to use her dilator as she has been.      2.) Genetic risk factors were assessed and she had a loss of MLH1  and PMS2; hypermethylation of MLH1 promotor positive. MLH1 promoter methylation is typically associated with sporadic microsatellite unstable tumors of the colon/rectum or endometrium.     3.) Labs and/or tests ordered include:  none.     4.) Health maintenance issues addressed today include annual health maintenance and non-gynecologic issues with PCP.    ROSEMARY Lance, WHNP-BC, ANP-BC, AOCNP  Women's Health Nurse Practitioner  Adult Nurse Practitioner  Division of Gynecologic Oncology        CC  Patient Care Team:  Angeli Nolan MD as PCP - General (Family Practice)

## 2024-05-14 NOTE — NURSING NOTE
"Oncology Rooming Note    May 14, 2024 10:08 AM   Hanh Villalba is a 68 year old female who presents for:    Chief Complaint   Patient presents with    Oncology Clinic Visit     Endometrioid adenocarcinoma of uterus      Initial Vitals: /73   Pulse 69   Temp 98.9  F (37.2  C)   Resp 16   Wt 119.7 kg (264 lb)   LMP  (LMP Unknown)   SpO2 97%   BMI 40.74 kg/m   Estimated body mass index is 40.74 kg/m  as calculated from the following:    Height as of 12/13/23: 1.715 m (5' 7.5\").    Weight as of this encounter: 119.7 kg (264 lb). Body surface area is 2.39 meters squared.  No Pain (0) Comment: Data Unavailable   No LMP recorded (lmp unknown). Patient has had a hysterectomy.  Allergies reviewed: Yes  Medications reviewed: Yes    Medications: Medication refills not needed today.  Pharmacy name entered into [employer]:    [id number] - [place], [place] - [address] [facility] [address]    Frailty Screening:   Is the patient here for a new oncology consult visit in cancer care? 2. No      Clinical concerns: no other complaints      Vj Pulido"

## 2024-06-04 ENCOUNTER — TELEPHONE (OUTPATIENT)
Dept: GASTROENTEROLOGY | Facility: CLINIC | Age: 69
End: 2024-06-04
Payer: COMMERCIAL

## 2024-06-04 NOTE — TELEPHONE ENCOUNTER
"Endoscopy Scheduling Screen    Have you had a positive Covid test in the last 14 days?  No    What is your communication preference for Instructions and/or Bowel Prep?   MyChart    What insurance is in the chart?  Other:  [facility]    Ordering/Referring Provider:     AL HALL      (If ordering provider performs procedure, schedule with ordering provider unless otherwise instructed. )    BMI: Estimated body mass index is 40.74 kg/m  as calculated from the following:    Height as of 12/13/23: 1.715 m (5' 7.5\").    Weight as of 5/14/24: 119.7 kg (264 lb).     Sedation Ordered  moderate sedation.   If patient BMI > 50 do not schedule in [facility].    If patient BMI > 45 do not schedule at [facility].    Are you taking methadone or Suboxone?  No    Have you had difficulties, pain, or discomfort during past endoscopy procedures?  No    Are you taking any prescription medications for pain 3 or more times per week?   NO, No RN review required.    Do you have a history of malignant hyperthermia?  No    (Females) Are you currently pregnant?        Have you been diagnosed or told you have pulmonary hypertension?   No    Do you have an LVAD?  No    Have you been told you have moderate to severe sleep apnea?  No    Have you been told you have COPD, asthma, or any other lung disease?  No    Do you have any heart conditions?  No     Have you ever had or are you waiting for an organ transplant?  No. Continue scheduling, no site restrictions.    Have you had a stroke or transient ischemic attack (TIA aka \"mini stroke\" in the last 6 months?   No    Have you been diagnosed with or been told you have cirrhosis of the liver?   No    Are you currently on dialysis?   No    Do you need assistance transferring?   No    BMI: Estimated body mass index is 40.74 kg/m  as calculated from the following:    Height as of 12/13/23: 1.715 m (5' 7.5\").    Weight as of 5/14/24: 119.7 kg (264 lb).     Is patients BMI > 40 and scheduling location " [facility]?  Yes (If MAC sedation is ordered, schedule PAC eval)    Do you take an injectable medication for weight loss or diabetes (excluding insulin)?  No    Do you take the medication Naltrexone?  No    Do you take blood thinners?  No       Prep   Are you currently on dialysis or do you have chronic kidney disease?  Yes ([facility] Prep)    Do you have a diagnosis of diabetes?  No    Do you have a diagnosis of cystic fibrosis (CF)?  No    On a regular basis do you go 3 -5 days between bowel movements?  No    BMI > 40?  Yes (Extended Prep)    Preferred Pharmacy:    [id number] [address] [address]  Phone: [phone] Fax: [phone]    Final Scheduling Details     Procedure scheduled  Colonoscopy    Surgeon:   RANJITH    Date of procedure:   12/3     Pre-OP / PAC:   N    Location   [facility]    Sedation    [facility]    Patient Reminders:   You will receive a call from a Nurse to review instructions and health history.  This assessment must be completed prior to your procedure.  Failure to complete the Nurse assessment may result in the procedure being cancelled.      On the day of your procedure, please designate an adult(s) who can drive you home stay with you for the next 24 hours. The medicines used in the exam will make you sleepy. You will not be able to drive.      You cannot take public transportation, ride share services, or non-medical taxi service without a responsible caregiver.  Medical transport services are allowed with the requirement that a responsible caregiver will receive you at your destination.  We require that drivers and caregivers are confirmed prior to your procedure.

## 2024-06-10 ENCOUNTER — ALLIED HEALTH/NURSE VISIT (OUTPATIENT)
Dept: FAMILY MEDICINE | Facility: CLINIC | Age: 69
End: 2024-06-10
Payer: COMMERCIAL

## 2024-06-10 ENCOUNTER — LAB (OUTPATIENT)
Dept: LAB | Facility: CLINIC | Age: 69
End: 2024-06-10
Payer: COMMERCIAL

## 2024-06-10 VITALS — DIASTOLIC BLOOD PRESSURE: 70 MMHG | SYSTOLIC BLOOD PRESSURE: 124 MMHG

## 2024-06-10 DIAGNOSIS — Z01.30 BLOOD PRESSURE CHECK: Primary | ICD-10-CM

## 2024-06-10 DIAGNOSIS — N18.31 STAGE 3A CHRONIC KIDNEY DISEASE (H): ICD-10-CM

## 2024-06-10 LAB
ALBUMIN MFR UR ELPH: 9.5 MG/DL
ALBUMIN SERPL BCG-MCNC: 4.2 G/DL (ref 3.5–5.2)
ANION GAP SERPL CALCULATED.3IONS-SCNC: 10 MMOL/L (ref 7–15)
BUN SERPL-MCNC: 16.3 MG/DL (ref 8–23)
CALCIUM SERPL-MCNC: 10.3 MG/DL (ref 8.8–10.2)
CHLORIDE SERPL-SCNC: 107 MMOL/L (ref 98–107)
CREAT SERPL-MCNC: 1.16 MG/DL (ref 0.51–0.95)
CREAT UR-MCNC: 90.3 MG/DL
DEPRECATED HCO3 PLAS-SCNC: 24 MMOL/L (ref 22–29)
EGFRCR SERPLBLD CKD-EPI 2021: 50 ML/MIN/1.73M2
ERYTHROCYTE [DISTWIDTH] IN BLOOD BY AUTOMATED COUNT: 12.5 % (ref 10–15)
GLUCOSE SERPL-MCNC: 92 MG/DL (ref 70–99)
HCT VFR BLD AUTO: 41.8 % (ref 35–47)
HGB BLD-MCNC: 13.1 G/DL (ref 11.7–15.7)
MCH RBC QN AUTO: 30 PG (ref 26.5–33)
MCHC RBC AUTO-ENTMCNC: 31.3 G/DL (ref 31.5–36.5)
MCV RBC AUTO: 96 FL (ref 78–100)
PHOSPHATE SERPL-MCNC: 2.4 MG/DL (ref 2.5–4.5)
PLATELET # BLD AUTO: 212 10E3/UL (ref 150–450)
POTASSIUM SERPL-SCNC: 4.6 MMOL/L (ref 3.4–5.3)
PROT/CREAT 24H UR: 0.11 MG/MG CR (ref 0–0.2)
RBC # BLD AUTO: 4.37 10E6/UL (ref 3.8–5.2)
SODIUM SERPL-SCNC: 141 MMOL/L (ref 135–145)
VIT D+METAB SERPL-MCNC: 44 NG/ML (ref 20–50)
WBC # BLD AUTO: 4.7 10E3/UL (ref 4–11)

## 2024-06-10 PROCEDURE — 85027 COMPLETE CBC AUTOMATED: CPT

## 2024-06-10 PROCEDURE — 36415 COLL VENOUS BLD VENIPUNCTURE: CPT

## 2024-06-10 PROCEDURE — 82306 VITAMIN D 25 HYDROXY: CPT

## 2024-06-10 PROCEDURE — 99207 PR NO CHARGE NURSE ONLY: CPT

## 2024-06-10 PROCEDURE — 84156 ASSAY OF PROTEIN URINE: CPT

## 2024-06-10 PROCEDURE — 80069 RENAL FUNCTION PANEL: CPT

## 2024-06-10 NOTE — PROGRESS NOTES
Hanh Villalba is a 68 year old patient who comes in today for a Blood Pressure check.  Initial BP:  /70   LMP  (LMP Unknown)      Data Unavailable  Disposition: follow-up as previously indicated by provider    Patient in for lab only appt and wanted to get her BP checked since she will be seeing nephrology soon.      Ryanne Alcantara MA

## 2024-06-14 ENCOUNTER — VIRTUAL VISIT (OUTPATIENT)
Dept: NEPHROLOGY | Facility: CLINIC | Age: 69
End: 2024-06-14
Attending: STUDENT IN AN ORGANIZED HEALTH CARE EDUCATION/TRAINING PROGRAM
Payer: COMMERCIAL

## 2024-06-14 DIAGNOSIS — N18.31 STAGE 3A CHRONIC KIDNEY DISEASE (H): Primary | ICD-10-CM

## 2024-06-14 PROCEDURE — G2211 COMPLEX E/M VISIT ADD ON: HCPCS | Performed by: STUDENT IN AN ORGANIZED HEALTH CARE EDUCATION/TRAINING PROGRAM

## 2024-06-14 PROCEDURE — 99214 OFFICE O/P EST MOD 30 MIN: CPT | Mod: 95 | Performed by: STUDENT IN AN ORGANIZED HEALTH CARE EDUCATION/TRAINING PROGRAM

## 2024-06-14 NOTE — LETTER
6/14/2024       RE: Hanh Villalba  [address]  [place] [address]     Dear Colleague,    Thank you for referring your patient, Hanh Villalba, to the [facility] [place] at [facility]. Please see a copy of my visit note below.        Nephrology Clinic Visit  Hanh Villalba MRN: [id number] YOB: 1955  Primary Care Provider: Angeli Nolan    Video-Visit Details  Patient evaluated by billable video visit  Video Start Time: 12:56 PM  Type of service:  Video Visit  Video End Time:1:13 PM  Originating Location (pt. Location): Home  Distant Location (provider location):  On-site  Platform used for Video Visit: WARSTUFFWell  ----------------------------------------------------------------------------------------------------------------------  Visit 6/14/2024:  -BP Control:   --In office: N/A  --At home: 116/76, 116/72, 126/64, 112/68, 124/72  --Current Regimen: None  -DM Control: No  --Current Regimen: None  -Creatinine Trend: 1.16 (6/10/2024) from 1.2 (12/11/2023) from 1.0 (11/16/2023) from 1.13 (6/14/2023) from 1.2 (5/4/2023)  -UPCR 6/10/2024 0.11g/gr  -Calcium levels remain mildly elevated (10.3 on 6/10/2024)  -Her vitamin D levels remain in a normal range (appropriate in the setting of primary hyperpara)  -Saw Onc 5/14/2024. No changes  -Discuss GLP-1RA: Discussed this a few months ago. She is now eating a bit less on her own, has cut down on sugar, stopped diet mountain dew.   -Discuss Statin:   -May have a decompression and stabilization on her spine.   -July 23rd going scuba diving.   -Spending a lot of time in the garden.     Visit 12/13/2023:  -BP Control: 131/78 last visit in the office.   --Current Regimen: None  -DM Control: No  --Current Regimen: None  -Creatinine Trend: 1.2 (12/11/2023) from 1.0 (11/16/2023) from 1.13 (6/14/2023) from 1.2 (5/4/2023)  -We discussed diet strategies to preserve  kidney health  -We discussed her creatinine trend and that her cystatin-c eGFR is about the same as her creatinine eGFR  -We disucssed plan to follow up with Endo for Primary hyperpara    Visit 6/14/2023:  -BP Control: No issues with this leading up to establishing care with me.  No orthostatic hypotension.   --Current Regimen: None  -DM Control: No  --Current Regimen: None  -Creatinine Trend: 1.13 (6/14/2023) from 1.2 (5/4/2023)  -Nocturia: 1x nights  -Hematuria: No  -Nephrolithiasis: No  -NSAID Use: Ibuprofen/Aleve until about 1.5 years ago. Intermittently using for back or neck pain. Maybe a few doses a week. Not very heavy use even at the worst of times (maybe about 3 days in a row at most).   -Herbal/OTC Medication Use: Just what's on her chart  -Family History of Kidney Disease: None  -Family/Friends on RRT/Kidney Transplant: No/No    -Other:  --Hx of Stage IB Grade 2 Endometrial Adenocarcinoma s/p Brachytherapy (completed 12/2019) following with Onc for surveillance. No issues and doing [id number] surveillance until 12/2024 then qYear  --Robotic hysterectomy. Further checkups have been good. No complications at the time of her hysterectomy.   --Has some pyuria. No dysuria. No frequency. Can't remember her last UTI. No fevers. Having some stress incontinence.  --Having some hip discomfort    Objective:  PAST MEDICAL HISTORY:  Past Medical History:   Diagnosis Date     Cancer (H) August 2019    Uterus removed     Depression      Depressive disorder 1999    taking prosac     Hypothyroidism      Overweight        PAST SURGICAL HISTORY:  Past Surgical History:   Procedure Laterality Date     ABDOMEN SURGERY  Oct 2019    Robotic Hysterectomy     BIOPSY  early 2000's    left breast - non event     COLONOSCOPY  within last 6 years ?    OK     COLONOSCOPY N/A 7/26/2021    Procedure: COLONOSCOPY, WITH POLYPECTOMY;  Surgeon: Ryan Butterfield MD;  Location: [facility] OR     DAVINCI HYSTERECTOMY TOTAL, BILATERAL  SALPINGO-OOPHORECTOMY, NODE DISSECTION, COMBINED N/A 10/4/2019    Procedure: Robot-assisted total laparoscopic hysterectomy, Bilateral salpingectomy and Right Oophorectomy, Lysis of adhesion, Cervical Indocyanine Green injection, Bilateral sentinel lymph node dissection, Repair of vaginal laceration.;  Surgeon: Perez Reddy MD;  Location: [facility] OR     DILATION AND CURETTAGE N/A 9/12/2019    Procedure: DILATION AND CURETTAGE, UTERUS;  Surgeon: Navdeep Barajas MD;  Location: [facility] OR     OPERATIVE HYSTEROSCOPY WITH MORCELLATOR N/A 9/12/2019    Procedure: HYSTEROSCOPY WITH MORCELLATOR (MYOSURE);  Surgeon: Navdeep Barajas MD;  Location: [facility] OR     SALPINGO OOPHORECTOMY,R/L/SHANDA Left 1980s    Left ovarian with endometriois     US BREAST BIOPSY LT Left        MEDICATIONS:  Current Outpatient Medications   Medication Instructions     Apple Cider Vinegar 600 MG CAPS 1 capsule, Oral, DAILY     CALCIUM PO 20 mg, Oral, DAILY     cholecalciferol (VITAMIN D3) 5000 units TABS tablet Oral, DAILY     clobetasol (TEMOVATE) 0.05 % external ointment Apply sparingly then once or twice a week as needed     Cyanocobalamin 1500 MCG TBDP 1 tablet, Oral, DAILY     FLUoxetine (PROZAC) 20 MG capsule Take 1 capsule (20 mg) by mouth every 48 hours AND 2 capsules (40 mg) every 48 hours.     Lactobacillus (ACIDOPHILUS PO) 1 tablet, Oral, DAILY     levothyroxine (SYNTHROID/LEVOTHROID) 100 mcg, Oral, EVERY MORNING     Lutein 20 MG CAPS Oral     Omega 3-6-9 Fatty Acids (OMEGA-3-6-9 PO) 1 capsule, DAILY     omeprazole (PRILOSEC) 20 mg, Oral, DAILY       FAMILY MEDICAL HISTORY:   Family History   Problem Relation Age of Onset     Acute myelogenous leukemia Mother      Coronary Artery Disease Mother         bypass surgery 1989     Osteoporosis Mother      Obesity Mother      Parkinsonism Father      Prostate Cancer Father         Diagnosed late in life in his 80's     Obesity Sister      Heart Disease Sister      Coronary Artery Disease  "Sister         heart attacK in June of 2019     Uterine Cancer Maternal Grandmother 40        Uterine versus cervical     No Known Problems Sister      Leukemia Brother      Leukemia Nephew      Coronary Artery Disease Brother         Carotid Artery Surgery     Obesity Brother      Substance Abuse Brother         alcohol       PHYSICAL EXAM:   LMP  (LMP Unknown)   GENERAL APPEARANCE: alert and no distress  MS: no evidence of inflammation in joints, no muscle tenderness  SKIN: no rash  NEURO: mentation intact and speech normal  PSYCH: affect normal    LABS REVIEWED BY ME:   ANEMIA  Recent Labs   Lab Test 06/10/24  0901 05/01/24  0938 06/14/23  0718 11/30/22  1148   HGB 13.1 13.3 13.2 12.3       BMP  Recent Labs   Lab Test 06/10/24  0901 05/01/24  0938 12/11/23  0927 11/16/23  1007 08/25/23  0909 06/14/23  0718 05/04/23  1334 12/12/22  0954 07/17/20  0840 09/26/19  1221     --  140  --   --  140  --  140   < > 139   POTASSIUM 4.6  --  5.0  --   --  4.5  --  4.5   < > 4.4   CHLORIDE 107  --  105  --   --  106  --  105   < > 106   CO2 24  --  29  --   --  28  --  26   < > 28   ANIONGAP 10  --  6*  --   --  6*  --  9   < > 5   BUN 16.3  --  22.3  --   --  18.1  --  15.6   < > 18   CR 1.16* 1.09* 1.20* 1.00* 1.10* 1.13*   < > 1.10*   < > 1.04   GFRESTIMATED 50* 54* 48* 60* 54* 52*   < > 54*   < > 56*   MAG  --  1.8  --  1.8 2.3  --   --   --   --   --    PROTTOTAL  --   --   --   --   --   --   --   --   --  7.1    < > = values in this interval not displayed.       CBC  Recent Labs   Lab Test 06/10/24  0901 05/01/24  0938 06/14/23  0718 11/30/22  1148 03/15/21  0847 09/26/19  1221   HGB 13.1 13.3 13.2 12.3 12.8 12.7   WBC 4.7  --  5.0  --  7.6 8.0   HCT 41.8  --  40.6  --  39.6 39.0   MCV 96  --  96  --  97 98     --  220  --  202 241       DIABETESNo lab results found.    HYPONATREMIANo lab results found.    Invalid input(s): \"UOSM\", \"OSM\"    MBD  Recent Labs   Lab Test 06/10/24  0901 05/01/24  0938 " 12/11/23  0927 11/16/23  1007 08/25/23  0909 06/14/23  0718 05/04/23  1334   SHANDRA 10.3* 10.3* 10.6* 10.5* 10.9*   < > 10.3*   ALBUMIN 4.2 4.4  --  4.1 4.1   < > 4.4   PHOS 2.4* 2.6  --  2.3* 2.8   < > 2.9   PTHI  --  87*  --  82* 114*  --  74*    < > = values in this interval not displayed.        URINE STUDIES  Recent Labs   Lab Test 06/14/23  0721 11/05/19  1746 09/20/19 2006   COLOR Light Yellow Yellow Yellow   APPEARANCE Clear Clear Cloudy   URINEGLC Negative Negative Negative   URINEBILI Negative Negative Negative   URINEKETONE Negative Negative Negative   SG 1.012 >1.030 1.025   UBLD Negative Small* Large*   URINEPH 6.5 5.5 6.0   PROTEIN Negative Negative 100*   UROBILINOGEN  --  0.2 0.2   NITRITE Negative Negative Positive*   LEUKEST Moderate* Small* Large*   RBCU 0 O - 2 25-50*   WBCU 27* 5-10* >100*     No lab results found.    ADDITIONAL LABS ORDERED/REVIEWED BY ME:  See below    Assessment/Plan  CKD Stage G3aA1  Established care with [facility] 6/14/2023    Baseline creatinine appears to be 1-1.2 dating back to at least 2008. Prior to this was 0.8-0.9 in 2005. Unclear what happened between 2005 and 2008 to result in creatinine increase but she has overal been remarkably stable over the years since. Did have one creatinine of 1.4 but this was not sustained. UA 6/2023 w/ no hematuira. Did have some pyuria but denies any UTI symptoms and her clinic history at the time of establishing care with me is not consistent with AIN/CECILIA (also her UA had 8 squamous cells so wasn't a perfect collection), UPCR 6/2023 w/ 0.11g/gr. CT Abd/Pelvis 6/2021 w/o hydro, stones, or other obvious renal issues.     Borderline hypercalcemia present since 7/2018 as well. Typically in the low 10's although did have a calcium of 11.1 on 12/12/2022. Prior to this appears calcium was normal in the mid 9's typically.     Hypercalcemia workup:  -Albumin 4.4 5/4/2023   -25-OH vitamin D 57 5/4/2023  -24 hour urine calcium/creatinine  12/2022: 0.22g/day calcium, 3.35L UOP, 14mg/kg/day creatinine excretion in sample  -PTH 74 5/4/2023  -Phos 2.9 5/4/2023  -Serum Calcium 10.3 5/4/2023    Saw Endo 5/11/2023. Discussion regarding Primary HyperPara and possible surgical intervention. Plan for close monitoring for now.    CKD Etiology (Biopsy Proven: No):  -Unclear etiology of mild CKD dating back to 2008. Stable since.   -Perhaps some contribution of hypercalciuria from primary hyperpara  -Some small contribution of NSAID use is likely, but not our main  of mild CKD  -No significant history of hypertension/hypotension per her report. Not currently on any Anti-HTN  -No clinical or objective evidence of obstructive nephropathy    Plan:  -Will continue to monitor things intermittently. Her renal function has been incredibly stable over the years which is great. Continue optimal supportive care (avoid hypo/hypertension, avoid NSAIDs as much as possible, keep up with general health measures like vaccinations, avoid sick contacts, and monitor for any UTI/Obstructive symptoms  -No indication for ARB or SGLT2i at this time. Continue to intermittently monitor proteinuria and eGFR.  -Discussed diet and lifestyle interventions  -We discussed the potential for GLP-1RA to be nephroprotective even in those without diabetes (although need more robust data still) and that weight loss helps to offset glomerular hyperfiltration injury. She is losing weight with lifestyle interventions so will hold off on additional medications at this time.   -Remainder per problem below      Anemia of Renal Disease  Hemoglobin: 13.1 (6/10/2024)  Ferritin:  TSAT:    Plan:  -No need for IV iron or EPO      Lipid Management in CKD  KDIGO 2013 Guidelines recommend the following for patients with CKD:  Adults >/= 51 yo w/ CKD stage 1 or 2: Statin  Adults >/= 51 yo w/ CKD stage 3-5: Statin + Ezetimibe or Statin alone  Adults 18-49 + 1 of the following (CAD, DM, Ischemic stroke, 10 yr  ASCVD risk >10%): Statin    KDIGO guidelines recommend Simvastatin 20mg/Ezetimibe 10mg qDay for patients with CKD G3a-G5 (in line with the SHARP trial). If Simvastatin used alone, 40mg qDay is referenced.   Other options include: Atorvastatin 20mg qDay (4D trial) and Rosuvastatin 10mg qDay (ED trial)    Plan:  -She would like to hold off for now. She will think about.       Metabolic Acidosis of Renal Disease  Bicarb: 24 (6/10/2024)    Plan:  -No need for NaHCO at this time      Mineral Bone Disease  PTH: 87 (5/1/2024)  Phos: 2.4 (6/10/2024)  Calcium: 10.3 (6/10/2024)  Vitamin D: 44 (6/10/2024)    Plan:  -Following with Endo for primary hyperpara  -No need for phos binder at this time    Primary Hyperparathyroidism  Guidelines suggest that surgery for hyperparathyroidism is indicated in symptomatic patients or in asymptomatic patients who meet any one of the following conditions:  -Serum calcium concentration of 1.0 mg/dL (0.25 mmol/L) or more above the ULN  -eGFR < 60 mL/min/1.73 m2, 24-hour urine for calcium > 400 mg/day and increased stone risk by biochemical stone risk analysis and/or nephrolithiasis/nephrocalcinosis on imaging.  -Bone density at the hip, lumbar spine, or distal radius that is more than 2.5 standard deviations below peak bone mass (T score <-2.5) and/or previous fragility fracture   -Age less than 50 years    Operative intervention can be delayed in patients over 50 years of age who are asymptomatic or minimally symptomatic and who have serum calcium concentrations <1.0 mg/dL (0.2 mmol/L) above the upper limit of normal, and in patients who are medically unfit for surgery.     Plan:  -Continue routine follow up with Endo    Other:  -Specialty Care Coordination Referral - CKD G1-G3b w/o imminent RRT planning/needs (Yes/no, Date Referral Placed): No  -Med Therapy Management Referral (Yes/No, Date of Referral): No    Return to clinic: 6 months    Marcos Thomas MD   of  Medicine  Division of Nephrology and Hypertension  [facility]    25 minutes spent on the date of the encounter doing chart review, history and exam, documentation and further activities as noted above    The longitudinal plan of care for CKD G3aA1 was addressed during this visit. Due to the added complexity in care, I will continue to support Hanh Villalba in the subsequent management of this condition(s) and with the ongoing continuity of care of this condition(s).      Again, thank you for allowing me to participate in the care of your patient.      Sincerely,    Marcos Thomas MD

## 2024-06-14 NOTE — PATIENT INSTRUCTIONS
"It was a pleasure to see you in nephrology clinic today. I've included a brief summary of our discussion and care plan from today's visit below.  _______________________________________________________________________    My recommendations are summarized as follows:  -Keep a Blood Pressure log. Please make sure that you are using a validated blood pressure device (check \"www.validatebp.org\").  -Avoid all NSAID's. Examples include Ibuprofen (Advil, Motrin), naprosyn (Aleve), celebrex among others. Acetaminophen (Tylenol) is ok with maximum dose in 24 hours of 3000mg.  -Healthy lifestyle measures will keep your kidney's functioning at their current best. This includes regular exercise, maintaining a healthy body weight and smoking cessation.   -Please consider that Atorvastatin medication we discussed. Let me know if you have questions or concerns.  -Please continue the diet/lifestyle changes you have made. Continued weight loss will also help with your kidney health  -Please continue to intermittently monitor your home blood pressure readings and reach out to me if the top number is consistently > 130    Please return to Nephrology Clinic in 6 months via video visit to review your progress.     Who do I call with any questions after my visit?  There are multiple ways to contact your nephrology care team:    -To schedule or reschedule an appointment, please call [phone].  -Reach out via [employer]. These messages are answered by your nurse or doctor during business hours and typically in 1-2 days. [employer] messages are best for quick questions/clarifications/updates. Frequently, your doctor or nurse will recommend setting up a follow up appointment to address any significant questions/concerns.  -For urgent questions after business hours, you may reach the on-call Nephrology Fellow by contacting the [facility]  at [phone].    To schedule imaging:   -Please call [phone]     To schedule your " lab appointment at the [facility]:  -Please call [phone]    Sincerely,    Dr. Marcos Thomas   of Medicine  Division of Nephrology and Hypertension  [facility]

## 2024-06-14 NOTE — NURSING NOTE
Is the patient currently in the state of [place]? YES    Visit mode:VIDEO    If the visit is dropped, the patient can be reconnected by: VIDEO VISIT: Text to cell phone:   Telephone Information:   Mobile [phone]       Will anyone else be joining the visit? NO  (If patient encounters technical issues they should call [phone]    How would you like to obtain your AVS? MyChart    Are changes needed to the allergy or medication list? No    Are refills needed on medications prescribed by this physician? NO    Reason for visit: RECHECK    Nicolasa PIMENTEL

## 2024-06-14 NOTE — PROGRESS NOTES
Nephrology Clinic Visit  Hanh Villalba MRN: [id number] YOB: 1955  Primary Care Provider: Angeli Nolan    Video-Visit Details  Patient evaluated by billable video visit  Video Start Time: 12:56 PM  Type of service:  Video Visit  Video End Time:1:13 PM  Originating Location (pt. Location): Home  Distant Location (provider location):  On-site  Platform used for Video Visit: Anaid  ----------------------------------------------------------------------------------------------------------------------  Visit 6/14/2024:  -BP Control:   --In office: N/A  --At home: 116/76, 116/72, 126/64, 112/68, 124/72  --Current Regimen: None  -DM Control: No  --Current Regimen: None  -Creatinine Trend: 1.16 (6/10/2024) from 1.2 (12/11/2023) from 1.0 (11/16/2023) from 1.13 (6/14/2023) from 1.2 (5/4/2023)  -UPCR 6/10/2024 0.11g/gr  -Calcium levels remain mildly elevated (10.3 on 6/10/2024)  -Her vitamin D levels remain in a normal range (appropriate in the setting of primary hyperpara)  -Saw Onc 5/14/2024. No changes  -Discuss GLP-1RA: Discussed this a few months ago. She is now eating a bit less on her own, has cut down on sugar, stopped diet mountain dew.   -Discuss Statin:   -May have a decompression and stabilization on her spine.   -July 23rd going scuba diving.   -Spending a lot of time in the garden.     Visit 12/13/2023:  -BP Control: 131/78 last visit in the office.   --Current Regimen: None  -DM Control: No  --Current Regimen: None  -Creatinine Trend: 1.2 (12/11/2023) from 1.0 (11/16/2023) from 1.13 (6/14/2023) from 1.2 (5/4/2023)  -We discussed diet strategies to preserve kidney health  -We discussed her creatinine trend and that her cystatin-c eGFR is about the same as her creatinine eGFR  -We disucssed plan to follow up with Endo for Primary hyperpara    Visit 6/14/2023:  -BP Control: No issues with this leading up to establishing care with me.  No orthostatic hypotension.   --Current  Regimen: None  -DM Control: No  --Current Regimen: None  -Creatinine Trend: 1.13 (6/14/2023) from 1.2 (5/4/2023)  -Nocturia: 1x nights  -Hematuria: No  -Nephrolithiasis: No  -NSAID Use: Ibuprofen/Aleve until about 1.5 years ago. Intermittently using for back or neck pain. Maybe a few doses a week. Not very heavy use even at the worst of times (maybe about 3 days in a row at most).   -Herbal/OTC Medication Use: Just what's on her chart  -Family History of Kidney Disease: None  -Family/Friends on RRT/Kidney Transplant: No/No    -Other:  --Hx of Stage IB Grade 2 Endometrial Adenocarcinoma s/p Brachytherapy (completed 12/2019) following with Onc for surveillance. No issues and doing [id number] surveillance until 12/2024 then qYear  --Robotic hysterectomy. Further checkups have been good. No complications at the time of her hysterectomy.   --Has some pyuria. No dysuria. No frequency. Can't remember her last UTI. No fevers. Having some stress incontinence.  --Having some hip discomfort    Objective:  PAST MEDICAL HISTORY:  Past Medical History:   Diagnosis Date    Cancer (H) August 2019    Uterus removed    Depression     Depressive disorder 1999    taking prosac    Hypothyroidism     Overweight        PAST SURGICAL HISTORY:  Past Surgical History:   Procedure Laterality Date    ABDOMEN SURGERY  Oct 2019    Robotic Hysterectomy    BIOPSY  early 2000's    left breast - non event    COLONOSCOPY  within last 6 years ?    OK    COLONOSCOPY N/A 7/26/2021    Procedure: COLONOSCOPY, WITH POLYPECTOMY;  Surgeon: Ryan Butterfield MD;  [place] [facility] [place]    DAVINC HYSTERECTOMY TOTAL, BILATERAL SALPINGO-OOPHORECTOMY, NODE DISSECTION, COMBINED N/A 10/4/2019    Procedure: Robot-assisted total laparoscopic hysterectomy, Bilateral salpingectomy and Right Oophorectomy, Lysis of adhesion, Cervical Indocyanine Green injection, Bilateral sentinel lymph node dissection, Repair of vaginal laceration.;  Surgeon: Perez Reddy MD;  Location:  [facility] OR    DILATION AND CURETTAGE N/A 9/12/2019    Procedure: DILATION AND CURETTAGE, UTERUS;  Surgeon: Navdeep Barajas MD;  Location: [facility] OR    OPERATIVE HYSTEROSCOPY WITH MORCELLATOR N/A 9/12/2019    Procedure: HYSTEROSCOPY WITH MORCELLATOR (MYOSURE);  Surgeon: Navdeep Barajas MD;  Location: [facility] OR    SALPINGO OOPHORECTOMY,R/L/SHANDA Left 1980s    Left ovarian with endometriois    US BREAST BIOPSY LT Left        MEDICATIONS:  Current Outpatient Medications   Medication Instructions    Apple Cider Vinegar 600 MG CAPS 1 capsule, Oral, DAILY    CALCIUM PO 20 mg, Oral, DAILY    cholecalciferol (VITAMIN D3) 5000 units TABS tablet Oral, DAILY    clobetasol (TEMOVATE) 0.05 % external ointment Apply sparingly then once or twice a week as needed    Cyanocobalamin 1500 MCG TBDP 1 tablet, Oral, DAILY    FLUoxetine (PROZAC) 20 MG capsule Take 1 capsule (20 mg) by mouth every 48 hours AND 2 capsules (40 mg) every 48 hours.    Lactobacillus (ACIDOPHILUS PO) 1 tablet, Oral, DAILY    levothyroxine (SYNTHROID/LEVOTHROID) 100 mcg, Oral, EVERY MORNING    Lutein 20 MG CAPS Oral    Omega 3-6-9 Fatty Acids (OMEGA-3-6-9 PO) 1 capsule, DAILY    omeprazole (PRILOSEC) 20 mg, Oral, DAILY       FAMILY MEDICAL HISTORY:   Family History   Problem Relation Age of Onset    Acute myelogenous leukemia Mother     Coronary Artery Disease Mother         bypass surgery 1989    Osteoporosis Mother     Obesity Mother     Parkinsonism Father     Prostate Cancer Father         Diagnosed late in life in his 80's    Obesity Sister     Heart Disease Sister     Coronary Artery Disease Sister         heart attacK in June of 2019    Uterine Cancer Maternal Grandmother 40        Uterine versus cervical    No Known Problems Sister     Leukemia Brother     Leukemia Nephew     Coronary Artery Disease Brother         Carotid Artery Surgery    Obesity Brother     Substance Abuse Brother         alcohol       PHYSICAL EXAM:   LMP  (LMP Unknown)   GENERAL  "APPEARANCE: alert and no distress  MS: no evidence of inflammation in joints, no muscle tenderness  SKIN: no rash  NEURO: mentation intact and speech normal  PSYCH: affect normal    LABS REVIEWED BY ME:   ANEMIA  Recent Labs   Lab Test 06/10/24  0901 05/01/24  0938 06/14/23  0718 11/30/22  1148   HGB 13.1 13.3 13.2 12.3       BMP  Recent Labs   Lab Test 06/10/24  0901 05/01/24  0938 12/11/23  0927 11/16/23  1007 08/25/23  0909 06/14/23  0718 05/04/23  1334 12/12/22  0954 07/17/20  0840 09/26/19  1221     --  140  --   --  140  --  140   < > 139   POTASSIUM 4.6  --  5.0  --   --  4.5  --  4.5   < > 4.4   CHLORIDE 107  --  105  --   --  106  --  105   < > 106   CO2 24  --  29  --   --  28  --  26   < > 28   ANIONGAP 10  --  6*  --   --  6*  --  9   < > 5   BUN 16.3  --  22.3  --   --  18.1  --  15.6   < > 18   CR 1.16* 1.09* 1.20* 1.00* 1.10* 1.13*   < > 1.10*   < > 1.04   GFRESTIMATED 50* 54* 48* 60* 54* 52*   < > 54*   < > 56*   MAG  --  1.8  --  1.8 2.3  --   --   --   --   --    PROTTOTAL  --   --   --   --   --   --   --   --   --  7.1    < > = values in this interval not displayed.       CBC  Recent Labs   Lab Test 06/10/24  0901 05/01/24  0938 06/14/23  0718 11/30/22  1148 03/15/21  0847 09/26/19  1221   HGB 13.1 13.3 13.2 12.3 12.8 12.7   WBC 4.7  --  5.0  --  7.6 8.0   HCT 41.8  --  40.6  --  39.6 39.0   MCV 96  --  96  --  97 98     --  220  --  202 241       DIABETESNo lab results found.    HYPONATREMIANo lab results found.    Invalid input(s): \"UOSM\", \"OSM\"    MBD  Recent Labs   Lab Test 06/10/24  0901 05/01/24  0938 12/11/23  0927 11/16/23  1007 08/25/23  0909 06/14/23  0718 05/04/23  1334   SHANDRA 10.3* 10.3* 10.6* 10.5* 10.9*   < > 10.3*   ALBUMIN 4.2 4.4  --  4.1 4.1   < > 4.4   PHOS 2.4* 2.6  --  2.3* 2.8   < > 2.9   PTHI  --  87*  --  82* 114*  --  74*    < > = values in this interval not displayed.        URINE STUDIES  Recent Labs   Lab Test 06/14/23  0721 11/05/19  1746 09/20/19 2006 "   COLOR Light Yellow Yellow Yellow   APPEARANCE Clear Clear Cloudy   URINEGLC Negative Negative Negative   URINEBILI Negative Negative Negative   URINEKETONE Negative Negative Negative   SG 1.012 >1.030 1.025   UBLD Negative Small* Large*   URINEPH 6.5 5.5 6.0   PROTEIN Negative Negative 100*   UROBILINOGEN  --  0.2 0.2   NITRITE Negative Negative Positive*   LEUKEST Moderate* Small* Large*   RBCU 0 O - 2 25-50*   WBCU 27* 5-10* >100*     No lab results found.    ADDITIONAL LABS ORDERED/REVIEWED BY ME:  See below    Assessment/Plan  CKD Stage G3aA1  Established care with [facility] 6/14/2023    Baseline creatinine appears to be 1-1.2 dating back to at least 2008. Prior to this was 0.8-0.9 in 2005. Unclear what happened between 2005 and 2008 to result in creatinine increase but she has overal been remarkably stable over the years since. Did have one creatinine of 1.4 but this was not sustained. UA 6/2023 w/ no hematuira. Did have some pyuria but denies any UTI symptoms and her clinic history at the time of establishing care with me is not consistent with AIN/CECILIA (also her UA had 8 squamous cells so wasn't a perfect collection), UPCR 6/2023 w/ 0.11g/gr. CT Abd/Pelvis 6/2021 w/o hydro, stones, or other obvious renal issues.     Borderline hypercalcemia present since 7/2018 as well. Typically in the low 10's although did have a calcium of 11.1 on 12/12/2022. Prior to this appears calcium was normal in the mid 9's typically.     Hypercalcemia workup:  -Albumin 4.4 5/4/2023   -25-OH vitamin D 57 5/4/2023  -24 hour urine calcium/creatinine 12/2022: 0.22g/day calcium, 3.35L UOP, 14mg/kg/day creatinine excretion in sample  -PTH 74 5/4/2023  -Phos 2.9 5/4/2023  -Serum Calcium 10.3 5/4/2023    Saw Endo 5/11/2023. Discussion regarding Primary HyperPara and possible surgical intervention. Plan for close monitoring for now.    CKD Etiology (Biopsy Proven: No):  -Unclear etiology of mild CKD dating back to 2008. Stable since.    -Perhaps some contribution of hypercalciuria from primary hyperpara  -Some small contribution of NSAID use is likely, but not our main  of mild CKD  -No significant history of hypertension/hypotension per her report. Not currently on any Anti-HTN  -No clinical or objective evidence of obstructive nephropathy    Plan:  -Will continue to monitor things intermittently. Her renal function has been incredibly stable over the years which is great. Continue optimal supportive care (avoid hypo/hypertension, avoid NSAIDs as much as possible, keep up with general health measures like vaccinations, avoid sick contacts, and monitor for any UTI/Obstructive symptoms  -No indication for ARB or SGLT2i at this time. Continue to intermittently monitor proteinuria and eGFR.  -Discussed diet and lifestyle interventions  -We discussed the potential for GLP-1RA to be nephroprotective even in those without diabetes (although need more robust data still) and that weight loss helps to offset glomerular hyperfiltration injury. She is losing weight with lifestyle interventions so will hold off on additional medications at this time.   -Remainder per problem below      Anemia of Renal Disease  Hemoglobin: 13.1 (6/10/2024)  Ferritin:  TSAT:    Plan:  -No need for IV iron or EPO      Lipid Management in CKD  KDIGO 2013 Guidelines recommend the following for patients with CKD:  Adults >/= 49 yo w/ CKD stage 1 or 2: Statin  Adults >/= 49 yo w/ CKD stage 3-5: Statin + Ezetimibe or Statin alone  Adults 18-49 + 1 of the following (CAD, DM, Ischemic stroke, 10 yr ASCVD risk >10%): Statin    KDIGO guidelines recommend Simvastatin 20mg/Ezetimibe 10mg qDay for patients with CKD G3a-G5 (in line with the SHARP trial). If Simvastatin used alone, 40mg qDay is referenced.   Other options include: Atorvastatin 20mg qDay (4D trial) and Rosuvastatin 10mg qDay (ED trial)    Plan:  -She would like to hold off for now. She will think about.        Metabolic Acidosis of Renal Disease  Bicarb: 24 (6/10/2024)    Plan:  -No need for NaHCO at this time      Mineral Bone Disease  PTH: 87 (5/1/2024)  Phos: 2.4 (6/10/2024)  Calcium: 10.3 (6/10/2024)  Vitamin D: 44 (6/10/2024)    Plan:  -Following with Endo for primary hyperpara  -No need for phos binder at this time    Primary Hyperparathyroidism  Guidelines suggest that surgery for hyperparathyroidism is indicated in symptomatic patients or in asymptomatic patients who meet any one of the following conditions:  -Serum calcium concentration of 1.0 mg/dL (0.25 mmol/L) or more above the ULN  -eGFR < 60 mL/min/1.73 m2, 24-hour urine for calcium > 400 mg/day and increased stone risk by biochemical stone risk analysis and/or nephrolithiasis/nephrocalcinosis on imaging.  -Bone density at the hip, lumbar spine, or distal radius that is more than 2.5 standard deviations below peak bone mass (T score <-2.5) and/or previous fragility fracture   -Age less than 50 years    Operative intervention can be delayed in patients over 50 years of age who are asymptomatic or minimally symptomatic and who have serum calcium concentrations <1.0 mg/dL (0.2 mmol/L) above the upper limit of normal, and in patients who are medically unfit for surgery.     Plan:  -Continue routine follow up with Endo    Other:  -Specialty Care Coordination Referral - CKD G1-G3b w/o imminent RRT planning/needs (Yes/no, Date Referral Placed): No  -Med Therapy Management Referral (Yes/No, Date of Referral): No    Return to clinic: 6 months    Marcos Thomas MD   of Medicine  Division of Nephrology and Hypertension  [facility]    25 minutes spent on the date of the encounter doing chart review, history and exam, documentation and further activities as noted above    The longitudinal plan of care for CKD G3aA1 was addressed during this visit. Due to the added complexity in care, I will continue to support Hanh  LYDIA Villalba in the subsequent management of this condition(s) and with the ongoing continuity of care of this condition(s).

## 2024-06-19 ENCOUNTER — TELEPHONE (OUTPATIENT)
Dept: NEPHROLOGY | Facility: CLINIC | Age: 69
End: 2024-06-19
Payer: COMMERCIAL

## 2024-06-19 NOTE — TELEPHONE ENCOUNTER
BURT and sent [facility] to schedule a follow up around 12.14.24 via video visit with Dr. Thomas// 6.19.24 KET

## 2024-06-24 ENCOUNTER — TELEPHONE (OUTPATIENT)
Dept: NEPHROLOGY | Facility: CLINIC | Age: 69
End: 2024-06-24
Payer: COMMERCIAL

## 2024-06-24 NOTE — TELEPHONE ENCOUNTER
EDIEM to schedule follow up around 12.14.24 with Dr. Thomas using a video visit// 2nd attempt// 6.24.24 KET

## 2024-07-17 ENCOUNTER — OFFICE VISIT (OUTPATIENT)
Dept: FAMILY MEDICINE | Facility: CLINIC | Age: 69
End: 2024-07-17
Payer: COMMERCIAL

## 2024-07-17 VITALS
WEIGHT: 253.2 LBS | HEIGHT: 67 IN | HEART RATE: 62 BPM | DIASTOLIC BLOOD PRESSURE: 69 MMHG | TEMPERATURE: 98.1 F | SYSTOLIC BLOOD PRESSURE: 106 MMHG | BODY MASS INDEX: 39.74 KG/M2 | OXYGEN SATURATION: 99 % | RESPIRATION RATE: 15 BRPM

## 2024-07-17 DIAGNOSIS — Z01.818 PREOPERATIVE EXAMINATION: Primary | ICD-10-CM

## 2024-07-17 DIAGNOSIS — E03.4 HYPOTHYROIDISM DUE TO ACQUIRED ATROPHY OF THYROID: ICD-10-CM

## 2024-07-17 DIAGNOSIS — N18.30 STAGE 3 CHRONIC KIDNEY DISEASE, UNSPECIFIED WHETHER STAGE 3A OR 3B CKD (H): ICD-10-CM

## 2024-07-17 DIAGNOSIS — N17.8 OTHER ACUTE KIDNEY FAILURE (H): ICD-10-CM

## 2024-07-17 DIAGNOSIS — M43.16 SPONDYLOLISTHESIS OF LUMBAR REGION: ICD-10-CM

## 2024-07-17 LAB
BASOPHILS # BLD AUTO: 0 10E3/UL (ref 0–0.2)
BASOPHILS NFR BLD AUTO: 1 %
EOSINOPHIL # BLD AUTO: 0.3 10E3/UL (ref 0–0.7)
EOSINOPHIL NFR BLD AUTO: 5 %
ERYTHROCYTE [DISTWIDTH] IN BLOOD BY AUTOMATED COUNT: 13 % (ref 10–15)
HCT VFR BLD AUTO: 40.2 % (ref 35–47)
HGB BLD-MCNC: 12.9 G/DL (ref 11.7–15.7)
IMM GRANULOCYTES # BLD: 0 10E3/UL
IMM GRANULOCYTES NFR BLD: 0 %
INR PPP: 0.95 (ref 0.85–1.15)
LYMPHOCYTES # BLD AUTO: 1.5 10E3/UL (ref 0.8–5.3)
LYMPHOCYTES NFR BLD AUTO: 27 %
MCH RBC QN AUTO: 30.4 PG (ref 26.5–33)
MCHC RBC AUTO-ENTMCNC: 32.1 G/DL (ref 31.5–36.5)
MCV RBC AUTO: 95 FL (ref 78–100)
MONOCYTES # BLD AUTO: 0.5 10E3/UL (ref 0–1.3)
MONOCYTES NFR BLD AUTO: 9 %
NEUTROPHILS # BLD AUTO: 3.2 10E3/UL (ref 1.6–8.3)
NEUTROPHILS NFR BLD AUTO: 59 %
PLATELET # BLD AUTO: 212 10E3/UL (ref 150–450)
RBC # BLD AUTO: 4.24 10E6/UL (ref 3.8–5.2)
WBC # BLD AUTO: 5.4 10E3/UL (ref 4–11)

## 2024-07-17 PROCEDURE — 36415 COLL VENOUS BLD VENIPUNCTURE: CPT | Performed by: FAMILY MEDICINE

## 2024-07-17 PROCEDURE — 99214 OFFICE O/P EST MOD 30 MIN: CPT | Performed by: FAMILY MEDICINE

## 2024-07-17 PROCEDURE — 85025 COMPLETE CBC W/AUTO DIFF WBC: CPT | Performed by: FAMILY MEDICINE

## 2024-07-17 PROCEDURE — 93000 ELECTROCARDIOGRAM COMPLETE: CPT | Performed by: FAMILY MEDICINE

## 2024-07-17 PROCEDURE — 85610 PROTHROMBIN TIME: CPT | Performed by: FAMILY MEDICINE

## 2024-07-17 NOTE — PROGRESS NOTES
Preoperative Evaluation  [facility]  [address] [place] [id number]  Phone: [phone]  Primary Provider: Angeli Messina MD  Pre-op Performing Provider: Carlos Ott MD  Jul 17, 2024             7/15/2024   Surgical Information   What procedure is being done? Decompression and Stabilization of Lower spine   Facility or Hospital where procedure/surgery will be performed: [facility]   Who is doing the procedure / surgery? Dr. Jeb Jaramillo   Date of surgery / procedure: Aug 6   Time of surgery / procedure: 9:00   Where do you plan to recover after surgery? at home with family        Fax number for surgical facility: [phone]    Assessment & Plan     The proposed surgical procedure is considered INTERMEDIATE risk.    Preoperative examination  Normal EKG, and normal CBC  Nothing to eat or drink after midnight the morning of surgery.  No NSAID, Fish oil one week before surgery.  - EKG 12-lead complete w/read - Clinics  - CBC with platelets and differential; Future  - CBC with platelets and differential    Spondylolisthesis of lumbar region  Surgery, Decompression, Transfacet, transforaminal L4- L5    Hypothyroidism due to acquired atrophy of thyroid  Continue with current medications.    Stage 3 chronic kidney disease, unspecified whether stage 3a or 3b CKD (H)  Normal for CBC.  - INR; Future  - INR       - No identified additional risk factors other than previously addressed    Antiplatelet or Anticoagulation Medication Instructions   - Patient is on no antiplatelet or anticoagulation medications.    Additional Medication Instructions  Take all scheduled medications on the day of surgery EXCEPT for modifications listed below:    Recommendation  Approval given to proceed with proposed procedure, without further diagnostic evaluation.    Trev Londono is a 68 year old, presenting for the following:  Pre-Op Exam    History of lumbar spinal stenosis,  hypothyroidism.  Patient is scheduled to have decompression at the level L4-L5, with fusion.    Patient has no chest pain no short of breath, no history of bleeding or blood transfusion.  No previous history of anesthesia reaction.        7/17/2024     9:09 AM   Additional Questions   Roomed by shaan woods related to upcoming procedure:         7/15/2024   Pre-Op Questionnaire   Have you ever had a heart attack or stroke? No   Have you ever had surgery on your heart or blood vessels, such as a stent placement, a coronary artery bypass, or surgery on an artery in your head, neck, heart, or legs? No   Do you have chest pain with activity? No   Do you have a history of heart failure? No   Do you currently have a cold, bronchitis or symptoms of other infection? No   Do you have a cough, shortness of breath, or wheezing? No   Do you or anyone in your family have previous history of blood clots? No   Do you or does anyone in your family have a serious bleeding problem such as prolonged bleeding following surgeries or cuts? No   Have you ever had problems with anemia or been told to take iron pills? No   Have you had any abnormal blood loss such as black, tarry or bloody stools, or abnormal vaginal bleeding? No   Have you ever had a blood transfusion? No   Are you willing to have a blood transfusion if it is medically needed before, during, or after your surgery? Yes   Have you or any of your relatives ever had problems with anesthesia? No   Do you have sleep apnea, excessive snoring or daytime drowsiness? No   Do you have any artifical heart valves or other implanted medical devices like a pacemaker, defibrillator, or continuous glucose monitor? No   Do you have artificial joints? No   Are you allergic to latex? No        Health Care Directive  Patient has a Health Care Directive on file      Preoperative Review of    reviewed - no record of controlled substances prescribed.      Status of Chronic  Conditions:  DEPRESSION - Patient has a long history of Depression of moderate severity requiring medication for control with recent symptoms being stable..Current symptoms of depression include none.     HYPOTHYROIDISM - Patient has a longstanding history of chronic Hypothyroidism. Patient has been doing well, noting no tremor, insomnia, hair loss or changes in skin texture. Continues to take medications as directed, without adverse reactions or side effects. Last TSH   Lab Results   Component Value Date    TSH 1.60 10/26/2023   .      Patient Active Problem List    Diagnosis Date Noted    Hyperparathyroidism (H24) 11/22/2023     Priority: Medium     Saw endocrine 11/2023  Monitor calcium, PTH, vitd, creat q 6 months      Serum calcium elevated 09/26/2023     Priority: Medium    TMJ (temporomandibular joint syndrome) 09/26/2023     Priority: Medium    Right hip pain 06/22/2023     Priority: Medium    History of colonic polyps 07/29/2021     Priority: Medium     5 tubular adenomas < 10 mm  2020 guidelines suggest repeat in 3 years.      Stage 3a chronic kidney disease (H) 07/17/2020     Priority: Medium    Overweight (H) 11/05/2019     Priority: Medium    Endometrioid adenocarcinoma of uterus (H) 09/26/2019     Priority: Medium     Stage 1B FIGO Grade 2, DOI 17/21mm (81%), LVSI+, cytology negative, tumor 3.4cm  MSI-high, MLH-1 promoter hypermethylation      Hypothyroidism due to acquired atrophy of thyroid 07/19/2019     Priority: Medium    Recurrent major depressive disorder, in full remission (H24) 07/19/2019     Priority: Medium      Past Medical History:   Diagnosis Date    Cancer (H) August 2019    Uterus removed    Depression     Depressive disorder 1999    taking prosac    Hypothyroidism     Overweight      Past Surgical History:   Procedure Laterality Date    ABDOMEN SURGERY  Oct 2019    Robotic Hysterectomy    BIOPSY  early 2000's    left breast - non event    COLONOSCOPY  within last 6 years ?    OK     COLONOSCOPY N/A 7/26/2021    Procedure: COLONOSCOPY, WITH POLYPECTOMY;  Surgeon: Ryan Butterfield MD;  Location: [facility] OR    DAVINCI HYSTERECTOMY TOTAL, BILATERAL SALPINGO-OOPHORECTOMY, NODE DISSECTION, COMBINED N/A 10/4/2019    Procedure: Robot-assisted total laparoscopic hysterectomy, Bilateral salpingectomy and Right Oophorectomy, Lysis of adhesion, Cervical Indocyanine Green injection, Bilateral sentinel lymph node dissection, Repair of vaginal laceration.;  Surgeon: Perez Reddy MD;  Location: [facility] OR    DILATION AND CURETTAGE N/A 9/12/2019    Procedure: DILATION AND CURETTAGE, UTERUS;  Surgeon: Navdeep Barajas MD;  Location: [facility] OR    OPERATIVE HYSTEROSCOPY WITH MORCELLATOR N/A 9/12/2019    Procedure: HYSTEROSCOPY WITH MORCELLATOR (MYOSURE);  Surgeon: Navdeep Barajas MD;  Location: [facility] OR    SALPINGO OOPHORECTOMY,R/L/SHANDA Left 1980s    Left ovarian with endometriois    US BREAST BIOPSY LT Left      Current Outpatient Medications   Medication Sig Dispense Refill    Apple Cider Vinegar 600 MG CAPS Take 1 capsule by mouth daily       CALCIUM PO Take 20 mg by mouth daily      chlorhexidine (PERIDEX) 0.12 % solution SWISH WITH 15 ML BY MOUTH FOR 30 SECONDS, THEN SPIT, TWO TIMES A DAY.*      cholecalciferol (VITAMIN D3) 5000 units TABS tablet Take by mouth daily      clobetasol (TEMOVATE) 0.05 % external ointment Apply sparingly to affected area once or twice a week as needed 45 g 0    Cyanocobalamin 1500 MCG TBDP Take 1 tablet by mouth daily       FLUoxetine (PROZAC) 20 MG capsule Take 1 capsule (20 mg) by mouth every 48 hours AND 2 capsules (40 mg) every 48 hours. 135 capsule 3    Lactobacillus (ACIDOPHILUS PO) Take 1 tablet by mouth daily       levothyroxine (SYNTHROID/LEVOTHROID) 100 MCG tablet Take 1 tablet (100 mcg) by mouth every morning 90 tablet 3    Lutein 20 MG CAPS       Omega 3-6-9 Fatty Acids (OMEGA-3-6-9 PO) Take 1 capsule by mouth daily      omeprazole (PRILOSEC) 10 MG DR capsule  "Take 20 mg by mouth daily         No Known Allergies     Social History     Tobacco Use    Smoking status: Former     Current packs/day: 0.00     Average packs/day: 1 pack/day for 15.8 years (15.8 ttl pk-yrs)     Types: Cigarettes     Start date: 1972     Quit date: 1987     Years since quittin.7     Passive exposure: Past    Smokeless tobacco: Never   Substance Use Topics    Alcohol use: Yes     Comment: a beer here and there - never more than 2 -  2 times/week     Family History   Problem Relation Age of Onset    Acute myelogenous leukemia Mother     Coronary Artery Disease Mother         bypass surgery     Osteoporosis Mother     Obesity Mother     Parkinsonism Father     Prostate Cancer Father         Diagnosed late in life in his 80's    Obesity Sister     Heart Disease Sister     Coronary Artery Disease Sister         heart attacK in 2019    Uterine Cancer Maternal Grandmother 40        Uterine versus cervical    No Known Problems Sister     Leukemia Brother     Leukemia Nephew     Coronary Artery Disease Brother         Carotid Artery Surgery    Obesity Brother     Substance Abuse Brother         alcohol     History   Drug Use Unknown             Review of Systems  Constitutional, HEENT, cardiovascular, pulmonary, GI, , musculoskeletal, neuro, skin, endocrine and psych systems are negative, except as otherwise noted.    Objective    /69 (BP Location: Right arm, Patient Position: Chair, Cuff Size: Adult Large)   Pulse 62   Temp 98.1  F (36.7  C) (Oral)   Resp 15   Ht 1.712 m (5' 7.42\")   Wt 114.9 kg (253 lb 3.2 oz)   LMP  (LMP Unknown)   SpO2 99%   BMI 39.16 kg/m     Estimated body mass index is 39.16 kg/m  as calculated from the following:    Height as of this encounter: 1.712 m (5' 7.42\").    Weight as of this encounter: 114.9 kg (253 lb 3.2 oz).  Physical Exam  GENERAL: alert and no distress  EYES: Eyes grossly normal to inspection, PERRL and conjunctivae and " "sclerae normal  HENT: ear canals and TM's normal, nose and mouth without ulcers or lesions  NECK: no adenopathy, no asymmetry, masses, or scars  RESP: lungs clear to auscultation - no rales, rhonchi or wheezes  CV: regular rate and rhythm, normal S1 S2, no S3 or S4, no murmur, click or rub, no peripheral edema  ABDOMEN: soft, nontender, no hepatosplenomegaly, no masses and bowel sounds normal  MS: no gross musculoskeletal defects noted, no edema  SKIN: no suspicious lesions or rashes  NEURO: Normal strength and tone, mentation intact and speech normal  PSYCH: mentation appears normal, affect normal/bright    Recent Labs   Lab Test 06/10/24  0901 05/01/24  0938 12/11/23  0927   HGB 13.1 13.3  --      --   --      --  140   POTASSIUM 4.6  --  5.0   CR 1.16* 1.09* 1.20*        Diagnostics  Lab Results   Component Value Date    WBC 5.4 07/17/2024    WBC 7.6 03/15/2021     Lab Results   Component Value Date    RBC 4.24 07/17/2024    RBC 4.10 03/15/2021     Lab Results   Component Value Date    HGB 12.9 07/17/2024    HGB 12.8 03/15/2021     Lab Results   Component Value Date    HCT 40.2 07/17/2024    HCT 39.6 03/15/2021     No components found for: \"MCT\"  Lab Results   Component Value Date    MCV 95 07/17/2024    MCV 97 03/15/2021     Lab Results   Component Value Date    MCH 30.4 07/17/2024    MCH 31.2 03/15/2021     Lab Results   Component Value Date    MCHC 32.1 07/17/2024    MCHC 32.3 03/15/2021     Lab Results   Component Value Date    RDW 13.0 07/17/2024    RDW 12.4 03/15/2021     Lab Results   Component Value Date     07/17/2024     03/15/2021     Orders Placed This Encounter   Procedures    INR    CBC with platelets and differential    EKG 12-lead complete w/read - Clinics    CBC with platelets and differential       EKG: appears normal, NSR, sinus bradycardia, normal axis, normal intervals, no acute ST/T changes c/w ischemia, no LVH by voltage criteria, unchanged from previous " tracings    Revised Cardiac Risk Index (RCRI)  The patient has the following serious cardiovascular risks for perioperative complications:   - No serious cardiac risks = 0 points     RCRI Interpretation: 0 points: Class I (very low risk - 0.4% complication rate)         Signed Electronically by: Carlos Ott MD  Copy of this evaluation report is provided to requesting physician.

## 2024-07-17 NOTE — PATIENT INSTRUCTIONS
Nothing to eat or drink after midnight the morning of surgery.  Do not take Fish oil, Omega 3 , no NSAID one week before surgery.  Take Levothyroxine, Fluoxetine the morning of surgery with sips of water.

## 2024-08-12 DIAGNOSIS — E03.4 HYPOTHYROIDISM DUE TO ACQUIRED ATROPHY OF THYROID: ICD-10-CM

## 2024-08-13 ENCOUNTER — OFFICE VISIT (OUTPATIENT)
Dept: FAMILY MEDICINE | Facility: CLINIC | Age: 69
End: 2024-08-13
Payer: COMMERCIAL

## 2024-08-13 VITALS
TEMPERATURE: 98.1 F | HEIGHT: 67 IN | DIASTOLIC BLOOD PRESSURE: 74 MMHG | HEART RATE: 63 BPM | BODY MASS INDEX: 39.71 KG/M2 | SYSTOLIC BLOOD PRESSURE: 111 MMHG | WEIGHT: 253 LBS | RESPIRATION RATE: 16 BRPM | OXYGEN SATURATION: 99 %

## 2024-08-13 DIAGNOSIS — C55 ENDOMETRIOID ADENOCARCINOMA OF UTERUS (H): ICD-10-CM

## 2024-08-13 DIAGNOSIS — M48.062 SPINAL STENOSIS OF LUMBAR REGION WITH NEUROGENIC CLAUDICATION: ICD-10-CM

## 2024-08-13 DIAGNOSIS — Z09 HOSPITAL DISCHARGE FOLLOW-UP: Primary | ICD-10-CM

## 2024-08-13 PROCEDURE — 99213 OFFICE O/P EST LOW 20 MIN: CPT | Performed by: NURSE PRACTITIONER

## 2024-08-13 PROCEDURE — G2211 COMPLEX E/M VISIT ADD ON: HCPCS | Performed by: NURSE PRACTITIONER

## 2024-08-13 RX ORDER — ACETAMINOPHEN 500 MG
1000 TABLET ORAL
COMMUNITY
Start: 2024-08-08 | End: 2024-09-18

## 2024-08-13 RX ORDER — OXYCODONE HYDROCHLORIDE 5 MG/1
5-10 TABLET ORAL
COMMUNITY
Start: 2024-08-08 | End: 2024-09-18

## 2024-08-13 RX ORDER — AMOXICILLIN 250 MG
1-4 CAPSULE ORAL
COMMUNITY
Start: 2024-08-08 | End: 2024-09-18

## 2024-08-13 RX ORDER — METHOCARBAMOL 500 MG/1
500 TABLET, FILM COATED ORAL
COMMUNITY
Start: 2024-08-08 | End: 2024-09-18

## 2024-08-13 RX ORDER — LEVOTHYROXINE SODIUM 100 UG/1
100 TABLET ORAL EVERY MORNING
Qty: 100 TABLET | Refills: 0 | Status: SHIPPED | OUTPATIENT
Start: 2024-08-13

## 2024-08-13 ASSESSMENT — PAIN SCALES - GENERAL: PAINLEVEL: SEVERE PAIN (6)

## 2024-08-13 NOTE — PATIENT INSTRUCTIONS
Continue medications as prescribed.     Continue back brace.     Follow-up with surgeon as planned.     Follow-up as needed.

## 2024-08-13 NOTE — PROGRESS NOTES
Assessment & Plan     Hospital discharge follow-up  Improving.     Spinal stenosis of lumbar region with neurogenic claudication  She takes oxycodone about twice a day for pain and tylenol in between. Uses ice and walking regularly for pain management. Uses stool softener. Feels she is doing well. She had questions about muscle relaxer. Recommended using at night in place of oxycodone if able to see if that gave her relief through the night. She has follow-up with surgeon in a month. Wearing brace for 6 weeks total. Incision looks intact and without signs of infection.     Endometrioid adenocarcinoma of uterus (H)  Has appt with oncology scheduled in November.     MED REC REQUIRED  Post Medication Reconciliation Status:  Discharge medications reconciled, continue medications without change    Patient Instructions   Continue medications as prescribed.     Continue back brace.     Follow-up with surgeon as planned.     Follow-up as needed.    Subjective   Bry is a 68 year old, presenting for the following health issues:  Hospital F/U        8/13/2024     9:04 AM   Additional Questions   Roomed by Cathi   Accompanied by none         8/13/2024     9:04 AM   Patient Reported Additional Medications   Patient reports taking the following new medications Tylenol, stool softener      HPI         Hospital Follow-up Visit:    Hospital/Nursing Home/IP Rehab Facility:  [facility]   Date of Admission: 08/06/24  Date of Discharge: 08/08/24  Reason(s) for Admission: spine surgery   Was the patient in the ICU or did the patient experience delirium during hospitalization?  No  Do you have any other stressors you would like to discuss with your provider? OTHER: pain management, reducing boredom     Problems taking medications regularly:  None  Medication changes since discharge: tylenol 1000 mg, oxy 5 mg (not currently using) methocarbamol, stool softener   Problems adhering to non-medication therapy:  None    Summary of  hospitalization:  CareEverywhere information obtained and reviewed  Diagnostic Tests/Treatments reviewed.  Follow up needed: none  Other Healthcare Providers Involved in Patient s Care:         Specialist appointment - she thinks it is next month (unable to review due to it being out of [facility])  Update since discharge: improved.         Plan of care communicated with patient and family           ER follow-up note from 8/6/24:   Discharge Summaries  - documented in this encounter  Keanu Shook MD - 08/08/2024 9:19 AM CDT  Formatting of this note is different from the original.  Images from the original note were not included.    HOSPITAL DISCHARGE SUMMARY    Patient Name: Hanh Villalba  YOB: 1955 Age: 68 y.o.  Medical Record Number: [id number]  Primary Physician: Angeli Nolan  Phone: [phone]  Admission Date: 8/6/2024  Discharge Date: 8/9/2024    Ms. Hanh Villalba is a 68 y.o. who underwent Decompression - Transfacet/Transforaminal L4 to: L5 Posterior Spine Fusion with Instrumentation L4 to: L5, FUSION POSTERIOR SPINE LEVEL 01 on 8/6/2024 by Dr. Jaramillo, Jeb Marquez MD; anesthesia General, EBL 50 ml, OR time 2 Hr 53 Min 11 Sec with no immediate complications.    She will be [facility] from [facility] to home    PRINCIPAL DIAGNOSIS CAUSING ADMISSION: 1) Spondylolisthesis, Lumbar M43.162) Stenosis, Lumbar - w/Neurogenic Claudication M48.062     FOLLOW-UP:  She should return to clinic in 6 weeks or as scheduled    Additional followup: NA    BRIEF HOSPITAL COURSE: This 68 y.o. female [facility] Procedure(s):  Decompression - Transfacet/Transforaminal L4 to: L5 Posterior Spine Fusion with Instrumentation L4 to: L5  FUSION POSTERIOR SPINE LEVEL 01. The patient had a stable post operative course. Standard perioperative antibiotics were administered and the patient received DVT prophylaxis per service protocol. The patient's pain was  initially controlled on intravenous pain medications and then weaned to oral medications prior to discharge. The patient's pain was well controlled and the patient met mobility expectations appropriate for the discharge location. The incision is clean, dry and intact.     -------------------------------------  Additional provider notes: Patient presents in clinic with partner for the following:     Hospital follow-up: doing well, wearing brace for 6 weeks. Taking oxycodone PRN. Last took it at 7pm last night. Ice, not heat, walking, and Tylenol PRN. She has been using stool softener which works well. States incision is doing well with no drainage or signs of infection. Currently wearing back brace. Uses cane for walking.     No Known Allergies    Current Outpatient Medications   Medication Sig Dispense Refill    FLUoxetine (PROZAC) 20 MG capsule Take 1 capsule (20 mg) by mouth every 48 hours AND 2 capsules (40 mg) every 48 hours. 135 capsule 3    levothyroxine (SYNTHROID/LEVOTHROID) 100 MCG tablet Take 1 tablet (100 mcg) by mouth every morning 90 tablet 3    Apple Cider Vinegar 600 MG CAPS Take 1 capsule by mouth daily  (Patient not taking: Reported on 8/13/2024)      CALCIUM PO Take 20 mg by mouth daily (Patient not taking: Reported on 8/13/2024)      chlorhexidine (PERIDEX) 0.12 % solution SWISH WITH 15 ML BY MOUTH FOR 30 SECONDS, THEN SPIT, TWO TIMES A DAY.* (Patient not taking: Reported on 8/13/2024)      cholecalciferol (VITAMIN D3) 5000 units TABS tablet Take by mouth daily (Patient not taking: Reported on 8/13/2024)      clobetasol (TEMOVATE) 0.05 % external ointment Apply sparingly to affected area once or twice a week as needed (Patient not taking: Reported on 8/13/2024) 45 g 0    Cyanocobalamin 1500 MCG TBDP Take 1 tablet by mouth daily  (Patient not taking: Reported on 8/13/2024)      Lactobacillus (ACIDOPHILUS PO) Take 1 tablet by mouth daily       Lutein 20 MG CAPS  (Patient not taking: Reported on  "8/13/2024)      Omega 3-6-9 Fatty Acids (OMEGA-3-6-9 PO) Take 1 capsule by mouth daily (Patient not taking: Reported on 8/13/2024)      omeprazole (PRILOSEC) 10 MG DR capsule Take 20 mg by mouth daily       No current facility-administered medications for this visit.     Facility-Administered Medications Ordered in Other Visits   Medication Dose Route Frequency Provider Last Rate Last Admin    sodium chloride (PF) 0.9% PF flush 10 mL  10 mL Intracatheter Once Angeli Nolan MD           Past Medical History:   Diagnosis Date    Cancer (H) August 2019    Uterus removed    Depression     Depressive disorder 1999    taking prosac    Hypothyroidism     Overweight             Review of Systems  Constitutional, HEENT, cardiovascular, pulmonary, gi and gu systems are negative, except as otherwise noted.      Objective    /74 (BP Location: Right arm, Patient Position: Sitting, Cuff Size: Adult Large)   Pulse 63   Temp 98.1  F (36.7  C) (Oral)   Resp 16   Ht 1.712 m (5' 7.42\")   Wt 114.8 kg (253 lb)   LMP  (LMP Unknown)   SpO2 99%   BMI 39.13 kg/m    Body mass index is 39.13 kg/m .  Physical Exam  Vitals reviewed.   Constitutional:       General: She is not in acute distress.     Appearance: Normal appearance. She is obese. She is not ill-appearing or toxic-appearing.   Cardiovascular:      Rate and Rhythm: Normal rate and regular rhythm.      Pulses: Normal pulses.      Heart sounds: Normal heart sounds.   Pulmonary:      Effort: Pulmonary effort is normal.      Breath sounds: Normal breath sounds.   Musculoskeletal:         General: Normal range of motion.        Back:       Comments: Wearing back brace   Skin:     General: Skin is warm and dry.   Neurological:      Mental Status: She is alert and oriented to person, place, and time.   Psychiatric:         Behavior: Behavior normal.                    Signed Electronically by: Alejandra Castillo DNP    "

## 2024-08-28 DIAGNOSIS — F33.42 RECURRENT MAJOR DEPRESSIVE DISORDER, IN FULL REMISSION (H): ICD-10-CM

## 2024-09-18 ENCOUNTER — OFFICE VISIT (OUTPATIENT)
Dept: FAMILY MEDICINE | Facility: CLINIC | Age: 69
End: 2024-09-18
Payer: COMMERCIAL

## 2024-09-18 VITALS
OXYGEN SATURATION: 99 % | BODY MASS INDEX: 39.08 KG/M2 | SYSTOLIC BLOOD PRESSURE: 118 MMHG | WEIGHT: 249 LBS | TEMPERATURE: 97.9 F | DIASTOLIC BLOOD PRESSURE: 74 MMHG | HEIGHT: 67 IN | HEART RATE: 66 BPM | RESPIRATION RATE: 16 BRPM

## 2024-09-18 DIAGNOSIS — R21 RASH: Primary | ICD-10-CM

## 2024-09-18 PROCEDURE — 99214 OFFICE O/P EST MOD 30 MIN: CPT | Performed by: FAMILY MEDICINE

## 2024-09-18 RX ORDER — TRIAMCINOLONE ACETONIDE 1 MG/G
CREAM TOPICAL 2 TIMES DAILY
Qty: 30 G | Refills: 0 | Status: SHIPPED | OUTPATIENT
Start: 2024-09-18

## 2024-09-18 RX ORDER — MUPIROCIN 20 MG/G
OINTMENT TOPICAL 2 TIMES DAILY
Qty: 22 G | Refills: 0 | Status: SHIPPED | OUTPATIENT
Start: 2024-09-18

## 2024-09-18 ASSESSMENT — PATIENT HEALTH QUESTIONNAIRE - PHQ9
SUM OF ALL RESPONSES TO PHQ QUESTIONS 1-9: 0
SUM OF ALL RESPONSES TO PHQ QUESTIONS 1-9: 0
10. IF YOU CHECKED OFF ANY PROBLEMS, HOW DIFFICULT HAVE THESE PROBLEMS MADE IT FOR YOU TO DO YOUR WORK, TAKE CARE OF THINGS AT HOME, OR GET ALONG WITH OTHER PEOPLE: NOT DIFFICULT AT ALL

## 2024-09-18 NOTE — PROGRESS NOTES
"  Assessment & Plan   Problem List Items Addressed This Visit    None  Visit Diagnoses       Rash    -  Primary    Relevant Medications    triamcinolone (KENALOG) 0.1 % external cream    mupirocin (BACTROBAN) 2 % external ointment           Will treat rash as contact dermatitis with potential impetigination based on history of some honey crusting. If no improvement at 5 days, would like to see her back or have her see her PCP/Derm as oral medication may be warranted.      Subjective   Bry is a 68 year old, presenting for the following health issues:  spot on nose        9/18/2024    10:41 AM   Additional Questions   Roomed by Teresa RONQUILLO CMA     History of Present Illness       Reason for visit:  Reddness on nose -- impetigo?  Symptom onset:  3-7 days ago  Symptoms include:  Red nose  Symptom intensity:  Moderate  Symptom progression:  Staying the same  Had these symptoms before:  No   She is taking medications regularly.     A nurse friend of hers told her she has impetigo/MRSA  It started several days ago, no new exposures but wonders if beets are causal?  No history of such a rash before  No other areas for her rash  She has tried some herbal topical treatments without success.      Objective    /74 (BP Location: Right arm, Patient Position: Chair, Cuff Size: Adult Large)   Pulse 66   Temp 97.9  F (36.6  C) (Temporal)   Resp 16   Ht 1.712 m (5' 7.42\")   Wt 112.9 kg (249 lb)   LMP  (LMP Unknown)   SpO2 99%   BMI 38.51 kg/m    Body mass index is 38.51 kg/m .  Physical Exam        Media Information      Document Information    Other: Photograph   Nose   09/18/2024 10:52 AM   Attached To:   Office Visit on 9/18/24 with Adal Tucker DO   Source Information    Adal Tucker DO   Family Practice   Document History              Signed Electronically by: ADAL TUCKER DO    "

## 2024-09-29 SDOH — HEALTH STABILITY: PHYSICAL HEALTH: ON AVERAGE, HOW MANY MINUTES DO YOU ENGAGE IN EXERCISE AT THIS LEVEL?: 30 MIN

## 2024-09-29 SDOH — HEALTH STABILITY: PHYSICAL HEALTH: ON AVERAGE, HOW MANY DAYS PER WEEK DO YOU ENGAGE IN MODERATE TO STRENUOUS EXERCISE (LIKE A BRISK WALK)?: 4 DAYS

## 2024-09-29 ASSESSMENT — SOCIAL DETERMINANTS OF HEALTH (SDOH): HOW OFTEN DO YOU GET TOGETHER WITH FRIENDS OR RELATIVES?: THREE TIMES A WEEK

## 2024-10-03 ENCOUNTER — OFFICE VISIT (OUTPATIENT)
Dept: FAMILY MEDICINE | Facility: CLINIC | Age: 69
End: 2024-10-03
Attending: FAMILY MEDICINE
Payer: COMMERCIAL

## 2024-10-03 DIAGNOSIS — N18.31 STAGE 3A CHRONIC KIDNEY DISEASE (H): ICD-10-CM

## 2024-10-03 DIAGNOSIS — Z13.1 SCREENING FOR DIABETES MELLITUS: ICD-10-CM

## 2024-10-03 DIAGNOSIS — F33.42 RECURRENT MAJOR DEPRESSIVE DISORDER, IN FULL REMISSION (H): ICD-10-CM

## 2024-10-03 DIAGNOSIS — E21.3 HYPERPARATHYROIDISM (H): ICD-10-CM

## 2024-10-03 DIAGNOSIS — L98.9 LESION OF SKIN OF NOSE: ICD-10-CM

## 2024-10-03 DIAGNOSIS — Z00.00 ENCOUNTER FOR MEDICARE ANNUAL WELLNESS EXAM: Primary | ICD-10-CM

## 2024-10-03 DIAGNOSIS — E03.4 HYPOTHYROIDISM DUE TO ACQUIRED ATROPHY OF THYROID: ICD-10-CM

## 2024-10-03 DIAGNOSIS — Z12.31 ENCOUNTER FOR SCREENING MAMMOGRAM FOR BREAST CANCER: ICD-10-CM

## 2024-10-03 DIAGNOSIS — Z13.220 SCREENING FOR HYPERLIPIDEMIA: ICD-10-CM

## 2024-10-03 PROBLEM — E83.52 SERUM CALCIUM ELEVATED: Status: RESOLVED | Noted: 2023-09-26 | Resolved: 2024-10-03

## 2024-10-03 LAB
CHOLEST SERPL-MCNC: 199 MG/DL
FASTING STATUS PATIENT QL REPORTED: NO
FASTING STATUS PATIENT QL REPORTED: NO
GLUCOSE SERPL-MCNC: 92 MG/DL (ref 70–99)
HDLC SERPL-MCNC: 40 MG/DL
LDLC SERPL CALC-MCNC: 141 MG/DL
NONHDLC SERPL-MCNC: 159 MG/DL
PTH-INTACT SERPL-MCNC: 73 PG/ML (ref 15–65)
TRIGL SERPL-MCNC: 88 MG/DL
TSH SERPL DL<=0.005 MIU/L-ACNC: 0.62 UIU/ML (ref 0.3–4.2)
VIT D+METAB SERPL-MCNC: 43 NG/ML (ref 20–50)

## 2024-10-03 PROCEDURE — 80061 LIPID PANEL: CPT | Performed by: FAMILY MEDICINE

## 2024-10-03 PROCEDURE — 82306 VITAMIN D 25 HYDROXY: CPT | Performed by: FAMILY MEDICINE

## 2024-10-03 PROCEDURE — 99213 OFFICE O/P EST LOW 20 MIN: CPT | Mod: 25 | Performed by: FAMILY MEDICINE

## 2024-10-03 PROCEDURE — 36415 COLL VENOUS BLD VENIPUNCTURE: CPT | Performed by: FAMILY MEDICINE

## 2024-10-03 PROCEDURE — 82947 ASSAY GLUCOSE BLOOD QUANT: CPT | Performed by: FAMILY MEDICINE

## 2024-10-03 PROCEDURE — 84443 ASSAY THYROID STIM HORMONE: CPT | Performed by: FAMILY MEDICINE

## 2024-10-03 PROCEDURE — G0439 PPPS, SUBSEQ VISIT: HCPCS | Performed by: FAMILY MEDICINE

## 2024-10-03 PROCEDURE — 83970 ASSAY OF PARATHORMONE: CPT | Performed by: FAMILY MEDICINE

## 2024-10-03 ASSESSMENT — PATIENT HEALTH QUESTIONNAIRE - PHQ9
SUM OF ALL RESPONSES TO PHQ QUESTIONS 1-9: 0
10. IF YOU CHECKED OFF ANY PROBLEMS, HOW DIFFICULT HAVE THESE PROBLEMS MADE IT FOR YOU TO DO YOUR WORK, TAKE CARE OF THINGS AT HOME, OR GET ALONG WITH OTHER PEOPLE: NOT DIFFICULT AT ALL
SUM OF ALL RESPONSES TO PHQ QUESTIONS 1-9: 0

## 2024-10-03 NOTE — PATIENT INSTRUCTIONS
For your joint pain, consider tylenol or glucosamine.    For the skin on your nose, use the creams again for 10-14days.  If the rash does not clear up and stay away, then please see dermatology for further evaluation.    Patient Education   Preventive Care Advice   This is general advice given by our system to help you stay healthy. However, your care team may have specific advice just for you. Please talk to your care team about your preventive care needs.  Nutrition  Eat 5 or more servings of fruits and vegetables each day.  Try wheat bread, brown rice and whole grain pasta (instead of white bread, rice, and pasta).  Get enough calcium and vitamin D. Check the label on foods and aim for 100% of the RDA (recommended daily allowance).  Lifestyle  Exercise at least 150 minutes each week  (30 minutes a day, 5 days a week).  Do muscle strengthening activities 2 days a week. These help control your weight and prevent disease.  No smoking.  Wear sunscreen to prevent skin cancer.  Have a dental exam and cleaning every 6 months.  Yearly exams  See your health care team every year to talk about:  Any changes in your health.  Any medicines your care team has prescribed.  Preventive care, family planning, and ways to prevent chronic diseases.  Shots (vaccines)   HPV shots (up to age 26), if you've never had them before.  Hepatitis B shots (up to age 59), if you've never had them before.  COVID-19 shot: Get this shot when it's due.  Flu shot: Get a flu shot every year.  Tetanus shot: Get a tetanus shot every 10 years.  Pneumococcal, hepatitis A, and RSV shots: Ask your care team if you need these based on your risk.  Shingles shot (for age 50 and up)  General health tests  Diabetes screening:  Starting at age 35, Get screened for diabetes at least every 3 years.  If you are younger than age 35, ask your care team if you should be screened for diabetes.  Cholesterol test: At age 39, start having a cholesterol test every 5  years, or more often if advised.  Bone density scan (DEXA): At age 50, ask your care team if you should have this scan for osteoporosis (brittle bones).  Hepatitis C: Get tested at least once in your life.  STIs (sexually transmitted infections)  Before age 24: Ask your care team if you should be screened for STIs.  After age 24: Get screened for STIs if you're at risk. You are at risk for STIs (including HIV) if:  You are sexually active with more than one person.  You don't use condoms every time.  You or a partner was diagnosed with a sexually transmitted infection.  If you are at risk for HIV, ask about PrEP medicine to prevent HIV.  Get tested for HIV at least once in your life, whether you are at risk for HIV or not.  Cancer screening tests  Cervical cancer screening: If you have a cervix, begin getting regular cervical cancer screening tests starting at age 21.  Breast cancer scan (mammogram): If you've ever had breasts, begin having regular mammograms starting at age 40. This is a scan to check for breast cancer.  Colon cancer screening: It is important to start screening for colon cancer at age 45.  Have a colonoscopy test every 10 years (or more often if you're at risk) Or, ask your provider about stool tests like a FIT test every year or Cologuard test every 3 years.  To learn more about your testing options, visit:   .  For help making a decision, visit:   https://bit.ly/he31806.  Prostate cancer screening test: If you have a prostate, ask your care team if a prostate cancer screening test (PSA) at age 55 is right for you.  Lung cancer screening: If you are a current or former smoker ages 50 to 80, ask your care team if ongoing lung cancer screenings are right for you.  For informational purposes only. Not to replace the advice of your health care provider. Copyright   2023 [employer]. All rights reserved. Clinically reviewed by the [facility]. [employer] [id number] -  REV 01/24.  Bladder Training: Care Instructions  Your Care Instructions     Bladder training is used to treat urge incontinence and stress incontinence. Urge incontinence means that the need to urinate comes on so fast that you can't get to a toilet in time. Stress incontinence means that you leak urine because of pressure on your bladder. For example, it may happen when you laugh, cough, or lift something heavy.  Bladder training can increase how long you can wait before you have to urinate. It can also help your bladder hold more urine. And it can give you better control over the urge to urinate.  It is important to remember that bladder training takes a few weeks to a few months to make a difference. You may not see results right away, but don't give up.  Follow-up care is a key part of your treatment and safety. Be sure to make and go to all appointments, and call your doctor if you are having problems. It's also a good idea to know your test results and keep a list of the medicines you take.  How can you care for yourself at home?  Work with your doctor to come up with a bladder training program that is right for you. You may use one or more of the following methods.  Delayed urination  In the beginning, try to keep from urinating for 5 minutes after you first feel the need to go.  While you wait, take deep, slow breaths to relax. Kegel exercises can also help you delay the need to go to the bathroom.  After some practice, when you can easily wait 5 minutes to urinate, try to wait 10 minutes before you urinate.  Slowly increase the waiting period until you are able to control when you have to urinate.  Scheduled urination  Empty your bladder when you first wake up in the morning.  Schedule times throughout the day when you will urinate.  Start by going to the bathroom every hour, even if you don't need to go.  Slowly increase the time between trips to the bathroom.  When you have found a schedule that works  "well for you, keep doing it.  If you wake up during the night and have to urinate, do it. Apply your schedule to waking hours only.  Kegel exercises  These tighten and strengthen pelvic muscles, which can help you control the flow of urine. (If doing these exercises causes pain, stop doing them and talk with your doctor.) To do Kegel exercises:  Squeeze your muscles as if you were trying not to pass gas. Or squeeze your muscles as if you were stopping the flow of urine. Your belly, legs, and buttocks shouldn't move.  Hold the squeeze for 3 seconds, then relax for 5 to 10 seconds.  Start with 3 seconds, then add 1 second each week until you are able to squeeze for 10 seconds.  Repeat the exercise 10 times a session. Do 3 to 8 sessions a day.  When should you call for help?  Watch closely for changes in your health, and be sure to contact your doctor if:    Your incontinence is getting worse.     You do not get better as expected.   Where can you learn more?  Go to https://www.[employer].net/patiented  Enter V684 in the search box to learn more about \"Bladder Training: Care Instructions.\"  Current as of: November 15, 2023  Content Version: 14.2 2024 [facility].   Care instructions adapted under license by your healthcare professional. If you have questions about a medical condition or this instruction, always ask your healthcare professional. [employer] disclaims any warranty or liability for your use of this information.       "

## 2024-10-03 NOTE — PROGRESS NOTES
"Preventive Care Visit  [facility]  Angeli Messina MD, Family Medicine  Oct 3, 2024      Assessment & Plan     (Z00.00) Encounter for Medicare annual wellness exam  (primary encounter diagnosis)  Comment: stable health  Plan: PRIMARY CARE FOLLOW-UP SCHEDULING        Declined vaccines today  Health Care Maintenance reviewed, measures not yet completed have been discussed.      (E03.4) Hypothyroidism due to acquired atrophy of thyroid  Comment:   Plan: TSH WITH FREE T4 REFLEX        adjust therapy/treatment based on results      (F33.42) Recurrent major depressive disorder, in full remission (H)  Comment:   Plan: continue to monitor    (E21.3) Hyperparathyroidism (H)  Comment:   Plan: Parathyroid Hormone Intact, Vitamin D         Deficiency        adjust therapy/treatment based on results      (N18.31) Stage 3a chronic kidney disease (H)  Comment: sees nephrology right now  Plan: can assume monitoring when nephrology determines she is stable    (L98.9) Lesion of skin of nose  Comment:   Plan: Adult Dermatology  Referral        Advised restarting previously prescribed treatment.  If not resolve in two weeks, then schedule with derm    (Z12.31) Encounter for screening mammogram for breast cancer  Comment:   Plan: MA Screen Bilateral w/Ferny            (Z13.220) Screening for hyperlipidemia  Comment:   Plan: Lipid panel reflex to direct LDL Non-fasting        adjust therapy/treatment based on results      (Z13.1) Screening for diabetes mellitus  Comment:   Plan: Glucose        adjust therapy/treatment based on results              BMI  Estimated body mass index is 38.51 kg/m  as calculated from the following:    Height as of 9/18/24: 1.712 m (5' 7.42\").    Weight as of 9/18/24: 112.9 kg (249 lb).   Weight management plan: Discussed healthy diet and exercise guidelines    Counseling  Appropriate preventive services were addressed with this patient via screening, questionnaire, or " discussion as appropriate for fall prevention, nutrition, physical activity, Tobacco-use cessation, social engagement, weight loss and cognition.  Checklist reviewing preventive services available has been given to the patient.  Reviewed patient's diet, addressing concerns and/or questions.   Information on urinary incontinence and treatment options given to patient.       FUTURE APPOINTMENTS:       - Follow-up for annual visit or as needed    Subjective   Bry is a 68 year old, presenting for the following:  Physical        10/3/2024     1:18 PM   Additional Questions   Roomed by Kel CARRERA MA        Health Care Directive  Patient has a Health Care Directive on file  Advance care planning document is on file and is current.    HPI    Nose skin problem ongoing for 2 weeks, was prescribed mupirocin 2 % ointment and triamcinolone 0.1 % cream.  Has been treating for about two weeks.  Thought it was gone so stopped treatment and then recurred.  Has been seen [facility] in the past     Blood pressure has been good  120/80 or less when she checks    Colonoscopy scheduled for December.    Has been losing weight  Reducing Mountain Dew    Just had back surgery  Went well.  Is ambulating normally    Cataract surgery in November    Had a couple of instances of vertigo.  Sometimes when she goes from laying to sitting/standing feels the beginning of it.  Epley maneuver fixes it    PIP joints of hands  Has been using voltaren gel    Injection into knee has helped.    Fasting since 8 am (not quite 6.5 hours)            9/29/2024   General Health   How would you rate your overall physical health? Good   Feel stress (tense, anxious, or unable to sleep) Not at all            9/29/2024   Nutrition   Diet: Regular (no restrictions)            9/29/2024   Exercise   Days per week of moderate/strenous exercise 4 days   Average minutes spent exercising at this level 30 min            9/29/2024   Social Factors   Frequency of gathering with  friends or relatives Three times a week   Worry food won't last until get money to buy more No   Food not last or not have enough money for food? No   Do you have housing? (Housing is defined as stable permanent housing and does not include staying ouside in a car, in a tent, in an abandoned building, in an overnight shelter, or couch-surfing.) Yes   Are you worried about losing your housing? No   Lack of transportation? No   Unable to get utilities (heat,electricity)? No            9/29/2024   Fall Risk   Fallen 2 or more times in the past year? No    No   Trouble with walking or balance? No    No       Multiple values from one day are sorted in reverse-chronological order          9/29/2024   Activities of Daily Living- Home Safety   Needs help with the following daily activites None of the above   Safety concerns in the home None of the above            9/29/2024   Dental   Dentist two times every year? Yes            9/29/2024   Hearing Screening   Hearing concerns? None of the above            9/29/2024   Driving Risk Screening   Patient/family members have concerns about driving No            9/29/2024   General Alertness/Fatigue Screening   Have you been more tired than usual lately? No            9/29/2024   Urinary Incontinence Screening   Bothered by leaking urine in past 6 months Yes            9/29/2024   TB Screening   Were you born outside of the [place]? No          Today's PHQ-9 Score:       10/3/2024    12:12 AM   PHQ-9 SCORE   PHQ-9 Total Score MyChart 0   PHQ-9 Total Score 0         9/29/2024   Substance Use   Alcohol more than 3/day or more than 7/wk No   Do you have a current opioid prescription? No   How severe/bad is pain from 1 to 10? 2/10   Do you use any other substances recreationally? No    (!) DECLINE       Multiple values from one day are sorted in reverse-chronological order     Social History     Tobacco Use    Smoking status: Former     Current packs/day: 0.00     Average packs/day: 1  pack/day for 15.8 years (15.8 ttl pk-yrs)     Types: Cigarettes     Start date: 1972     Quit date: 1987     Years since quittin.9     Passive exposure: Past    Smokeless tobacco: Never   Vaping Use    Vaping status: Never Used   Substance Use Topics    Alcohol use: Yes     Comment: a beer here and there - never more than 2 -  2 times/week    Drug use: Never           2024   LAST FHS-7 RESULTS   1st degree relative breast or ovarian cancer No   Any relative bilateral breast cancer No   Any male have breast cancer No   Any ONE woman have BOTH breast AND ovarian cancer No   Any woman with breast cancer before 50yrs No   2 or more relatives with breast AND/OR ovarian cancer No   2 or more relatives with breast AND/OR bowel cancer No           Mammogram Screening - Mammogram every 1-2 years updated in Health Maintenance based on mutual decision making      History of abnormal Pap smear: YES - reflected in Problem List and Health Maintenance accordingly        2017    12:00 AM   PAP / HPV   PAP-ABSTRACT See Scanned Document           This result is from an external source.     ASCVD Risk   The 10-year ASCVD risk score (Yolande DK, et al., 2019) is: 9.3%    Values used to calculate the score:      Age: 68 years      Sex: Female      Is Non- : No      Diabetic: No      Tobacco smoker: No      Systolic Blood Pressure: 118 mmHg      Is BP treated: No      HDL Cholesterol: 40 mg/dL      Total Cholesterol: 252 mg/dL            Reviewed and updated as needed this visit by Provider                      Current providers sharing in care for this patient include:  Patient Care Team:  Angeli Nolan MD as PCP - General (Family Practice)  Beatrice Reyes APRN CNP as Assigned Cancer Care Provider  Angeli Nolan MD as Assigned PCP  Felice Madera MD as MD (Endocrinology, Diabetes, and Metabolism)  Marcos Thomas MD as MD (Nephrology)  Martha  "MD Marcos as Assigned Nephrology Provider  Felice Madera MD as Assigned Endocrinology Provider    The following health maintenance items are reviewed in Epic and correct as of today:  Health Maintenance   Topic Date Due    COLORECTAL CANCER SCREENING  07/26/2024    LIPID  08/25/2024    MICROALBUMIN  08/25/2024    INFLUENZA VACCINE (1) 09/01/2024    COVID-19 Vaccine (2 - 2024-25 season) 09/01/2024    MEDICARE ANNUAL WELLNESS VISIT  09/26/2024    TSH W/FREE T4 REFLEX  10/26/2024    PHQ-9  04/03/2025    ANNUAL REVIEW OF HM ORDERS  04/05/2025    BMP  06/10/2025    HEMOGLOBIN  07/17/2025    FALL RISK ASSESSMENT  10/03/2025    MAMMO SCREENING  01/22/2026    GLUCOSE  06/10/2027    ADVANCE CARE PLANNING  09/26/2028    RSV VACCINE (1 - 1-dose 75+ series) 10/23/2028    DTAP/TDAP/TD IMMUNIZATION (4 - Td or Tdap) 10/31/2029    DEXA  12/23/2037    HEPATITIS C SCREENING  Completed    DEPRESSION ACTION PLAN  Completed    Pneumococcal Vaccine: 65+ Years  Completed    URINALYSIS  Completed    HPV IMMUNIZATION  Aged Out    MENINGITIS IMMUNIZATION  Aged Out    RSV MONOCLONAL ANTIBODY  Aged Out    ZOSTER IMMUNIZATION  Discontinued         Review of Systems  Constitutional, HEENT, cardiovascular, pulmonary, GI, , musculoskeletal, neuro, skin, endocrine and psych systems are negative, except as otherwise noted.     Objective    Exam  LMP  (LMP Unknown)   Breastfeeding No    Estimated body mass index is 38.51 kg/m  as calculated from the following:    Height as of 9/18/24: 1.712 m (5' 7.42\").    Weight as of 9/18/24: 112.9 kg (249 lb).    Physical Exam  GENERAL: alert and no distress  EYES: Eyes grossly normal to inspection, PERRL and conjunctivae and sclerae normal  HENT: normal cephalic/atraumatic and ear canals and TM's normal  NECK: no adenopathy, no asymmetry, masses, or scars  RESP: lungs clear to auscultation - no rales, rhonchi or wheezes  CV: regular rate and rhythm, normal S1 S2, no S3 or S4, no murmur, click or rub, no " peripheral edema  MS: no gross musculoskeletal defects noted, no edema  SKIN: see pictures from previous visit - still small area of redness on tip of nose  NEURO: Normal strength and tone, mentation intact and speech normal  PSYCH: mentation appears normal, affect normal/bright         10/3/2024   Mini Cog   Clock Draw Score 2 Normal   3 Item Recall 3 objects recalled   Mini Cog Total Score 5            Vision Screen         Signed Electronically by: Angeli Messina MD

## 2024-10-04 ENCOUNTER — TELEPHONE (OUTPATIENT)
Dept: FAMILY MEDICINE | Facility: CLINIC | Age: 69
End: 2024-10-04
Payer: COMMERCIAL

## 2024-10-04 NOTE — TELEPHONE ENCOUNTER
Forms/Letter Request    Type of form/letter: OTHER: History & Physical Form       Do we have the form/letter: Yes: Placed in [employer] Mailbox    Who is the form from? [facility]     Where did/will the form come from? Patient or family brought in       When is form/letter needed by: asap    How would you like the form/letter returned: Fax : [phone]    Patient Notified form requests are processed in 5-7 business days:Yes    Could we send this information to you in [employer] or would you prefer to receive a phone call?:   No preference   Okay to leave a detailed message?: Yes at Cell number on file:    Telephone Information:   Mobile [phone]

## 2024-10-08 NOTE — TELEPHONE ENCOUNTER
This form is a pre-op form for cataract surgery.    I did not complete a pre-op.  If her eye surgeon will accept it, can send last Preventive Visit note.  Otherwise will need a pre-op visit - can add on to my schedule

## 2024-10-09 ENCOUNTER — PATIENT OUTREACH (OUTPATIENT)
Dept: CARE COORDINATION | Facility: CLINIC | Age: 69
End: 2024-10-09
Payer: COMMERCIAL

## 2024-10-09 NOTE — TELEPHONE ENCOUNTER
Scheduled patient for a pre op physical with Dr Nolan on 10/22/24.    Abby Ortiz [facility]

## 2024-10-18 ENCOUNTER — TRANSFERRED RECORDS (OUTPATIENT)
Dept: HEALTH INFORMATION MANAGEMENT | Facility: CLINIC | Age: 69
End: 2024-10-18
Payer: COMMERCIAL

## 2024-10-22 ENCOUNTER — OFFICE VISIT (OUTPATIENT)
Dept: FAMILY MEDICINE | Facility: CLINIC | Age: 69
End: 2024-10-22
Payer: COMMERCIAL

## 2024-10-22 VITALS
DIASTOLIC BLOOD PRESSURE: 74 MMHG | HEART RATE: 61 BPM | SYSTOLIC BLOOD PRESSURE: 115 MMHG | RESPIRATION RATE: 16 BRPM | BODY MASS INDEX: 39.24 KG/M2 | OXYGEN SATURATION: 99 % | TEMPERATURE: 98.2 F | WEIGHT: 250 LBS | HEIGHT: 67 IN

## 2024-10-22 DIAGNOSIS — Z01.818 PREOP GENERAL PHYSICAL EXAM: Primary | ICD-10-CM

## 2024-10-22 DIAGNOSIS — H25.9 AGE-RELATED CATARACT OF BOTH EYES, UNSPECIFIED AGE-RELATED CATARACT TYPE: ICD-10-CM

## 2024-10-22 PROCEDURE — 99214 OFFICE O/P EST MOD 30 MIN: CPT | Performed by: FAMILY MEDICINE

## 2024-10-22 ASSESSMENT — PAIN SCALES - GENERAL: PAINLEVEL: NO PAIN (0)

## 2024-10-22 NOTE — PROGRESS NOTES
Preoperative Evaluation  [address] [address]  Phone: [phone]  Primary Provider: Angeli Messina MD  Pre-op Performing Provider: Angeli Messina MD  Oct 22, 2024             10/21/2024   Surgical Information   What procedure is being done? Cataract   Facility or Hospital where procedure/surgery will be performed: [facility]   Who is doing the procedure / surgery? Dr. Nathan Berrios   Date of surgery / procedure: Right eye 11/6    Left eye   11/13   Time of surgery / procedure: AM   Where do you plan to recover after surgery? at home with family        Fax number for surgical facility: [phone]    Assessment & Plan     The proposed surgical procedure is considered LOW risk.    (Z01.818) Preop general physical exam  (primary encounter diagnosis)  Comment:   Plan: medically cleared for surgery    (H25.9) Age-related cataract of both eyes, unspecified age-related cataract type  Comment:   Plan: having surgery to repair            - No identified additional risk factors other than previously addressed    Antiplatelet or Anticoagulation Medication Instructions   - Patient is on no antiplatelet or anticoagulation medications.    Additional Medication Instructions  Take all scheduled medications on the day of surgery    Recommendation  Approval given to proceed with proposed procedure, without further diagnostic evaluation.    Subjective   Bry is a 68 year old, presenting for the following:  Pre-Op Exam          10/22/2024     4:02 PM   Additional Questions   Roomed by Cathi   Accompanied by none         10/22/2024     4:02 PM   Patient Reported Additional Medications   Patient reports taking the following new medications none     HPI related to upcoming procedure: has cataracts which affect vision        10/21/2024   Pre-Op Questionnaire   Have you ever had a heart attack or stroke? No   Have you ever had surgery on  your heart or blood vessels, such as a stent placement, a coronary artery bypass, or surgery on an artery in your head, neck, heart, or legs? No   Do you have chest pain with activity? No   Do you have a history of heart failure? No   Do you currently have a cold, bronchitis or symptoms of other infection? No   Do you have a cough, shortness of breath, or wheezing? No   Do you or anyone in your family have previous history of blood clots? No   Do you or does anyone in your family have a serious bleeding problem such as prolonged bleeding following surgeries or cuts? No   Have you ever had problems with anemia or been told to take iron pills? No   Have you had any abnormal blood loss such as black, tarry or bloody stools, or abnormal vaginal bleeding? No   Have you ever had a blood transfusion? No   Are you willing to have a blood transfusion if it is medically needed before, during, or after your surgery? Yes   Have you or any of your relatives ever had problems with anesthesia? No   Do you have sleep apnea, excessive snoring or daytime drowsiness? No   Do you have any artifical heart valves or other implanted medical devices like a pacemaker, defibrillator, or continuous glucose monitor? No   Do you have artificial joints? No   Are you allergic to latex? No        Health Care Directive  Patient has a Health Care Directive on file      Preoperative Review of    reviewed - controlled substances prescribed by other outside provider(s).          Patient Active Problem List    Diagnosis Date Noted    Spinal stenosis of lumbar region with neurogenic claudication 08/13/2024     Priority: Medium    Hyperparathyroidism (H) 11/22/2023     Priority: Medium     Saw endocrine 11/2023  Monitor calcium, PTH, vitd, creat q 6 months      TMJ (temporomandibular joint syndrome) 09/26/2023     Priority: Medium    Right hip pain 06/22/2023     Priority: Medium    History of colonic polyps 07/29/2021     Priority: Medium     5  tubular adenomas < 10 mm  2020 guidelines suggest repeat in 3 years.      Stage 3a chronic kidney disease (H) 07/17/2020     Priority: Medium    Overweight (H) 11/05/2019     Priority: Medium    Endometrioid adenocarcinoma of uterus (H) 09/26/2019     Priority: Medium     Stage 1B FIGO Grade 2, DOI 17/21mm (81%), LVSI+, cytology negative, tumor 3.4cm  MSI-high, MLH-1 promoter hypermethylation      Hypothyroidism due to acquired atrophy of thyroid 07/19/2019     Priority: Medium    Recurrent major depressive disorder, in full remission (H) 07/19/2019     Priority: Medium      Past Medical History:   Diagnosis Date    Arthritis 2010    Cancer (H) 08/2019    Uterus removed    Depression     Depressive disorder 1999    taking prosac    Hypothyroidism     Overweight      Past Surgical History:   Procedure Laterality Date    ABDOMEN SURGERY  Oct 2019    Robotic Hysterectomy    BIOPSY  early 2000's    left breast - non event    COLONOSCOPY  within last 6 years ?    OK    COLONOSCOPY N/A 7/26/2021    Procedure: COLONOSCOPY, WITH POLYPECTOMY;  Surgeon: Ryan Butterfield MD;  Location: [facility] OR    DAVINCI HYSTERECTOMY TOTAL, BILATERAL SALPINGO-OOPHORECTOMY, NODE DISSECTION, COMBINED N/A 10/4/2019    Procedure: Robot-assisted total laparoscopic hysterectomy, Bilateral salpingectomy and Right Oophorectomy, Lysis of adhesion, Cervical Indocyanine Green injection, Bilateral sentinel lymph node dissection, Repair of vaginal laceration.;  Surgeon: Perez Reddy MD;  Location: [facility] OR    DILATION AND CURETTAGE N/A 9/12/2019    Procedure: DILATION AND CURETTAGE, UTERUS;  Surgeon: Navdeep Barajas MD;  Location: [facility] OR    OPERATIVE HYSTEROSCOPY WITH MORCELLATOR N/A 9/12/2019    Procedure: HYSTEROSCOPY WITH MORCELLATOR (MYOSURE);  Surgeon: Navdeep Barajas MD;  Location: [facility] OR    SALPINGO OOPHORECTOMY,R/L/SHANDA Left 1980s    Left ovarian with endometriois    US BREAST BIOPSY LT Left      Current Outpatient Medications  "  Medication Sig Dispense Refill    Apple Cider Vinegar 600 MG CAPS Take 1 capsule by mouth daily.      CALCIUM PO Take 20 mg by mouth daily.      chlorhexidine (PERIDEX) 0.12 % solution       cholecalciferol (VITAMIN D3) 5000 units TABS tablet Take by mouth daily.      clobetasol (TEMOVATE) 0.05 % external ointment Apply sparingly to affected area once or twice a week as needed 45 g 0    Cyanocobalamin 1500 MCG TBDP Take 1 tablet by mouth daily.      FLUoxetine (PROZAC) 20 MG capsule TAKE 1 CAPSULE BY MOUTH EVERY 48 HOURS AND 2 CAPSULES BY MOUTH  EVERY 48 HOURS 135 capsule 0    levothyroxine (SYNTHROID/LEVOTHROID) 100 MCG tablet TAKE 1 TABLET BY MOUTH IN THE  MORNING 100 tablet 0    Lutein 20 MG CAPS Take by mouth.      triamcinolone (KENALOG) 0.1 % external cream Apply topically 2 times daily. 30 g 0    mupirocin (BACTROBAN) 2 % external ointment Apply topically 2 times daily. To nose (Patient not taking: Reported on 10/22/2024) 22 g 0    Omega 3-6-9 Fatty Acids (OMEGA-3-6-9 PO) Take 1 capsule by mouth daily. (Patient not taking: Reported on 10/3/2024)         No Known Allergies     Social History     Tobacco Use    Smoking status: Former     Current packs/day: 0.00     Average packs/day: 1 pack/day for 15.8 years (15.8 ttl pk-yrs)     Types: Cigarettes     Start date: 1972     Quit date: 1987     Years since quittin.9     Passive exposure: Past    Smokeless tobacco: Never   Substance Use Topics    Alcohol use: Yes     Comment: a beer here and there - never more than 2 -  2 times/week       History   Drug Use Unknown             Review of Systems  Constitutional, HEENT, cardiovascular, pulmonary, gi and gu systems are negative, except as otherwise noted.    Objective    /74 (BP Location: Right arm, Patient Position: Sitting, Cuff Size: Adult Large)   Pulse 61   Temp 98.2  F (36.8  C) (Oral)   Resp 16   Ht 1.712 m (5' 7.42\")   Wt 113.4 kg (250 lb)   LMP  (LMP Unknown)   SpO2 99%   BMI " "38.67 kg/m     Estimated body mass index is 38.67 kg/m  as calculated from the following:    Height as of this encounter: 1.712 m (5' 7.42\").    Weight as of this encounter: 113.4 kg (250 lb).  Physical Exam  GENERAL: alert and no distress  EYES: Eyes grossly normal to inspection, PERRL and conjunctivae and sclerae normal  HENT: ear canals and TM's normal, nose and mouth without ulcers or lesions  NECK: no adenopathy, no asymmetry, masses, or scars  RESP: lungs clear to auscultation - no rales, rhonchi or wheezes  CV: regular rate and rhythm, normal S1 S2, no S3 or S4, no murmur, click or rub, no peripheral edema  MS: no gross musculoskeletal defects noted, no edema  PSYCH: mentation appears normal, affect normal/bright    Recent Labs   Lab Test 07/17/24  0953 06/10/24  0901 05/01/24  0938 12/11/23  0927 12/11/23  0927   HGB 12.9 13.1 13.3   < >  --     212  --   --   --    INR 0.95  --   --   --   --    NA  --  141  --   --  140   POTASSIUM  --  4.6  --   --  5.0   CR  --  1.16* 1.09*  --  1.20*    < > = values in this interval not displayed.        Diagnostics  No labs were ordered during this visit.   No EKG required for low risk surgery (cataract, skin procedure, breast biopsy, etc).    Revised Cardiac Risk Index (RCRI)  The patient has the following serious cardiovascular risks for perioperative complications:   - No serious cardiac risks = 0 points     RCRI Interpretation: 0 points: Class I (very low risk - 0.4% complication rate)         Signed Electronically by: Angeli Messina MD  A copy of this evaluation report is provided to the requesting physician.         "

## 2024-10-22 NOTE — PATIENT INSTRUCTIONS
How to Take Your Medication Before Surgery  Preoperative Medication Instructions   Antiplatelet or Anticoagulation Medication Instructions   - Patient is on no antiplatelet or anticoagulation medications.    Additional Medication Instructions  Take all scheduled medications on the day of surgery       Patient Education   Preparing for Your Surgery  For Adults  Getting started  In most cases, a nurse will call to review your health history and instructions. They will give you an arrival time based on your scheduled surgery time. Please be ready to share:  Your doctor's clinic name and phone number  Your medical, surgical, and anesthesia history  A list of allergies and sensitivities  A list of medicines, including herbal treatments and over-the-counter drugs  Whether the patient has a legal guardian (ask how to send us the papers in advance)  Note: You may not receive a call if you were seen at our PAC (Preoperative Assessment Center).  Please tell us if you're pregnant--or if there's any chance you might be pregnant. Some surgeries may injure a fetus (unborn baby), so they require a pregnancy test. Surgeries that are safe for a fetus don't always need a test, and you can choose whether to have one.   Preparing for surgery  Within 10 to 30 days of surgery: Have a pre-op exam (sometimes called an H&P, or History and Physical). This can be done at a clinic or pre-operative center.  If you're having a , you may not need this exam. Talk to your care team.  At your pre-op exam, talk to your care team about all medicines you take. (This includes CBD oil and any drugs, such as THC, marijuana, and other forms of cannabis.) If you need to stop any medicine before surgery, ask when to start taking it again.  This is for your safety. Many medicines and drugs can make you bleed too much during surgery. Some change how well surgery (anesthesia) drugs work.  Call your insurance company to let them know you're having  surgery. (If you don't have insurance, call [phone].)  Call your clinic if there's any change in your health. This includes a scrape or scratch near the surgery site, or any signs of a cold (sore throat, runny nose, cough, rash, fever).  Eating and drinking guidelines  For your safety: Unless your surgeon tells you otherwise, follow the guidelines below.  Eat and drink as normal until 8 hours before you arrive for surgery. After that, no food or milk. You can spit out gum when you arrive.  Drink clear liquids until 2 hours before you arrive. These are liquids you can see through, like water, Gatorade, and Propel Water. They also include plain black coffee and tea (no cream or milk).  No alcohol for 24 hours before you arrive. The night before surgery, stop any drinks that contain THC.  If your care team tells you to take medicine on the morning of surgery, it's okay to take it with a sip of water. No other medicines or drugs are allowed (including CBD oil)--follow your care team's instructions.  If you have questions the day of surgery, call your hospital or surgery center.   Preventing infection  Shower or bathe the night before and the morning of surgery. Follow the instructions your clinic gave you. (If no instructions, use regular soap.)  Don't shave or clip hair near your surgery site. We'll remove the hair if needed.  Don't smoke or vape the morning of surgery. No chewing tobacco for 6 hours before you arrive. A nicotine patch is okay. You may spit out nicotine gum when you arrive.  For some surgeries, the surgeon will tell you to fully quit smoking and nicotine.  We will make every effort to keep you safe from infection. We will:  Clean our hands often with soap and water (or an alcohol-based hand rub).  Clean the skin at your surgery site with a special soap that kills germs.  Give you a special gown to keep you warm. (Cold raises the risk of infection.)  Wear hair covers, masks, gowns, and gloves  during surgery.  Give antibiotic medicine, if prescribed. Not all surgeries need this medicine.  What to bring on the day of surgery  Photo ID and insurance card  Copy of your health care directive, if you have one  Glasses and hearing aids (bring cases)  You can't wear contacts during surgery  Inhaler and eye drops, if you use them (tell us about these when you arrive)  CPAP machine or breathing device, if you use them  A few personal items, if spending the night  If you have . . .  A pacemaker, ICD (cardiac defibrillator), or other implant: Bring the ID card.  An implanted stimulator: Bring the remote control.  A legal guardian: Bring a copy of the certified (court-stamped) guardianship papers.  Please remove any jewelry, including body piercings. Leave jewelry and other valuables at home.  If you're going home the day of surgery  You must have a responsible adult drive you home. They should stay with you overnight as well.  If you don't have someone to stay with you, and you aren't safe to go home alone, we may keep you overnight. Insurance often won't pay for this.  After surgery  If it's hard to control your pain or you need more pain medicine, please call your surgeon's office.  Questions?   If you have any questions for your care team, list them here:   ____________________________________________________________________________________________________________________________________________________________________________________________________________________________________________________________  For informational purposes only. Not to replace the advice of your health care provider. Copyright   2003, 2019 [facility]. All rights reserved. Clinically reviewed by Luis Felipe Coats MD. Kiosked [id number] - REV 08/24.

## 2024-10-29 DIAGNOSIS — E03.4 HYPOTHYROIDISM DUE TO ACQUIRED ATROPHY OF THYROID: ICD-10-CM

## 2024-10-29 RX ORDER — LEVOTHYROXINE SODIUM 100 UG/1
100 TABLET ORAL EVERY MORNING
Qty: 100 TABLET | Refills: 2 | Status: SHIPPED | OUTPATIENT
Start: 2024-10-29

## 2024-11-10 NOTE — PROGRESS NOTES
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  She completed brachytherapy 2019   She is here today for a surveillance visit.     Oncology history:  2019 D&C with pathology showing Grade 1 endometrial cancer, loss of MLH1 and PMS2, hypermethylation of MLH1 promotor positive  10/4/2019: Robotic-assisted Total laparoscopic hysterectomy, bilateral salpingo-oophorectomy, sentinel lymph node dissection, vaginal laceration repair: Stage 1B FIGO Grade 2, DOI 17/21mm (81%), LVSI+, cytology negative, tumor 3.4cm; MSI-H (MLH1 promoter melthylation)  2019: Vaginal brachytherapy; 30Gy in 5 fx     23: right hip xray Impression:  1. No acute osseous abnormality.  2. No substantial degenerative change of the right hip. Right SI joint degenerative change.           Today she comes to clinic and denies any concerns for her visit. She denies any vaginal bleeding, no changes in her bowel or bladder habits, no nausea/emesis, no lower extremity edema, and no difficulties eating or sleeping. She denies any abdominal discomfort/bloating, no fevers or chills, and no chest pain or shortness of breath. She used her dilator about 4 times since her last visit, most recently last week. She has had several deaths in her family over the past 6 months; including her sister. She has Clobetasol at home to use when needed. She continues to go diving and recently logged her 1000 dive.         Annual exam with PCP:10/24  Mammogram: scheduled in   Colonoscopy:  DEXA:       Review of Systems  See HPI      Past Medical History:    Past Medical History:   Diagnosis Date    Arthritis 2010    Cancer (H) 2019    Uterus removed    Depression     Depressive disorder     taking prosac    Hypothyroidism     Overweight           Past Surgical History:    Past Surgical History:   Procedure Laterality Date    ABDOMEN SURGERY  Oct 2019    Robotic Hysterectomy    BIOPSY  early 2000's    left breast - non event    COLONOSCOPY  within last 6 years ?    OK    COLONOSCOPY N/A 7/26/2021    Procedure: COLONOSCOPY, WITH POLYPECTOMY;  Surgeon: Ryan Butterfield MD;  Location: [facility] OR    DAVINCI HYSTERECTOMY TOTAL, BILATERAL SALPINGO-OOPHORECTOMY, NODE DISSECTION, COMBINED N/A 10/4/2019    Procedure: Robot-assisted total laparoscopic hysterectomy, Bilateral salpingectomy and Right Oophorectomy, Lysis of adhesion, Cervical Indocyanine Green injection, Bilateral sentinel lymph node dissection, Repair of vaginal laceration.;  Surgeon: Perez Reddy MD;  Location: [facility]    DILATION AND CURETTAGE N/A 9/12/2019    Procedure: DILATION AND CURETTAGE, UTERUS;  Surgeon: Navdeep Barajas MD;  Location: [facility]    OPERATIVE HYSTEROSCOPY WITH MORCELLATOR N/A 9/12/2019    Procedure: HYSTEROSCOPY WITH MORCELLATOR (MYOSURE);  Surgeon: Navdeep Barajas MD;  Location: [facility] OR    SALPINGO OOPHORECTOMY,R/L/SHANDA Left 1980s    Left ovarian with endometriois    US BREAST BIOPSY LT Left        Current Medications:     Current Outpatient Medications   Medication Sig Dispense Refill    Apple Cider Vinegar 600 MG CAPS Take 1 capsule by mouth daily.      CALCIUM PO Take 20 mg by mouth daily.      chlorhexidine (PERIDEX) 0.12 % solution       cholecalciferol (VITAMIN D3) 5000 units TABS tablet Take by mouth daily.      clobetasol (TEMOVATE) 0.05 % external ointment Apply sparingly to affected area once or twice a week as needed 45 g 0    Cyanocobalamin 1500 MCG TBDP Take 1 tablet by mouth daily.      FLUoxetine (PROZAC) 20 MG capsule TAKE 1 CAPSULE BY MOUTH EVERY 48 HOURS AND 2 CAPSULES BY MOUTH  EVERY 48 HOURS 135 capsule 0    levothyroxine (SYNTHROID/LEVOTHROID) 100 MCG tablet TAKE 1 TABLET BY MOUTH IN THE  MORNING 100 tablet 2    Lutein 20 MG CAPS Take by  "mouth.      Omega 3-6-9 Fatty Acids (OMEGA-3-6-9 PO) Take 1 capsule by mouth daily.      mupirocin (BACTROBAN) 2 % external ointment Apply topically 2 times daily. To nose (Patient not taking: Reported on 2024) 22 g 0    triamcinolone (KENALOG) 0.1 % external cream Apply topically 2 times daily. (Patient not taking: Reported on 2024) 30 g 0         Allergies:      No Known Allergies     Social History:     Social History     Tobacco Use    Smoking status: Former     Current packs/day: 0.00     Average packs/day: 1 pack/day for 15.8 years (15.8 ttl pk-yrs)     Types: Cigarettes     Start date: 1972     Quit date: 1987     Years since quittin.0     Passive exposure: Past    Smokeless tobacco: Never   Substance Use Topics    Alcohol use: Yes     Comment: a beer here and there - never more than 2 -  2 times/week       History   Drug Use Unknown         Family History:     The patient's family history is notable for     Family History   Problem Relation Age of Onset    Acute myelogenous leukemia Mother     Coronary Artery Disease Mother         bypass surgery     Osteoporosis Mother     Obesity Mother     Parkinsonism Father     Prostate Cancer Father         Diagnosed late in life in his 80's    Obesity Sister     Heart Disease Sister     Coronary Artery Disease Sister         heart attacK in 2019    Uterine Cancer Maternal Grandmother 40        Uterine versus cervical    No Known Problems Sister     Leukemia Brother     Leukemia Nephew     Coronary Artery Disease Brother         Carotid Artery Surgery    Obesity Brother     Substance Abuse Brother         alcohol    Other Cancer Sister         Robotic Hyster    Other Cancer Brother         Ploetz         Physical Exam:     /73   Pulse 88   Temp 97.4  F (36.3  C) (Tympanic)   Resp 18   Ht 1.715 m (5' 7.5\")   Wt 112.9 kg (249 lb)   LMP  (LMP Unknown)   SpO2 98%   BMI 38.42 kg/m    Body mass index is 38.42 " kg/m .    General Appearance: healthy and alert, no distress     HEENT: no thyromegaly, no palpable nodules or masses        Cardiovascular: regular rate and rhythm, no gallops, rubs or murmurs     Respiratory: lungs clear, no rales, rhonchi or wheezes, normal diaphragmatic excursion    Musculoskeletal: extremities non tender and without edema    Skin: no lesions or rashes     Neurological: normal gait, no gross defects     Psychiatric: appropriate mood and affect                               Hematological: normal cervical, supraclavicular and inguinal lymph nodes     Gastrointestinal:       abdomen soft, non-tender, non-distended, no organomegaly or masses    Genitourinary: External genitalia and urethral meatus appears normal. Lichen sclerosis at anterior vulva stable.  Vagina is smooth without nodularity or masses; slight radiation changes at vaginal cuff.  Cervix surgically absent.  Bimanual exam reveal no masses, nodularity or fullness.  Recto-vaginal exam confirms these findings.      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  She completed brachytherapy 12/2019   She is here today for a surveillance visit.     20 minutes spent on the date of the encounter doing chart review, history and exam, documentation and further activities as noted above      Plan:     1.)      She is now at her 5 year post treatment point and can extend her surveillance to annually; this can be done here or with her local provider if comfortable. Reviewed recommendations from [facility] not to perform surveillance pap smears in women diagnosed with endometrial cancer as this does not improve detection of local recurrence. Reviewed signs and symptoms for when she should contact the clinic or seek additional care. Patient to contact the clinic with any questions or concerns in the interim.  Answered all of her questions to the best of my ability.    -she  does not need to use her dilator if she does not wish to.  -lichen sclerosis: can apply Clobetasol to anterior vulva as needed; continue to monitor.       The longitudinal plan of care for the diagnosis(es)/condition(s) as documented were addressed during this visit. Due to the added complexity in care, I will continue to support Bry in the subsequent management and with ongoing continuity of care.     2.) Genetic risk factors were assessed and the patient had a loss of MLH1 and PMS2; hypermethylation of MLH1 promotor positive. MLH1 promoter methylation is typically associated with sporadic microsatellite unstable tumors of the colon/rectum or endometrium.     3.) Labs and/or tests ordered include:  none.     4.) Health maintenance issues addressed today include annual health maintenance and non-gynecologic issues with PCP.    ROSEMARY Lance, WHNP-BC, ANP-BC, AOCNP  Women's Health Nurse Practitioner  Adult Nurse Practitioner  Division of Gynecologic Oncology        CC  Patient Care Team:  Angeli Nolan MD as PCP - General (Family Practice)  Beatrice Reyes APRN CNP as Assigned Cancer Care Provider  Angeli Nolan MD as Assigned PCP  Felice Madera MD as MD (Endocrinology, Diabetes, and Metabolism)  Marcos Thomas MD as MD (Nephrology)  Marcos Thomas MD as Assigned Nephrology Provider  Felice Madera MD as Assigned Endocrinology Provider  SELF, REFERRED

## 2024-11-11 ENCOUNTER — ONCOLOGY VISIT (OUTPATIENT)
Dept: ONCOLOGY | Facility: CLINIC | Age: 69
End: 2024-11-11
Attending: NURSE PRACTITIONER
Payer: COMMERCIAL

## 2024-11-11 VITALS
WEIGHT: 249 LBS | HEART RATE: 88 BPM | DIASTOLIC BLOOD PRESSURE: 73 MMHG | BODY MASS INDEX: 37.74 KG/M2 | SYSTOLIC BLOOD PRESSURE: 125 MMHG | RESPIRATION RATE: 18 BRPM | OXYGEN SATURATION: 98 % | HEIGHT: 68 IN | TEMPERATURE: 97.4 F

## 2024-11-11 DIAGNOSIS — C55 ENDOMETRIOID ADENOCARCINOMA OF UTERUS (H): Primary | ICD-10-CM

## 2024-11-11 DIAGNOSIS — L90.0 LICHEN SCLEROSUS: ICD-10-CM

## 2024-11-11 PROCEDURE — G0463 HOSPITAL OUTPT CLINIC VISIT: HCPCS | Performed by: NURSE PRACTITIONER

## 2024-11-11 PROCEDURE — 99213 OFFICE O/P EST LOW 20 MIN: CPT | Performed by: NURSE PRACTITIONER

## 2024-11-11 PROCEDURE — G2211 COMPLEX E/M VISIT ADD ON: HCPCS | Performed by: NURSE PRACTITIONER

## 2024-11-11 ASSESSMENT — PAIN SCALES - GENERAL: PAINLEVEL_OUTOF10: NO PAIN (0)

## 2024-11-11 NOTE — NURSING NOTE
"Oncology Rooming Note    November 11, 2024 9:03 AM   Hanh Villalba is a 69 year old female who presents for:    Chief Complaint   Patient presents with    Oncology Clinic Visit     Endometrioid adenocarcinoma of uterus     Initial Vitals: /73   Pulse 88   Temp 97.4  F (36.3  C) (Tympanic)   Resp 18   Ht 1.715 m (5' 7.5\")   Wt 112.9 kg (249 lb)   LMP  (LMP Unknown)   SpO2 98%   BMI 38.42 kg/m   Estimated body mass index is 38.42 kg/m  as calculated from the following:    Height as of this encounter: 1.715 m (5' 7.5\").    Weight as of this encounter: 112.9 kg (249 lb). Body surface area is 2.32 meters squared.  No Pain (0) Comment: Data Unavailable   No LMP recorded (lmp unknown). Patient has had a hysterectomy.  Allergies reviewed: Yes  Medications reviewed: No    Medications: Medication refills not needed today.  Pharmacy name entered into [facility]:    [id number] [place], [place] - [address] [place], [place] - [address] [place] [address]    Frailty Screening:   Is the patient here for a new oncology consult visit in cancer care? 2. No      Clinical concerns: none      Rodolfo Sousa LPN              "

## 2024-11-11 NOTE — LETTER
2024      Hanh Villalba   [address]  [place] [address]      Dear Colleague,    Thank you for referring your patient, Hanh Villalba, to the [facility]. Please see a copy of my visit note below.                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  She completed brachytherapy 2019   She is here today for a surveillance visit.     Oncology history:  2019 D&C with pathology showing Grade 1 endometrial cancer, loss of MLH1 and PMS2, hypermethylation of MLH1 promotor positive  10/4/2019: Robotic-assisted Total laparoscopic hysterectomy, bilateral salpingo-oophorectomy, sentinel lymph node dissection, vaginal laceration repair: Stage 1B FIGO Grade 2, DOI 17/21mm (81%), LVSI+, cytology negative, tumor 3.4cm; MSI-H (MLH1 promoter melthylation)  2019: Vaginal brachytherapy; 30Gy in 5 fx     23: right hip xray Impression:  1. No acute osseous abnormality.  2. No substantial degenerative change of the right hip. Right SI joint degenerative change.           Today she comes to clinic and denies any concerns for her visit. She denies any vaginal bleeding, no changes in her bowel or bladder habits, no nausea/emesis, no lower extremity edema, and no difficulties eating or sleeping. She denies any abdominal discomfort/bloating, no fevers or chills, and no chest pain or shortness of breath. She used her dilator about 4 times since her last visit, most recently last week. She has had several deaths in her family over the past 6 months; including her sister. She has Clobetasol at home to use when needed. She continues to go diving and recently logged her 1000 dive.         Annual exam with PCP:10/24  Mammogram: scheduled in   Colonoscopy:  DEXA:  12/22      Review of Systems  See HPI      Past Medical History:    Past Medical History:   Diagnosis Date     Arthritis 2010     Cancer (H) 08/2019    Uterus removed     Depression      Depressive disorder 1999    taking prosac     Hypothyroidism      Overweight          Past Surgical History:    Past Surgical History:   Procedure Laterality Date     ABDOMEN SURGERY  Oct 2019    Robotic Hysterectomy     BIOPSY  early 2000's    left breast - non event     COLONOSCOPY  within last 6 years ?    OK     COLONOSCOPY N/A 7/26/2021    Procedure: COLONOSCOPY, WITH POLYPECTOMY;  Surgeon: Ryan Butterfield MD;  Location: [facility] OR     DAVINCI HYSTERECTOMY TOTAL, BILATERAL SALPINGO-OOPHORECTOMY, NODE DISSECTION, COMBINED N/A 10/4/2019    Procedure: Robot-assisted total laparoscopic hysterectomy, Bilateral salpingectomy and Right Oophorectomy, Lysis of adhesion, Cervical Indocyanine Green injection, Bilateral sentinel lymph node dissection, Repair of vaginal laceration.;  Surgeon: Perez Reddy MD;  Location: [facility]     DILATION AND CURETTAGE N/A 9/12/2019    Procedure: DILATION AND CURETTAGE, UTERUS;  Surgeon: Navdeep Barajas MD;  Location: [facility] OR     OPERATIVE HYSTEROSCOPY WITH MORCELLATOR N/A 9/12/2019    Procedure: HYSTEROSCOPY WITH MORCELLATOR (MYOSURE);  Surgeon: Navdeep Barajas MD;  Location: [facility] OR     SALPINGO OOPHORECTOMY,R/L/SHANDA Left 1980s    Left ovarian with endometriois     US BREAST BIOPSY LT Left        Current Medications:     Current Outpatient Medications   Medication Sig Dispense Refill     Apple Cider Vinegar 600 MG CAPS Take 1 capsule by mouth daily.       CALCIUM PO Take 20 mg by mouth daily.       chlorhexidine (PERIDEX) 0.12 % solution        cholecalciferol (VITAMIN D3) 5000 units TABS tablet Take by mouth daily.       clobetasol (TEMOVATE) 0.05 % external ointment Apply sparingly to affected area once or twice a week as needed 45 g 0     Cyanocobalamin 1500 MCG TBDP Take 1 tablet by  mouth daily.       FLUoxetine (PROZAC) 20 MG capsule TAKE 1 CAPSULE BY MOUTH EVERY 48 HOURS AND 2 CAPSULES BY MOUTH  EVERY 48 HOURS 135 capsule 0     levothyroxine (SYNTHROID/LEVOTHROID) 100 MCG tablet TAKE 1 TABLET BY MOUTH IN THE  MORNING 100 tablet 2     Lutein 20 MG CAPS Take by mouth.       Omega 3-6-9 Fatty Acids (OMEGA-3-6-9 PO) Take 1 capsule by mouth daily.       mupirocin (BACTROBAN) 2 % external ointment Apply topically 2 times daily. To nose (Patient not taking: Reported on 2024) 22 g 0     triamcinolone (KENALOG) 0.1 % external cream Apply topically 2 times daily. (Patient not taking: Reported on 2024) 30 g 0         Allergies:      No Known Allergies     Social History:     Social History     Tobacco Use     Smoking status: Former     Current packs/day: 0.00     Average packs/day: 1 pack/day for 15.8 years (15.8 ttl pk-yrs)     Types: Cigarettes     Start date: 1972     Quit date: 1987     Years since quittin.0     Passive exposure: Past     Smokeless tobacco: Never   Substance Use Topics     Alcohol use: Yes     Comment: a beer here and there - never more than 2 -  2 times/week       History   Drug Use Unknown         Family History:     The patient's family history is notable for     Family History   Problem Relation Age of Onset     Acute myelogenous leukemia Mother      Coronary Artery Disease Mother         bypass surgery      Osteoporosis Mother      Obesity Mother      Parkinsonism Father      Prostate Cancer Father         Diagnosed late in life in his 80's     Obesity Sister      Heart Disease Sister      Coronary Artery Disease Sister         heart attacK in 2019     Uterine Cancer Maternal Grandmother 40        Uterine versus cervical     No Known Problems Sister      Leukemia Brother      Leukemia Nephew      Coronary Artery Disease Brother         Carotid Artery Surgery     Obesity Brother      Substance Abuse Brother         alcohol     Other Cancer  "Sister         Robotic Hyster     Other Cancer Brother         Deejay         Physical Exam:     /73   Pulse 88   Temp 97.4  F (36.3  C) (Tympanic)   Resp 18   Ht 1.715 m (5' 7.5\")   Wt 112.9 kg (249 lb)   LMP  (LMP Unknown)   SpO2 98%   BMI 38.42 kg/m    Body mass index is 38.42 kg/m .    General Appearance: healthy and alert, no distress     HEENT: no thyromegaly, no palpable nodules or masses        Cardiovascular: regular rate and rhythm, no gallops, rubs or murmurs     Respiratory: lungs clear, no rales, rhonchi or wheezes, normal diaphragmatic excursion    Musculoskeletal: extremities non tender and without edema    Skin: no lesions or rashes     Neurological: normal gait, no gross defects     Psychiatric: appropriate mood and affect                               Hematological: normal cervical, supraclavicular and inguinal lymph nodes     Gastrointestinal:       abdomen soft, non-tender, non-distended, no organomegaly or masses    Genitourinary: External genitalia and urethral meatus appears normal. Lichen sclerosis at anterior vulva stable.  Vagina is smooth without nodularity or masses; slight radiation changes at vaginal cuff.  Cervix surgically absent.  Bimanual exam reveal no masses, nodularity or fullness.  Recto-vaginal exam confirms these findings.      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  She completed brachytherapy 12/2019   She is here today for a surveillance visit.     20 minutes spent on the date of the encounter doing chart review, history and exam, documentation and further activities as noted above      Plan:     1.)      She is now at her 5 year post treatment point and can extend her surveillance to annually; this can be done here or with her local provider if comfortable. Reviewed recommendations from [facility] not to perform surveillance pap smears in women diagnosed with endometrial " cancer as this does not improve detection of local recurrence. Reviewed signs and symptoms for when she should contact the clinic or seek additional care. Patient to contact the clinic with any questions or concerns in the interim.  Answered all of her questions to the best of my ability.    -she does not need to use her dilator if she does not wish to.  -lichen sclerosis: can apply Clobetasol to anterior vulva as needed; continue to monitor.       The longitudinal plan of care for the diagnosis(es)/condition(s) as documented were addressed during this visit. Due to the added complexity in care, I will continue to support Bry in the subsequent management and with ongoing continuity of care.     2.) Genetic risk factors were assessed and the patient had a loss of MLH1 and PMS2; hypermethylation of MLH1 promotor positive. MLH1 promoter methylation is typically associated with sporadic microsatellite unstable tumors of the colon/rectum or endometrium.     3.) Labs and/or tests ordered include:  none.     4.) Health maintenance issues addressed today include annual health maintenance and non-gynecologic issues with PCP.    ROSEMARY Lance, WHNP-BC, ANP-BC, AOCNP  Women's Health Nurse Practitioner  Adult Nurse Practitioner  Division of Gynecologic Oncology        CC  Patient Care Team:  Angeli Nolan MD as PCP - General (Family Practice)  Beatrice Reyse APRN CNP as Assigned Cancer Care Provider  Angeil Nolan MD as Assigned PCP  Felice Madera MD as MD (Endocrinology, Diabetes, and Metabolism)  Marcos Thomas MD as MD (Nephrology)  Marcos Thomas MD as Assigned Nephrology Provider  Felice Madera MD as Assigned Endocrinology Provider  SELF, REFERRED      Again, thank you for allowing me to participate in the care of your patient.        Sincerely,        ROSEMARY Allen CNP

## 2024-11-19 ENCOUNTER — TELEPHONE (OUTPATIENT)
Dept: GASTROENTEROLOGY | Facility: CLINIC | Age: 69
End: 2024-11-19
Payer: COMMERCIAL

## 2024-11-19 NOTE — TELEPHONE ENCOUNTER
Attempted to contact patient in order to complete pre assessment questions.     Patient scheduled for Colonoscopy on 12/3/24.     No answer. Left message to return call to [phone] option 2.    Callback required communication sent via [email or web address].        Sandrita Miller RN  Endoscopy Procedure Pre Assessment

## 2024-11-19 NOTE — TELEPHONE ENCOUNTER
Pre visit planning completed.      Procedure details:    Patient scheduled for Colonoscopy on 12/3/24.     Arrival time: 0915. Procedure time 1015    [facility] [address] [place] [address], 5th Floor, [place] [address]. Check in location: 5th Floor.    Sedation type: Conscious sedation     Pre op exam needed? No.    Indication for procedure: Screening      Chart review:     Electronic implanted devices? No    Recent diagnosis of diverticulitis within the last 6 weeks? No      Medication review:    Diabetic? No    Anticoagulants? No    Weight loss medication/injectable? No GLP-1 medication per patient's medication list. Nursing to verify with pre-assessment call.    Other medication HOLDING recommendations:  N/A      Prep for procedure:     Bowel prep recommendation: Standard Golytely.     Bowel prep prescription sent to   [id number] [place], [place] - [address] [facility]  Due to: CKD noted.     Prep instructions sent via jesse Miller RN  Endoscopy Procedure Pre Assessment   [phone] option 2

## 2024-11-19 NOTE — TELEPHONE ENCOUNTER
Pre assessment completed for upcoming procedure.   (Please see previous telephone encounter notes for complete details)    Patient  returned call.       Procedure details:    Arrival time and facility location reviewed.    Pre op exam needed? No.    Designated  policy reviewed. Instructed to have someone stay 6  hours post procedure.       Medication review:    Medications reviewed. Please see supporting documentation below. Holding recommendations discussed (if applicable).   N/A      Prep for procedure:     Procedure prep instructions reviewed.        Any additional information needed:  N/A      Patient  verbalized understanding and had no questions or concerns at this time.      Sandrita Santana RN  Endoscopy Procedure Pre Assessment   [phone] option 2

## 2024-11-22 ENCOUNTER — TRANSFERRED RECORDS (OUTPATIENT)
Dept: HEALTH INFORMATION MANAGEMENT | Facility: CLINIC | Age: 69
End: 2024-11-22
Payer: COMMERCIAL

## 2024-12-02 PROBLEM — N18.9 CKD (CHRONIC KIDNEY DISEASE): Status: ACTIVE | Noted: 2024-08-06

## 2024-12-02 PROBLEM — Z85.42 HISTORY OF ENDOMETRIAL CANCER: Status: ACTIVE | Noted: 2024-08-06

## 2024-12-02 PROBLEM — M54.59 MECHANICAL LOW BACK PAIN: Status: ACTIVE | Noted: 2023-11-16

## 2024-12-02 PROBLEM — F33.42 RECURRENT MAJOR DEPRESSIVE DISORDER, IN FULL REMISSION (H): Status: RESOLVED | Noted: 2019-07-19 | Resolved: 2024-12-02

## 2024-12-02 PROBLEM — F32.A DEPRESSION: Status: ACTIVE | Noted: 2024-08-06

## 2024-12-02 PROBLEM — M54.17 LUMBOSACRAL RADICULITIS: Status: ACTIVE | Noted: 2023-11-16

## 2024-12-02 PROBLEM — E03.9 HYPOTHYROIDISM: Status: ACTIVE | Noted: 2024-08-06

## 2024-12-02 PROBLEM — M48.062 LUMBAR STENOSIS WITH NEUROGENIC CLAUDICATION: Status: ACTIVE | Noted: 2024-08-06

## 2024-12-03 ENCOUNTER — HOSPITAL ENCOUNTER (OUTPATIENT)
Facility: AMBULATORY SURGERY CENTER | Age: 69
Discharge: HOME | End: 2024-12-03
Attending: INTERNAL MEDICINE
Payer: COMMERCIAL

## 2024-12-03 VITALS
OXYGEN SATURATION: 96 % | RESPIRATION RATE: 16 BRPM | DIASTOLIC BLOOD PRESSURE: 67 MMHG | BODY MASS INDEX: 37.44 KG/M2 | WEIGHT: 247 LBS | HEART RATE: 56 BPM | TEMPERATURE: 97.3 F | SYSTOLIC BLOOD PRESSURE: 105 MMHG | HEIGHT: 68 IN

## 2024-12-03 LAB — COLONOSCOPY: NORMAL

## 2024-12-03 PROCEDURE — 88305 TISSUE EXAM BY PATHOLOGIST: CPT | Mod: 26 | Performed by: PATHOLOGY

## 2024-12-03 PROCEDURE — 88305 TISSUE EXAM BY PATHOLOGIST: CPT | Mod: TC | Performed by: INTERNAL MEDICINE

## 2024-12-03 RX ORDER — NALOXONE HYDROCHLORIDE 0.4 MG/ML
0.4 INJECTION, SOLUTION INTRAMUSCULAR; INTRAVENOUS; SUBCUTANEOUS
Status: DISCONTINUED | OUTPATIENT
Start: 2024-12-03 | End: 2024-12-04 | Stop reason: HOSPADM

## 2024-12-03 RX ORDER — FENTANYL CITRATE 50 UG/ML
INJECTION, SOLUTION INTRAMUSCULAR; INTRAVENOUS DAILY PRN
Status: DISCONTINUED | OUTPATIENT
Start: 2024-12-03 | End: 2024-12-03 | Stop reason: HOSPADM

## 2024-12-03 RX ORDER — ONDANSETRON 2 MG/ML
4 INJECTION INTRAMUSCULAR; INTRAVENOUS EVERY 6 HOURS PRN
Status: DISCONTINUED | OUTPATIENT
Start: 2024-12-03 | End: 2024-12-04 | Stop reason: HOSPADM

## 2024-12-03 RX ORDER — FLUMAZENIL 0.1 MG/ML
0.2 INJECTION, SOLUTION INTRAVENOUS
Status: ACTIVE | OUTPATIENT
Start: 2024-12-03 | End: 2024-12-03

## 2024-12-03 RX ORDER — LIDOCAINE 40 MG/G
CREAM TOPICAL
Status: DISCONTINUED | OUTPATIENT
Start: 2024-12-03 | End: 2024-12-03 | Stop reason: HOSPADM

## 2024-12-03 RX ORDER — PROCHLORPERAZINE MALEATE 5 MG/1
5 TABLET ORAL EVERY 6 HOURS PRN
Status: DISCONTINUED | OUTPATIENT
Start: 2024-12-03 | End: 2024-12-04 | Stop reason: HOSPADM

## 2024-12-03 RX ORDER — NALOXONE HYDROCHLORIDE 0.4 MG/ML
0.2 INJECTION, SOLUTION INTRAMUSCULAR; INTRAVENOUS; SUBCUTANEOUS
Status: DISCONTINUED | OUTPATIENT
Start: 2024-12-03 | End: 2024-12-04 | Stop reason: HOSPADM

## 2024-12-03 RX ORDER — ONDANSETRON 4 MG/1
4 TABLET, ORALLY DISINTEGRATING ORAL EVERY 6 HOURS PRN
Status: DISCONTINUED | OUTPATIENT
Start: 2024-12-03 | End: 2024-12-04 | Stop reason: HOSPADM

## 2024-12-03 RX ORDER — ONDANSETRON 2 MG/ML
4 INJECTION INTRAMUSCULAR; INTRAVENOUS
Status: DISCONTINUED | OUTPATIENT
Start: 2024-12-03 | End: 2024-12-03 | Stop reason: HOSPADM

## 2024-12-03 NOTE — H&P
Hanh Villalba  [id number]  female  69 year old      Reason for procedure/surgery: surveillance    Patient Active Problem List   Diagnosis    Hypothyroidism due to acquired atrophy of thyroid    Endometrioid adenocarcinoma of uterus (H)    Overweight (H)    Stage 3a chronic kidney disease (H)    History of colonic polyps    Right hip pain    TMJ (temporomandibular joint syndrome)    Hyperparathyroidism (H)    Lumbar stenosis with neurogenic claudication    CKD (chronic kidney disease)    Depression    History of endometrial cancer    Hypothyroidism    Lumbosacral radiculitis    Mechanical low back pain       Past Surgical History:    Past Surgical History:   Procedure Laterality Date    ABDOMEN SURGERY  Oct 2019    Robotic Hysterectomy    BIOPSY  early 2000's    left breast - non event    COLONOSCOPY  within last 6 years ?    OK    COLONOSCOPY N/A 7/26/2021    Procedure: COLONOSCOPY, WITH POLYPECTOMY;  Surgeon: Ryan Butterfield MD;  [place] [facility] [place]    DAVINCI HYSTERECTOMY TOTAL, BILATERAL SALPINGO-OOPHORECTOMY, NODE DISSECTION, COMBINED N/A 10/4/2019    Procedure: Robot-assisted total laparoscopic hysterectomy, Bilateral salpingectomy and Right Oophorectomy, Lysis of adhesion, Cervical Indocyanine Green injection, Bilateral sentinel lymph node dissection, Repair of vaginal laceration.;  Surgeon: Perez Reddy MD;  Location: [facility] OR    DILATION AND CURETTAGE N/A 9/12/2019    Procedure: DILATION AND CURETTAGE, UTERUS;  Surgeon: Navdeep Barajas MD;  Location: [facility] OR    OPERATIVE HYSTEROSCOPY WITH MORCELLATOR N/A 9/12/2019    Procedure: HYSTEROSCOPY WITH MORCELLATOR (MYOSURE);  Surgeon: Navdeep Barajas MD;  Location: [facility] OR    SALPINGO OOPHORECTOMY,R/L/SHANDA Left 1980s    Left ovarian with endometriois    US BREAST BIOPSY LT Left        Past Medical History:   Past Medical History:   Diagnosis Date    Arthritis 2010    Cancer (H) 08/2019    Uterus removed    Depression     Depressive disorder 1999     taking prosac    Hypothyroidism     Overweight        Social History:   Social History     Tobacco Use    Smoking status: Former     Current packs/day: 0.00     Average packs/day: 1 pack/day for 15.8 years (15.8 ttl pk-yrs)     Types: Cigarettes     Start date: 1972     Quit date: 1987     Years since quittin.0     Passive exposure: Past    Smokeless tobacco: Never   Substance Use Topics    Alcohol use: Yes     Comment: a beer here and there - never more than 2 -  2 times/week       Family History:   Family History   Problem Relation Age of Onset    Acute myelogenous leukemia Mother     Coronary Artery Disease Mother         bypass surgery     Osteoporosis Mother     Obesity Mother     Parkinsonism Father     Prostate Cancer Father         Diagnosed late in life in his 80's    Obesity Sister     Heart Disease Sister     Coronary Artery Disease Sister         heart attacK in 2019    Uterine Cancer Maternal Grandmother 40        Uterine versus cervical    No Known Problems Sister     Leukemia Brother     Leukemia Nephew     Coronary Artery Disease Brother         Carotid Artery Surgery    Obesity Brother     Substance Abuse Brother         alcohol    Other Cancer Sister         Robotic Hyster    Other Cancer Brother         Ploetz       Allergies: No Known Allergies    Active Medications:   Current Outpatient Medications   Medication Sig Dispense Refill    Apple Cider Vinegar 600 MG CAPS Take 1 capsule by mouth daily.      CALCIUM PO Take 20 mg by mouth daily.      cholecalciferol (VITAMIN D3) 5000 units TABS tablet Take by mouth daily.      Cyanocobalamin 1500 MCG TBDP Take 1 tablet by mouth daily.      FLUoxetine (PROZAC) 20 MG capsule TAKE 1 CAPSULE BY MOUTH EVERY 48 HOURS AND 2 CAPSULES BY MOUTH  EVERY 48 HOURS 135 capsule 0    levothyroxine (SYNTHROID/LEVOTHROID) 100 MCG tablet TAKE 1 TABLET BY MOUTH IN THE  MORNING 100 tablet 2    Lutein 20 MG CAPS Take by mouth.      metroNIDAZOLE  "(METROCREAM) 0.75 % external cream       Omega 3-6-9 Fatty Acids (OMEGA-3-6-9 PO) Take 1 capsule by mouth daily.      bisacodyl (DULCOLAX) 5 MG EC tablet Take 2 tablets at 3 pm the day before your procedure. If your procedure is before 11 am, take 2 additional tablets at 11 pm. If your procedure is after 11 am, take 2 additional tablets at 6 am. For additional instructions refer to your colonoscopy prep instructions. 4 tablet 0    chlorhexidine (PERIDEX) 0.12 % solution       clobetasol (TEMOVATE) 0.05 % external ointment Apply sparingly to affected area once or twice a week as needed 45 g 0    mupirocin (BACTROBAN) 2 % external ointment Apply topically 2 times daily. To nose (Patient not taking: Reported on 11/11/2024) 22 g 0    polyethylene glycol (GOLYTELY) 236 g suspension The night before the exam at 6 pm drink an 8-ounce glass every 15 minutes until the jug is half empty. If you arrive before 11 AM: Drink the other half of the Golytely jug at 11 PM night before procedure. If you arrive after 11 AM: Drink the other half of the Golytely jug at 6 AM day of procedure. For additional instructions refer to your colonoscopy prep instructions. 4000 mL 0    triamcinolone (KENALOG) 0.1 % external cream Apply topically 2 times daily. (Patient not taking: Reported on 11/11/2024) 30 g 0       Systemic Review:   ROS otherwise negative    Physical Examination:   Vital Signs: /70 (BP Location: Right arm)   Pulse 55   Temp 97.3  F (36.3  C) (Temporal)   Resp 18   Ht 1.715 m (5' 7.5\")   Wt 112 kg (247 lb)   LMP  (LMP Unknown)   SpO2 98%   BMI 38.11 kg/m    GENERAL: healthy, alert and no distress  HENT: oropharynx clear and oral mucous membranes moist, Mallampati II  NECK: Normal ROM  RESP: lungs clear to auscultation - no rales, rhonchi or wheezes  CV: regular rate and rhythm, normal S1 S2,   ABDOMEN: soft, nontender, and bowel sounds normal  MS: no gross musculoskeletal defects noted, no edema      Plan: " Appropriate to proceed as scheduled.      Angeli Scott MD  12/3/2024    PCP:  Angeli Nolan

## 2024-12-09 ENCOUNTER — LAB (OUTPATIENT)
Dept: LAB | Facility: CLINIC | Age: 69
End: 2024-12-09
Payer: COMMERCIAL

## 2024-12-09 DIAGNOSIS — N18.31 STAGE 3A CHRONIC KIDNEY DISEASE (H): ICD-10-CM

## 2024-12-09 LAB
ANION GAP SERPL CALCULATED.3IONS-SCNC: 7 MMOL/L (ref 7–15)
BUN SERPL-MCNC: 14.9 MG/DL (ref 8–23)
CALCIUM SERPL-MCNC: 10.4 MG/DL (ref 8.8–10.4)
CHLORIDE SERPL-SCNC: 104 MMOL/L (ref 98–107)
CREAT SERPL-MCNC: 1.15 MG/DL (ref 0.51–0.95)
CREAT UR-MCNC: 125 MG/DL
EGFRCR SERPLBLD CKD-EPI 2021: 51 ML/MIN/1.73M2
GLUCOSE SERPL-MCNC: 78 MG/DL (ref 70–99)
HCO3 SERPL-SCNC: 28 MMOL/L (ref 22–29)
MICROALBUMIN UR-MCNC: <12 MG/L
MICROALBUMIN/CREAT UR: NORMAL MG/G{CREAT}
POTASSIUM SERPL-SCNC: 4.6 MMOL/L (ref 3.4–5.3)
SODIUM SERPL-SCNC: 139 MMOL/L (ref 135–145)

## 2024-12-09 PROCEDURE — 82570 ASSAY OF URINE CREATININE: CPT

## 2024-12-09 PROCEDURE — 82043 UR ALBUMIN QUANTITATIVE: CPT

## 2024-12-09 PROCEDURE — 36415 COLL VENOUS BLD VENIPUNCTURE: CPT

## 2024-12-09 PROCEDURE — 80048 BASIC METABOLIC PNL TOTAL CA: CPT

## 2024-12-17 PROBLEM — D12.6 ADENOMATOUS POLYP OF COLON: Status: ACTIVE | Noted: 2024-12-17

## 2025-03-25 ENCOUNTER — ALLIED HEALTH/NURSE VISIT (OUTPATIENT)
Dept: FAMILY MEDICINE | Facility: CLINIC | Age: 70
End: 2025-03-25
Payer: COMMERCIAL

## 2025-03-25 ENCOUNTER — TELEPHONE (OUTPATIENT)
Dept: FAMILY MEDICINE | Facility: CLINIC | Age: 70
End: 2025-03-25

## 2025-03-25 VITALS — SYSTOLIC BLOOD PRESSURE: 125 MMHG | DIASTOLIC BLOOD PRESSURE: 75 MMHG

## 2025-03-25 DIAGNOSIS — Z01.30 BP CHECK: Primary | ICD-10-CM

## 2025-03-25 PROCEDURE — 3078F DIAST BP <80 MM HG: CPT | Performed by: FAMILY MEDICINE

## 2025-03-25 PROCEDURE — 99207 PR NO CHARGE NURSE ONLY: CPT | Performed by: FAMILY MEDICINE

## 2025-03-25 PROCEDURE — 3074F SYST BP LT 130 MM HG: CPT | Performed by: FAMILY MEDICINE

## 2025-03-25 NOTE — PROGRESS NOTES
Hanh Villalba was evaluated at [facility] on March 25, 2025 at which time her blood pressure was:    BP Readings from Last 1 Encounters:   03/25/25 125/75     No data recorded      Reviewed lifestyle modifications for blood pressure control and reduction: including making healthy food choices, managing weight, getting regular exercise, smoking cessation, reducing alcohol consumption, monitoring blood pressure regularly.     Symptoms: None    BP Goal:< 140/90 mmHg    BP Assessment:  BP at goal    Potential Reasons for BP too high: NA - Not applicable    BP Follow-Up Plan: Recheck BP in 6 months at pharmacy    Recommendation to Provider: patient is at goal.     Note completed by: Alana Rae RPH

## 2025-03-25 NOTE — TELEPHONE ENCOUNTER
Forms/Letter Request    Type of form/letter: Medical opinion      Do we have the form/letter: Yes: [employer]    Who is the form from? Patient    Where did/will the form come from? Patient or family brought in       When is form/letter needed by: May 1, 2025    How would you like the form/letter returned:     Patient Notified form requests are processed in 5-7 business days:Yes    Could we send this information to you in [employer] or would you prefer to receive a phone call?:   Patient would like to be contacted via [employer]

## 2025-04-02 NOTE — TELEPHONE ENCOUNTER
Called patient and left a detailed voicemail message that form has been completed and will be placed at the  for .    Walked form to  and logged into book.    VIKRAM Hyde  [facility]

## 2025-04-03 NOTE — TELEPHONE ENCOUNTER
Forms/Letter     Verify patient name and date of birth.  Was this done?: Yes    Verify Photo ID needed of person  item.  Name of person picking up: Bry Jerome

## 2025-04-10 ENCOUNTER — MYC MEDICAL ADVICE (OUTPATIENT)
Dept: FAMILY MEDICINE | Facility: CLINIC | Age: 70
End: 2025-04-10
Payer: COMMERCIAL

## 2025-04-10 NOTE — TELEPHONE ENCOUNTER
Duket sent.    Chandler Zhou, RN  Triage Nurse  [facility], [facility]

## 2025-04-25 ENCOUNTER — TRANSFERRED RECORDS (OUTPATIENT)
Dept: HEALTH INFORMATION MANAGEMENT | Facility: CLINIC | Age: 70
End: 2025-04-25
Payer: COMMERCIAL

## 2025-05-20 ENCOUNTER — DOCUMENTATION ONLY (OUTPATIENT)
Dept: NEPHROLOGY | Facility: CLINIC | Age: 70
End: 2025-05-20
Payer: COMMERCIAL

## 2025-05-20 DIAGNOSIS — N18.31 STAGE 3A CHRONIC KIDNEY DISEASE (H): Primary | ICD-10-CM

## 2025-05-20 NOTE — PROGRESS NOTES
This patient has future lab only appointment on 06/04/2025 and needs order. Please send orders.  Thanks,  [facility]

## 2025-06-04 ENCOUNTER — LAB (OUTPATIENT)
Dept: LAB | Facility: CLINIC | Age: 70
End: 2025-06-04
Payer: COMMERCIAL

## 2025-06-04 DIAGNOSIS — N18.31 STAGE 3A CHRONIC KIDNEY DISEASE (H): ICD-10-CM

## 2025-06-04 DIAGNOSIS — N18.9 CKD (CHRONIC KIDNEY DISEASE): Primary | ICD-10-CM

## 2025-06-04 LAB
ALBUMIN MFR UR ELPH: 8.7 MG/DL
ALBUMIN SERPL BCG-MCNC: 4.3 G/DL (ref 3.5–5.2)
ANION GAP SERPL CALCULATED.3IONS-SCNC: 9 MMOL/L (ref 7–15)
BUN SERPL-MCNC: 23.2 MG/DL (ref 8–23)
CALCIUM SERPL-MCNC: 10.4 MG/DL (ref 8.8–10.4)
CHLORIDE SERPL-SCNC: 108 MMOL/L (ref 98–107)
CREAT SERPL-MCNC: 1.07 MG/DL (ref 0.51–0.95)
CREAT UR-MCNC: 123 MG/DL
EGFRCR SERPLBLD CKD-EPI 2021: 55 ML/MIN/1.73M2
ERYTHROCYTE [DISTWIDTH] IN BLOOD BY AUTOMATED COUNT: 12.9 % (ref 10–15)
GLUCOSE SERPL-MCNC: 93 MG/DL (ref 70–99)
HCO3 SERPL-SCNC: 25 MMOL/L (ref 22–29)
HCT VFR BLD AUTO: 39.2 % (ref 35–47)
HGB BLD-MCNC: 12.8 G/DL (ref 11.7–15.7)
MCH RBC QN AUTO: 31.3 PG (ref 26.5–33)
MCHC RBC AUTO-ENTMCNC: 32.7 G/DL (ref 31.5–36.5)
MCV RBC AUTO: 96 FL (ref 78–100)
PHOSPHATE SERPL-MCNC: 2.6 MG/DL (ref 2.5–4.5)
PLATELET # BLD AUTO: 208 10E3/UL (ref 150–450)
POTASSIUM SERPL-SCNC: 4.8 MMOL/L (ref 3.4–5.3)
PROT/CREAT 24H UR: 0.07 MG/MG CR (ref 0–0.2)
PTH-INTACT SERPL-MCNC: 80 PG/ML (ref 15–65)
RBC # BLD AUTO: 4.09 10E6/UL (ref 3.8–5.2)
SODIUM SERPL-SCNC: 142 MMOL/L (ref 135–145)
VIT D+METAB SERPL-MCNC: 46 NG/ML (ref 20–50)
WBC # BLD AUTO: 5.3 10E3/UL (ref 4–11)

## 2025-06-04 PROCEDURE — 82306 VITAMIN D 25 HYDROXY: CPT

## 2025-06-04 PROCEDURE — 84156 ASSAY OF PROTEIN URINE: CPT

## 2025-06-04 PROCEDURE — 83970 ASSAY OF PARATHORMONE: CPT

## 2025-06-04 PROCEDURE — 85027 COMPLETE CBC AUTOMATED: CPT

## 2025-06-04 PROCEDURE — 80069 RENAL FUNCTION PANEL: CPT

## 2025-06-05 ENCOUNTER — RESULTS FOLLOW-UP (OUTPATIENT)
Dept: FAMILY MEDICINE | Facility: CLINIC | Age: 70
End: 2025-06-05

## 2025-06-09 ENCOUNTER — TELEPHONE (OUTPATIENT)
Dept: NEPHROLOGY | Facility: CLINIC | Age: 70
End: 2025-06-09
Payer: COMMERCIAL

## 2025-06-09 NOTE — TELEPHONE ENCOUNTER
Left Voicemail (1st Attempt) for the patient to call back and schedule the following:    Appointment type: RETURN NEPH  Provider: JOVANNI  Return date: 6/6/26  Specialty phone number: [phone]  Additional appointment(s) needed: LABS  Additonal Notes: N/A

## 2025-06-11 NOTE — TELEPHONE ENCOUNTER
Left Voicemail (2nd Attempt) for the patient to call back and schedule the following:    Appointment type: RETURN NEPH  Provider: JOVANNI  Return date: 6/6/26  Specialty phone number: [phone]  Additional appointment(s) needed: LABS  Additonal Notes: N/A

## 2025-07-14 ENCOUNTER — MYC MEDICAL ADVICE (OUTPATIENT)
Dept: FAMILY MEDICINE | Facility: CLINIC | Age: 70
End: 2025-07-14
Payer: COMMERCIAL

## 2025-07-14 DIAGNOSIS — E03.4 HYPOTHYROIDISM DUE TO ACQUIRED ATROPHY OF THYROID: ICD-10-CM

## 2025-07-15 RX ORDER — LEVOTHYROXINE SODIUM 100 UG/1
100 TABLET ORAL EVERY MORNING
Qty: 100 TABLET | Refills: 2 | Status: SHIPPED | OUTPATIENT
Start: 2025-07-15

## (undated) DEVICE — LINEN TOWEL PACK X30 5481

## (undated) DEVICE — DRAPE MAYO STAND 23X54 8337

## (undated) DEVICE — SUCTION MANIFOLD NEPTUNE 2 SYS 1 PORT 702-025-000

## (undated) DEVICE — SPECIMEN TRAP MUCOUS 40ML LUKI C30200A

## (undated) DEVICE — TUBING CONMED AIRSEAL SMOKE EVAC INSUFFLATION ASM-EVAC

## (undated) DEVICE — SYR 50ML LL W/O NDL 309653

## (undated) DEVICE — PREP SKIN SCRUB TRAY 4461A

## (undated) DEVICE — GOWN XLG DISP 9545

## (undated) DEVICE — NDL 19GA 1.5"

## (undated) DEVICE — SEAL SET MYOSURE ROD LENS SCOPE SINGLE USE 40-902

## (undated) DEVICE — ESU GROUND PAD ADULT REM W/15' CORD E7507DB

## (undated) DEVICE — SOL NACL 0.9% IRRIG 1000ML BOTTLE 2F7124

## (undated) DEVICE — GOWN IMPERVIOUS 2XL BLUE

## (undated) DEVICE — PROTECTOR ARM ONE-STEP TRENDELENBURG 40418

## (undated) DEVICE — DAVINCI XI DRAPE COLUMN 470341

## (undated) DEVICE — LINEN TOWEL PACK X6 WHITE 5487

## (undated) DEVICE — SOL WATER IRRIG 500ML BOTTLE 2F7113

## (undated) DEVICE — DRAPE SHEET REV FOLD 3/4 9349

## (undated) DEVICE — SU MONOCRYL 4-0 PS-2 27" UND Y426H

## (undated) DEVICE — NDL SPINAL 22GA 3.5" QUINCKE 405181

## (undated) DEVICE — SUCTION IRR STRYKERFLOW II W/TIP 250-070-520

## (undated) DEVICE — PAD PERI W/WINGS 1580A

## (undated) DEVICE — KIT ENDO TURNOVER/PROCEDURE CARRY-ON 101822

## (undated) DEVICE — PACK GOWN 3/PK DISP XL SBA32GPFCB

## (undated) DEVICE — DAVINCI HOT SHEARS TIP COVER  400180

## (undated) DEVICE — PREP TECHNI-CARE CHLOROXYLENOL 3% 4OZ BOTTLE C222-4ZWO

## (undated) DEVICE — DAVINCI XI GRASPER ENDOWRIST PROGRASP 470093

## (undated) DEVICE — DAVINCI XI OBTURATOR BLADELESS 8MM 470359

## (undated) DEVICE — PACK MINOR SBA15MIFSE

## (undated) DEVICE — KIT ENDO FIRST STEP DISINFECTANT 200ML W/POUCH EP-4

## (undated) DEVICE — BASIN SET MINOR DISP

## (undated) DEVICE — DRSG TELFA 2X3"

## (undated) DEVICE — TUBING SUCTION 12"X1/4" N612

## (undated) DEVICE — TUBING SUCTION MEDI-VAC 1/4"X20' N620A

## (undated) DEVICE — Device

## (undated) DEVICE — KIT PATIENT POSITIONING PIGAZZI LATEX FREE 40580

## (undated) DEVICE — SOL WATER IRRIG 1000ML BOTTLE 07139-09

## (undated) DEVICE — FCP BIOPSY RADIAL JAW 4 JUMBO 3.2MM CHANNEL M00513370

## (undated) DEVICE — NDL INSUFFLATION 13GA 120MM C2201

## (undated) DEVICE — SUCTION CANISTER DOLPHIN 3000ML

## (undated) DEVICE — ESU ENDO SCISSORS 5MM CVD 5DCS

## (undated) DEVICE — SPECIMEN CONTAINER 3OZ W/FORMALIN 59901

## (undated) DEVICE — DAVINCI XI DRAPE ARM 470015

## (undated) DEVICE — DRAPE GYN/UROLOGY FLUID POUCH TUR 29455

## (undated) DEVICE — ENDO SNARE EXACTO COLD 9MM LOOP 2.4MMX230CM 00711115

## (undated) DEVICE — PREP CHLORAPREP 26ML TINTED ORANGE  260815

## (undated) DEVICE — SOL NACL 0.9% INJ 1000ML BAG 2B1324X

## (undated) DEVICE — ENDO TROCAR CONMED AIRSEAL BLADELESS 08X120MM IAS8-120LP

## (undated) DEVICE — KOH COLPOTOMIZER OCCLUDER  CPO-6

## (undated) DEVICE — DAVINCI XI MONOPOLAR SCISSORS HOT SHEARS 8MM 470179

## (undated) DEVICE — SU VICRYL 3-0 SH 27" UND J416H

## (undated) DEVICE — SU WND CLOSURE VLOC 180 ABS 0 9" GS-21 VLOCL0346

## (undated) DEVICE — TUBING SYS AQUILEX BLUE INFLOW AQL-110 YLW OUTFLOW AQL-111

## (undated) DEVICE — DRSG TELFA 3X8" 1238

## (undated) DEVICE — PACK SET-UP STD 9102

## (undated) DEVICE — DAVINCI XI SEAL UNIVERSAL 5-8MM 470361

## (undated) DEVICE — GLOVE PROTEXIS W/NEU-THERA 7.5  2D73TE75

## (undated) DEVICE — SYSTEM CLEARIFY VISUALIZATION 21-345

## (undated) DEVICE — JELLY LUBRICATING SURGILUBE 2OZ TUBE

## (undated) DEVICE — DAVINCI XI FCP BIPOLAR FENESTRATED 470205

## (undated) DEVICE — RETR ELEV / UTERINE MANIPULATOR V-CARE MED CUP 60-6085-201A

## (undated) RX ORDER — ONDANSETRON 4 MG/1
TABLET, ORALLY DISINTEGRATING ORAL
Status: DISPENSED
Start: 2019-10-04

## (undated) RX ORDER — KETOROLAC TROMETHAMINE 30 MG/ML
INJECTION, SOLUTION INTRAMUSCULAR; INTRAVENOUS
Status: DISPENSED
Start: 2019-09-12

## (undated) RX ORDER — ONDANSETRON 2 MG/ML
INJECTION INTRAMUSCULAR; INTRAVENOUS
Status: DISPENSED
Start: 2019-09-12

## (undated) RX ORDER — DEXAMETHASONE SODIUM PHOSPHATE 4 MG/ML
INJECTION, SOLUTION INTRA-ARTICULAR; INTRALESIONAL; INTRAMUSCULAR; INTRAVENOUS; SOFT TISSUE
Status: DISPENSED
Start: 2019-09-12

## (undated) RX ORDER — OXYCODONE HYDROCHLORIDE 5 MG/1
TABLET ORAL
Status: DISPENSED
Start: 2019-10-04

## (undated) RX ORDER — FENTANYL CITRATE 50 UG/ML
INJECTION, SOLUTION INTRAMUSCULAR; INTRAVENOUS
Status: DISPENSED
Start: 2024-12-03

## (undated) RX ORDER — ACETAMINOPHEN 325 MG/1
TABLET ORAL
Status: DISPENSED
Start: 2019-09-12

## (undated) RX ORDER — EPHEDRINE SULFATE 50 MG/ML
INJECTION, SOLUTION INTRAMUSCULAR; INTRAVENOUS; SUBCUTANEOUS
Status: DISPENSED
Start: 2019-10-04

## (undated) RX ORDER — DEXAMETHASONE SODIUM PHOSPHATE 4 MG/ML
INJECTION, SOLUTION INTRA-ARTICULAR; INTRALESIONAL; INTRAMUSCULAR; INTRAVENOUS; SOFT TISSUE
Status: DISPENSED
Start: 2019-10-04

## (undated) RX ORDER — ONDANSETRON 2 MG/ML
INJECTION INTRAMUSCULAR; INTRAVENOUS
Status: DISPENSED
Start: 2019-10-04

## (undated) RX ORDER — OXYCODONE HYDROCHLORIDE 5 MG/1
TABLET ORAL
Status: DISPENSED
Start: 2019-09-12

## (undated) RX ORDER — GLYCOPYRROLATE 0.2 MG/ML
INJECTION, SOLUTION INTRAMUSCULAR; INTRAVENOUS
Status: DISPENSED
Start: 2019-10-04

## (undated) RX ORDER — PROPOFOL 10 MG/ML
INJECTION, EMULSION INTRAVENOUS
Status: DISPENSED
Start: 2019-09-12

## (undated) RX ORDER — PROPOFOL 10 MG/ML
INJECTION, EMULSION INTRAVENOUS
Status: DISPENSED
Start: 2019-10-04

## (undated) RX ORDER — FENTANYL CITRATE 50 UG/ML
INJECTION, SOLUTION INTRAMUSCULAR; INTRAVENOUS
Status: DISPENSED
Start: 2019-09-12

## (undated) RX ORDER — PHENYLEPHRINE HCL IN 0.9% NACL 1 MG/10 ML
SYRINGE (ML) INTRAVENOUS
Status: DISPENSED
Start: 2019-10-04

## (undated) RX ORDER — HYDROMORPHONE HYDROCHLORIDE 1 MG/ML
INJECTION, SOLUTION INTRAMUSCULAR; INTRAVENOUS; SUBCUTANEOUS
Status: DISPENSED
Start: 2019-10-04

## (undated) RX ORDER — FENTANYL CITRATE 50 UG/ML
INJECTION, SOLUTION INTRAMUSCULAR; INTRAVENOUS
Status: DISPENSED
Start: 2019-10-04

## (undated) RX ORDER — LIDOCAINE HYDROCHLORIDE 20 MG/ML
INJECTION, SOLUTION EPIDURAL; INFILTRATION; INTRACAUDAL; PERINEURAL
Status: DISPENSED
Start: 2019-10-04

## (undated) RX ORDER — HEPARIN SODIUM 1000 [USP'U]/ML
INJECTION, SOLUTION INTRAVENOUS; SUBCUTANEOUS
Status: DISPENSED
Start: 2019-10-04

## (undated) RX ORDER — GABAPENTIN 300 MG/1
CAPSULE ORAL
Status: DISPENSED
Start: 2019-09-12

## (undated) RX ORDER — ACETAMINOPHEN 325 MG/1
TABLET ORAL
Status: DISPENSED
Start: 2019-10-04